# Patient Record
Sex: MALE | Race: WHITE | Employment: UNEMPLOYED | URBAN - NONMETROPOLITAN AREA
[De-identification: names, ages, dates, MRNs, and addresses within clinical notes are randomized per-mention and may not be internally consistent; named-entity substitution may affect disease eponyms.]

---

## 2017-01-03 ENCOUNTER — TELEPHONE (OUTPATIENT)
Dept: FAMILY MEDICINE CLINIC | Age: 57
End: 2017-01-03

## 2017-01-09 ENCOUNTER — OFFICE VISIT (OUTPATIENT)
Dept: FAMILY MEDICINE CLINIC | Age: 57
End: 2017-01-09

## 2017-01-09 ENCOUNTER — TELEPHONE (OUTPATIENT)
Dept: FAMILY MEDICINE CLINIC | Age: 57
End: 2017-01-09

## 2017-01-09 VITALS
WEIGHT: 223 LBS | DIASTOLIC BLOOD PRESSURE: 84 MMHG | SYSTOLIC BLOOD PRESSURE: 112 MMHG | BODY MASS INDEX: 32.93 KG/M2 | RESPIRATION RATE: 16 BRPM | HEART RATE: 80 BPM | TEMPERATURE: 97.3 F

## 2017-01-09 DIAGNOSIS — M15.9 PRIMARY OSTEOARTHRITIS INVOLVING MULTIPLE JOINTS: ICD-10-CM

## 2017-01-09 DIAGNOSIS — M51.37 DDD (DEGENERATIVE DISC DISEASE), LUMBOSACRAL: ICD-10-CM

## 2017-01-09 DIAGNOSIS — E11.42 TYPE 2 DM WITH DIABETIC NEUROPATHY AFFECTING BOTH SIDES OF BODY (HCC): Primary | ICD-10-CM

## 2017-01-09 PROCEDURE — 99213 OFFICE O/P EST LOW 20 MIN: CPT | Performed by: NURSE PRACTITIONER

## 2017-01-09 RX ORDER — OMEPRAZOLE 40 MG/1
CAPSULE, DELAYED RELEASE ORAL
Qty: 30 CAPSULE | Refills: 6 | Status: SHIPPED | OUTPATIENT
Start: 2017-01-09 | End: 2017-06-05 | Stop reason: SDUPTHER

## 2017-01-09 ASSESSMENT — PATIENT HEALTH QUESTIONNAIRE - PHQ9
SUM OF ALL RESPONSES TO PHQ9 QUESTIONS 1 & 2: 0
2. FEELING DOWN, DEPRESSED OR HOPELESS: 0
1. LITTLE INTEREST OR PLEASURE IN DOING THINGS: 0
SUM OF ALL RESPONSES TO PHQ QUESTIONS 1-9: 0

## 2017-01-09 ASSESSMENT — ENCOUNTER SYMPTOMS: RESPIRATORY NEGATIVE: 1

## 2017-01-16 RX ORDER — BLOOD SUGAR DIAGNOSTIC
STRIP MISCELLANEOUS
Qty: 100 STRIP | Refills: 10 | Status: SHIPPED | OUTPATIENT
Start: 2017-01-16 | End: 2018-12-12 | Stop reason: ALTCHOICE

## 2017-01-31 ENCOUNTER — TELEPHONE (OUTPATIENT)
Dept: FAMILY MEDICINE CLINIC | Age: 57
End: 2017-01-31

## 2017-02-06 ENCOUNTER — OFFICE VISIT (OUTPATIENT)
Dept: FAMILY MEDICINE CLINIC | Age: 57
End: 2017-02-06

## 2017-02-06 VITALS
RESPIRATION RATE: 18 BRPM | BODY MASS INDEX: 32.93 KG/M2 | OXYGEN SATURATION: 97 % | DIASTOLIC BLOOD PRESSURE: 78 MMHG | WEIGHT: 223 LBS | HEART RATE: 67 BPM | SYSTOLIC BLOOD PRESSURE: 110 MMHG

## 2017-02-06 DIAGNOSIS — E11.42 TYPE 2 DIABETES MELLITUS WITH DIABETIC POLYNEUROPATHY, UNSPECIFIED LONG TERM INSULIN USE STATUS: Primary | ICD-10-CM

## 2017-02-06 PROCEDURE — 99213 OFFICE O/P EST LOW 20 MIN: CPT | Performed by: NURSE PRACTITIONER

## 2017-02-06 RX ORDER — GLIMEPIRIDE 4 MG/1
TABLET ORAL
Qty: 30 TABLET | Refills: 3 | Status: SHIPPED | OUTPATIENT
Start: 2017-02-06 | End: 2017-03-20 | Stop reason: ALTCHOICE

## 2017-02-06 ASSESSMENT — PATIENT HEALTH QUESTIONNAIRE - PHQ9
SUM OF ALL RESPONSES TO PHQ9 QUESTIONS 1 & 2: 1
SUM OF ALL RESPONSES TO PHQ QUESTIONS 1-9: 1
2. FEELING DOWN, DEPRESSED OR HOPELESS: 0
1. LITTLE INTEREST OR PLEASURE IN DOING THINGS: 1
SUM OF ALL RESPONSES TO PHQ9 QUESTIONS 1 & 2: 1
2. FEELING DOWN, DEPRESSED OR HOPELESS: 0
1. LITTLE INTEREST OR PLEASURE IN DOING THINGS: 1
SUM OF ALL RESPONSES TO PHQ QUESTIONS 1-9: 1

## 2017-02-06 ASSESSMENT — ENCOUNTER SYMPTOMS
GASTROINTESTINAL NEGATIVE: 1
RESPIRATORY NEGATIVE: 1

## 2017-03-20 ENCOUNTER — OFFICE VISIT (OUTPATIENT)
Dept: FAMILY MEDICINE CLINIC | Age: 57
End: 2017-03-20

## 2017-03-20 VITALS
HEART RATE: 88 BPM | WEIGHT: 226 LBS | OXYGEN SATURATION: 96 % | HEIGHT: 69 IN | BODY MASS INDEX: 33.47 KG/M2 | SYSTOLIC BLOOD PRESSURE: 120 MMHG | DIASTOLIC BLOOD PRESSURE: 70 MMHG

## 2017-03-20 DIAGNOSIS — E11.42 TYPE 2 DIABETES MELLITUS WITH DIABETIC POLYNEUROPATHY, WITH LONG-TERM CURRENT USE OF INSULIN (HCC): Primary | ICD-10-CM

## 2017-03-20 DIAGNOSIS — Z79.4 TYPE 2 DIABETES MELLITUS WITH DIABETIC POLYNEUROPATHY, WITH LONG-TERM CURRENT USE OF INSULIN (HCC): Primary | ICD-10-CM

## 2017-03-20 PROCEDURE — 36415 COLL VENOUS BLD VENIPUNCTURE: CPT | Performed by: NURSE PRACTITIONER

## 2017-03-20 PROCEDURE — 99213 OFFICE O/P EST LOW 20 MIN: CPT | Performed by: NURSE PRACTITIONER

## 2017-03-20 ASSESSMENT — ENCOUNTER SYMPTOMS
EYES NEGATIVE: 1
RESPIRATORY NEGATIVE: 1
GASTROINTESTINAL NEGATIVE: 1

## 2017-03-21 LAB
ESTIMATED AVERAGE GLUCOSE: 162.8 MG/DL
HBA1C MFR BLD: 7.3 %

## 2017-04-12 ENCOUNTER — OFFICE VISIT (OUTPATIENT)
Dept: RHEUMATOLOGY | Age: 57
End: 2017-04-12

## 2017-04-12 VITALS
WEIGHT: 229 LBS | TEMPERATURE: 98.1 F | HEART RATE: 76 BPM | DIASTOLIC BLOOD PRESSURE: 72 MMHG | HEIGHT: 69 IN | BODY MASS INDEX: 33.92 KG/M2 | SYSTOLIC BLOOD PRESSURE: 104 MMHG

## 2017-04-12 DIAGNOSIS — M18.12 PRIMARY OSTEOARTHRITIS OF FIRST CARPOMETACARPAL JOINT OF LEFT HAND: ICD-10-CM

## 2017-04-12 DIAGNOSIS — M15.9 PRIMARY OSTEOARTHRITIS INVOLVING MULTIPLE JOINTS: Primary | ICD-10-CM

## 2017-04-12 DIAGNOSIS — R52 PAIN MANAGEMENT: ICD-10-CM

## 2017-04-12 DIAGNOSIS — Z79.1 NSAID LONG-TERM USE: ICD-10-CM

## 2017-04-12 PROCEDURE — 99214 OFFICE O/P EST MOD 30 MIN: CPT | Performed by: INTERNAL MEDICINE

## 2017-04-12 PROCEDURE — 20600 DRAIN/INJ JOINT/BURSA W/O US: CPT | Performed by: INTERNAL MEDICINE

## 2017-04-12 RX ORDER — OXYCODONE HYDROCHLORIDE AND ACETAMINOPHEN 5; 325 MG/1; MG/1
TABLET ORAL
Qty: 60 TABLET | Refills: 0 | Status: SHIPPED | OUTPATIENT
Start: 2017-04-12 | End: 2017-05-01 | Stop reason: SDUPTHER

## 2017-04-12 RX ORDER — TRIAMCINOLONE ACETONIDE 40 MG/ML
40 INJECTION, SUSPENSION INTRA-ARTICULAR; INTRAMUSCULAR ONCE
Status: COMPLETED | OUTPATIENT
Start: 2017-04-12 | End: 2017-04-12

## 2017-04-12 RX ADMIN — TRIAMCINOLONE ACETONIDE 40 MG: 40 INJECTION, SUSPENSION INTRA-ARTICULAR; INTRAMUSCULAR at 14:11

## 2017-04-19 ENCOUNTER — TELEPHONE (OUTPATIENT)
Dept: FAMILY MEDICINE CLINIC | Age: 57
End: 2017-04-19

## 2017-04-24 ENCOUNTER — TELEPHONE (OUTPATIENT)
Dept: FAMILY MEDICINE CLINIC | Age: 57
End: 2017-04-24

## 2017-05-01 DIAGNOSIS — M15.9 PRIMARY OSTEOARTHRITIS INVOLVING MULTIPLE JOINTS: ICD-10-CM

## 2017-05-01 RX ORDER — BISOPROLOL FUMARATE AND HYDROCHLOROTHIAZIDE 10; 6.25 MG/1; MG/1
TABLET ORAL
Qty: 30 TABLET | Refills: 2 | Status: SHIPPED | OUTPATIENT
Start: 2017-05-01 | End: 2017-06-05 | Stop reason: SDUPTHER

## 2017-05-02 ENCOUNTER — TELEPHONE (OUTPATIENT)
Dept: FAMILY MEDICINE CLINIC | Age: 57
End: 2017-05-02

## 2017-05-02 RX ORDER — DULOXETIN HYDROCHLORIDE 30 MG/1
CAPSULE, DELAYED RELEASE ORAL
Qty: 30 CAPSULE | Refills: 1 | Status: SHIPPED | OUTPATIENT
Start: 2017-05-02 | End: 2017-11-30 | Stop reason: ALTCHOICE

## 2017-05-02 RX ORDER — OXYCODONE HYDROCHLORIDE AND ACETAMINOPHEN 5; 325 MG/1; MG/1
TABLET ORAL
Qty: 60 TABLET | Refills: 0 | Status: SHIPPED | OUTPATIENT
Start: 2017-05-02 | End: 2017-11-16 | Stop reason: DRUGHIGH

## 2017-05-04 ENCOUNTER — TELEPHONE (OUTPATIENT)
Dept: FAMILY MEDICINE CLINIC | Age: 57
End: 2017-05-04

## 2017-05-04 DIAGNOSIS — E11.00 TYPE 2 DIABETES MELLITUS WITH HYPEROSMOLARITY WITHOUT COMA, WITHOUT LONG-TERM CURRENT USE OF INSULIN (HCC): Primary | ICD-10-CM

## 2017-05-17 DIAGNOSIS — Z12.11 SPECIAL SCREENING FOR MALIGNANT NEOPLASMS, COLON: Primary | ICD-10-CM

## 2017-05-24 ENCOUNTER — TELEPHONE (OUTPATIENT)
Dept: FAMILY MEDICINE CLINIC | Age: 57
End: 2017-05-24

## 2017-05-24 DIAGNOSIS — M25.511 ACUTE PAIN OF BOTH SHOULDERS: Primary | ICD-10-CM

## 2017-05-24 DIAGNOSIS — M25.512 ACUTE PAIN OF BOTH SHOULDERS: Primary | ICD-10-CM

## 2017-06-05 ENCOUNTER — OFFICE VISIT (OUTPATIENT)
Dept: FAMILY MEDICINE CLINIC | Age: 57
End: 2017-06-05

## 2017-06-05 VITALS
OXYGEN SATURATION: 97 % | RESPIRATION RATE: 20 BRPM | SYSTOLIC BLOOD PRESSURE: 122 MMHG | HEART RATE: 82 BPM | DIASTOLIC BLOOD PRESSURE: 88 MMHG | BODY MASS INDEX: 33.37 KG/M2 | WEIGHT: 226 LBS

## 2017-06-05 DIAGNOSIS — E11.40 TYPE 2 DIABETES MELLITUS WITH DIABETIC NEUROPATHY, WITH LONG-TERM CURRENT USE OF INSULIN (HCC): Primary | ICD-10-CM

## 2017-06-05 DIAGNOSIS — E78.2 MIXED HYPERLIPIDEMIA: ICD-10-CM

## 2017-06-05 DIAGNOSIS — I10 ESSENTIAL HYPERTENSION: ICD-10-CM

## 2017-06-05 DIAGNOSIS — Z79.4 TYPE 2 DIABETES MELLITUS WITH DIABETIC NEUROPATHY, WITH LONG-TERM CURRENT USE OF INSULIN (HCC): Primary | ICD-10-CM

## 2017-06-05 PROCEDURE — 99213 OFFICE O/P EST LOW 20 MIN: CPT | Performed by: NURSE PRACTITIONER

## 2017-06-05 PROCEDURE — 36415 COLL VENOUS BLD VENIPUNCTURE: CPT | Performed by: NURSE PRACTITIONER

## 2017-06-05 RX ORDER — PRAVASTATIN SODIUM 10 MG
TABLET ORAL
Qty: 30 TABLET | Refills: 6 | Status: SHIPPED | OUTPATIENT
Start: 2017-06-05 | End: 2018-02-22 | Stop reason: SDUPTHER

## 2017-06-05 RX ORDER — OMEPRAZOLE 40 MG/1
CAPSULE, DELAYED RELEASE ORAL
Qty: 30 CAPSULE | Refills: 6 | Status: SHIPPED | OUTPATIENT
Start: 2017-06-05 | End: 2018-03-23 | Stop reason: SDUPTHER

## 2017-06-05 RX ORDER — GLIMEPIRIDE 4 MG/1
4 TABLET ORAL
Qty: 30 TABLET | Refills: 3 | Status: CANCELLED | OUTPATIENT
Start: 2017-06-05

## 2017-06-05 RX ORDER — LISINOPRIL 5 MG/1
TABLET ORAL
Qty: 30 TABLET | Refills: 6 | Status: SHIPPED | OUTPATIENT
Start: 2017-06-05 | End: 2017-11-06 | Stop reason: SDUPTHER

## 2017-06-05 RX ORDER — BISOPROLOL FUMARATE AND HYDROCHLOROTHIAZIDE 10; 6.25 MG/1; MG/1
TABLET ORAL
Qty: 30 TABLET | Refills: 6 | Status: SHIPPED | OUTPATIENT
Start: 2017-06-05 | End: 2018-04-21 | Stop reason: SDUPTHER

## 2017-06-05 ASSESSMENT — ENCOUNTER SYMPTOMS
GASTROINTESTINAL NEGATIVE: 1
RESPIRATORY NEGATIVE: 1

## 2017-06-05 ASSESSMENT — PATIENT HEALTH QUESTIONNAIRE - PHQ9
2. FEELING DOWN, DEPRESSED OR HOPELESS: 0
SUM OF ALL RESPONSES TO PHQ9 QUESTIONS 1 & 2: 0
1. LITTLE INTEREST OR PLEASURE IN DOING THINGS: 0
SUM OF ALL RESPONSES TO PHQ QUESTIONS 1-9: 0

## 2017-06-06 ENCOUNTER — OFFICE VISIT (OUTPATIENT)
Dept: ORTHOPEDIC SURGERY | Age: 57
End: 2017-06-06

## 2017-06-06 VITALS — BODY MASS INDEX: 33.47 KG/M2 | WEIGHT: 225.97 LBS | HEIGHT: 69 IN

## 2017-06-06 DIAGNOSIS — M25.511 RIGHT SHOULDER PAIN, UNSPECIFIED CHRONICITY: Primary | ICD-10-CM

## 2017-06-06 DIAGNOSIS — M25.512 LEFT SHOULDER PAIN, UNSPECIFIED CHRONICITY: ICD-10-CM

## 2017-06-06 LAB
ESTIMATED AVERAGE GLUCOSE: 197.3 MG/DL
HBA1C MFR BLD: 8.5 %

## 2017-06-06 PROCEDURE — 99243 OFF/OP CNSLTJ NEW/EST LOW 30: CPT | Performed by: ORTHOPAEDIC SURGERY

## 2017-06-06 PROCEDURE — 73030 X-RAY EXAM OF SHOULDER: CPT | Performed by: ORTHOPAEDIC SURGERY

## 2017-06-06 RX ORDER — BLOOD-GLUCOSE METER
1 KIT MISCELLANEOUS DAILY PRN
Qty: 1 DEVICE | Refills: 0 | Status: SHIPPED | OUTPATIENT
Start: 2017-06-06 | End: 2017-08-28 | Stop reason: SDUPTHER

## 2017-06-13 ENCOUNTER — OFFICE VISIT (OUTPATIENT)
Dept: ORTHOPEDIC SURGERY | Age: 57
End: 2017-06-13

## 2017-06-13 VITALS — BODY MASS INDEX: 33.47 KG/M2 | WEIGHT: 225.97 LBS | HEIGHT: 69 IN

## 2017-06-13 DIAGNOSIS — M75.111 INCOMPLETE TEAR OF RIGHT ROTATOR CUFF: Primary | ICD-10-CM

## 2017-06-13 DIAGNOSIS — M75.41 IMPINGEMENT SYNDROME OF RIGHT SHOULDER: ICD-10-CM

## 2017-06-13 DIAGNOSIS — M75.42 SHOULDER IMPINGEMENT, LEFT: ICD-10-CM

## 2017-06-13 PROBLEM — M25.819 SHOULDER IMPINGEMENT: Status: ACTIVE | Noted: 2017-06-13

## 2017-06-13 PROBLEM — M75.40 SHOULDER IMPINGEMENT: Status: ACTIVE | Noted: 2017-06-13

## 2017-06-13 PROCEDURE — 20610 DRAIN/INJ JOINT/BURSA W/O US: CPT | Performed by: ORTHOPAEDIC SURGERY

## 2017-06-13 PROCEDURE — 99213 OFFICE O/P EST LOW 20 MIN: CPT | Performed by: ORTHOPAEDIC SURGERY

## 2017-06-13 RX ORDER — MELOXICAM 15 MG/1
15 TABLET ORAL DAILY
Qty: 30 TABLET | Refills: 3 | Status: SHIPPED | OUTPATIENT
Start: 2017-06-13 | End: 2017-10-09 | Stop reason: SDUPTHER

## 2017-06-19 ENCOUNTER — HOSPITAL ENCOUNTER (OUTPATIENT)
Dept: PHYSICAL THERAPY | Age: 57
Discharge: HOME | End: 2017-06-30
Admitting: ORTHOPAEDIC SURGERY

## 2017-07-06 DIAGNOSIS — M15.9 PRIMARY OSTEOARTHRITIS INVOLVING MULTIPLE JOINTS: ICD-10-CM

## 2017-07-10 ENCOUNTER — OFFICE VISIT (OUTPATIENT)
Dept: ENT CLINIC | Age: 57
End: 2017-07-10

## 2017-07-10 VITALS
BODY MASS INDEX: 33.27 KG/M2 | HEIGHT: 69 IN | WEIGHT: 224.6 LBS | DIASTOLIC BLOOD PRESSURE: 68 MMHG | TEMPERATURE: 97.9 F | SYSTOLIC BLOOD PRESSURE: 130 MMHG

## 2017-07-10 DIAGNOSIS — H90.5 HEARING LOSS, SENSORINEURAL: Primary | ICD-10-CM

## 2017-07-10 PROCEDURE — 99203 OFFICE O/P NEW LOW 30 MIN: CPT | Performed by: OTOLARYNGOLOGY

## 2017-07-10 RX ORDER — DULOXETIN HYDROCHLORIDE 30 MG/1
CAPSULE, DELAYED RELEASE ORAL
Qty: 30 CAPSULE | Refills: 0 | OUTPATIENT
Start: 2017-07-10

## 2017-07-11 ENCOUNTER — TELEPHONE (OUTPATIENT)
Dept: RHEUMATOLOGY | Age: 57
End: 2017-07-11

## 2017-07-12 ENCOUNTER — TELEPHONE (OUTPATIENT)
Dept: RHEUMATOLOGY | Age: 57
End: 2017-07-12

## 2017-07-12 DIAGNOSIS — Z79.1 NSAID LONG-TERM USE: Primary | ICD-10-CM

## 2017-07-13 DIAGNOSIS — M79.671 FOOT PAIN, RIGHT: Primary | ICD-10-CM

## 2017-07-13 DIAGNOSIS — M79.672 FOOT PAIN, LEFT: ICD-10-CM

## 2017-07-16 ENCOUNTER — HOSPITAL ENCOUNTER (OUTPATIENT)
Dept: OTHER | Age: 57
Discharge: HOME | End: 2017-07-16
Attending: INTERNAL MEDICINE | Admitting: INTERNAL MEDICINE

## 2017-07-16 LAB
A/G RATIO: 1.7 (ref 1.1–2.2)
ALBUMIN SERPL-MCNC: 4.3 G/DL (ref 3.4–5)
ALP BLD-CCNC: 49 U/L (ref 40–129)
ALT SERPL-CCNC: 21 U/L (ref 10–40)
ANION GAP SERPL CALCULATED.3IONS-SCNC: 15 MMOL/L (ref 3–16)
AST SERPL-CCNC: 16 U/L (ref 15–37)
BILIRUB SERPL-MCNC: 0.7 MG/DL (ref 0–1)
BUN BLDV-MCNC: 22 MG/DL (ref 7–20)
CALCIUM SERPL-MCNC: 9.6 MG/DL (ref 8.3–10.6)
CHLORIDE BLD-SCNC: 97 MMOL/L (ref 99–110)
CO2: 26 MMOL/L (ref 21–32)
CREAT SERPL-MCNC: 0.8 MG/DL (ref 0.9–1.3)
GFR AFRICAN AMERICAN: >60
GFR NON-AFRICAN AMERICAN: >60
GLOBULIN: 2.5 G/DL
GLUCOSE BLD-MCNC: 314 MG/DL (ref 70–99)
POTASSIUM SERPL-SCNC: 4.1 MMOL/L (ref 3.5–5.1)
SODIUM BLD-SCNC: 138 MMOL/L (ref 136–145)
TOTAL PROTEIN: 6.8 G/DL (ref 6.4–8.2)

## 2017-07-19 ENCOUNTER — OFFICE VISIT (OUTPATIENT)
Dept: ENT CLINIC | Age: 57
End: 2017-07-19

## 2017-07-19 DIAGNOSIS — H91.93 HEARING LOSS, BILATERAL: Primary | ICD-10-CM

## 2017-07-19 PROCEDURE — 92557 COMPREHENSIVE HEARING TEST: CPT | Performed by: AUDIOLOGIST

## 2017-08-03 ENCOUNTER — OFFICE VISIT (OUTPATIENT)
Dept: RHEUMATOLOGY | Age: 57
End: 2017-08-03

## 2017-08-03 VITALS
HEIGHT: 69 IN | SYSTOLIC BLOOD PRESSURE: 110 MMHG | DIASTOLIC BLOOD PRESSURE: 80 MMHG | BODY MASS INDEX: 33.33 KG/M2 | TEMPERATURE: 98 F | WEIGHT: 225 LBS

## 2017-08-03 DIAGNOSIS — G89.29 CHRONIC RIGHT SHOULDER PAIN: ICD-10-CM

## 2017-08-03 DIAGNOSIS — M25.511 CHRONIC RIGHT SHOULDER PAIN: ICD-10-CM

## 2017-08-03 DIAGNOSIS — M15.9 PRIMARY OSTEOARTHRITIS INVOLVING MULTIPLE JOINTS: Primary | ICD-10-CM

## 2017-08-03 DIAGNOSIS — Z79.1 NSAID LONG-TERM USE: ICD-10-CM

## 2017-08-03 DIAGNOSIS — R52 PAIN MANAGEMENT: ICD-10-CM

## 2017-08-03 PROCEDURE — 99214 OFFICE O/P EST MOD 30 MIN: CPT | Performed by: INTERNAL MEDICINE

## 2017-08-03 PROCEDURE — 20610 DRAIN/INJ JOINT/BURSA W/O US: CPT | Performed by: INTERNAL MEDICINE

## 2017-08-03 RX ORDER — DULOXETIN HYDROCHLORIDE 60 MG/1
CAPSULE, DELAYED RELEASE ORAL
Qty: 90 CAPSULE | Refills: 0 | Status: SHIPPED | OUTPATIENT
Start: 2017-08-03 | End: 2017-11-07 | Stop reason: SDUPTHER

## 2017-08-03 RX ORDER — OXYCODONE HYDROCHLORIDE AND ACETAMINOPHEN 5; 325 MG/1; MG/1
TABLET ORAL
Qty: 45 TABLET | Refills: 0 | Status: SHIPPED | OUTPATIENT
Start: 2017-08-03 | End: 2017-08-28 | Stop reason: SDUPTHER

## 2017-08-03 RX ORDER — GABAPENTIN 600 MG/1
TABLET ORAL
COMMUNITY
Start: 2017-07-13 | End: 2017-11-06 | Stop reason: SDUPTHER

## 2017-08-03 RX ORDER — TRIAMCINOLONE ACETONIDE 40 MG/ML
40 INJECTION, SUSPENSION INTRA-ARTICULAR; INTRAMUSCULAR ONCE
Status: COMPLETED | OUTPATIENT
Start: 2017-08-03 | End: 2017-08-03

## 2017-08-03 RX ADMIN — TRIAMCINOLONE ACETONIDE 40 MG: 40 INJECTION, SUSPENSION INTRA-ARTICULAR; INTRAMUSCULAR at 12:07

## 2017-08-21 ENCOUNTER — TELEPHONE (OUTPATIENT)
Dept: FAMILY MEDICINE CLINIC | Age: 57
End: 2017-08-21

## 2017-08-28 ENCOUNTER — OFFICE VISIT (OUTPATIENT)
Dept: FAMILY MEDICINE CLINIC | Age: 57
End: 2017-08-28

## 2017-08-28 VITALS
HEART RATE: 84 BPM | DIASTOLIC BLOOD PRESSURE: 90 MMHG | WEIGHT: 218 LBS | BODY MASS INDEX: 32.29 KG/M2 | SYSTOLIC BLOOD PRESSURE: 132 MMHG | RESPIRATION RATE: 16 BRPM | TEMPERATURE: 97.5 F

## 2017-08-28 DIAGNOSIS — E78.5 HYPERLIPIDEMIA ASSOCIATED WITH TYPE 2 DIABETES MELLITUS (HCC): ICD-10-CM

## 2017-08-28 DIAGNOSIS — I10 ESSENTIAL HYPERTENSION: ICD-10-CM

## 2017-08-28 DIAGNOSIS — E11.42 TYPE 2 DIABETES MELLITUS WITH DIABETIC POLYNEUROPATHY, WITH LONG-TERM CURRENT USE OF INSULIN (HCC): Primary | ICD-10-CM

## 2017-08-28 DIAGNOSIS — E78.2 MIXED HYPERLIPIDEMIA: ICD-10-CM

## 2017-08-28 DIAGNOSIS — E11.69 HYPERLIPIDEMIA ASSOCIATED WITH TYPE 2 DIABETES MELLITUS (HCC): ICD-10-CM

## 2017-08-28 DIAGNOSIS — Z79.4 TYPE 2 DIABETES MELLITUS WITH DIABETIC POLYNEUROPATHY, WITH LONG-TERM CURRENT USE OF INSULIN (HCC): Primary | ICD-10-CM

## 2017-08-28 PROCEDURE — 99213 OFFICE O/P EST LOW 20 MIN: CPT | Performed by: NURSE PRACTITIONER

## 2017-08-28 PROCEDURE — 36415 COLL VENOUS BLD VENIPUNCTURE: CPT | Performed by: NURSE PRACTITIONER

## 2017-08-29 LAB
CREATININE URINE: 42.9 MG/DL (ref 39–259)
ESTIMATED AVERAGE GLUCOSE: 248.9 MG/DL
HBA1C MFR BLD: 10.3 %
MICROALBUMIN UR-MCNC: <1.2 MG/DL
MICROALBUMIN/CREAT UR-RTO: NORMAL MG/G (ref 0–30)

## 2017-08-29 ASSESSMENT — ENCOUNTER SYMPTOMS
RESPIRATORY NEGATIVE: 1
EYES NEGATIVE: 1
GASTROINTESTINAL NEGATIVE: 1

## 2017-09-12 ENCOUNTER — OFFICE VISIT (OUTPATIENT)
Dept: ORTHOPEDIC SURGERY | Age: 57
End: 2017-09-12

## 2017-09-12 VITALS
BODY MASS INDEX: 32.29 KG/M2 | SYSTOLIC BLOOD PRESSURE: 115 MMHG | HEART RATE: 81 BPM | WEIGHT: 218.03 LBS | HEIGHT: 69 IN | DIASTOLIC BLOOD PRESSURE: 67 MMHG

## 2017-09-12 DIAGNOSIS — M75.41 IMPINGEMENT SYNDROME OF RIGHT SHOULDER: Primary | ICD-10-CM

## 2017-09-12 DIAGNOSIS — M75.111 INCOMPLETE TEAR OF RIGHT ROTATOR CUFF: ICD-10-CM

## 2017-09-12 DIAGNOSIS — M75.40 SHOULDER IMPINGEMENT, UNSPECIFIED LATERALITY: ICD-10-CM

## 2017-09-12 DIAGNOSIS — M18.12 PRIMARY OSTEOARTHRITIS OF FIRST CARPOMETACARPAL JOINT OF LEFT HAND: ICD-10-CM

## 2017-09-12 PROCEDURE — 20600 DRAIN/INJ JOINT/BURSA W/O US: CPT | Performed by: ORTHOPAEDIC SURGERY

## 2017-09-12 PROCEDURE — 99213 OFFICE O/P EST LOW 20 MIN: CPT | Performed by: ORTHOPAEDIC SURGERY

## 2017-09-20 ENCOUNTER — OFFICE VISIT (OUTPATIENT)
Dept: ORTHOPEDIC SURGERY | Age: 57
End: 2017-09-20

## 2017-09-20 VITALS — HEIGHT: 69 IN | WEIGHT: 218.03 LBS | BODY MASS INDEX: 32.29 KG/M2

## 2017-09-20 DIAGNOSIS — M79.642 HAND PAIN, LEFT: Primary | ICD-10-CM

## 2017-09-20 PROCEDURE — 73140 X-RAY EXAM OF FINGER(S): CPT | Performed by: ORTHOPAEDIC SURGERY

## 2017-09-20 PROCEDURE — 99243 OFF/OP CNSLTJ NEW/EST LOW 30: CPT | Performed by: ORTHOPAEDIC SURGERY

## 2017-09-20 PROCEDURE — L3918 METACARP FX ORTHOSIS PRE OTS: HCPCS | Performed by: ORTHOPAEDIC SURGERY

## 2017-09-21 DIAGNOSIS — M19.049 CMC ARTHRITIS: Primary | ICD-10-CM

## 2017-10-04 ENCOUNTER — TELEPHONE (OUTPATIENT)
Dept: OTHER | Facility: CLINIC | Age: 57
End: 2017-10-04

## 2017-10-04 NOTE — TELEPHONE ENCOUNTER
Left message for pt to return call regarding FIT test.     Need to check status of FIT test and see if patient is planning on completing it.

## 2017-10-09 DIAGNOSIS — M75.111 INCOMPLETE TEAR OF RIGHT ROTATOR CUFF: ICD-10-CM

## 2017-10-09 DIAGNOSIS — M75.42 SHOULDER IMPINGEMENT, LEFT: ICD-10-CM

## 2017-10-09 DIAGNOSIS — M75.41 IMPINGEMENT SYNDROME OF RIGHT SHOULDER: ICD-10-CM

## 2017-10-09 RX ORDER — SYRINGE AND NEEDLE,INSULIN,1ML 28GX1/2"
SYRINGE, EMPTY DISPOSABLE MISCELLANEOUS
Qty: 100 EACH | Refills: 1 | Status: SHIPPED | OUTPATIENT
Start: 2017-10-09 | End: 2017-12-06 | Stop reason: SDUPTHER

## 2017-10-09 RX ORDER — MELOXICAM 15 MG/1
15 TABLET ORAL DAILY
Qty: 30 TABLET | Refills: 3 | Status: ON HOLD | OUTPATIENT
Start: 2017-10-09 | End: 2018-11-26 | Stop reason: HOSPADM

## 2017-11-02 ENCOUNTER — OFFICE VISIT (OUTPATIENT)
Dept: RHEUMATOLOGY | Age: 57
End: 2017-11-02

## 2017-11-02 VITALS
SYSTOLIC BLOOD PRESSURE: 120 MMHG | HEIGHT: 68 IN | WEIGHT: 222 LBS | DIASTOLIC BLOOD PRESSURE: 82 MMHG | TEMPERATURE: 98.3 F | BODY MASS INDEX: 33.65 KG/M2

## 2017-11-02 DIAGNOSIS — M18.11 PRIMARY OSTEOARTHRITIS OF FIRST CARPOMETACARPAL JOINT OF RIGHT HAND: ICD-10-CM

## 2017-11-02 DIAGNOSIS — M15.9 PRIMARY OSTEOARTHRITIS INVOLVING MULTIPLE JOINTS: Primary | ICD-10-CM

## 2017-11-02 DIAGNOSIS — Z79.1 NSAID LONG-TERM USE: ICD-10-CM

## 2017-11-02 DIAGNOSIS — R52 PAIN MANAGEMENT: ICD-10-CM

## 2017-11-02 PROCEDURE — G8427 DOCREV CUR MEDS BY ELIG CLIN: HCPCS | Performed by: INTERNAL MEDICINE

## 2017-11-02 PROCEDURE — 99214 OFFICE O/P EST MOD 30 MIN: CPT | Performed by: INTERNAL MEDICINE

## 2017-11-02 PROCEDURE — G8417 CALC BMI ABV UP PARAM F/U: HCPCS | Performed by: INTERNAL MEDICINE

## 2017-11-02 PROCEDURE — 20600 DRAIN/INJ JOINT/BURSA W/O US: CPT | Performed by: INTERNAL MEDICINE

## 2017-11-02 PROCEDURE — G8482 FLU IMMUNIZE ORDER/ADMIN: HCPCS | Performed by: INTERNAL MEDICINE

## 2017-11-02 PROCEDURE — 3017F COLORECTAL CA SCREEN DOC REV: CPT | Performed by: INTERNAL MEDICINE

## 2017-11-02 PROCEDURE — 1036F TOBACCO NON-USER: CPT | Performed by: INTERNAL MEDICINE

## 2017-11-02 RX ORDER — OXYCODONE HYDROCHLORIDE AND ACETAMINOPHEN 5; 325 MG/1; MG/1
TABLET ORAL
Qty: 45 TABLET | Refills: 0 | Status: SHIPPED | OUTPATIENT
Start: 2017-11-02 | End: 2017-11-16 | Stop reason: DRUGHIGH

## 2017-11-02 RX ORDER — TRIAMCINOLONE ACETONIDE 40 MG/ML
40 INJECTION, SUSPENSION INTRA-ARTICULAR; INTRAMUSCULAR ONCE
Status: COMPLETED | OUTPATIENT
Start: 2017-11-02 | End: 2017-11-02

## 2017-11-02 RX ADMIN — TRIAMCINOLONE ACETONIDE 40 MG: 40 INJECTION, SUSPENSION INTRA-ARTICULAR; INTRAMUSCULAR at 13:30

## 2017-11-06 ENCOUNTER — OFFICE VISIT (OUTPATIENT)
Dept: FAMILY MEDICINE CLINIC | Age: 57
End: 2017-11-06

## 2017-11-06 VITALS
OXYGEN SATURATION: 97 % | HEIGHT: 68 IN | TEMPERATURE: 98 F | RESPIRATION RATE: 16 BRPM | BODY MASS INDEX: 34.4 KG/M2 | HEART RATE: 74 BPM | WEIGHT: 227 LBS | SYSTOLIC BLOOD PRESSURE: 108 MMHG | DIASTOLIC BLOOD PRESSURE: 76 MMHG

## 2017-11-06 DIAGNOSIS — E78.2 MIXED HYPERLIPIDEMIA: ICD-10-CM

## 2017-11-06 DIAGNOSIS — I10 ESSENTIAL HYPERTENSION: ICD-10-CM

## 2017-11-06 DIAGNOSIS — M48.061 SPINAL STENOSIS, LUMBAR REGION, WITHOUT NEUROGENIC CLAUDICATION: Chronic | ICD-10-CM

## 2017-11-06 DIAGNOSIS — Z79.4 TYPE 2 DIABETES MELLITUS WITH DIABETIC NEUROPATHY, WITH LONG-TERM CURRENT USE OF INSULIN (HCC): Primary | ICD-10-CM

## 2017-11-06 DIAGNOSIS — E11.40 TYPE 2 DIABETES MELLITUS WITH DIABETIC NEUROPATHY, WITH LONG-TERM CURRENT USE OF INSULIN (HCC): Primary | ICD-10-CM

## 2017-11-06 PROCEDURE — 3017F COLORECTAL CA SCREEN DOC REV: CPT | Performed by: NURSE PRACTITIONER

## 2017-11-06 PROCEDURE — 1036F TOBACCO NON-USER: CPT | Performed by: NURSE PRACTITIONER

## 2017-11-06 PROCEDURE — G8417 CALC BMI ABV UP PARAM F/U: HCPCS | Performed by: NURSE PRACTITIONER

## 2017-11-06 PROCEDURE — G8482 FLU IMMUNIZE ORDER/ADMIN: HCPCS | Performed by: NURSE PRACTITIONER

## 2017-11-06 PROCEDURE — 36415 COLL VENOUS BLD VENIPUNCTURE: CPT | Performed by: NURSE PRACTITIONER

## 2017-11-06 PROCEDURE — 3046F HEMOGLOBIN A1C LEVEL >9.0%: CPT | Performed by: NURSE PRACTITIONER

## 2017-11-06 PROCEDURE — 99213 OFFICE O/P EST LOW 20 MIN: CPT | Performed by: NURSE PRACTITIONER

## 2017-11-06 PROCEDURE — G8427 DOCREV CUR MEDS BY ELIG CLIN: HCPCS | Performed by: NURSE PRACTITIONER

## 2017-11-06 RX ORDER — GABAPENTIN 600 MG/1
600 TABLET ORAL 3 TIMES DAILY
Qty: 90 TABLET | Refills: 6 | Status: SHIPPED | OUTPATIENT
Start: 2017-11-06 | End: 2018-02-26 | Stop reason: SDUPTHER

## 2017-11-06 RX ORDER — LISINOPRIL 5 MG/1
TABLET ORAL
Qty: 30 TABLET | Refills: 6 | Status: SHIPPED | OUTPATIENT
Start: 2017-11-06 | End: 2018-04-23 | Stop reason: SDUPTHER

## 2017-11-06 ASSESSMENT — ENCOUNTER SYMPTOMS
GASTROINTESTINAL NEGATIVE: 1
EYES NEGATIVE: 1
RESPIRATORY NEGATIVE: 1

## 2017-11-06 ASSESSMENT — PATIENT HEALTH QUESTIONNAIRE - PHQ9
1. LITTLE INTEREST OR PLEASURE IN DOING THINGS: 0
SUM OF ALL RESPONSES TO PHQ QUESTIONS 1-9: 0
SUM OF ALL RESPONSES TO PHQ9 QUESTIONS 1 & 2: 0
2. FEELING DOWN, DEPRESSED OR HOPELESS: 0

## 2017-11-06 NOTE — PATIENT INSTRUCTIONS
plate format. Talk to your doctor, a dietitian, or a diabetes educator about your concerns. Carbohydrate counting  With carbohydrate counting, you plan meals based on the amount of carbohydrate in each food. Carbohydrate raises blood sugar higher and more quickly than any other nutrient. It is found in desserts, breads and cereals, and fruit. It's also found in starchy vegetables such as potatoes and corn, grains such as rice and pasta, and milk and yogurt. Spreading carbohydrate throughout the day helps keep your blood sugar levels within your target range. Your daily amount depends on several things, including your weight, how active you are, which diabetes medicines you take, and what your goals are for your blood sugar levels. A registered dietitian or diabetes educator can help you plan how much carbohydrate to include in each meal and snack. A guideline for your daily amount of carbohydrate is:  · 45 to 60 grams at each meal. That's about the same as 3 to 4 carbohydrate servings. · 15 to 20 grams at each snack. That's about the same as 1 carbohydrate serving. The Nutrition Facts label on packaged foods tells you how much carbohydrate is in a serving of the food. First, look at the serving size on the food label. Is that the amount you eat in a serving? All of the nutrition information on a food label is based on that serving size. So if you eat more or less than that, you'll need to adjust the other numbers. Total carbohydrate is the next thing you need to look for on the label. If you count carbohydrate servings, one serving of carbohydrate is 15 grams. For foods that don't come with labels, such as fresh fruits and vegetables, you'll need a guide that lists carbohydrate in these foods. Ask your doctor, dietitian, or diabetes educator about books or other nutrition guides you can use.   If you take insulin, you need to know how many grams of carbohydrate are in a meal. This lets you know how much rapid-acting insulin to take before you eat. If you use an insulin pump, you get a constant rate of insulin during the day. So the pump must be programmed at meals to give you extra insulin to cover the rise in blood sugar after meals. When you know how much carbohydrate you will eat, you can take the right amount of insulin. Or, if you always use the same amount of insulin, you need to make sure that you eat the same amount of carbohydrate at meals. If you need more help to understand carbohydrate counting and food labels, ask your doctor, dietitian, or diabetes educator. How do you get started with meal planning? Here are some tips to get started:  · Plan your meals a week at a time. Don't forget to include snacks too. · Use cookbooks or online recipes to plan several main meals. Plan some quick meals for busy nights. You also can double some recipes that freeze well. Then you can save half for other busy nights when you don't have time to cook. · Make sure you have the ingredients you need for your recipes. If you're running low on basic items, put these items on your shopping list too. · List foods that you use to make breakfasts, lunches, and snacks. List plenty of fruits and vegetables. · Post this list on the refrigerator. Add to it as you think of more things you need. · Take the list to the store to do your weekly shopping. Follow-up care is a key part of your treatment and safety. Be sure to make and go to all appointments, and call your doctor if you are having problems. It's also a good idea to know your test results and keep a list of the medicines you take. Where can you learn more? Go to https://chharshaewwhitney.Local Motion. org and sign in to your Receept account. Enter P762 in the [facility] box to learn more about \"Learning About Meal Planning for Diabetes. \"     If you do not have an account, please click on the \"Sign Up Now\" link.   Current as of: March 13, 2017  Content Use warm (not hot) water. Check the water temperature with your wrists or other part of your body, not your feet. ¨ Dry your feet well. Pat them dry. Do not rub the skin on your feet too hard. Dry well between your toes. If the skin on your feet stays moist, bacteria or a fungus can grow, which can lead to infection. ¨ Keep your skin soft. Use moisturizing skin cream to keep the skin on your feet soft and prevent calluses and cracks. But do not put the cream between your toes, and stop using any cream that causes a rash. ¨ Clean underneath your toenails carefully. Do not use a sharp object to clean underneath your toenails. Use the blunt end of a nail file or other rounded tool. ¨ Trim and file your toenails straight across to prevent ingrown toenails. Use a nail clipper, not scissors. Use an emery board to smooth the edges. · Change socks daily. Socks without seams are best, because seams often rub the feet. You can find socks for people with diabetes from specialty catalogs. · Look inside your shoes every day for things like gravel or torn linings, which could cause blisters or sores. · Buy shoes that fit well:  ¨ Look for shoes that have plenty of space around the toes. This helps prevent bunions and blisters. ¨ Try on shoes while wearing the kind of socks you will usually wear with the shoes. ¨ Avoid plastic shoes. They may rub your feet and cause blisters. Good shoes should be made of materials that are flexible and breathable, such as leather or cloth. ¨ Break in new shoes slowly by wearing them for no more than an hour a day for several days. Take extra time to check your feet for red areas, blisters, or other problems after you wear new shoes. · Do not go barefoot. Do not wear sandals, and do not wear shoes with very thin soles. Thin soles are easy to puncture. They also do not protect your feet from hot pavement or cold weather. · Have your doctor check your feet during each visit.  If you have a foot problem, see your doctor. Do not try to treat an early foot problem at home. Home remedies or treatments that you can buy without a prescription (such as corn removers) can be harmful. · Always get early treatment for foot problems. A minor irritation can lead to a major problem if not properly cared for early. When should you call for help? Call your doctor now or seek immediate medical care if:  · You have a foot sore, an ulcer or break in the skin that is not healing after 4 days, bleeding corns or calluses, or an ingrown toenail. · You have blue or black areas, which can mean bruising or blood flow problems. · You have peeling skin or tiny blisters between your toes or cracking or oozing of the skin. · You have a fever for more than 24 hours and a foot sore. · You have new numbness or tingling in your feet that does not go away after you move your feet or change positions. · You have unexplained or unusual swelling of the foot or ankle. Watch closely for changes in your health, and be sure to contact your doctor if:  · You cannot do proper foot care. Where can you learn more? Go to https://CS Networks.Weaver Express. org and sign in to your MovieSet account. Enter O535 in the Recovr box to learn more about \"Diabetes Foot Health: Care Instructions. \"     If you do not have an account, please click on the \"Sign Up Now\" link. Current as of: March 13, 2017  Content Version: 11.3  © 1028-4797 [employer]. Care instructions adapted under license by [facility] [employer] [facility] ([facility]). If you have questions about a medical condition or this instruction, always ask your healthcare professional. [place] [address] any warranty or liability for your use of this information. Patient Education        Diabetic Neuropathy: Care Instructions  Your Care Instructions  When you have diabetes, your blood sugar level may get too high.  Over time, high blood sugar levels can damage nerves. This is called diabetic neuropathy. Nerve damage can cause pain, burning, tingling, and numbness and may leave you feeling weak. The feet are often affected. When you have nerve damage in your feet, you cannot feel your feet and toes as well as normal and may not notice cuts or sores. Even a small injury can lead to a serious infection. It is very important that you follow your doctor's advice on foot care. Sometimes diabetes damages nerves that help the body function. If this happens, your blood pressure, sweating, digestion, and urination might be affected. Your doctor may give you a target blood sugar level that is higher or lower than you are used to. Try to keep your blood sugar very close to this target level to prevent more damage. Follow-up care is a key part of your treatment and safety. Be sure to make and go to all appointments, and call your doctor if you are having problems. It's also a good idea to know your test results and keep a list of the medicines you take. How can you care for yourself at home? · Take your medicines exactly as prescribed. Call your doctor if you think you are having a problem with your medicine. It is very important that you take your insulin or diabetes pills as your doctor tells you. · Try to keep blood sugar at your target level. ¨ Eat a variety of healthy foods, with carbohydrate spread out in your meals. A dietitian can help you plan meals. ¨ Try to get at least 30 minutes of exercise on most days. ¨ Check your blood sugar as many times each day as your doctor recommends. · Take and record your blood pressure at home if your doctor tells you to. Learn the importance of the two measures of blood pressure (such as 130 over 80, or 130/80). To take your blood pressure at home:  ¨ Ask your doctor to check your blood pressure monitor to be sure it is accurate and the cuff fits you. Also ask your doctor to watch you to make sure that you are using it right.   ¨ Do

## 2017-11-06 NOTE — PROGRESS NOTES
previous with monofilament testing. Lymphadenopathy:     He has no cervical adenopathy. Neurological: He is alert and oriented to person, place, and time. He has normal reflexes. No cranial nerve deficit. He exhibits normal muscle tone. Coordination normal.   Skin: Skin is warm and dry. He is not diaphoretic. Psychiatric: He has a normal mood and affect. His behavior is normal. Judgment and thought content normal.   Nursing note and vitals reviewed. Assessment:      1. Type 2 DM  2. HTN  3. Hyperlipdemia        Plan:      1.3. All are stable. I'm super pleased  With his numbers and his change in attitude towards his disease. Will get a Hgb alc today we may decrease some of his meds if his numbers are better. RTC in 3 months or sooner as needed. He agrees with plan.

## 2017-11-07 LAB
ESTIMATED AVERAGE GLUCOSE: 171.4 MG/DL
HBA1C MFR BLD: 7.6 %

## 2017-11-08 ENCOUNTER — TELEPHONE (OUTPATIENT)
Dept: ORTHOPEDIC SURGERY | Age: 57
End: 2017-11-08

## 2017-11-13 ENCOUNTER — TELEPHONE (OUTPATIENT)
Dept: RHEUMATOLOGY | Age: 57
End: 2017-11-13

## 2017-11-13 ENCOUNTER — OFFICE VISIT (OUTPATIENT)
Dept: FAMILY MEDICINE CLINIC | Age: 57
End: 2017-11-13

## 2017-11-13 VITALS
HEIGHT: 70 IN | OXYGEN SATURATION: 96 % | DIASTOLIC BLOOD PRESSURE: 78 MMHG | SYSTOLIC BLOOD PRESSURE: 118 MMHG | TEMPERATURE: 97.8 F | BODY MASS INDEX: 32.81 KG/M2 | HEART RATE: 64 BPM | RESPIRATION RATE: 18 BRPM | WEIGHT: 229.2 LBS

## 2017-11-13 DIAGNOSIS — Z01.818 PRE-OPERATIVE GENERAL PHYSICAL EXAMINATION: ICD-10-CM

## 2017-11-13 DIAGNOSIS — G51.0 BELL'S PALSY: Primary | ICD-10-CM

## 2017-11-13 DIAGNOSIS — R52 PAIN MANAGEMENT: Primary | ICD-10-CM

## 2017-11-13 PROCEDURE — G8482 FLU IMMUNIZE ORDER/ADMIN: HCPCS | Performed by: NURSE PRACTITIONER

## 2017-11-13 PROCEDURE — G8427 DOCREV CUR MEDS BY ELIG CLIN: HCPCS | Performed by: NURSE PRACTITIONER

## 2017-11-13 PROCEDURE — 99213 OFFICE O/P EST LOW 20 MIN: CPT | Performed by: NURSE PRACTITIONER

## 2017-11-13 PROCEDURE — 1036F TOBACCO NON-USER: CPT | Performed by: NURSE PRACTITIONER

## 2017-11-13 PROCEDURE — 3017F COLORECTAL CA SCREEN DOC REV: CPT | Performed by: NURSE PRACTITIONER

## 2017-11-13 PROCEDURE — G8417 CALC BMI ABV UP PARAM F/U: HCPCS | Performed by: NURSE PRACTITIONER

## 2017-11-13 RX ORDER — MINERAL OIL AND PETROLATUM 150; 830 MG/G; MG/G
OINTMENT OPHTHALMIC
COMMUNITY
Start: 2017-11-08 | End: 2018-04-23 | Stop reason: ALTCHOICE

## 2017-11-13 RX ORDER — VALACYCLOVIR HYDROCHLORIDE 1 G/1
2000 TABLET, FILM COATED ORAL 3 TIMES DAILY
Qty: 21 TABLET | Refills: 0 | Status: SHIPPED | OUTPATIENT
Start: 2017-11-13 | End: 2018-02-26 | Stop reason: ALTCHOICE

## 2017-11-13 RX ORDER — POLYVINYL ALCOHOL 14 MG/ML
SOLUTION/ DROPS OPHTHALMIC
COMMUNITY
Start: 2017-11-07 | End: 2018-08-14

## 2017-11-13 ASSESSMENT — ENCOUNTER SYMPTOMS
EYES NEGATIVE: 1
RESPIRATORY NEGATIVE: 1
ALLERGIC/IMMUNOLOGIC NEGATIVE: 1

## 2017-11-13 NOTE — PROGRESS NOTES
rales.   Abdominal: Soft. Bowel sounds are normal. He exhibits no distension and no mass. There is no tenderness. Musculoskeletal: Normal range of motion. Lymphadenopathy:     He has no cervical adenopathy. Neurological: He is alert and oriented to person, place, and time. He has normal reflexes. He exhibits normal muscle tone. Coordination normal.   A slight left sided facial droop noted. Skin: Skin is warm and dry. He is not diaphoretic. Psychiatric: He has a normal mood and affect. His behavior is normal. Judgment and thought content normal.   Nursing note and vitals reviewed. Assessment:      Pre-op physical.      Plan:      Cleared for planned procedure. Filled out paperwork. Will give him Valacyclovir 1 gram TID for 7 days for this [place] palsey. RTC as needed and keep previously scheduled appointment. He agrees with plan.

## 2017-11-13 NOTE — TELEPHONE ENCOUNTER
Patient called about his medication since we are not doing PA on pain medication. Asking for referral for   pain doctor.

## 2017-11-16 ENCOUNTER — OFFICE VISIT (OUTPATIENT)
Dept: PAIN MANAGEMENT | Age: 57
End: 2017-11-16

## 2017-11-16 VITALS
DIASTOLIC BLOOD PRESSURE: 70 MMHG | BODY MASS INDEX: 34.86 KG/M2 | WEIGHT: 230 LBS | HEIGHT: 68 IN | SYSTOLIC BLOOD PRESSURE: 109 MMHG | HEART RATE: 73 BPM

## 2017-11-16 DIAGNOSIS — G89.4 CHRONIC PAIN SYNDROME: ICD-10-CM

## 2017-11-16 DIAGNOSIS — M13.0 POLYARTHROPATHY, MULTIPLE SITES: Primary | ICD-10-CM

## 2017-11-16 PROCEDURE — 1036F TOBACCO NON-USER: CPT | Performed by: ANESTHESIOLOGY

## 2017-11-16 PROCEDURE — 99203 OFFICE O/P NEW LOW 30 MIN: CPT | Performed by: ANESTHESIOLOGY

## 2017-11-16 PROCEDURE — G8427 DOCREV CUR MEDS BY ELIG CLIN: HCPCS | Performed by: ANESTHESIOLOGY

## 2017-11-16 PROCEDURE — G8417 CALC BMI ABV UP PARAM F/U: HCPCS | Performed by: ANESTHESIOLOGY

## 2017-11-16 PROCEDURE — 3017F COLORECTAL CA SCREEN DOC REV: CPT | Performed by: ANESTHESIOLOGY

## 2017-11-16 PROCEDURE — G8482 FLU IMMUNIZE ORDER/ADMIN: HCPCS | Performed by: ANESTHESIOLOGY

## 2017-11-16 RX ORDER — OXYCODONE HYDROCHLORIDE AND ACETAMINOPHEN 5; 325 MG/1; MG/1
1 TABLET ORAL 2 TIMES DAILY PRN
Qty: 30 TABLET | Refills: 0 | Status: SHIPPED | OUTPATIENT
Start: 2017-11-16 | End: 2017-11-23

## 2017-11-16 ASSESSMENT — ENCOUNTER SYMPTOMS
VOMITING: 0
WHEEZING: 0
NAUSEA: 0
HEARTBURN: 0
BACK PAIN: 1
EYE DISCHARGE: 0
EYE PAIN: 0
CONSTIPATION: 0
ABDOMINAL PAIN: 0
EYE REDNESS: 0
ORTHOPNEA: 0
HEMOPTYSIS: 0
SHORTNESS OF BREATH: 0
COUGH: 0

## 2017-11-16 ASSESSMENT — PATIENT HEALTH QUESTIONNAIRE - PHQ9
1. LITTLE INTEREST OR PLEASURE IN DOING THINGS: 1
2. FEELING DOWN, DEPRESSED OR HOPELESS: 1
SUM OF ALL RESPONSES TO PHQ QUESTIONS 1-9: 2
SUM OF ALL RESPONSES TO PHQ9 QUESTIONS 1 & 2: 2

## 2017-11-16 NOTE — PROGRESS NOTES
[address] [facility] [address], [place], [address]  [phone] (T)  [phone] (f)      11/16/17      Elicia Skaggs  1960      Samreen Urbina, [address]  [place], [address]  [phone]      Chief Complain:   Chief Complaint   Patient presents with    Lower Back Pain     c/o lower back pain mostly left side    Hip Pain     c/o right hip pain below belt        History of Present Illness   HPI    Chronic pain \"all over\" for several years due to arthritis in multiple joints and neuropathy in legs. Pain in multiple joints due to OA including knees, shoulders, hands -  seen Rheumatology (Dr Lulu Niño), [facility] (Dr Donal Cedillo) and is scheduled for a surgery on the left hand. Seen another pain provider (Dr Stacey Shukla) and was given Percocet 5 mg with moderate help, left his practice for unknown reasons - referred to us for the same. History of diabetic neuropathy (on Neurontin) and chronic episodic back pain without acute changes. Pain is constant, achy, sharp without any radiation or focal changes. Pain worse with changes in weather, activities and helped minimally by rest and medications.  RED FLAG symptoms (Symptoms may include, but not limited to, acute trauma, fever, drug use, malignancy, weakness, perianal numbness, bladder/bowel changes) - No      Therapies Tried:    Chiropractic Manipulation: None Recently / N/A   Physical Therapy: No / N/A   Home Exercise: No / N/A   TENS Therapy: No / N/A   Psychotherapy: No / N/A   Injections: No / N/A   Surgery: No / N/A    Pain Medications Tried:    NSAIDS: Yes / currently - on meloxicam   Antidepressants: Yes / depression - on Cymbalta   Anticonvulsants: Yes / diabetic neuropathy - on Neurontin   Muscle Relaxants: Yes / currently - on tizanidine   Opioids: None Recently / N/A    Past Medical History:   Diagnosis Date    Anxiety     CTS (carpal tunnel syndrome)     CTS (carpal tunnel syndrome)     Diabetes mellitus ([facility] [address]     GERD (gastroesophageal reflux disease)     Hyperlipidemia     Hypertension     Peripheral neuropathy (HCC)     Type II or unspecified type diabetes mellitus without mention of complication, not stated as uncontrolled     Urinary frequency        Past Surgical History:   Procedure Laterality Date    APPENDECTOMY      CARPAL TUNNEL RELEASE      HERNIA REPAIR  10/12/15    Laparoscopic Hernia Repair       No Known Allergies    Current Outpatient Prescriptions   Medication Sig Dispense Refill    oxyCODONE-acetaminophen (PERCOCET) 5-325 MG per tablet Take 1 tablet by mouth 2 times daily as needed for Pain . Earliest Fill Date: 11/16/17 30 tablet 0    ARTIFICIAL TEARS 1.4 % ophthalmic solution       White Petrolatum-Mineral Oil (ARTIFICIAL TEARS) 83-15 %       valACYclovir (VALTREX) 1 g tablet Take 2 tablets by mouth 3 times daily 21 tablet 0    DULoxetine (CYMBALTA) 60 MG extended release capsule TAKE ONE CAPSULE BY MOUTH DAILY 30 capsule 3    predniSONE (DELTASONE) 10 MG tablet Take 1 tablet by mouth daily Take 4 tablets ×4 days  Take 3 tablets ×3 days  Take 2 tablets ×2 days  Take 1 tablet ×1 day 30 tablet 0    Naphazoline-Polyethyl Glycol (EYE DROPS) 0.012-0.2 % SOLN Apply 1 drop to eye every hour as needed (TO KEEP CORNEA MOIST) 1 Bottle 3    Artificial Tear Ointment (EYE LUBRICANT) OINT Apply 1 applicator to eye nightly for 15 days 1 Tube 0    acyclovir (ZOVIRAX) 800 MG tablet Take 1 tablet by mouth 5 times daily for 25 days 25 tablet 0    gabapentin (NEURONTIN) 600 MG tablet Take 1 tablet by mouth 3 times daily 90 tablet 6    metFORMIN (GLUCOPHAGE) 1000 MG tablet TAKE ONE TABLET BY MOUTH TWICE A DAY 60 tablet 6    lisinopril (PRINIVIL;ZESTRIL) 5 MG tablet TAKE ONE TABLET BY MOUTH EVERY DAY 30 tablet 6    [employer] Lancets Super Thin MISC Test blood sugars 1-2 times a day can test more if blood sugar feels low.  100 each 6    ondansetron (ZOFRAN) 4 MG tablet Take 1 tablet by mouth every 8 hours as needed for Nausea or Vomiting 20 tablet 0    insulin lispro (HUMALOG KWIKPEN) 100 UNIT/ML pen Inject 20 units into skin three times daily 5 Pen 6    Alcohol Swabs (SURE-PREP ALCOHOL PREP) 70 % PADS Use one alcohol pad for each injection of 100 each 1    meloxicam (MOBIC) 15 MG tablet Take 1 tablet by mouth daily 30 tablet 3    B Complex Vitamins (VITAMIN B COMPLEX PO) Take 1 tablet by mouth daily OTC      insulin lispro (HUMALOG) 100 UNIT/ML injection vial Inject 5 Units into the skin 3 times daily (before meals)      CRANBERRY PO Take 1 tablet by mouth 2 times daily OTC      Insulin Pen Needle (KR[employer]ER PEN NEEDLES) 31G X 6 MM MISC 1 each by Does not apply route daily 300 each 11    glucose blood VI test strips (EXACTECH TEST) strip 1 each by In Vitro route 3 times daily As needed. 100 each 11    bisoprolol-hydrochlorothiazide (ZIAC) 10-6.25 MG per tablet TAKE ONE TABLET BY MOUTH EVERY DAY 30 tablet 6    omeprazole (PRILOSEC) 40 MG delayed release capsule TAKE ONE CAPSULE BY MOUTH EVERY DAY 30 capsule 6    pravastatin (PRAVACHOL) 10 MG tablet TAKE ONE TABLET BY MOUTH EVERY DAY 30 tablet 6    DULoxetine (CYMBALTA) 30 MG extended release capsule TAKE ONE CAPSULE BY MOUTH DAILY 30 capsule 1    Insulin Degludec (TRESIBA FLEXTOUCH) 100 UNIT/ML SOPN Inject 30 Units into the skin daily Indications: 10 units daily       WAVESENSE PRESTO strip TEST DAILY AND AS NEEDED 100 strip 10    diclofenac (VOLTAREN) 75 MG EC tablet Take 1 tablet by mouth 2 times daily 60 tablet 6    tiZANidine (ZANAFLEX) 4 MG tablet Take 1 tablet by mouth 3 times daily 90 tablet 5    Saw Palmetto, Serenoa repens, (SAW PALMETTO PO) Take by mouth 2 times daily      VALERIAN ROOT PO Take by mouth nightly      diclofenac sodium 1 % GEL Apply   2 gm in upper extremity joint, 3 times a day as needed. Maximum dose 12 gram/day.  2 Tube 3     No current facility-administered medications for this warm. No rash noted. He is not diaphoretic. Psychiatric: His behavior is normal. Thought content normal. His mood appears anxious. Irritable       Vitals:    11/16/17 1403   BP: 109/70   Site: Right Arm   Position: Sitting   Cuff Size: Large Adult   Pulse: 73   Weight: 230 lb (104.3 kg)   Height: 5' 8\" (1.727 m)       Body mass index is 34.97 kg/m². Pain Score:   4 /10    Screenings:   ORT (Opioid Risk Tool) Score = 4  BPI (Brief Pain Inventory):    Pain Score = 5   Interference Score = 6  PHQ-9 (Patient Health Questionnaire) Score = 15  PDI (Pain Disability Index) Score = 35/70    Current MEDD = 15    Assessment:    1. Polyarthropathy, multiple sites  Chronic.  - External Referral To Pain Clinic  - oxyCODONE-acetaminophen (PERCOCET) 5-325 MG per tablet; Take 1 tablet by mouth 2 times daily as needed for Pain . Earliest Fill Date: 11/16/17  Dispense: 30 tablet; Refill: 0    2. Chronic pain syndrome  ORT score - medium; risk factors - anxiety/depression.  - External Referral To Pain Clinic (patient request)      Plan:     1. Presents with multiple pain issues - due to OA, diabetic neuropathy and chronic back pain - no acute changes. 2. Reviewed MRI results with patient; physical exam today showed no focal signs. 3. Counseled him on options for pain; reasonable for use of Percocet 5 mg BID on a PRN basis. 4. We were late in seeing him today - he was irritable and frustrated over this; I apologized for the delay. 5. Reports he has to leave early so he could baby sit his grand baby; does not think it is going to work out with us, since we are almost an hour from home. 6. Requesting referral to pain management closer to home at [facility] (referral provided). I will provide him with Percocet 5 mg BID PRN till he could get into this provider. RTC PRN.    - Educational material provided on safe opioid use.     Failed conservative care:    - Failed PT, NSAIDs, anti-depressants, anti-convulsants through other providers. - Based on an analysis of risks, benefits, and alternatives, prescription of opioid appears appropriate for management of chronic pain. Analgesic Plan:   Continue present regimen: Yes   Adjust dose of present analgesic: No   Switch analgesics: No - continue Percocet   Add/Adjust concomitant therapy: No - continue Cymbalta     Controlled Substances Monitoring: The entire treatment plan was discussed with patient and agreed.          Marli Landis MD  Board Certified in Anesthesiology and Pain Medicine

## 2017-11-16 NOTE — PATIENT INSTRUCTIONS
life easier, such as a higher toilet seat and padded handles on kitchen utensils. · Do not sit in low chairs, which can make it hard to get up. · Put heat or cold on your sore joints as needed. Use whichever helps you most. You also can take turns with hot and cold packs. ¨ Apply heat 2 or 3 times a day for 20 to 30 minutesusing a heating pad, hot shower, or hot packto relieve pain and stiffness. ¨ Put ice or a cold pack on your sore joint for 10 to 20 minutes at a time. Put a thin cloth between the ice and your skin. When should you call for help? Call your doctor now or seek immediate medical care if:  · You have sudden swelling, warmth, or pain in any joint. · You have joint pain and a fever or rash. · You have such bad pain that you cannot use a joint. Watch closely for changes in your health, and be sure to contact your doctor if:  · You have mild joint symptoms that continue even with more than 6 weeks of care at home. · You have stomach pain or other problems with your medicine. Where can you learn more? Go to https://Fyreplug Inc..Casagem. org and sign in to your Think-Now account. Enter Q781 in the [employer] box to learn more about \"Arthritis: Care Instructions. \"     If you do not have an account, please click on the \"Sign Up Now\" link. Current as of: November 28, 2016  Content Version: 11.3  © 3583-4585 [employer]. Care instructions adapted under license by [facility] ([facility]). If you have questions about a medical condition or this instruction, always ask your healthcare professional. [place] [address] any warranty or liability for your use of this information.

## 2017-11-27 ENCOUNTER — HOSPITAL ENCOUNTER (OUTPATIENT)
Dept: OCCUPATIONAL THERAPY | Age: 57
Discharge: HOME | End: 2017-11-30
Attending: ORTHOPAEDIC SURGERY | Admitting: ORTHOPAEDIC SURGERY

## 2017-11-29 RX ORDER — HYDROMORPHONE HCL 110MG/55ML
0.5 PATIENT CONTROLLED ANALGESIA SYRINGE INTRAVENOUS EVERY 10 MIN PRN
Status: DISCONTINUED | OUTPATIENT
Start: 2017-11-29 | End: 2017-12-01 | Stop reason: HOSPADM

## 2017-11-29 RX ORDER — HYDRALAZINE HYDROCHLORIDE 20 MG/ML
5 INJECTION INTRAMUSCULAR; INTRAVENOUS EVERY 30 MIN PRN
Status: DISCONTINUED | OUTPATIENT
Start: 2017-11-29 | End: 2017-12-01 | Stop reason: HOSPADM

## 2017-11-29 RX ORDER — DIPHENHYDRAMINE HYDROCHLORIDE 50 MG/ML
6.25 INJECTION INTRAMUSCULAR; INTRAVENOUS
Status: ACTIVE | OUTPATIENT
Start: 2017-11-29 | End: 2017-11-29

## 2017-11-29 RX ORDER — ONDANSETRON 2 MG/ML
4 INJECTION INTRAMUSCULAR; INTRAVENOUS EVERY 30 MIN PRN
Status: DISCONTINUED | OUTPATIENT
Start: 2017-11-29 | End: 2017-12-01 | Stop reason: HOSPADM

## 2017-11-29 RX ORDER — HYDROMORPHONE HCL 110MG/55ML
0.5 PATIENT CONTROLLED ANALGESIA SYRINGE INTRAVENOUS EVERY 5 MIN PRN
Status: DISCONTINUED | OUTPATIENT
Start: 2017-11-29 | End: 2017-12-01 | Stop reason: HOSPADM

## 2017-11-29 RX ORDER — OXYCODONE HYDROCHLORIDE AND ACETAMINOPHEN 5; 325 MG/1; MG/1
1 TABLET ORAL PRN
Status: ACTIVE | OUTPATIENT
Start: 2017-11-29 | End: 2017-11-29

## 2017-11-29 RX ORDER — LABETALOL HYDROCHLORIDE 5 MG/ML
5 INJECTION, SOLUTION INTRAVENOUS
Status: DISCONTINUED | OUTPATIENT
Start: 2017-11-29 | End: 2017-12-01 | Stop reason: HOSPADM

## 2017-11-29 RX ORDER — OXYCODONE HYDROCHLORIDE AND ACETAMINOPHEN 5; 325 MG/1; MG/1
2 TABLET ORAL PRN
Status: ACTIVE | OUTPATIENT
Start: 2017-11-29 | End: 2017-11-29

## 2017-11-29 RX ORDER — MEPERIDINE HYDROCHLORIDE 25 MG/ML
12.5 INJECTION INTRAMUSCULAR; INTRAVENOUS; SUBCUTANEOUS EVERY 5 MIN PRN
Status: DISCONTINUED | OUTPATIENT
Start: 2017-11-29 | End: 2017-12-01 | Stop reason: HOSPADM

## 2017-11-30 ENCOUNTER — HOSPITAL ENCOUNTER (OUTPATIENT)
Dept: SURGERY | Age: 57
Discharge: HOME | End: 2017-11-30
Attending: ORTHOPAEDIC SURGERY | Admitting: ORTHOPAEDIC SURGERY

## 2017-11-30 VITALS
TEMPERATURE: 98.2 F | DIASTOLIC BLOOD PRESSURE: 96 MMHG | HEART RATE: 78 BPM | OXYGEN SATURATION: 98 % | RESPIRATION RATE: 17 BRPM | SYSTOLIC BLOOD PRESSURE: 146 MMHG

## 2017-11-30 LAB
GLUCOSE BLD-MCNC: 114 MG/DL (ref 70–99)
GLUCOSE BLD-MCNC: 144 MG/DL (ref 70–99)
PERFORMED ON: ABNORMAL
PERFORMED ON: ABNORMAL

## 2017-11-30 RX ORDER — LIDOCAINE HYDROCHLORIDE 10 MG/ML
1 INJECTION, SOLUTION EPIDURAL; INFILTRATION; INTRACAUDAL; PERINEURAL
Status: COMPLETED | OUTPATIENT
Start: 2017-11-30 | End: 2017-11-30

## 2017-11-30 RX ORDER — OXYCODONE HYDROCHLORIDE AND ACETAMINOPHEN 5; 325 MG/1; MG/1
2 TABLET ORAL EVERY 6 HOURS PRN
Qty: 50 TABLET | Refills: 0 | Status: SHIPPED | OUTPATIENT
Start: 2017-11-30 | End: 2017-12-07

## 2017-11-30 RX ORDER — SODIUM CHLORIDE, SODIUM LACTATE, POTASSIUM CHLORIDE, CALCIUM CHLORIDE 600; 310; 30; 20 MG/100ML; MG/100ML; MG/100ML; MG/100ML
INJECTION, SOLUTION INTRAVENOUS CONTINUOUS
Status: DISCONTINUED | OUTPATIENT
Start: 2017-11-30 | End: 2017-12-01 | Stop reason: HOSPADM

## 2017-11-30 RX ADMIN — SODIUM CHLORIDE, SODIUM LACTATE, POTASSIUM CHLORIDE, CALCIUM CHLORIDE: 600; 310; 30; 20 INJECTION, SOLUTION INTRAVENOUS at 06:27

## 2017-11-30 RX ADMIN — LIDOCAINE HYDROCHLORIDE 0.1 ML: 10 INJECTION, SOLUTION EPIDURAL; INFILTRATION; INTRACAUDAL; PERINEURAL at 06:26

## 2017-11-30 ASSESSMENT — PAIN - FUNCTIONAL ASSESSMENT: PAIN_FUNCTIONAL_ASSESSMENT: 0-10

## 2017-11-30 ASSESSMENT — PAIN SCALES - GENERAL
PAINLEVEL_OUTOF10: 0
PAINLEVEL_OUTOF10: 0

## 2017-11-30 ASSESSMENT — ACTIVITIES OF DAILY LIVING (ADL): EFFECT OF PAIN ON DAILY ACTIVITIES: MOVING IT

## 2017-11-30 ASSESSMENT — PAIN DESCRIPTION - DESCRIPTORS: DESCRIPTORS: ACHING;SHARP;CONSTANT

## 2017-11-30 NOTE — ANESTHESIA POST-OP
Postoperative Anesthesia Note    Name:    Kaushik Burgos  MRN:      [id number]    Patient Vitals for the past 12 hrs:   BP Temp Temp src Pulse Resp SpO2   11/30/17 0836 (!) 146/96 - - 78 17 98 %   11/30/17 0816 (!) 157/95 98.2 °F (36.8 °C) Temporal 85 16 98 %   11/30/17 0811 (!) 157/98 - - 85 18 97 %   11/30/17 0806 (!) 135/91 - - 88 16 96 %   11/30/17 0801 (!) 147/100 98.1 °F (36.7 °C) Temporal 89 14 94 %   11/30/17 0658 (!) 131/93 - - 73 16 100 %   11/30/17 0653 (!) 135/91 - - 71 16 100 %   11/30/17 0645 (!) 148/92 - - 74 18 100 %   11/30/17 0559 132/87 97.3 °F (36.3 °C) Temporal 73 20 100 %        LABS:    CBC  Lab Results   Component Value Date/Time    WBC 6.9 10/18/2017 10:50 AM    HGB 14.9 10/18/2017 10:50 AM    HCT 43.5 10/18/2017 10:50 AM     (L) 10/18/2017 10:50 AM     RENAL  Lab Results   Component Value Date/Time     10/18/2017 10:50 AM    K 3.8 10/18/2017 10:50 AM     10/18/2017 10:50 AM    CO2 24 10/18/2017 10:50 AM    BUN 18 10/18/2017 10:50 AM    CREATININE 0.8 (L) 10/18/2017 10:50 AM    GLUCOSE 254 (H) 10/18/2017 10:50 AM     COAGS  Lab Results   Component Value Date/Time    PROTIME 14.6 (H) 10/18/2017 10:50 AM    INR 1.26 (H) 10/18/2017 10:50 AM    APTT 26.8 10/18/2017 10:50 AM       Intake & Output: In: 900 [I.V.:900]  Out: -     Nausea & Vomiting:  No    Level of Consciousness:  Awake    Pain Assessment:  Adequate analgesia    Anesthesia Complications:  No apparent anesthetic complications    SUMMARY      Vital signs stable  OK to discharge from Stage I post anesthesia care.   Care transferred from Anesthesiology department on discharge from perioperative area

## 2017-11-30 NOTE — PROGRESS NOTES
Phase I (PACU)  1. Patient is identified using name and the date of birth. 2. The patient is free from signs and symptoms of chemical, electrical, laser, radiation, positioning, or transfer/transport injury. 3. The patient receives appropriate medication(s), safely administered during the Perioperative period. 4. The patient has wound/tissue perfusion consistent with or improved from baseline levels established preoperatively. 5. The patient is at or returning to normothermia at the conclusion of the immediate postoperative period. 6. The patient's fluid, electrolyte, and acid base balances are consistent with or improved from baseline levels established preoperatively. 7. The patient's pulmonary function is consistent with or improved from baseline levels established preoperatively. 8. The patient's cardiovascular status is consistent with or improved from baseline levels established preoperatively. 9. The patient/caregiver participates in decisions affecting his or her Perioperative plan of care. 10. The patient's care is consistent with the individualized Perioperative plan of care. 11. The patient's right to privacy is maintained. 12. The patient is the recipient of competent and ethical care within legal standards of practice. 13. The patient's value system, lifestyle, ethnicity, and culture are considered, respected, and incorporated in the Perioperative plan of care. 14. The patient demonstrates and/or reports adequate pain control throughout the the Perioperative period. 15. The patient's neurological status is consistent with or improved from baseline levels established preoperatively. 16. The patient/caregiver demonstrates knowledge of the expected responses to the operative or invasive procedure. 17. Patient/Caregiver has reduced anxiety. Interventions- Familiarize with environment and equipment.   18. Potential for fall the patient will move to fall risk upon sedation - through the recovery phase. [place] to environment. Call light in reach. Instruct to call for assistance prior to getting up. Non skid footwear. Bed wheels locked. Bed in lowest position. Side rails up times two.

## 2017-11-30 NOTE — PROGRESS NOTES
process. 23. Patient pain level is established preoperatively using age appropriate pain scale. 24. The patient will move to fall risk upon sedation- during and through the recovery phase. [place] to environment. Call light in reach. Instruct to call for assistance prior to getting up. Non skid footwear. Bed wheels locked. Bed in lowest position. Siderails up times two. Family at chairside/bedside preoperatively to assist with increased observation of patient.

## 2017-11-30 NOTE — H&P
I have reviewed the History & Physical and examined the patient and find no relevant changes. I have reviewed with the patient and/or family the risks, benefits, and alternatives to the procedure(s). All questions and concerns were addressed. Consent is on the chart. Surgical site, left thumb, has been marked by Dr Skyla Schaffer and confirmed by the patient. We discussed surgery for thumb CMC joint arthritis again today. The goal of the procedure is to reduce pain, and therefore, improve functions that are limited secondary to the patient's pain. We discussed removal of the trapezium and proceeding with suspension arthroplasty. Suspension of the thumb metacarpal will be done with either FiberWire or FiberTape, to help prevent excessive proximal migration of the thumb metacarpal.  However, removing the trapezium can lead to some settling of the thumb metacarpal.  Postsurgical rehabilitation and the need for hand therapy was explained. There will be significant limitations during the healing process, and use of a brace, for the 1st 8 weeks postsurgically. Rehabilitation and full recovery typically takes up to 3-4 months, however, each patient may experience a different time frame to maximal improvement. All questions and concerns were addressed today. Patient is in agreement with the plan.         Lisa Phillips MD  Hand & Upper Extremity Surgery  [id number] partner of [facility] [address]

## 2017-11-30 NOTE — PROGRESS NOTES
Patient ambulated to PACU stretcher and placed on cardiac monitor for nerve block. Time Out done at bedside with anesthesiologist .  Assisted anesthesiologist with nerve block.

## 2017-11-30 NOTE — PROGRESS NOTES
established preoperatively. 23. The patient/caregiver demonstrates knowledge of the expected responses to the operative or invasive procedure. 20. Patient/Caregiver has reduced anxiety. Interventions- Familiarize with environment and equipment. 21. Potential for fall the patient will move to fall risk upon sedation through the recovery phase. [place] to environment. Call light in reach. Instruct to call for assistance prior to getting up. Non skid footwear on. Bed wheels locked. Bed in lowest position. Side rails up times two.

## 2017-11-30 NOTE — OP NOTE
Patient:  Jarett Oro  Date of Surgery:  11/30/2017      Pre-Op Diagnoses:  1.   left basal thumb joint arthritis  2. Left wrist dequervains tendonitis  3. Post-op Diagnoses:   1.  same  2.       same      Procedure(s) Performed:  1.  left basal thumb joint suspension arthroplasty  2.  left tendon transfer of the wrist ×1, abductor  3. left wrist release de Quervain's tendinitis, first dorsal compartment  4. left wrist fluoroscopy, multiple views with intra-operative interpretation      Anesthesia Type:   Regional plus MAC    Blood Loss:   Minimal    Pathology:   Trapezium as specimen    Complications:   None    Assistant:  [facility]      The patient was brought to the operating and room and placed in the supine position. After the induction of anesthesia a standard time-out was performed. Description of the Procedure: The upper extremity had been prepped and draped in the normal sterile fashion and antibiotic prophylaxis was in place. Formal timeout was held and then the upper extremity was exsanguinated and the tourniquet was inflated to 250 mmHg. A longitudinal incision, approximately 4 cm in length, was made over the first dorsal compartment centered over the Aia 16 joint. Incision through skin only, dissection carefully to the subcutaneous tissues well protecting the dorsal sensory radial nerve. First dorsal compartment was released under direct visualization, it was quite tight. Both tendons were freed up. The EPB had its own sheath which was carefully incised and released. Dissection was taken onto the Aia 16 joint which was carefully opened protecting the nearby radial artery. The capsule was released and elevated off the trapezium carefully. Fluoroscopic guidance did aid in our identification in the trapezium. Trapezium was removed and sent as pathologic specimen.   There was complete cartilaginous wear on the superior portion of the trapezium and the underneath of the thumb metacarpal.  Volar beak of the thumb metacarpal was removed. Loose bodies were removed. Full trapeziectomy was undertaken. Confirmed with palpation, visualization, and fluoroscopy. The wound was copiously irrigated. The thumb was held in a position of pinch and under full traction, the Arthrex CMC suspension was then utilized between the thumb metacarpal base (radially) and index metacarpal base (radially), per Arthrex guidelines. Anchors with secure fixation. A slip of the APL was transposed into the joint to aid in the suspension. The thumb had nice alignment at this time. Circumduction maneuver of the thumb revealed no further crepitance. The wound was copiously irrigated. No other abnormality was noted. A piece of Gelfoam was placed beneath the thumb metacarpal and then the capsule was closed tightly with interrupted Vicryl. The nerve was protected and again visualized, the nerve was not injured. Skin was closed with interrupted nylon. Tourniquet was deflated and all fingers including the thumb were warm and well perfused. Soft sterile dressing was placed followed by a thumb spica splint with the thumb held in appropriate alignment. Patient was awoken from sedation, tolerated the case well, and was taken to the postanesthesia recovery in good condition. There were no complications. Trapezium then was sent as specimen. I was present for the entire case. Addendum:  Intraoperative fluoroscopy of the left hand was utilized, 3 views were saved and printed, verifying full trapeziectomy and appropriate alignment of the basal thumb joint following suspension arthroplasty. Intraoperative interpretation by Dr Caleb Banks. Findings:           Intervention:  Arthrex cmc suspension, Gelfoam    Other Notes:   Follow-up in 7-10 days for wound inspection and suture removal.  Hand therapy referral for a forearm-based thumb spica brace with the IP joint free to be worn full-time except for

## 2017-11-30 NOTE — ANESTHESIA PRE-OP
Department of Anesthesiology  Preprocedure Note       Name:  Myles Delaney   Age:  61 y.o.  :  1960                                          MRN:  [id number]         Date:  2017      Surgeon:    Procedure:    Medications prior to admission:   Prior to Admission medications    Medication Sig Start Date End Date Taking? Authorizing Provider   ARTIFICIAL TEARS 1.4 % ophthalmic solution  17   Historical Provider, MD   White Petrolatum-Mineral Oil (ARTIFICIAL TEARS) 83-15 %  17   Historical Provider, MD   valACYclovir (VALTREX) 1 g tablet Take 2 tablets by mouth 3 times daily 17   Catia House CNP   DULoxetine (CYMBALTA) 60 MG extended release capsule TAKE ONE CAPSULE BY MOUTH DAILY 17   Niya Escobar MD   predniSONE (DELTASONE) 10 MG tablet Take 1 tablet by mouth daily Take 4 tablets ×4 days  Take 3 tablets ×3 days  Take 2 tablets ×2 days  Take 1 tablet ×1 day 17   Charmayne Lee, MD   acyclovir (ZOVIRAX) 800 MG tablet Take 1 tablet by mouth 5 times daily for 25 days 17  Charmayne Lee, MD   gabapentin (NEURONTIN) 600 MG tablet Take 1 tablet by mouth 3 times daily 17   Catia House CNP   metFORMIN (GLUCOPHAGE) 1000 MG tablet TAKE ONE TABLET BY MOUTH TWICE A DAY 17   Catia House CNP   lisinopril (PRINIVIL;ZESTRIL) 5 MG tablet TAKE ONE TABLET BY MOUTH EVERY DAY 17   Catia House CNP   [employer] Lancets Super Thin MISC Test blood sugars 1-2 times a day can test more if blood sugar feels low.  17   Catia House CNP   ondansetron (ZOFRAN) 4 MG tablet Take 1 tablet by mouth every 8 hours as needed for Nausea or Vomiting 10/18/17   Calixto Maria MD   insulin lispro (HUMALOG KWIKPEN) 100 UNIT/ML pen Inject 20 units into skin three times daily 10/9/17   Catia House CNP   Alcohol Swabs (SURE-PREP ALCOHOL PREP) 70 % PADS Use one alcohol pad for each injection of 10/9/17   Catia House Provider, MD       Current medications:    Current Outpatient Prescriptions   Medication Sig Dispense Refill    ARTIFICIAL TEARS 1.4 % ophthalmic solution       White Petrolatum-Mineral Oil (ARTIFICIAL TEARS) 83-15 %       valACYclovir (VALTREX) 1 g tablet Take 2 tablets by mouth 3 times daily 21 tablet 0    DULoxetine (CYMBALTA) 60 MG extended release capsule TAKE ONE CAPSULE BY MOUTH DAILY 30 capsule 3    predniSONE (DELTASONE) 10 MG tablet Take 1 tablet by mouth daily Take 4 tablets ×4 days  Take 3 tablets ×3 days  Take 2 tablets ×2 days  Take 1 tablet ×1 day 30 tablet 0    acyclovir (ZOVIRAX) 800 MG tablet Take 1 tablet by mouth 5 times daily for 25 days 25 tablet 0    gabapentin (NEURONTIN) 600 MG tablet Take 1 tablet by mouth 3 times daily 90 tablet 6    metFORMIN (GLUCOPHAGE) 1000 MG tablet TAKE ONE TABLET BY MOUTH TWICE A DAY 60 tablet 6    lisinopril (PRINIVIL;ZESTRIL) 5 MG tablet TAKE ONE TABLET BY MOUTH EVERY DAY 30 tablet 6    [employer] Lancets Super Thin MISC Test blood sugars 1-2 times a day can test more if blood sugar feels low. 100 each 6    ondansetron (ZOFRAN) 4 MG tablet Take 1 tablet by mouth every 8 hours as needed for Nausea or Vomiting 20 tablet 0    insulin lispro (HUMALOG KWIKPEN) 100 UNIT/ML pen Inject 20 units into skin three times daily 5 Pen 6    Alcohol Swabs (SURE-PREP ALCOHOL PREP) 70 % PADS Use one alcohol pad for each injection of 100 each 1    meloxicam (MOBIC) 15 MG tablet Take 1 tablet by mouth daily 30 tablet 3    B Complex Vitamins (VITAMIN B COMPLEX PO) Take 1 tablet by mouth daily OTC      insulin lispro (HUMALOG) 100 UNIT/ML injection vial Inject 5 Units into the skin 3 times daily (before meals)      CRANBERRY PO Take 1 tablet by mouth 2 times daily OTC      Insulin Pen Needle ([employer] PEN NEEDLES) 31G X 6 MM MISC 1 each by Does not apply route daily 300 each 11    diclofenac sodium 1 % GEL Apply   2 gm in upper extremity joint, 3 times a day as needed. (APRESOLINE) injection 5 mg  5 mg Intravenous Q30 Min PRN Nhi Shore MD        meperidine (DEMEROL) injection 12.5 mg  12.5 mg Intravenous Q5 Min PRN Nhi Shore MD           Allergies:  No Known Allergies    Problem List:    Patient Active Problem List   Diagnosis Code    HTN (hypertension) I10    Hyperlipidemia E78.5    Diabetes mellitus ([address] E11.9    Chronic foot pain M79.673, G89.29    CTS (carpal tunnel syndrome) G56.00    Peripheral neuropathy (HCC) G62.9    Urinary frequency R35.0    Anxiety F41.9    Elbow pain M25.529    Pain of finger of left hand M79.645    Lateral epicondylitis M77.10    Primary osteoarthritis of first carpometacarpal joint of left hand M18.12    Acute sciatica M54.30    Incarcerated umbilical hernia Q34.6    Lumbar radiculitis M54.16    Arthritis, midfoot M19.079    Cervical radiculopathy at C7 M54.12    Degeneration of lumbar or lumbosacral intervertebral disc M51.37    Displacement of lumbar intervertebral disc without myelopathy M51.26    Lumbosacral spondylosis without myelopathy M47.817    Spinal stenosis, lumbar region, without neurogenic claudication M48.061    Disc displacement, lumbar M51.26    Lumbar facet arthropathy M12.88    Lumbar stenosis M48.061    Neck pain, chronic M54.2, G89.29    Impingement syndrome of right shoulder M75.41    Incomplete tear of right rotator cuff M75.111    Shoulder impingement M75.40       Past Medical History:        Diagnosis Date    Anxiety     CTS (carpal tunnel syndrome)     CTS (carpal tunnel syndrome)     Diabetes mellitus (HCC)     GERD (gastroesophageal reflux disease)     Hyperlipidemia     Hypertension     Peripheral neuropathy (HCC)     Type II or unspecified type diabetes mellitus without mention of complication, not stated as uncontrolled     Urinary frequency        Past Surgical History:        Procedure Laterality Date    APPENDECTOMY      CARPAL TUNNEL RELEASE      HERNIA REPAIR  10/12/15    Laparoscopic Hernia Repair       Social History:    Social History   Substance Use Topics    Smoking status: Former Smoker    Smokeless tobacco: Never Used      Comment: 2000    Alcohol use No                                Counseling given: Not Answered      Vital Signs (Current): There were no vitals filed for this visit. BP Readings from Last 3 Encounters:   11/16/17 109/70   11/13/17 118/78   11/07/17 (!) 143/98       NPO Status:                                                                                 BMI:   Wt Readings from Last 3 Encounters:   11/16/17 230 lb (104.3 kg)   11/13/17 229 lb 3.2 oz (104 kg)   11/07/17 227 lb (103 kg)     There is no height or weight on file to calculate BMI. Anesthesia Evaluation  Patient summary reviewed and Nursing notes reviewed no history of anesthetic complications:   Airway: Mallampati: III     Neck ROM: full   Dental:          Pulmonary:                              Cardiovascular:    (+) hypertension:,                   Neuro/Psych:   (+) neuromuscular disease:,             GI/Hepatic/Renal:   (+) GERD:,           Endo/Other:                     Abdominal:           Vascular:                                        Anesthesia Plan      general and regional     ASA 2     (Medications & allergies reviewed  All available lab & EKG data reviewed  Axillary block for POA)  Induction: intravenous. Anesthetic plan and risks discussed with patient. Plan discussed with CRNA.                   Pelon Cordon MD   11/29/2017

## 2017-12-01 ENCOUNTER — TELEPHONE (OUTPATIENT)
Dept: PAIN MANAGEMENT | Age: 57
End: 2017-12-01

## 2017-12-01 ENCOUNTER — HOSPITAL ENCOUNTER (OUTPATIENT)
Dept: OCCUPATIONAL THERAPY | Age: 57
Discharge: HOME | End: 2017-12-31
Attending: ORTHOPAEDIC SURGERY | Admitting: ORTHOPAEDIC SURGERY

## 2017-12-06 RX ORDER — ISOPROPYL ALCOHOL 0.75 G/1
SWAB TOPICAL
Qty: 100 EACH | Refills: 2 | Status: SHIPPED | OUTPATIENT
Start: 2017-12-06 | End: 2018-03-23 | Stop reason: SDUPTHER

## 2017-12-07 ENCOUNTER — HOSPITAL ENCOUNTER (OUTPATIENT)
Dept: OCCUPATIONAL THERAPY | Age: 57
Discharge: HOME | End: 2017-09-30
Admitting: ORTHOPAEDIC SURGERY

## 2017-12-07 ENCOUNTER — HOSPITAL ENCOUNTER (OUTPATIENT)
Dept: OCCUPATIONAL THERAPY | Age: 57
Discharge: HOME | End: 2017-12-07
Admitting: ORTHOPAEDIC SURGERY

## 2017-12-07 NOTE — FLOWSHEET NOTE
[address] [place], [address]                Hand Therapy Daily Treatment Note  Date:  2017    Patient: Adriana Craig   : 1960   MRN: [id number]  Referring Physician: Referring Practitioner: Dr. Jonathan Summers Diagnosis Information:  Diagnosis: s/p L [facility] arthroplasty (M19.049)                                          Treatment Diagnosis: M25.532                                    Insurance information: OT Insurance Information: [employer]    Date of Injury:  Date of Surgery: 17      Visit # Insurance Allowable        Date of Patient follow up with Physician: 17    G-Code:  OT G-codes  Functional Assessment Tool Used: Quick DASH - 64%  Functional Limitation: Carrying, moving and handling objects  Carrying, Moving and Handling Objects Current Status (): At least 60 percent but less than 80 percent impaired, limited or restricted  Carrying, Moving and Handling Objects Goal Status ():  At least 20 percent but less than 40 percent impaired, limited or restricted      Progress Note: []  Yes  [x]  No  Next due by: Visit #10      Latex Allergy:  [x]NO      []YES    Preferred Language for Healthcare:   [x]English       []other:    Pain level:  6/10     SUBJECTIVE: Referred for therapy following recent surgery.         RESTRICTIONS/PRECAUTIONS: NO CMCJ ROM x 4 weeks    OBJECTIVE:          Date:  17       Objective Measures:        Wrist AROM NT       Wrist edema 19.8cm               Modalities:                                Therapeutic Exercise, Activities, NMR:        HEP/pt education/wound care 25'                                         Therapeutic Exercise and NMR:  [x] (67790) Provided verbal/tactile cueing for activities related to strengthening, flexibility, endurance, ROM  for improvements in scapular, scapulothoracic and UE control with self care, reaching, carrying, lifting, house/yardwork, driving/computer work.    [] (61652) Provided verbal/tactile cueing for activities related to improving balance, coordination, kinesthetic sense, posture, motor skill, proprioception  to assist with  scapular, scapulothoracic and UE control with self care, reaching, carrying, lifting, house/yardwork, driving/computer work.   [x] Comments: Issued tensogrip D, gauze    Therapeutic Activities:    [x] (08746 or 30315) Provided verbal/tactile cueing for activities related to improving balance, coordination, kinesthetic sense, posture, motor skill, proprioception and motor activation to allow for proper function of scapular, scapulothoracic and UE control with self care, carrying, lifting, driving/computer work  [] Comments:    Home Exercise Program:    [x] (62146) Reviewed/Progressed HEP activities related to strengthening, flexibility, endurance, ROM of scapular, scapulothoracic and UE control with self care, reaching, carrying, lifting, house/yardwork, driving/computer work  [] (21137) Reviewed/Progressed HEP activities related to improving balance, coordination, kinesthetic sense, posture, motor skill, proprioception of scapular, scapulothoracic and UE control with self care, reaching, carrying, lifting, house/yardwork, driving/computer work    [] Comments:    Manual Treatments:  PROM / STM / Oscillations-Mobs:  G-I, II, III, IV (PA's, Inf., Post.)  [] (18424) Provided manual therapy to mobilize soft tissue/joints of cervical/CT, scapular GHJ and UE for the purpose of modulating pain, promoting relaxation,  increasing ROM, reducing/eliminating soft tissue swelling/inflammation/restriction, improving soft tissue extensibility and allowing for proper ROM for normal function with self care, reaching, carrying, lifting, house/yardwork, driving/computer work  [] Comments:    Splinting:  [x] Fabrication of: FBTSS  [] (60300) Checkout for orthotic/prosthetic use, established patient   [] current plan (see comments)  [x] Plan of care initiated [] Hold pending MD visit [] Discharge    Electronically signed by: Deidra Garcia OT/L, T [id number]

## 2017-12-07 NOTE — PROGRESS NOTES
[address] [place], [address] Certification  Dear Referring Practitioner: Dr. Albina Delaney,     We had the pleasure of evaluating the following patient for occupational therapy services at [address] [place] [address]. A summary of our findings can be found in the initial assessment below. This includes our plan of care. If you have any questions or concerns regarding these findings, please do not hesitate to contact me at the office phone number checked above. Thank you for the referral.     Physician Signature:_______________________________Date:__________________  By signing above (or electronic signature), therapists plan is approved by physician      Patient: Nando Corrales   : 1960   MRN: [id number]  Referring Physician: Referring Practitioner: Dr. Albina Delaney      Evaluation Date: 2017      Medical Diagnosis Information:  Diagnosis: s/p L CMC arthroplasty (M19.049)                                          Treatment Diagnosis: M25.532        Insurance information: OT Insurance Information: [employer]      Precautions/ Contra-indications:   Latex Allergy:  [x]No      []Yes  Pacemaker:  [x] No       [] Yes     Preferred Language for Healthcare:   [x]English       []other:    G-Code:  Applied on 2017    OT G-codes  Functional Assessment Tool Used: Quick DASH - 64%  Functional Limitation: Carrying, moving and handling objects  Carrying, Moving and Handling Objects Current Status (): At least 60 percent but less than 80 percent impaired, limited or restricted  Carrying, Moving and Handling Objects Goal Status (): At least 20 percent but less than 40 percent impaired, limited or restricted       [x] Patient reported history, allergies, and medications reviewed - see intake form.      SUBJECTIVE:  Background/Relevant scars):   Sutures dry and intact, no drainage    Sensation: [x] No reported deficits  [] Intact to light touch    [] [place] Lesley test completed, findings as noted:  [] Other:    Palpation: N/A    Functional Mobility/Transfers/Gait:   [x] Independent - no significant gait deviations  [] Assistance needed   [] Assistive device used: Falls Risk Assessment (30 days):   [x] Falls Risk assessed and no intervention required. [] Falls Risk assessed and Patient requires intervention due to being higher risk   TUG score (>12s at risk):     [] Falls education provided, including      Review Of Systems (ROS): [x]Performed Review of systems (Integumentary, CardioPulmonary, Neurological) by intake and observation. Intake form has been scanned into medical record. Patient has been instructed to contact their primary care physician regarding ROS issues if not already being addressed at this time. ASSESSMENT:   This patient presents with signs and symptoms consistent with the medical diagnosis provided by the referring physician. Impairments (physical, cognitive and/or psychosocial):  [x] Decreased mobility   [] Weakness    [] Hypersensitivity   [x] Pain/tenderness   [x] Edema/swelling   [] Decreased coordination (fine/gross motor)   [] Impaired body mechanics  [] Sensory loss  [] Loss of balance   [] Other:      Performance Deficits (to be addressed in plan of care):   [x] Bathing    [x] Household Tasks (cooking/cleaning)   [] Dressing    [] Self Feeding   [] Grooming    [] Work/Education   [x] Functional Mobility   [] Sleeping/Rest   [] Toileting/Hygiene   [] Recreational Activities   [] Driving    [] Community/Social Participation   [] Other:     Rehab Potential:   [] Excellent [x] Good [] Fair  [] Poor     Barriers affecting rehab potential:  []Age    []Lack of Motivation   []Co-Morbidities  []Cognitive Function  []Environmental/home/work barriers  []Other:     Tolerance of evaluation/treatment:    [] extensive additional review of review of medical and/or therapy records   and physical, cognitive, or psychosocial history related to current    functional performance    [x] An assessment that identifies performance deficits (relating to physical, cognitive, or psychosocial skills) that result in activity limitations and/or participation restrictions:   [x] 1-3 performance deficits   [] 3-5 performance deficits   [] 5 or more performance deficits    [x] Clinical decision making of:   [x] low complexity, including analysis of occupational profile, data analysis from problem focused assessment, and consideration of a limited number of treatment options. No comorbidities affect occupational performance. No task modifications or assistance needed to complete evaluation. [] moderate complexity, including analysis of occupational profile, data analysis from detailed assessment and consideration of several treatment options. Comorbidities that affect occupational performance may be present. Minimal to moderate task modifications or assistance needed to complete assessment. [] high complexity, including analysis of occupational profile, analysis of data from comprehensive assessment and consideration of multiple treatment options. Multiple comorbidities present that affect occupational performance. Significant task modifications or assistance needed to complete assessment.     Evaluation Code:  [x] Low Complexity EVAL 08694 (typically 30 minutes face to face)  [] Mod Complexity EVAL 92506 (typically 45 minutes face to face)  [] High Complexity EVAL 14033 (typically 60 minutes face to face)        Electronically signed by:  Frida Alvares OT/L, [facility]

## 2017-12-11 ENCOUNTER — HOSPITAL ENCOUNTER (OUTPATIENT)
Dept: OCCUPATIONAL THERAPY | Age: 57
Discharge: HOME | End: 2017-12-11
Admitting: ORTHOPAEDIC SURGERY

## 2017-12-11 ENCOUNTER — OFFICE VISIT (OUTPATIENT)
Dept: ORTHOPEDIC SURGERY | Age: 57
End: 2017-12-11

## 2017-12-11 VITALS — BODY MASS INDEX: 36.09 KG/M2 | HEIGHT: 67 IN | WEIGHT: 229.94 LBS

## 2017-12-11 DIAGNOSIS — M79.642 HAND PAIN, LEFT: Primary | ICD-10-CM

## 2017-12-11 DIAGNOSIS — M18.12 PRIMARY OSTEOARTHRITIS OF FIRST CARPOMETACARPAL JOINT OF LEFT HAND: ICD-10-CM

## 2017-12-11 PROCEDURE — 73130 X-RAY EXAM OF HAND: CPT | Performed by: ORTHOPAEDIC SURGERY

## 2017-12-11 PROCEDURE — 99024 POSTOP FOLLOW-UP VISIT: CPT | Performed by: ORTHOPAEDIC SURGERY

## 2017-12-11 RX ORDER — SULFAMETHOXAZOLE AND TRIMETHOPRIM 800; 160 MG/1; MG/1
1 TABLET ORAL 2 TIMES DAILY
Qty: 20 TABLET | Refills: 0 | Status: SHIPPED | OUTPATIENT
Start: 2017-12-11 | End: 2017-12-21

## 2017-12-11 NOTE — FLOWSHEET NOTE
scapular, scapulothoracic and UE control with self care, reaching, carrying, lifting, house/yardwork, driving/computer work.    [] (19411) Provided verbal/tactile cueing for activities related to improving balance, coordination, kinesthetic sense, posture, motor skill, proprioception  to assist with  scapular, scapulothoracic and UE control with self care, reaching, carrying, lifting, house/yardwork, driving/computer work.   [x] Comments: Issued tensogrip D    Therapeutic Activities:    [x] (94449 or 82517) Provided verbal/tactile cueing for activities related to improving balance, coordination, kinesthetic sense, posture, motor skill, proprioception and motor activation to allow for proper function of scapular, scapulothoracic and UE control with self care, carrying, lifting, driving/computer work  [] Comments:    Home Exercise Program:    [x] (38532) Reviewed/Progressed HEP activities related to strengthening, flexibility, endurance, ROM of scapular, scapulothoracic and UE control with self care, reaching, carrying, lifting, house/yardwork, driving/computer work  [] (15569) Reviewed/Progressed HEP activities related to improving balance, coordination, kinesthetic sense, posture, motor skill, proprioception of scapular, scapulothoracic and UE control with self care, reaching, carrying, lifting, house/yardwork, driving/computer work    [] Comments:    Manual Treatments:  PROM / STM / Oscillations-Mobs:  G-I, II, III, IV (PA's, Inf., Post.)  [] (76831) Provided manual therapy to mobilize soft tissue/joints of cervical/CT, scapular GHJ and UE for the purpose of modulating pain, promoting relaxation,  increasing ROM, reducing/eliminating soft tissue swelling/inflammation/restriction, improving soft tissue extensibility and allowing for proper ROM for normal function with self care, reaching, carrying, lifting, house/yardwork, driving/computer work  [] Comments:    Splinting:  [] Fabrication of:   [] (90889) Checkout for Follow-up: [x] Yes  [] No    PLAN: See eval  [x] Continue per plan of care [] Alter current plan (see comments)  [] Plan of care initiated [] Hold pending MD visit [] Discharge    Electronically signed by: Andrez Jones OT/L, CHT [id number]

## 2017-12-13 RX ORDER — CEPHALEXIN 250 MG/1
250 CAPSULE ORAL 4 TIMES DAILY
Qty: 28 CAPSULE | Refills: 0 | Status: SHIPPED | OUTPATIENT
Start: 2017-12-13 | End: 2017-12-26

## 2017-12-19 ENCOUNTER — TELEPHONE (OUTPATIENT)
Dept: ORTHOPEDIC SURGERY | Age: 57
End: 2017-12-19

## 2017-12-19 DIAGNOSIS — M18.12 PRIMARY OSTEOARTHRITIS OF FIRST CARPOMETACARPAL JOINT OF LEFT HAND: Primary | ICD-10-CM

## 2017-12-21 ENCOUNTER — HOSPITAL ENCOUNTER (OUTPATIENT)
Dept: PHYSICAL THERAPY | Age: 57
Discharge: HOME | End: 2017-12-21
Admitting: ORTHOPAEDIC SURGERY

## 2017-12-21 NOTE — FLOWSHEET NOTE
(79741)   [] Manual (23711) x       [] US (19600)  [] TA (50459) x      [] Paraffin (96734)  [] ADL  (57685) x     [] Splint/L code:   [] Estim (unattended) (61476)  [] Other:    GOALS: Long Term Goals to be achieved in 8  weeks, including patient directed goals to address identified performance deficits:  1) Pt to be independent in graded HEP progression with a good level of effort and compliance. Progression Towards Functional goals:  [] Patient is progressing as expected towards functional goals listed. [] Progression is slowed due to complexities listed. [] Progression has been slowed due to co-morbidities.   [x] Plan just implemented, too soon to assess goals progression  [] Other:     ASSESSMENT:  See eval    Treatment/Activity Tolerance:  [x] Patient tolerated treatment well [] Patient limited by fatigue  [] Patient limited by pain  [] Patient limited by other medical complications  [] Other:     Prognosis: [x] Good [] Fair  [] Poor    Patient Requires Follow-up: [x] Yes  [] No    PLAN: See eval  [] Continue per plan of care [] Alter current plan (see comments)  [x] Plan of care initiated [] Hold pending MD visit [] Discharge    Electronically signed by: Kavita Cleary PT, DPT, [id number]

## 2017-12-21 NOTE — PLAN OF CARE
[address] [facility], [address] [place] [address] 904 [facility], [address] [facility] ([facility] [address] [facility]  Phone: [phone], [phone]                                                    Physical Therapy Certification    Dear Referring Practitioner: Payton Uriarte,    We had the pleasure of evaluating the following patient for physical therapy services at [address] [place] [address]. A summary of our findings can be found in the initial assessment below. This includes our plan of care. If you have any questions or concerns regarding these findings, please do not hesitate to contact me at the office phone number checked above. Thank you for the referral.       Physician Signature:_______________________________Date:__________________  By signing above (or electronic signature), therapists plan is approved by physician      Patient: Lali Batista   : 1960   MRN: [id number]  Referring Physician: Referring Practitioner: Payton Uriarte      Evaluation Date: 2017      Medical Diagnosis Information:  Diagnosis: s/p L CMC arthroplasty (17)   Treatment Diagnosis: (M19.049)                                          Insurance information: PT Insurance Information: [employer]    Precautions/ Contra-indications/Relevant Medical History:   Latex Allergy:  [x]NO      []YES   Preferred Language for Healthcare:   [x]English       []other:     SUBJECTIVE: Patient stated complaint: s/p L CMC arthroplasty on 17. Functional Disability Index: PT G-Codes  Functional Assessment Tool Used: Quick Dash  Score: 59%  Functional Limitation: Carrying, moving and handling objects  Mobility: Walking and Moving Around Current Status (): At least 40 percent but less than 60 percent impaired, limited or restricted  Mobility: Walking and Moving Around Goal Status (): 0 percent impaired, limited or restricted  Carrying, Moving and Handling Objects Current Status ():  At least 40 percent but less than 60 percent impaired, limited or restricted  Carrying, Moving and Handling Objects Goal Status (): 0 percent impaired, limited or restricted    Pain Scale: 2-6/10  Easing factors: rest, ice, heat  Provocative factors: grasping, holding objects, lifting     Type: [x]Constant   []Intermittent  []Radiating []Localized []other:     Numbness/Tingling: None    Functional Limitations/Impairments: [x]Lifting/reaching [x]Grooming [x]Carrying    [x]ADL's []Driving []Sports/Recreations   []Other:    Occupation/School: Disabled    Living Status/Prior Level of Function: Independent with ADLs and IADLs. OBJECTIVE:     ROM Left Right   Shoulder Flex     Shoulder Abd          Thumb flexion 60 °  WNL ? Thumb abd 68 °     Thumb opp 57 °     Wrist Flex/ext WNL ? Circumf 19.5 cm    Strength  Left Right   Shoulder Flex NT ? WNL ? Shoulder Scap     Shoulder ER     Shoulder IR     Elbow Flex     Elbow Ext     Wrist Flex     Wrist Ext     Carlos        Reflexes/Sensation (myotomes/dermatomes):    [x]Normal    []Abnormal:      Joint mobility:    [x]Normal    []Hypo   []Hyper    Palpation: pain over incisions     Functional Mobility/Transfers: WNL    Posture: WNL    Bandages/Dressings/Incisions: incision is red, patient reports he is on an antibiotic. States he has a brace that he is supposed to use for 6 weeks but hasn't been wearing it because it's uncomfortable. Gait (include devices/WB status): WNL    Orthopedic Special Tests: NT ?      Provocative Test Right Left    Shoulder Positive Negative Positive Negative   Jane-Drew [] [x] [] [x]   Empty Can [] [x] [] [x]   Cross arm test [] [x] [] [x]   Mcgowan's [] [x] [] [x]   Apprehension test [] [x] [] [x]   Speed's/Yergason's [] [x] [] [x]     Provocative Test Right Left    Cervical/Thoracic  Positive Negative Positive Negative   Sharp-Deena [] [x] [] [x]   Alar ligament test [] [x] [] [x]   Compression/distraction [] [x] [] [x] TUG score (>12s at risk):     [] Falls education provided, including       G-Codes:  PT G-Codes  Functional Assessment Tool Used: Quick Dash  Score: 59%  Functional Limitation: Carrying, moving and handling objects  Mobility: Walking and Moving Around Current Status (): At least 40 percent but less than 60 percent impaired, limited or restricted  Mobility: Walking and Moving Around Goal Status (): 0 percent impaired, limited or restricted  Carrying, Moving and Handling Objects Current Status ():  At least 40 percent but less than 60 percent impaired, limited or restricted  Carrying, Moving and Handling Objects Goal Status (): 0 percent impaired, limited or restricted    ASSESSMENT:   Functional Impairments   []Noted spinal or UE joint hypomobility   []Noted spinal or UE joint hypermobility   [x]Decreased UE functional ROM   [x]Decreased UE functional strength   []Abnormal reflexes/sensation/myotomal/dermatomal deficits   []Decreased RC/scapular/core strength and neuromuscular control   []other:      Functional Activity Limitations (from functional questionnaire and intake)   []Reduced ability to tolerate prolonged functional positions   []Reduced ability or difficulty with changes of positions or transfers between positions   []Reduced ability to maintain good posture and demonstrate good body mechanics with sitting, bending, and lifting   [] Reduced ability or tolerance with driving and/or computer work   []Reduced ability to sleep   []Reduced ability to perform lifting, reaching, carrying tasks   []Reduced ability to tolerate impact through UE   []Reduced ability to reach behind back   [x]Reduced ability to  or hold objects   [x]Reduced ability to throw or toss an object   []other:    Participation Restrictions   [x]Reduced participation in self care activities   [x]Reduced participation in home management activities   []Reduced participation in work activities   [x]Reduced participation in social activities. []Reduced participation in sport/recreation activities. Classification:   [x]Signs/symptoms consistent with post-surgical status including decreased ROM, strength and function. []Signs/symptoms consistent with joint sprain/strain   []Signs/symptoms consistent with shoulder impingement   []Signs/symptoms consistent with shoulder/elbow/wrist tendinopathy   []Signs/symptoms consistent with Rotator cuff tear   []Signs/symptoms consistent with labral tear   []Signs/symptoms consistent with postural dysfunction    []Signs/symptoms consistent with Glenohumeral IR Deficit - <45 degrees   []Signs/symptoms consistent with facet dysfunction of cervical/thoracic spine    []Signs/symptoms consistent with pathology which may benefit from Dry needling     []other: Tolerance of evaluation/treatment:    []Excellent   [x]Good    []Fair   []Poor    Physical Therapy Evaluation Complexity Justification   [x] A history of present problem with:  [] no personal factors and/or comorbidities that impact the plan of care;  [x]1-2 personal factors and/or comorbidities that impact the plan of care  []3 personal factors and/or comorbidities that impact the plan of care  [x] An examination of body systems using standardized tests and measures addressing any of the following: body structures and functions (impairments), activity limitations, and/or participation restrictions;:  [] a total of 1-2 or more elements   [x] a total of 3 or more elements   [] a total of 4 or more elements   [x] A clinical presentation with:  [] stable and/or uncomplicated characteristics   [x] evolving clinical presentation with changing characteristics  [] unstable and unpredictable characteristics;   [x] Clinical decision making of [x] low, [] moderate, [] high complexity using standardized patient assessment instrument and/or measurable assessment of functional outcome.     [x] EVAL (LOW) 46150 (typically 20 minutes face-to-face)  []

## 2017-12-23 ENCOUNTER — TELEPHONE (OUTPATIENT)
Dept: FAMILY MEDICINE CLINIC | Age: 57
End: 2017-12-23

## 2017-12-23 NOTE — TELEPHONE ENCOUNTER
Patient calling asking for Tresiba samples. He didn't know the office was closed. I sent in an Rx for 1 pen to get him until the office opens next week.

## 2017-12-27 ENCOUNTER — OFFICE VISIT (OUTPATIENT)
Dept: ORTHOPEDIC SURGERY | Age: 57
End: 2017-12-27

## 2017-12-27 DIAGNOSIS — M79.642 PAIN OF LEFT HAND: Primary | ICD-10-CM

## 2017-12-27 DIAGNOSIS — M18.12 PRIMARY OSTEOARTHRITIS OF FIRST CARPOMETACARPAL JOINT OF LEFT HAND: ICD-10-CM

## 2017-12-27 PROCEDURE — G8428 CUR MEDS NOT DOCUMENT: HCPCS | Performed by: ORTHOPAEDIC SURGERY

## 2017-12-27 PROCEDURE — G8417 CALC BMI ABV UP PARAM F/U: HCPCS | Performed by: ORTHOPAEDIC SURGERY

## 2017-12-27 PROCEDURE — G8482 FLU IMMUNIZE ORDER/ADMIN: HCPCS | Performed by: ORTHOPAEDIC SURGERY

## 2017-12-27 PROCEDURE — 99024 POSTOP FOLLOW-UP VISIT: CPT | Performed by: ORTHOPAEDIC SURGERY

## 2017-12-27 PROCEDURE — 3017F COLORECTAL CA SCREEN DOC REV: CPT | Performed by: ORTHOPAEDIC SURGERY

## 2017-12-27 PROCEDURE — 73130 X-RAY EXAM OF HAND: CPT | Performed by: ORTHOPAEDIC SURGERY

## 2017-12-27 PROCEDURE — 1036F TOBACCO NON-USER: CPT | Performed by: ORTHOPAEDIC SURGERY

## 2017-12-27 RX ORDER — CYCLOBENZAPRINE HCL 10 MG
10 TABLET ORAL 3 TIMES DAILY PRN
Qty: 30 TABLET | Refills: 0 | Status: SHIPPED | OUTPATIENT
Start: 2017-12-27 | End: 2018-01-06

## 2018-01-01 ENCOUNTER — HOSPITAL ENCOUNTER (OUTPATIENT)
Dept: OCCUPATIONAL THERAPY | Age: 58
Discharge: HOME | End: 2018-01-31
Attending: ORTHOPAEDIC SURGERY | Admitting: ORTHOPAEDIC SURGERY

## 2018-01-04 DIAGNOSIS — M18.10 DEGENERATIVE ARTHRITIS OF THUMB, UNSPECIFIED LATERALITY: Primary | ICD-10-CM

## 2018-01-05 RX ORDER — HYDROCODONE BITARTRATE AND ACETAMINOPHEN 5; 325 MG/1; MG/1
1 TABLET ORAL EVERY 8 HOURS PRN
Qty: 21 TABLET | Refills: 0 | Status: SHIPPED | OUTPATIENT
Start: 2018-01-05 | End: 2018-01-12

## 2018-01-10 ENCOUNTER — OFFICE VISIT (OUTPATIENT)
Dept: ORTHOPEDIC SURGERY | Age: 58
End: 2018-01-10

## 2018-01-10 VITALS — WEIGHT: 225.97 LBS | HEIGHT: 67 IN | BODY MASS INDEX: 35.47 KG/M2

## 2018-01-10 DIAGNOSIS — M18.12 PRIMARY OSTEOARTHRITIS OF FIRST CARPOMETACARPAL JOINT OF LEFT HAND: Primary | ICD-10-CM

## 2018-01-10 PROCEDURE — 99024 POSTOP FOLLOW-UP VISIT: CPT | Performed by: ORTHOPAEDIC SURGERY

## 2018-01-10 RX ORDER — CYCLOBENZAPRINE HCL 10 MG
10 TABLET ORAL 3 TIMES DAILY PRN
Qty: 60 TABLET | Refills: 0 | Status: SHIPPED | OUTPATIENT
Start: 2018-01-10 | End: 2018-01-20

## 2018-01-10 NOTE — PROGRESS NOTES
Chief Complaint   Patient presents with    Follow-up     Left hand basal jt arthroplasty, removal of trapezium sx 11/30/2017       HISTORY OF PRESENT ILLNESS:  Yves Adam is a 61 y.o.  patient here for repeat evaluation after undergoing left thumb basal joint suspension arthroplasty 5 weeks ago. Pain in the thumb is down to a 3. He feels that his motion and function is improving. He is not going to therapy. He has also been noncompliant with brace use postsurgically. He declines any bracing, he has not been utilizing them. He does request refill on Flexeril, this has helped with his spasms. Overall he does feel improved. ROS:  ROS neg     Past medical history is reviewed again today, no changes to report    PHYSICAL EXAMINATION:  Patient is alert and pleasant, in no acute distress. The affected extremity is examined today. Left thumb reveals a well-healed surgical site. Very minimal residual swelling. No erythema. No drainage. No sign of infection. Circumduction maneuver at the base of the thumb does not reveal instability or crepitance. Flexion and extension is almost full. Mild  strength is still persisting left hand. Thumb as well as sensate. Thumb is well perfused. X-rays:  None needed today, no new injury    IMPRESSION AND PLAN: Left thumb basal joint arthritis, 5 weeks following suspension arthroplasty  Doing well despite his noncompliance with the postoperative care plan. We will continue with our current care plan. Once again we recommended postoperative therapy and postoperative bracing and a period of postoperative restrictions. He declines office. We did refill the Flexeril as it is helping his spasms. We cannot prescribe this long-term for him. He is hopeful that this is the last prescription of it. Follow-up in 3 months unless needed sooner. No further x-ray unless deemed clinically necessary. Overall he is pleased with his progress, he feels improved.     All

## 2018-01-17 DIAGNOSIS — Z12.11 SCREENING FOR COLON CANCER: Primary | ICD-10-CM

## 2018-01-23 ENCOUNTER — OFFICE VISIT (OUTPATIENT)
Dept: ORTHOPEDIC SURGERY | Age: 58
End: 2018-01-23

## 2018-01-23 VITALS
SYSTOLIC BLOOD PRESSURE: 112 MMHG | HEART RATE: 54 BPM | DIASTOLIC BLOOD PRESSURE: 80 MMHG | BODY MASS INDEX: 32.22 KG/M2 | HEIGHT: 70 IN | WEIGHT: 225.09 LBS

## 2018-01-23 DIAGNOSIS — M25.512 LEFT SHOULDER PAIN, UNSPECIFIED CHRONICITY: Primary | ICD-10-CM

## 2018-01-23 PROCEDURE — G8482 FLU IMMUNIZE ORDER/ADMIN: HCPCS | Performed by: ORTHOPAEDIC SURGERY

## 2018-01-23 PROCEDURE — 1036F TOBACCO NON-USER: CPT | Performed by: ORTHOPAEDIC SURGERY

## 2018-01-23 PROCEDURE — G8427 DOCREV CUR MEDS BY ELIG CLIN: HCPCS | Performed by: ORTHOPAEDIC SURGERY

## 2018-01-23 PROCEDURE — 3017F COLORECTAL CA SCREEN DOC REV: CPT | Performed by: ORTHOPAEDIC SURGERY

## 2018-01-23 PROCEDURE — G8417 CALC BMI ABV UP PARAM F/U: HCPCS | Performed by: ORTHOPAEDIC SURGERY

## 2018-01-23 PROCEDURE — 99214 OFFICE O/P EST MOD 30 MIN: CPT | Performed by: ORTHOPAEDIC SURGERY

## 2018-01-23 NOTE — PROGRESS NOTES
Chief Complaint  New Patient (Left Shoulder: DOI 1 1/2 months ago fell FOOSA, increase pain, seen in ER 1/22/2018 muscle strain; increase night pain; overhead reaching is limited; pain on top and anterior )      History of Present Illness:  Alli Castillo is a 61 y.o. y/o male who presents today for evaluation of his left shoulder. In November he had a fall while he is outside onto a left arm immediately had pain. He is continuing at night pain and difficulty abducting his arm. He denies previous surgery on his shoulder, but states he has had some achy shoulder pain in the past and did have a previous cortisone injection but cannot remember when. He denies previous therapy for his shoulder. He denies numbness and tingling.       Occupation/activities: Not currently working    Medical History  Past Medical History:   Diagnosis Date    Anxiety     CTS (carpal tunnel syndrome)     CTS (carpal tunnel syndrome)     Diabetes mellitus ([address]     GERD (gastroesophageal reflux disease)     Hyperlipidemia     Hypertension     Peripheral neuropathy (HCC)     Type II or unspecified type diabetes mellitus without mention of complication, not stated as uncontrolled     Urinary frequency        Past Surgical History:   Procedure Laterality Date    APPENDECTOMY      CARPAL TUNNEL RELEASE      HAND ARTHROPLASTY Left     HERNIA REPAIR  10/12/15    Laparoscopic Hernia Repair       Social History     Social History    Marital status: Single     Spouse name: N/A    Number of children: N/A    Years of education: N/A     Occupational History    disability      Social History Main Topics    Smoking status: Former Smoker    Smokeless tobacco: Never Used      Comment: 2000    Alcohol use No    Drug use: No    Sexual activity: Yes     Partners: Female     Other Topics Concern    Not on file     Social History Narrative    No narrative on file       Family History   Problem Relation Age of Onset    Anemia Mother    [facility] Cuff Tear       Plan:   -Given his fall and exam and continued night pain and pain with activity we will get an MRI to evaluate for rotator cuff injury  -He can continue with symptomatic treatment in the interim  -We will see him back after his MRI discussed the findings and further treatment options  -If there are issues in the interim he should contact the office    Voice Recognition Dictation disclaimer: Please note that portions of this chart were generated using Dragon dictation software. Although every effort was made to ensure the accuracy of this automated transcription, some errors in transcription may have occurred.

## 2018-01-30 ENCOUNTER — NURSE ONLY (OUTPATIENT)
Dept: FAMILY MEDICINE CLINIC | Age: 58
End: 2018-01-30

## 2018-01-30 DIAGNOSIS — Z12.11 SPECIAL SCREENING FOR MALIGNANT NEOPLASMS, COLON: ICD-10-CM

## 2018-01-30 LAB
CONTROL: PRESENT
HEMOCCULT STL QL: NEGATIVE

## 2018-01-30 PROCEDURE — 82274 ASSAY TEST FOR BLOOD FECAL: CPT | Performed by: FAMILY MEDICINE

## 2018-02-02 ENCOUNTER — OFFICE VISIT (OUTPATIENT)
Dept: ORTHOPEDIC SURGERY | Age: 58
End: 2018-02-02

## 2018-02-02 VITALS — BODY MASS INDEX: 32.22 KG/M2 | HEIGHT: 70 IN | WEIGHT: 225.09 LBS

## 2018-02-02 DIAGNOSIS — M25.512 LEFT SHOULDER PAIN, UNSPECIFIED CHRONICITY: Primary | ICD-10-CM

## 2018-02-02 DIAGNOSIS — S46.812D PARTIAL TEAR OF LEFT SUBSCAPULARIS TENDON, SUBSEQUENT ENCOUNTER: ICD-10-CM

## 2018-02-02 PROCEDURE — 1036F TOBACCO NON-USER: CPT | Performed by: ORTHOPAEDIC SURGERY

## 2018-02-02 PROCEDURE — G8427 DOCREV CUR MEDS BY ELIG CLIN: HCPCS | Performed by: ORTHOPAEDIC SURGERY

## 2018-02-02 PROCEDURE — 99212 OFFICE O/P EST SF 10 MIN: CPT | Performed by: ORTHOPAEDIC SURGERY

## 2018-02-02 PROCEDURE — G8417 CALC BMI ABV UP PARAM F/U: HCPCS | Performed by: ORTHOPAEDIC SURGERY

## 2018-02-02 PROCEDURE — G8482 FLU IMMUNIZE ORDER/ADMIN: HCPCS | Performed by: ORTHOPAEDIC SURGERY

## 2018-02-02 PROCEDURE — 3017F COLORECTAL CA SCREEN DOC REV: CPT | Performed by: ORTHOPAEDIC SURGERY

## 2018-02-07 ENCOUNTER — OFFICE VISIT (OUTPATIENT)
Dept: ORTHOPEDIC SURGERY | Age: 58
End: 2018-02-07

## 2018-02-07 VITALS — BODY MASS INDEX: 35.33 KG/M2 | WEIGHT: 225.09 LBS | HEIGHT: 67 IN

## 2018-02-07 DIAGNOSIS — M18.12 PRIMARY OSTEOARTHRITIS OF FIRST CARPOMETACARPAL JOINT OF LEFT HAND: Primary | ICD-10-CM

## 2018-02-07 PROCEDURE — 99024 POSTOP FOLLOW-UP VISIT: CPT | Performed by: ORTHOPAEDIC SURGERY

## 2018-02-07 RX ORDER — HYDROCODONE BITARTRATE AND ACETAMINOPHEN 5; 325 MG/1; MG/1
1 TABLET ORAL EVERY 8 HOURS PRN
Qty: 20 TABLET | Refills: 0 | Status: SHIPPED | OUTPATIENT
Start: 2018-02-07 | End: 2018-02-14

## 2018-02-12 ENCOUNTER — HOSPITAL ENCOUNTER (OUTPATIENT)
Dept: PHYSICAL THERAPY | Age: 58
Discharge: HOME | End: 2018-02-28
Admitting: ORTHOPAEDIC SURGERY

## 2018-02-12 ENCOUNTER — TELEPHONE (OUTPATIENT)
Dept: FAMILY MEDICINE CLINIC | Age: 58
End: 2018-02-12

## 2018-02-12 NOTE — PLAN OF CARE
[x]   Speed's/Yergason's [] [x] [] [x]     Provocative Test Right Left    Cervical/Thoracic  Positive Negative Positive Negative   Sharp-Deena [] [x] [] [x]   Alar ligament test [] [x] [] [x]   Compression/distraction [] [x] [] [x]   Jump sign (MTRP's) [] [x] [] [x]   Adson's/Stephan's [] [x] [] [x]   Nila's [] [x] [] [x]                          [x] Patient history, allergies, meds reviewed. Medical chart reviewed. See intake form. Review Of Systems (ROS):  [x]Performed Review of systems (Integumentary, CardioPulmonary, Neurological) by intake and observation. Intake form has been scanned into medical record. Patient has been instructed to contact their primary care physician regarding ROS issues if not already being addressed at this time.       Co-morbidities/Complexities (which will affect course of rehabilitation):   []None           Arthritic conditions   [x]Rheumatoid arthritis (M05.9)  []Osteoarthritis (M19.91)   Cardiovascular conditions   [x]Hypertension (I10)  []Hyperlipidemia (E78.5)  []Angina pectoris (I20)  []Atherosclerosis (I70)   Musculoskeletal conditions   []Disc pathology   []Congenital spine pathologies   []Prior surgical intervention  []Osteoporosis (M81.8)  []Osteopenia (M85.8)   Endocrine conditions   []Hypothyroid (E03.9)  []Hyperthyroid Gastrointestinal conditions   []Constipation (U19.38)   Metabolic conditions   []Morbid obesity (E66.01)  [x]Diabetes type 1(E10.65) or 2 (E11.65)   []Neuropathy (G60.9)     Pulmonary conditions   []Asthma (J45)  []Coughing   []COPD (J44.9)   Psychological Disorders  []Anxiety (F41.9)  []Depression (F32.9)   []Other:   []Other:          Barriers to/and or personal factors that will affect rehab potential:              []Age  []Sex              []Motivation/Lack of Motivation                        []Co-Morbidities              []Cognitive Function, education/learning barriers              []Environmental, home barriers              []profession/work barriers  []past PT/medical experience  []other:  Justification:     Falls Risk Assessment (30 days):   [x] Falls Risk assessed and no intervention required. [] Falls Risk assessed and Patient requires intervention due to being higher risk   TUG score (>12s at risk):     [] Falls education provided, including       G-Codes:  PT G-Codes  Functional Assessment Tool Used: Quick Dash  Score: 64%  Functional Limitation: Carrying, moving and handling objects  Carrying, Moving and Handling Objects Current Status ():  At least 60 percent but less than 80 percent impaired, limited or restricted  Carrying, Moving and Handling Objects Goal Status (): 0 percent impaired, limited or restricted    ASSESSMENT:   Functional Impairments   []Noted spinal or UE joint hypomobility   []Noted spinal or UE joint hypermobility   [x]Decreased UE functional ROM   [x]Decreased UE functional strength   []Abnormal reflexes/sensation/myotomal/dermatomal deficits   [x]Decreased RC/scapular/core strength and neuromuscular control   []other:      Functional Activity Limitations (from functional questionnaire and intake)   []Reduced ability to tolerate prolonged functional positions   []Reduced ability or difficulty with changes of positions or transfers between positions   []Reduced ability to maintain good posture and demonstrate good body mechanics with sitting, bending, and lifting   [] Reduced ability or tolerance with driving and/or computer work   [x]Reduced ability to sleep   [x]Reduced ability to perform lifting, reaching, carrying tasks   [x]Reduced ability to tolerate impact through UE   [x]Reduced ability to reach behind back   [x]Reduced ability to  or hold objects   [x]Reduced ability to throw or toss an object   []other:    Participation Restrictions   []Reduced participation in self care activities   [x]Reduced participation in home management activities   []Reduced participation in work activities   [x]Reduced face-to-face)  [] EVAL (MOD) 79940 (typically 30 minutes face-to-face)  [] EVAL (HIGH) 14053 (typically 45 minutes face-to-face)  [] RE-EVAL     PLAN:  Frequency/Duration:  1-2 days per week for 12 weeks:  INTERVENTIONS:  1. Therapeutic exercise including: strength training, ROM, NMR and proprioception for the scapula, core and Upper extremity  2. Manual therapy as indicated including Dry Needling/IASTM, STM, PROM, Gr I-IV mobilizations, spinal mobilization/manipulation. 3. Modalities as needed including: thermal agents, E-stim, US, iontophoresis as indicated. 4. Patient education on joint protection, activity modification, progression of HEP. HEP instruction: (see scanned forms)    GOALS:    Therapist goals for Patient:   Short Term Goals: To be achieved in: 2 weeks  1. Independent in HEP and progression per patient tolerance, in order to prevent re-injury. 2. Patient will have a decrease in pain to facilitate improvement in movement, function, and ADLs as indicated by Functional Deficits. Long Term Goals: To be achieved in: 12 weeks  1. Disability index score of 10% or less for the Quick Dash to assist with reaching prior level of function. 2. Patient will demonstrate increased AROM to equal the opposite side bilaterally to allow for proper joint functioning as indicated by patients Functional Deficits. 3. Patient will demonstrate an increase in strength to UE MMT scores to match bilaterally to allow for proper functional mobility as indicated by patients Functional Deficits. 4. Patient will return to all transfers, work activities, and functional activities without increased symptoms or restriction. 5. Patient will have 0/10 pain with ADL's.        Electronically signed by:  Oneyda Anaya PT

## 2018-02-12 NOTE — FLOWSHEET NOTE
stress across injured/healing structures. Therapeutic Exercise and NMR EXR  [x] (87618) Provided verbal/tactile cueing for activities related to strengthening, flexibility, endurance, ROM  for improvements in scapular, scapulothoracic and UE control with self care, reaching, carrying, lifting, house/yardwork, driving/computer work. [x] (19991) Provided verbal/tactile cueing for activities related to improving balance, coordination, kinesthetic sense, posture, motor skill, proprioception  to assist with  scapular, scapulothoracic and UE control with self care, reaching, carrying, lifting, house/yardwork, driving/computer work.     Home Exercise Program:    [x] (33397) Reviewed/Progressed HEP activities related to strengthening, flexibility, endurance, ROM of scapular, scapulothoracic and UE control with self care, reaching, carrying, lifting, house/yardwork, driving/computer work  [x] (57902) Reviewed/Progressed HEP activities related to improving balance, coordination, kinesthetic sense, posture, motor skill, proprioception of scapular, scapulothoracic and UE control with self care, reaching, carrying, lifting, house/yardwork, driving/computer work      Manual Treatments:  PROM / STM / Oscillations-Mobs:  G-I, II, III, IV (PA's, Inf., Post.)  [x] (81817) Provided manual therapy to mobilize soft tissue/joints of cervical/CT, scapular GHJ and UE for the purpose of modulating pain, promoting relaxation,  increasing ROM, reducing/eliminating soft tissue swelling/inflammation/restriction, improving soft tissue extensibility and allowing for proper ROM for normal function with self care, reaching, carrying, lifting, house/yardwork, driving/computer work    Modalities:  na    Charges:  Timed Code Treatment Minutes: 30   Total Treatment Minutes: 50     [x] EVAL (LOW) 41310 (typically 20 minutes face-to-face)  [] EVAL (MOD) 81236 (typically 30 minutes face-to-face)  [] EVAL (HIGH) 02149 (typically 45 minutes face-to-face)  [] RE-EVAL     [x] JI(62015) x  2   [] Ionto  [] NMR (51732) x      [] Vaso  [] Manual (72650) x       [] Mech Traction  [] ES (unattended):   [] Other:     GOALS:  Therapist goals for Patient:   Short Term Goals: To be achieved in: 2 weeks  1. Independent in HEP and progression per patient tolerance, in order to prevent re-injury. 2. Patient will have a decrease in pain to facilitate improvement in movement, function, and ADLs as indicated by Functional Deficits.     Long Term Goals: To be achieved in: 12 weeks  1. Disability index score of 10% or less for the Quick Dash to assist with reaching prior level of function. 2. Patient will demonstrate increased AROM to equal the opposite side bilaterally to allow for proper joint functioning as indicated by patients Functional Deficits. 3. Patient will demonstrate an increase in strength to UE MMT scores to match bilaterally to allow for proper functional mobility as indicated by patients Functional Deficits. 4. Patient will return to all transfers, work activities, and functional activities without increased symptoms or restriction. 5. Patient will have 0/10 pain with ADL's. Goals that are underlined signify the goal has been accomplished. Progression Towards Functional goals:  [] Patient is progressing as expected towards functional goals listed. [] Progression is slowed due to complexities listed. [] Progression has been slowed due to co-morbidities.   [x] Plan just implemented, too soon to assess goals progression  [] Other:     ASSESSMENT:  See eval    Treatment/Activity Tolerance:   [x] Patient tolerated treatment well [] Patient limited by fatique  [] Patient limited by pain  [] Patient limited by other medical complications  [] Other:     Prognosis: [] Good [] Fair  [] Poor    Patient Requires Follow-up: [x] Yes  [] No    PLAN: See eval  [] Continue per plan of care [] Alter current plan (see comments)  [x] Plan of care

## 2018-02-22 DIAGNOSIS — E11.42 TYPE 2 DIABETES MELLITUS WITH DIABETIC POLYNEUROPATHY, WITH LONG-TERM CURRENT USE OF INSULIN (HCC): ICD-10-CM

## 2018-02-22 DIAGNOSIS — Z79.4 TYPE 2 DIABETES MELLITUS WITH DIABETIC POLYNEUROPATHY, WITH LONG-TERM CURRENT USE OF INSULIN (HCC): ICD-10-CM

## 2018-02-26 ENCOUNTER — HOSPITAL ENCOUNTER (OUTPATIENT)
Dept: PHYSICAL THERAPY | Age: 58
Discharge: HOME | End: 2018-02-27
Admitting: ORTHOPAEDIC SURGERY

## 2018-02-26 ENCOUNTER — OFFICE VISIT (OUTPATIENT)
Dept: FAMILY MEDICINE CLINIC | Age: 58
End: 2018-02-26

## 2018-02-26 VITALS
HEART RATE: 96 BPM | WEIGHT: 233 LBS | DIASTOLIC BLOOD PRESSURE: 84 MMHG | BODY MASS INDEX: 33.43 KG/M2 | OXYGEN SATURATION: 94 % | SYSTOLIC BLOOD PRESSURE: 119 MMHG

## 2018-02-26 DIAGNOSIS — M15.9 PRIMARY OSTEOARTHRITIS INVOLVING MULTIPLE JOINTS: ICD-10-CM

## 2018-02-26 DIAGNOSIS — G89.29 OTHER CHRONIC PAIN: ICD-10-CM

## 2018-02-26 DIAGNOSIS — E11.40 TYPE 2 DIABETES MELLITUS WITH DIABETIC NEUROPATHY, WITH LONG-TERM CURRENT USE OF INSULIN (HCC): Primary | ICD-10-CM

## 2018-02-26 DIAGNOSIS — Z79.4 TYPE 2 DIABETES MELLITUS WITH DIABETIC NEUROPATHY, WITH LONG-TERM CURRENT USE OF INSULIN (HCC): Primary | ICD-10-CM

## 2018-02-26 DIAGNOSIS — F33.2 SEVERE EPISODE OF RECURRENT MAJOR DEPRESSIVE DISORDER, WITHOUT PSYCHOTIC FEATURES (HCC): ICD-10-CM

## 2018-02-26 PROCEDURE — 1036F TOBACCO NON-USER: CPT | Performed by: NURSE PRACTITIONER

## 2018-02-26 PROCEDURE — G8427 DOCREV CUR MEDS BY ELIG CLIN: HCPCS | Performed by: NURSE PRACTITIONER

## 2018-02-26 PROCEDURE — 36415 COLL VENOUS BLD VENIPUNCTURE: CPT | Performed by: NURSE PRACTITIONER

## 2018-02-26 PROCEDURE — 3046F HEMOGLOBIN A1C LEVEL >9.0%: CPT | Performed by: NURSE PRACTITIONER

## 2018-02-26 PROCEDURE — 3017F COLORECTAL CA SCREEN DOC REV: CPT | Performed by: NURSE PRACTITIONER

## 2018-02-26 PROCEDURE — G8482 FLU IMMUNIZE ORDER/ADMIN: HCPCS | Performed by: NURSE PRACTITIONER

## 2018-02-26 PROCEDURE — G8417 CALC BMI ABV UP PARAM F/U: HCPCS | Performed by: NURSE PRACTITIONER

## 2018-02-26 PROCEDURE — 96160 PT-FOCUSED HLTH RISK ASSMT: CPT | Performed by: NURSE PRACTITIONER

## 2018-02-26 PROCEDURE — 99214 OFFICE O/P EST MOD 30 MIN: CPT | Performed by: NURSE PRACTITIONER

## 2018-02-26 RX ORDER — GABAPENTIN 600 MG/1
TABLET ORAL
Qty: 90 TABLET | Refills: 6 | Status: SHIPPED | OUTPATIENT
Start: 2018-02-26 | End: 2018-08-14 | Stop reason: SDUPTHER

## 2018-02-26 RX ORDER — DULOXETIN HYDROCHLORIDE 60 MG/1
CAPSULE, DELAYED RELEASE ORAL
Qty: 30 CAPSULE | Refills: 6 | Status: SHIPPED | OUTPATIENT
Start: 2018-02-26 | End: 2018-08-14 | Stop reason: SDUPTHER

## 2018-02-26 RX ORDER — PEN NEEDLE, DIABETIC 31 G X1/4"
NEEDLE, DISPOSABLE MISCELLANEOUS
Qty: 300 EACH | Refills: 11 | Status: SHIPPED | OUTPATIENT
Start: 2018-02-26 | End: 2019-03-14 | Stop reason: SDUPTHER

## 2018-02-26 RX ORDER — PRAVASTATIN SODIUM 10 MG
TABLET ORAL
Qty: 30 TABLET | Refills: 11 | Status: SHIPPED | OUTPATIENT
Start: 2018-02-26 | End: 2018-04-24 | Stop reason: SDUPTHER

## 2018-02-26 RX ORDER — LIDOCAINE 50 MG/G
1 PATCH TOPICAL DAILY
Qty: 30 PATCH | Refills: 0 | Status: SHIPPED | OUTPATIENT
Start: 2018-02-26 | End: 2018-03-19 | Stop reason: SDUPTHER

## 2018-02-26 ASSESSMENT — ENCOUNTER SYMPTOMS
RESPIRATORY NEGATIVE: 1
GASTROINTESTINAL NEGATIVE: 1

## 2018-02-26 ASSESSMENT — PATIENT HEALTH QUESTIONNAIRE - PHQ9
7. TROUBLE CONCENTRATING ON THINGS, SUCH AS READING THE NEWSPAPER OR WATCHING TELEVISION: 1
2. FEELING DOWN, DEPRESSED OR HOPELESS: 3
9. THOUGHTS THAT YOU WOULD BE BETTER OFF DEAD, OR OF HURTING YOURSELF: 2
6. FEELING BAD ABOUT YOURSELF - OR THAT YOU ARE A FAILURE OR HAVE LET YOURSELF OR YOUR FAMILY DOWN: 2
SUM OF ALL RESPONSES TO PHQ9 QUESTIONS 1 & 2: 6
3. TROUBLE FALLING OR STAYING ASLEEP: 3
8. MOVING OR SPEAKING SO SLOWLY THAT OTHER PEOPLE COULD HAVE NOTICED. OR THE OPPOSITE, BEING SO FIGETY OR RESTLESS THAT YOU HAVE BEEN MOVING AROUND A LOT MORE THAN USUAL: 0
5. POOR APPETITE OR OVEREATING: 3
4. FEELING TIRED OR HAVING LITTLE ENERGY: 2
10. IF YOU CHECKED OFF ANY PROBLEMS, HOW DIFFICULT HAVE THESE PROBLEMS MADE IT FOR YOU TO DO YOUR WORK, TAKE CARE OF THINGS AT HOME, OR GET ALONG WITH OTHER PEOPLE: 2
SUM OF ALL RESPONSES TO PHQ QUESTIONS 1-9: 19
1. LITTLE INTEREST OR PLEASURE IN DOING THINGS: 3

## 2018-02-26 NOTE — PATIENT INSTRUCTIONS
or you do not enjoy things like you once did. You may be depressed, which is common in people with chronic pain. Depression can be treated. ? · You have vomiting or cramps for more than 2 hours. ? Watch closely for changes in your health, and be sure to contact your doctor if:  ? · You cannot sleep because of pain. ? · You are very worried or anxious about your pain. ? · You have trouble taking your pain medicine. ? · You have any concerns about your pain medicine. ? · You have trouble with bowel movements, such as:  ¨ No bowel movement in 3 days. ¨ Blood in the anal area, in your stool, or on the toilet paper. ¨ Diarrhea for more than 24 hours. Where can you learn more? Go to https://[employer].Lockbox. org and sign in to your [facility] account. Enter N004 in the [employer] box to learn more about \"Chronic Pain: Care Instructions. \"     If you do not have an account, please click on the \"Sign Up Now\" link. Current as of: October 14, 2016  Content Version: 11.5  © 9421-6539 [employer]. Care instructions adapted under license by [facility] ([facility]). If you have questions about a medical condition or this instruction, always ask your healthcare professional. [place] [address] any warranty or liability for your use of this information. Patient Education        Preventing a Relapse of Depression: Care Instructions  Your Care Instructions    A relapse of depression means your symptoms have come back after you have gotten better. This illness often comes and goes during a lifetime. But there are many things you can do to keep it from coming back. Follow-up care is a key part of your treatment and safety. Be sure to make and go to all appointments, and call your doctor if you are having problems. It's also a good idea to know your test results and keep a list of the medicines you take. What do you need to know?   Know your risk of relapse  Talk to your doctor to find out if you are at risk of relapse. Many things can make a person more likely to relapse into depression. These include having a family member with depression, dealing with serious problems in a relationship or a job, having a serious medical condition, or abusing drugs or alcohol. It is important to know your risk and to recognize warning signs of relapse. Once you know these things, you will be better able to keep it from happening to you. Know the warning signs of relapse  The two most common signs of relapse are:  · Feeling sad or hopeless. · Losing interest in your daily activities. You may have other symptoms, such as:  · You lose or gain weight. · You sleep too much or not enough. · You feel restless and unable to sit still. · You feel unable to move. · You feel tired all the time. · You feel unworthy or guilty without an obvious reason. · You have problems concentrating, remembering, or making decisions. · You think often about death or suicide. · You feel angry or have panic attacks. How can you care for yourself at home? · Take your medicine as prescribed. Call your doctor if you have any problems with your medicine. Many people take their medicines for at least 6 months after they have recovered. This often helps keep symptoms from coming back. However, if your depression keeps coming back, you may have to take medicine for the rest of your life. · Continue counseling even after you have stopped taking medicine. · Eat healthy foods. Include fruits, vegetables, beans, and whole grains in your diet each day. · Get at least 30 minutes of exercise on most days of the week. Walking is a good choice. You also may want to do other activities, such as running, swimming, cycling, or playing tennis or team sports. · See your doctor right away if you have new symptoms or feel that your depression is coming back. · Keep a regular sleep schedule.  Try for 8 hours of sleep a condition or this instruction, always ask your healthcare professional. [address] any warranty or liability for your use of this information. Patient Education        Recovering From Depression: Care Instructions  Your Care Instructions    Taking good care of yourself is important as you recover from depression. In time, your symptoms will fade as your treatment takes hold. Do not give up. Instead, focus your energy on getting better. Your mood will improve. It just takes some time. Focus on things that can help you feel better, such as being with friends and family, eating well, and getting enough rest. But take things slowly. Do not do too much too soon. You will begin to feel better gradually. Follow-up care is a key part of your treatment and safety. Be sure to make and go to all appointments, and call your doctor if you are having problems. It's also a good idea to know your test results and keep a list of the medicines you take. How can you care for yourself at home? Be realistic  · If you have a large task to do, break it up into smaller steps you can handle, and just do what you can. · You may want to put off important decisions until your depression has lifted. If you have plans that will have a major impact on your life, such as marriage, divorce, or a job change, try to wait a bit. Talk it over with friends and loved ones who can help you look at the overall picture first.  · Reaching out to people for help is important. Do not isolate yourself. Let your family and friends help you. Find someone you can trust and confide in, and talk to that person. · Be patient, and be kind to yourself. Remember that depression is not your fault and is not something you can overcome with willpower alone. Treatment is necessary for depression, just like for any other illness. Feeling better takes time, and your mood will improve little by little.   Stay active  · Stay busy and get

## 2018-02-27 LAB
ESTIMATED AVERAGE GLUCOSE: 243.2 MG/DL
HBA1C MFR BLD: 10.1 %

## 2018-02-28 DIAGNOSIS — F32.89 OTHER DEPRESSION: Primary | ICD-10-CM

## 2018-03-01 ENCOUNTER — HOSPITAL ENCOUNTER (OUTPATIENT)
Dept: PHYSICAL THERAPY | Age: 58
Discharge: HOME | End: 2018-03-31
Attending: ORTHOPAEDIC SURGERY | Admitting: ORTHOPAEDIC SURGERY

## 2018-03-02 ENCOUNTER — CLINICAL DOCUMENTATION (OUTPATIENT)
Dept: SPIRITUAL SERVICES | Age: 58
End: 2018-03-02

## 2018-03-02 NOTE — PROGRESS NOTES
3/2/2018  1:20pm/1320    Outpatient SCS telephoned patient:    Ambulatory Spiritual Care Note     Visit Type: Spiritual Assessment and Initial           Age/Gender: 61 y.o. / male     Consultation Requested By: Erica Morales CNP for Emotional Distress. Advanced Care Planning:   Living Will: No Per patient's chart  Medical Power of : No Per patient's chart  [place] DNR: No Per patient's chart    Patient Demographics    Address: [address] [place] [address]   Contact Numbers: [phone] (home) [phone] (work)    E-mail Address: [email or web address]   Support System: Family Members   MyChart Status:  Active   Place of Alevism: None   Mobility/Ability to leave home-Do they Drive? Patient said that he does drive and is mobile. PCP's Name: [facility], CNP     Scheduled appointments:  Future Appointments  Date Time Provider Jacki Sue   3/5/2018 10:30 AM Radha Gray PTA [facility][1] [facility] None   3/16/2018 11:10 AM Kalyn Thomson MD [facility]   4/11/2018 10:45 AM Alpesh Augustin MD [facility]   5/9/2018 1:00 PM Lissette Isaac MD [facility]        Subjective:      Discussed with: Patient  Outpatient SCS telephoned patient. Patient discussed his current health situation and current relationships. Patient said, \"I take one day at a time. \" Patient acknowledged that \"some days are really tough. \"  Patient denies intention or means of harming himself and states, \"My family needs me and harming myself would not do anyone any good. \" Patient verbalized a desire to continue receiving telephone calls from Outpatient SCS. Objective:       Approachable [x] Sad  [x]   Angry [] Shock   []   Calm [] Tearful  []   Happy [] Venting Emotion [x]   Anxious [] Other []      Assessment:     Emotional Distress, Grief/Loss, Spiritual Struggle and Unresolved Conflict/Relationships.       Meaning/Purpose: Patient finds meaning in caring for his grand-children and for other family members. Before assisted he enjoyed working as a . Jason Wei is a hobby he would like to participate in more but is currently limited in this activity due to health and finances. Community: Patient voiced minimal emotional support that is available. Hope/Peace: Patient spoke of decreased peace in his life and his inability to sleep well. Patient voiced that he is looking forward to warmer weather where he can be outdoors and begin mowing. Personal Spirituality and Practices: Tatianna Posada is a practice that is meaningful to patient and fills him with a sense of accomplishment. No other practices were verbalized. Effects on Medical Care/ Decision Making: Patient did verbalize, \"I intend to keep going and take it one step at a time. \"        Plan:       Outpatient SCS to follow with telephone calls offering emotional and spiritual support. Outpatient SCS to facilitate expressions of grief and loss via active listening. Follow-Up: Telephone call within 3 weeks.     Length of Encounter: 45 Minutes    Complexity of Encounter: High

## 2018-03-05 ENCOUNTER — HOSPITAL ENCOUNTER (OUTPATIENT)
Dept: PHYSICAL THERAPY | Age: 58
Discharge: HOME | End: 2018-03-06
Admitting: ORTHOPAEDIC SURGERY

## 2018-03-05 NOTE — FLOWSHEET NOTE
strengthening, flexibility, endurance, ROM  for improvements in scapular, scapulothoracic and UE control with self care, reaching, carrying, lifting, house/yardwork, driving/computer work. [x] (79599) Provided verbal/tactile cueing for activities related to improving balance, coordination, kinesthetic sense, posture, motor skill, proprioception  to assist with  scapular, scapulothoracic and UE control with self care, reaching, carrying, lifting, house/yardwork, driving/computer work.     Home Exercise Program:    [x] (67905) Reviewed/Progressed HEP activities related to strengthening, flexibility, endurance, ROM of scapular, scapulothoracic and UE control with self care, reaching, carrying, lifting, house/yardwork, driving/computer work  [x] (80715) Reviewed/Progressed HEP activities related to improving balance, coordination, kinesthetic sense, posture, motor skill, proprioception of scapular, scapulothoracic and UE control with self care, reaching, carrying, lifting, house/yardwork, driving/computer work      Manual Treatments:  PROM / STM / Oscillations-Mobs:  G-I, II, III, IV (PA's, Inf., Post.)  [x] (38666) Provided manual therapy to mobilize soft tissue/joints of cervical/CT, scapular GHJ and UE for the purpose of modulating pain, promoting relaxation,  increasing ROM, reducing/eliminating soft tissue swelling/inflammation/restriction, improving soft tissue extensibility and allowing for proper ROM for normal function with self care, reaching, carrying, lifting, house/yardwork, driving/computer work    Modalities:  Ice x8'    Charges:  Timed Code Treatment Minutes: 55   Total Treatment Minutes: 63     [] EVAL (LOW) 71687 (typically 20 minutes face-to-face)  [] EVAL (MOD) 81215 (typically 30 minutes face-to-face)  [] EVAL (HIGH) 48791 (typically 45 minutes face-to-face)  [] RE-EVAL     [x] QQ(36200) x  2   [] Ionto  [x] NMR (92407) x  1   [] Vaso  [x] Manual (30230) x  1    [] Mech Traction  [] ES

## 2018-03-13 ENCOUNTER — TELEPHONE (OUTPATIENT)
Dept: FAMILY MEDICINE CLINIC | Age: 58
End: 2018-03-13

## 2018-03-13 DIAGNOSIS — M54.12 CERVICAL RADICULOPATHY AT C7: ICD-10-CM

## 2018-03-13 DIAGNOSIS — M51.26 DISPLACEMENT OF LUMBAR INTERVERTEBRAL DISC WITHOUT MYELOPATHY: Chronic | ICD-10-CM

## 2018-03-13 DIAGNOSIS — M47.817 LUMBOSACRAL SPONDYLOSIS WITHOUT MYELOPATHY: Chronic | ICD-10-CM

## 2018-03-13 DIAGNOSIS — M51.37 DEGENERATION OF LUMBAR OR LUMBOSACRAL INTERVERTEBRAL DISC: Primary | Chronic | ICD-10-CM

## 2018-03-13 DIAGNOSIS — M79.645 PAIN OF FINGER OF LEFT HAND: ICD-10-CM

## 2018-03-13 DIAGNOSIS — M54.16 LUMBAR RADICULITIS: ICD-10-CM

## 2018-03-13 DIAGNOSIS — M48.061 SPINAL STENOSIS, LUMBAR REGION, WITHOUT NEUROGENIC CLAUDICATION: Chronic | ICD-10-CM

## 2018-03-13 DIAGNOSIS — M75.40 IMPINGEMENT SYNDROME OF SHOULDER REGION, UNSPECIFIED LATERALITY: ICD-10-CM

## 2018-03-16 ENCOUNTER — OFFICE VISIT (OUTPATIENT)
Dept: ORTHOPEDIC SURGERY | Age: 58
End: 2018-03-16

## 2018-03-16 VITALS — HEIGHT: 70 IN | BODY MASS INDEX: 32.35 KG/M2 | WEIGHT: 225.97 LBS

## 2018-03-16 DIAGNOSIS — M75.111 INCOMPLETE TEAR OF RIGHT ROTATOR CUFF: Primary | ICD-10-CM

## 2018-03-16 PROCEDURE — 99212 OFFICE O/P EST SF 10 MIN: CPT | Performed by: ORTHOPAEDIC SURGERY

## 2018-03-16 PROCEDURE — G8417 CALC BMI ABV UP PARAM F/U: HCPCS | Performed by: ORTHOPAEDIC SURGERY

## 2018-03-16 PROCEDURE — 1036F TOBACCO NON-USER: CPT | Performed by: ORTHOPAEDIC SURGERY

## 2018-03-16 PROCEDURE — G8482 FLU IMMUNIZE ORDER/ADMIN: HCPCS | Performed by: ORTHOPAEDIC SURGERY

## 2018-03-16 PROCEDURE — 3017F COLORECTAL CA SCREEN DOC REV: CPT | Performed by: ORTHOPAEDIC SURGERY

## 2018-03-16 PROCEDURE — G8427 DOCREV CUR MEDS BY ELIG CLIN: HCPCS | Performed by: ORTHOPAEDIC SURGERY

## 2018-03-16 NOTE — PROGRESS NOTES
Chief Complaint  Follow-up (Left Shoulder: partial tear to the left subscapularis tendon, full-thickness superior aspect, partial thickness middle and inferior aspect, subacromial impingement, biceps tendinitis; in PT; feels as though PT  doesn't really help, so is doign exercises at home; has no pain if he keeps shoulder in front of him, but reaching behind back increases pain; still having increase pain at night which makes it hard to sleep)      History of Present Illness:  Keith Johns is a 61 y.o. y/o male who presents today for follow-up of his left shoulder. He continues to have pain especially at night and weakness with any kind of internal rotation exercises for motion. He has been doing exercises at home. He did go to physical therapy for 5 times but does not want to continue going secondary to time in financial reasons. He is now in pain management.       Medical History  Past Medical History:   Diagnosis Date    Anxiety     CTS (carpal tunnel syndrome)     CTS (carpal tunnel syndrome)     Diabetes mellitus (HCC)     GERD (gastroesophageal reflux disease)     Hyperlipidemia     Hypertension     Peripheral neuropathy (HCC)     Type II or unspecified type diabetes mellitus without mention of complication, not stated as uncontrolled     Urinary frequency        Past Surgical History:   Procedure Laterality Date    APPENDECTOMY      CARPAL TUNNEL RELEASE      HAND ARTHROPLASTY Left     HERNIA REPAIR  10/12/15    Laparoscopic Hernia Repair       Social History     Social History    Marital status: Single     Spouse name: N/A    Number of children: N/A    Years of education: N/A     Occupational History    disability      Social History Main Topics    Smoking status: Former Smoker    Smokeless tobacco: Never Used      Comment: 2000    Alcohol use No    Drug use: No    Sexual activity: Yes     Partners: Female     Other Topics Concern    Not on file     Social History Narrative    No extremity does not show any tenderness, deformity or injury. Range of motion is unremarkable. There is no gross instability. There are no rashes, ulcerations or lesions. Strength and tone are normal.      Radiology:     X-rays obtained and reviewed in office:  No new x-rays    MRI dated 1/26/18 once again reviewed: Full-thickness tear to the upper one third of the subscapularis tendon left shoulder with small amount of retraction with inferior two thirds of subscapularis tear with partial-thickness tear without retraction, tendinosis supraspinatus tendon without definitive tear, degenerative changes acromial clavicular joint, mild spurring acromion      Assessment:  68-year-old male with a partial tear to the left subscapularis tendon, full-thickness superior aspect, partial thickness middle and inferior aspect, subacromial impingement, biceps tendinitis    Office Procedures:  No orders of the defined types were placed in this encounter. Plan:   -We once again had a lengthy discussion about his shoulder. He does have a partial full-thickness injury to the superior aspect of his subscapularis and we discussed this is unlikely to improve without surgical treatment, however he is not ready for surgery yet at this time. He would like to continue with his home exercise plan    -He is in pain management he can continue with treatment there at this time    -I'll see him back in 3 months repeat evaluation and further discussion    -Surgery would be: Left shoulder arthroscopy with debridement, subscapularis rotator cuff repair arthroscopic, possible open, subpectoral biceps tenodesis, subacromial decompression and acromioplasty    -If there are issues in the interim he should contact the office    Voice Recognition Dictation disclaimer: Please note that portions of this chart were generated using Dragon dictation software.  Although every effort was made to ensure the accuracy of this automated transcription, some

## 2018-03-19 RX ORDER — METHYL SALICYLATE/MENTHOL
CREAM (GRAM) TOPICAL
Qty: 1 BOTTLE | Refills: 3 | Status: SHIPPED | OUTPATIENT
Start: 2018-03-19 | End: 2019-03-25 | Stop reason: SDUPTHER

## 2018-03-19 RX ORDER — LIDOCAINE 50 MG/G
1 PATCH TOPICAL DAILY
Qty: 30 PATCH | Refills: 0 | Status: SHIPPED | OUTPATIENT
Start: 2018-03-19 | End: 2018-04-02 | Stop reason: SDUPTHER

## 2018-03-19 NOTE — TELEPHONE ENCOUNTER
What is Thera- Gesic Extra Strength? I need directions for this tej. Will his insurance pay for this.     Thanks,  Digna Riley

## 2018-03-19 NOTE — TELEPHONE ENCOUNTER
Kareem Batista Yes it is OTC but someone told him if hecould get a Rx for it his ins may pay.  Pt stated he has been using it OTC

## 2018-03-19 NOTE — TELEPHONE ENCOUNTER
I called pt and he stated he uses the [facility] on his back and shoulders twice daily     Pt also asking for a refill of his Lidocaine patch's

## 2018-03-19 NOTE — TELEPHONE ENCOUNTER
We don't have thera-gesic on his med list, Is it something OTC. Ok to reorder the Lidocaine patches. 1 to affected areas, on for 12 hours off for 12 hours.   Disp # 60 RF x6

## 2018-03-19 NOTE — TELEPHONE ENCOUNTER
I don't think it will pay for it,  But we can try it Theragesic Cream BID to affected areas, Disp 1 tube RF x6

## 2018-03-20 ENCOUNTER — CLINICAL DOCUMENTATION (OUTPATIENT)
Dept: SPIRITUAL SERVICES | Age: 58
End: 2018-03-20

## 2018-03-21 ENCOUNTER — CLINICAL DOCUMENTATION (OUTPATIENT)
Dept: SPIRITUAL SERVICES | Age: 58
End: 2018-03-21

## 2018-03-28 RX ORDER — LIDOCAINE 5 %
ADHESIVE PATCH, MEDICATED TOPICAL
Qty: 30 PATCH | Refills: 5 | Status: SHIPPED | OUTPATIENT
Start: 2018-03-28 | End: 2018-04-24 | Stop reason: SDUPTHER

## 2018-03-28 RX ORDER — UBIQUINOL 100 MG
CAPSULE ORAL
Qty: 100 EACH | Refills: 1 | Status: SHIPPED | OUTPATIENT
Start: 2018-03-28

## 2018-03-28 RX ORDER — OMEPRAZOLE 40 MG/1
CAPSULE, DELAYED RELEASE ORAL
Qty: 30 CAPSULE | Refills: 5 | Status: SHIPPED | OUTPATIENT
Start: 2018-03-28 | End: 2018-08-14 | Stop reason: SDUPTHER

## 2018-04-01 ENCOUNTER — HOSPITAL ENCOUNTER (OUTPATIENT)
Dept: PHYSICAL THERAPY | Age: 58
Discharge: HOME | End: 2018-04-30
Attending: ORTHOPAEDIC SURGERY | Admitting: ORTHOPAEDIC SURGERY

## 2018-04-02 ENCOUNTER — TELEPHONE (OUTPATIENT)
Dept: FAMILY MEDICINE CLINIC | Age: 58
End: 2018-04-02

## 2018-04-02 DIAGNOSIS — M51.37 DEGENERATION OF LUMBAR OR LUMBOSACRAL INTERVERTEBRAL DISC: Chronic | ICD-10-CM

## 2018-04-02 DIAGNOSIS — M75.40 IMPINGEMENT SYNDROME OF SHOULDER REGION, UNSPECIFIED LATERALITY: ICD-10-CM

## 2018-04-02 DIAGNOSIS — M54.16 LUMBAR RADICULITIS: ICD-10-CM

## 2018-04-02 DIAGNOSIS — M15.9 PRIMARY OSTEOARTHRITIS INVOLVING MULTIPLE JOINTS: ICD-10-CM

## 2018-04-02 DIAGNOSIS — M79.645 PAIN OF FINGER OF LEFT HAND: ICD-10-CM

## 2018-04-02 DIAGNOSIS — M54.12 CERVICAL RADICULOPATHY AT C7: ICD-10-CM

## 2018-04-02 DIAGNOSIS — M47.817 LUMBOSACRAL SPONDYLOSIS WITHOUT MYELOPATHY: Chronic | ICD-10-CM

## 2018-04-02 DIAGNOSIS — M48.061 SPINAL STENOSIS, LUMBAR REGION, WITHOUT NEUROGENIC CLAUDICATION: Chronic | ICD-10-CM

## 2018-04-02 DIAGNOSIS — M51.26 DISPLACEMENT OF LUMBAR INTERVERTEBRAL DISC WITHOUT MYELOPATHY: Chronic | ICD-10-CM

## 2018-04-02 RX ORDER — LIDOCAINE 50 MG/G
1 PATCH TOPICAL DAILY
Qty: 30 PATCH | Refills: 2 | Status: SHIPPED | OUTPATIENT
Start: 2018-04-02 | End: 2018-04-16 | Stop reason: SDUPTHER

## 2018-04-11 ENCOUNTER — TELEPHONE (OUTPATIENT)
Dept: ORTHOPEDIC SURGERY | Age: 58
End: 2018-04-11

## 2018-04-11 ENCOUNTER — OFFICE VISIT (OUTPATIENT)
Dept: ORTHOPEDIC SURGERY | Age: 58
End: 2018-04-11

## 2018-04-11 VITALS — BODY MASS INDEX: 32.35 KG/M2 | HEIGHT: 70 IN | WEIGHT: 225.97 LBS

## 2018-04-11 DIAGNOSIS — M79.642 HAND PAIN, LEFT: Primary | ICD-10-CM

## 2018-04-11 DIAGNOSIS — M18.12 PRIMARY OSTEOARTHRITIS OF FIRST CARPOMETACARPAL JOINT OF LEFT HAND: ICD-10-CM

## 2018-04-11 PROCEDURE — 1036F TOBACCO NON-USER: CPT | Performed by: ORTHOPAEDIC SURGERY

## 2018-04-11 PROCEDURE — G8427 DOCREV CUR MEDS BY ELIG CLIN: HCPCS | Performed by: ORTHOPAEDIC SURGERY

## 2018-04-11 PROCEDURE — G8417 CALC BMI ABV UP PARAM F/U: HCPCS | Performed by: ORTHOPAEDIC SURGERY

## 2018-04-11 PROCEDURE — 99213 OFFICE O/P EST LOW 20 MIN: CPT | Performed by: ORTHOPAEDIC SURGERY

## 2018-04-11 PROCEDURE — L3917 METACARP FX ORTHOSIS PRE CST: HCPCS | Performed by: ORTHOPAEDIC SURGERY

## 2018-04-11 PROCEDURE — 3017F COLORECTAL CA SCREEN DOC REV: CPT | Performed by: ORTHOPAEDIC SURGERY

## 2018-04-16 DIAGNOSIS — M79.645 PAIN OF FINGER OF LEFT HAND: ICD-10-CM

## 2018-04-16 DIAGNOSIS — M54.12 CERVICAL RADICULOPATHY AT C7: ICD-10-CM

## 2018-04-16 DIAGNOSIS — M54.16 LUMBAR RADICULITIS: ICD-10-CM

## 2018-04-16 DIAGNOSIS — M48.061 SPINAL STENOSIS, LUMBAR REGION, WITHOUT NEUROGENIC CLAUDICATION: Chronic | ICD-10-CM

## 2018-04-16 DIAGNOSIS — M51.37 DEGENERATION OF LUMBAR OR LUMBOSACRAL INTERVERTEBRAL DISC: Chronic | ICD-10-CM

## 2018-04-16 DIAGNOSIS — M51.26 DISPLACEMENT OF LUMBAR INTERVERTEBRAL DISC WITHOUT MYELOPATHY: Chronic | ICD-10-CM

## 2018-04-16 DIAGNOSIS — M75.40 IMPINGEMENT SYNDROME OF SHOULDER REGION, UNSPECIFIED LATERALITY: ICD-10-CM

## 2018-04-16 DIAGNOSIS — M47.817 LUMBOSACRAL SPONDYLOSIS WITHOUT MYELOPATHY: Chronic | ICD-10-CM

## 2018-04-16 RX ORDER — LIDOCAINE 50 MG/G
3 PATCH TOPICAL DAILY
Qty: 30 PATCH | Refills: 2 | Status: SHIPPED | OUTPATIENT
Start: 2018-04-16 | End: 2018-04-23 | Stop reason: DRUGHIGH

## 2018-04-20 ENCOUNTER — CLINICAL DOCUMENTATION (OUTPATIENT)
Dept: SPIRITUAL SERVICES | Age: 58
End: 2018-04-20

## 2018-04-23 ENCOUNTER — OFFICE VISIT (OUTPATIENT)
Dept: FAMILY MEDICINE CLINIC | Age: 58
End: 2018-04-23

## 2018-04-23 VITALS
DIASTOLIC BLOOD PRESSURE: 82 MMHG | TEMPERATURE: 98 F | OXYGEN SATURATION: 98 % | SYSTOLIC BLOOD PRESSURE: 118 MMHG | WEIGHT: 232 LBS | HEART RATE: 76 BPM | BODY MASS INDEX: 33.29 KG/M2

## 2018-04-23 DIAGNOSIS — I10 ESSENTIAL HYPERTENSION: ICD-10-CM

## 2018-04-23 DIAGNOSIS — E11.40 TYPE 2 DIABETES MELLITUS WITH DIABETIC NEUROPATHY, WITH LONG-TERM CURRENT USE OF INSULIN (HCC): Primary | ICD-10-CM

## 2018-04-23 DIAGNOSIS — R42 DIZZINESS: ICD-10-CM

## 2018-04-23 DIAGNOSIS — F32.9 REACTIVE DEPRESSION: ICD-10-CM

## 2018-04-23 DIAGNOSIS — E78.2 MIXED HYPERLIPIDEMIA: ICD-10-CM

## 2018-04-23 DIAGNOSIS — Z79.4 TYPE 2 DIABETES MELLITUS WITH DIABETIC NEUROPATHY, WITH LONG-TERM CURRENT USE OF INSULIN (HCC): Primary | ICD-10-CM

## 2018-04-23 PROCEDURE — G8427 DOCREV CUR MEDS BY ELIG CLIN: HCPCS | Performed by: NURSE PRACTITIONER

## 2018-04-23 PROCEDURE — 3046F HEMOGLOBIN A1C LEVEL >9.0%: CPT | Performed by: NURSE PRACTITIONER

## 2018-04-23 PROCEDURE — 36415 COLL VENOUS BLD VENIPUNCTURE: CPT | Performed by: NURSE PRACTITIONER

## 2018-04-23 PROCEDURE — 2022F DILAT RTA XM EVC RTNOPTHY: CPT | Performed by: NURSE PRACTITIONER

## 2018-04-23 PROCEDURE — 99213 OFFICE O/P EST LOW 20 MIN: CPT | Performed by: NURSE PRACTITIONER

## 2018-04-23 PROCEDURE — 3017F COLORECTAL CA SCREEN DOC REV: CPT | Performed by: NURSE PRACTITIONER

## 2018-04-23 PROCEDURE — G8417 CALC BMI ABV UP PARAM F/U: HCPCS | Performed by: NURSE PRACTITIONER

## 2018-04-23 PROCEDURE — 1036F TOBACCO NON-USER: CPT | Performed by: NURSE PRACTITIONER

## 2018-04-23 RX ORDER — INSULIN LISPRO 100 [IU]/ML
INJECTION, SOLUTION INTRAVENOUS; SUBCUTANEOUS
Qty: 1 PEN | Refills: 5 | Status: SHIPPED | OUTPATIENT
Start: 2018-04-23 | End: 2018-09-27 | Stop reason: SDUPTHER

## 2018-04-23 RX ORDER — OXYCODONE AND ACETAMINOPHEN 7.5; 325 MG/1; MG/1
1 TABLET ORAL EVERY 8 HOURS PRN
COMMUNITY

## 2018-04-23 RX ORDER — BISOPROLOL FUMARATE AND HYDROCHLOROTHIAZIDE 10; 6.25 MG/1; MG/1
TABLET ORAL
Qty: 30 TABLET | Refills: 5 | Status: SHIPPED | OUTPATIENT
Start: 2018-04-23 | End: 2018-08-14 | Stop reason: SDUPTHER

## 2018-04-23 RX ORDER — LISINOPRIL 5 MG/1
TABLET ORAL
Qty: 30 TABLET | Refills: 11 | Status: SHIPPED | OUTPATIENT
Start: 2018-04-23 | End: 2018-12-17 | Stop reason: SDUPTHER

## 2018-04-23 RX ORDER — TIZANIDINE 4 MG/1
TABLET ORAL
Qty: 60 TABLET | Refills: 5 | Status: SHIPPED | OUTPATIENT
Start: 2018-04-23 | End: 2018-08-14 | Stop reason: SDUPTHER

## 2018-04-24 DIAGNOSIS — E78.2 MIXED HYPERLIPIDEMIA: Primary | ICD-10-CM

## 2018-04-24 LAB
A/G RATIO: 1.9 (ref 1.1–2.2)
ALBUMIN SERPL-MCNC: 4.2 G/DL (ref 3.4–5)
ALP BLD-CCNC: 61 U/L (ref 40–129)
ALT SERPL-CCNC: 19 U/L (ref 10–40)
ANION GAP SERPL CALCULATED.3IONS-SCNC: 18 MMOL/L (ref 3–16)
AST SERPL-CCNC: 15 U/L (ref 15–37)
BILIRUB SERPL-MCNC: 0.5 MG/DL (ref 0–1)
BUN BLDV-MCNC: 24 MG/DL (ref 7–20)
CALCIUM SERPL-MCNC: 8.8 MG/DL (ref 8.3–10.6)
CHLORIDE BLD-SCNC: 97 MMOL/L (ref 99–110)
CHOLESTEROL, TOTAL: 165 MG/DL (ref 0–199)
CO2: 22 MMOL/L (ref 21–32)
CREAT SERPL-MCNC: 1 MG/DL (ref 0.9–1.3)
ESTIMATED AVERAGE GLUCOSE: 226 MG/DL
GFR AFRICAN AMERICAN: >60
GFR NON-AFRICAN AMERICAN: >60
GLOBULIN: 2.2 G/DL
GLUCOSE BLD-MCNC: 403 MG/DL (ref 70–99)
HBA1C MFR BLD: 9.5 %
HDLC SERPL-MCNC: 30 MG/DL (ref 40–60)
LDL CHOLESTEROL CALCULATED: 80 MG/DL
POTASSIUM SERPL-SCNC: 4.4 MMOL/L (ref 3.5–5.1)
SODIUM BLD-SCNC: 137 MMOL/L (ref 136–145)
TOTAL PROTEIN: 6.4 G/DL (ref 6.4–8.2)
TRIGL SERPL-MCNC: 273 MG/DL (ref 0–150)
TSH REFLEX: 1.48 UIU/ML (ref 0.27–4.2)
VLDLC SERPL CALC-MCNC: 55 MG/DL

## 2018-04-24 RX ORDER — LIDOCAINE 50 MG/G
3 PATCH TOPICAL EVERY 24 HOURS
Qty: 90 PATCH | Refills: 6 | Status: SHIPPED | OUTPATIENT
Start: 2018-04-24 | End: 2018-07-24 | Stop reason: SDUPTHER

## 2018-04-24 RX ORDER — PRAVASTATIN SODIUM 10 MG
20 TABLET ORAL DAILY
Qty: 60 TABLET | Refills: 11 | Status: SHIPPED | OUTPATIENT
Start: 2018-04-24 | End: 2018-12-17 | Stop reason: SDUPTHER

## 2018-04-24 ASSESSMENT — ENCOUNTER SYMPTOMS
EYES NEGATIVE: 1
GASTROINTESTINAL NEGATIVE: 1
RESPIRATORY NEGATIVE: 1

## 2018-04-24 ASSESSMENT — PATIENT HEALTH QUESTIONNAIRE - PHQ9
SUM OF ALL RESPONSES TO PHQ9 QUESTIONS 1 & 2: 2
1. LITTLE INTEREST OR PLEASURE IN DOING THINGS: 1
SUM OF ALL RESPONSES TO PHQ QUESTIONS 1-9: 2
2. FEELING DOWN, DEPRESSED OR HOPELESS: 1

## 2018-04-25 ENCOUNTER — OFFICE VISIT (OUTPATIENT)
Dept: ORTHOPEDIC SURGERY | Age: 58
End: 2018-04-25

## 2018-04-25 VITALS — BODY MASS INDEX: 36.4 KG/M2 | HEIGHT: 67 IN | WEIGHT: 231.92 LBS

## 2018-04-25 DIAGNOSIS — M79.642 HAND PAIN, LEFT: Primary | ICD-10-CM

## 2018-04-25 DIAGNOSIS — M18.12 PRIMARY OSTEOARTHRITIS OF FIRST CARPOMETACARPAL JOINT OF LEFT HAND: ICD-10-CM

## 2018-04-25 PROCEDURE — 99213 OFFICE O/P EST LOW 20 MIN: CPT | Performed by: ORTHOPAEDIC SURGERY

## 2018-04-25 PROCEDURE — 3017F COLORECTAL CA SCREEN DOC REV: CPT | Performed by: ORTHOPAEDIC SURGERY

## 2018-04-25 PROCEDURE — G8417 CALC BMI ABV UP PARAM F/U: HCPCS | Performed by: ORTHOPAEDIC SURGERY

## 2018-04-25 PROCEDURE — G8427 DOCREV CUR MEDS BY ELIG CLIN: HCPCS | Performed by: ORTHOPAEDIC SURGERY

## 2018-04-25 PROCEDURE — 1036F TOBACCO NON-USER: CPT | Performed by: ORTHOPAEDIC SURGERY

## 2018-05-18 ENCOUNTER — CLINICAL DOCUMENTATION (OUTPATIENT)
Dept: SPIRITUAL SERVICES | Age: 58
End: 2018-05-18

## 2018-05-21 ENCOUNTER — TELEPHONE (OUTPATIENT)
Dept: FAMILY MEDICINE CLINIC | Age: 58
End: 2018-05-21

## 2018-05-23 ENCOUNTER — TELEPHONE (OUTPATIENT)
Dept: FAMILY MEDICINE CLINIC | Age: 58
End: 2018-05-23

## 2018-06-04 ENCOUNTER — TELEPHONE (OUTPATIENT)
Dept: FAMILY MEDICINE CLINIC | Age: 58
End: 2018-06-04

## 2018-06-06 ENCOUNTER — OFFICE VISIT (OUTPATIENT)
Dept: ORTHOPEDIC SURGERY | Age: 58
End: 2018-06-06

## 2018-06-06 VITALS — BODY MASS INDEX: 36.4 KG/M2 | WEIGHT: 231.92 LBS | HEIGHT: 67 IN

## 2018-06-06 DIAGNOSIS — M18.12 PRIMARY OSTEOARTHRITIS OF FIRST CARPOMETACARPAL JOINT OF LEFT HAND: ICD-10-CM

## 2018-06-06 DIAGNOSIS — M79.642 HAND PAIN, LEFT: Primary | ICD-10-CM

## 2018-06-06 PROCEDURE — 3017F COLORECTAL CA SCREEN DOC REV: CPT | Performed by: ORTHOPAEDIC SURGERY

## 2018-06-06 PROCEDURE — G8417 CALC BMI ABV UP PARAM F/U: HCPCS | Performed by: ORTHOPAEDIC SURGERY

## 2018-06-06 PROCEDURE — 20600 DRAIN/INJ JOINT/BURSA W/O US: CPT | Performed by: ORTHOPAEDIC SURGERY

## 2018-06-06 PROCEDURE — 1036F TOBACCO NON-USER: CPT | Performed by: ORTHOPAEDIC SURGERY

## 2018-06-06 PROCEDURE — 99213 OFFICE O/P EST LOW 20 MIN: CPT | Performed by: ORTHOPAEDIC SURGERY

## 2018-06-06 PROCEDURE — G8428 CUR MEDS NOT DOCUMENT: HCPCS | Performed by: ORTHOPAEDIC SURGERY

## 2018-06-12 ENCOUNTER — OFFICE VISIT (OUTPATIENT)
Dept: ORTHOPEDIC SURGERY | Age: 58
End: 2018-06-12

## 2018-06-12 VITALS — BODY MASS INDEX: 36.4 KG/M2 | WEIGHT: 231.92 LBS | HEIGHT: 67 IN

## 2018-06-12 DIAGNOSIS — S46.812D PARTIAL TEAR OF LEFT SUBSCAPULARIS TENDON, SUBSEQUENT ENCOUNTER: Primary | ICD-10-CM

## 2018-06-12 DIAGNOSIS — M25.511 CHRONIC RIGHT SHOULDER PAIN: ICD-10-CM

## 2018-06-12 DIAGNOSIS — G89.29 CHRONIC RIGHT SHOULDER PAIN: ICD-10-CM

## 2018-06-12 PROCEDURE — G8428 CUR MEDS NOT DOCUMENT: HCPCS | Performed by: ORTHOPAEDIC SURGERY

## 2018-06-12 PROCEDURE — 1036F TOBACCO NON-USER: CPT | Performed by: ORTHOPAEDIC SURGERY

## 2018-06-12 PROCEDURE — G8417 CALC BMI ABV UP PARAM F/U: HCPCS | Performed by: ORTHOPAEDIC SURGERY

## 2018-06-12 PROCEDURE — 20610 DRAIN/INJ JOINT/BURSA W/O US: CPT | Performed by: ORTHOPAEDIC SURGERY

## 2018-06-12 PROCEDURE — 99213 OFFICE O/P EST LOW 20 MIN: CPT | Performed by: ORTHOPAEDIC SURGERY

## 2018-06-12 PROCEDURE — 3017F COLORECTAL CA SCREEN DOC REV: CPT | Performed by: ORTHOPAEDIC SURGERY

## 2018-06-13 ENCOUNTER — TELEPHONE (OUTPATIENT)
Dept: ORTHOPEDIC SURGERY | Age: 58
End: 2018-06-13

## 2018-06-18 ENCOUNTER — TELEPHONE (OUTPATIENT)
Dept: FAMILY MEDICINE CLINIC | Age: 58
End: 2018-06-18

## 2018-06-18 RX ORDER — BLOOD-GLUCOSE METER
KIT MISCELLANEOUS
Qty: 50 STRIP | Refills: 10 | Status: SHIPPED | OUTPATIENT
Start: 2018-06-18 | End: 2018-12-12 | Stop reason: SDUPTHER

## 2018-06-19 RX ORDER — BACLOFEN 10 MG/1
10 TABLET ORAL
COMMUNITY
Start: 2018-05-21 | End: 2018-08-14

## 2018-06-19 RX ORDER — LIDOCAINE 40 MG/G
4 CREAM TOPICAL EVERY 12 HOURS
COMMUNITY
Start: 2018-05-22 | End: 2018-08-14

## 2018-06-20 RX ORDER — LANCETS 33 GAUGE
EACH MISCELLANEOUS
Qty: 100 EACH | Refills: 5 | Status: SHIPPED | OUTPATIENT
Start: 2018-06-20 | End: 2018-12-21 | Stop reason: SDUPTHER

## 2018-06-26 ENCOUNTER — OFFICE VISIT (OUTPATIENT)
Dept: ORTHOPEDIC SURGERY | Age: 58
End: 2018-06-26

## 2018-06-26 VITALS — WEIGHT: 231 LBS | BODY MASS INDEX: 36.26 KG/M2 | HEIGHT: 67 IN

## 2018-06-26 DIAGNOSIS — M19.019 ACROMIOCLAVICULAR JOINT ARTHRITIS: ICD-10-CM

## 2018-06-26 DIAGNOSIS — M75.81 TENDINITIS OF RIGHT ROTATOR CUFF: Primary | ICD-10-CM

## 2018-06-26 PROCEDURE — G8427 DOCREV CUR MEDS BY ELIG CLIN: HCPCS | Performed by: ORTHOPAEDIC SURGERY

## 2018-06-26 PROCEDURE — 3017F COLORECTAL CA SCREEN DOC REV: CPT | Performed by: ORTHOPAEDIC SURGERY

## 2018-06-26 PROCEDURE — G8417 CALC BMI ABV UP PARAM F/U: HCPCS | Performed by: ORTHOPAEDIC SURGERY

## 2018-06-26 PROCEDURE — 20610 DRAIN/INJ JOINT/BURSA W/O US: CPT | Performed by: ORTHOPAEDIC SURGERY

## 2018-06-26 PROCEDURE — 99213 OFFICE O/P EST LOW 20 MIN: CPT | Performed by: ORTHOPAEDIC SURGERY

## 2018-06-26 PROCEDURE — 1036F TOBACCO NON-USER: CPT | Performed by: ORTHOPAEDIC SURGERY

## 2018-07-24 RX ORDER — LIDOCAINE 50 MG/G
3 PATCH TOPICAL EVERY 24 HOURS
Qty: 90 PATCH | Refills: 6 | Status: SHIPPED | OUTPATIENT
Start: 2018-07-24 | End: 2018-12-17 | Stop reason: SDUPTHER

## 2018-07-26 ENCOUNTER — CARE COORDINATION (OUTPATIENT)
Dept: CARE COORDINATION | Age: 58
End: 2018-07-26

## 2018-07-26 ENCOUNTER — TELEPHONE (OUTPATIENT)
Dept: FAMILY MEDICINE CLINIC | Age: 58
End: 2018-07-26

## 2018-08-01 ENCOUNTER — TELEPHONE (OUTPATIENT)
Dept: FAMILY MEDICINE CLINIC | Age: 58
End: 2018-08-01

## 2018-08-02 ENCOUNTER — TELEPHONE (OUTPATIENT)
Dept: ORTHOPEDIC SURGERY | Age: 58
End: 2018-08-02

## 2018-08-02 NOTE — TELEPHONE ENCOUNTER
SUBMITTED A PA VIA CMM FOR Lidocaine 5% patches  Key: [id number]  PA Case ID: [id number]  Rx #: [id number]   STATUS: APPROVED   GOOD UNTIL 05/22/2019

## 2018-08-06 ENCOUNTER — TELEPHONE (OUTPATIENT)
Dept: FAMILY MEDICINE CLINIC | Age: 58
End: 2018-08-06

## 2018-08-08 ENCOUNTER — CARE COORDINATION (OUTPATIENT)
Dept: CARE COORDINATION | Age: 58
End: 2018-08-08

## 2018-08-08 NOTE — CARE COORDINATION
Spoke with patient  Introduced role of [facility] and Explained benefits of Care Coordination  Reviewed definition of A1C and patient's results  Patient reports he currently has all of his medications and denies any difficulty affording medications  Patient admits he occasionally forgets to take his medication      Patient currently undecided if wants to enroll in care coordination  He has an appointment with his PCP next week and wants to wait until he gets the results of his A1C  Will provide the following hand outs for patient to review:  Target Blood sugars  Complications of Diabetes   Understanding Type 2 Diabetes

## 2018-08-10 NOTE — TELEPHONE ENCOUNTER
Pritesh Campos,  Thanks for talking with Caleb Freire. He has multiple issues and is pretty non-complaint. He eats pretty much what he wants and is known to miss his meds.         Robby Taylor

## 2018-08-14 ENCOUNTER — OFFICE VISIT (OUTPATIENT)
Dept: FAMILY MEDICINE CLINIC | Age: 58
End: 2018-08-14

## 2018-08-14 VITALS
WEIGHT: 226.4 LBS | BODY MASS INDEX: 35.53 KG/M2 | HEART RATE: 76 BPM | OXYGEN SATURATION: 93 % | SYSTOLIC BLOOD PRESSURE: 104 MMHG | DIASTOLIC BLOOD PRESSURE: 68 MMHG

## 2018-08-14 DIAGNOSIS — Z79.4 TYPE 2 DIABETES MELLITUS WITH DIABETIC NEUROPATHY, WITH LONG-TERM CURRENT USE OF INSULIN (HCC): Primary | ICD-10-CM

## 2018-08-14 DIAGNOSIS — F34.1 DYSTHYMIA: ICD-10-CM

## 2018-08-14 DIAGNOSIS — I10 ESSENTIAL HYPERTENSION: ICD-10-CM

## 2018-08-14 DIAGNOSIS — E11.40 TYPE 2 DIABETES MELLITUS WITH DIABETIC NEUROPATHY, WITH LONG-TERM CURRENT USE OF INSULIN (HCC): Primary | ICD-10-CM

## 2018-08-14 DIAGNOSIS — G47.01 INSOMNIA DUE TO MEDICAL CONDITION: ICD-10-CM

## 2018-08-14 DIAGNOSIS — E78.2 MIXED HYPERLIPIDEMIA: ICD-10-CM

## 2018-08-14 DIAGNOSIS — M15.9 PRIMARY OSTEOARTHRITIS INVOLVING MULTIPLE JOINTS: ICD-10-CM

## 2018-08-14 LAB
CREATININE URINE: 166 MG/DL (ref 39–259)
MICROALBUMIN UR-MCNC: <1.2 MG/DL
MICROALBUMIN/CREAT UR-RTO: NORMAL MG/G (ref 0–30)

## 2018-08-14 PROCEDURE — G8417 CALC BMI ABV UP PARAM F/U: HCPCS | Performed by: NURSE PRACTITIONER

## 2018-08-14 PROCEDURE — G8427 DOCREV CUR MEDS BY ELIG CLIN: HCPCS | Performed by: NURSE PRACTITIONER

## 2018-08-14 PROCEDURE — 3045F PR MOST RECENT HEMOGLOBIN A1C LEVEL 7.0-9.0%: CPT | Performed by: NURSE PRACTITIONER

## 2018-08-14 PROCEDURE — 99214 OFFICE O/P EST MOD 30 MIN: CPT | Performed by: NURSE PRACTITIONER

## 2018-08-14 PROCEDURE — 36415 COLL VENOUS BLD VENIPUNCTURE: CPT | Performed by: NURSE PRACTITIONER

## 2018-08-14 PROCEDURE — 1036F TOBACCO NON-USER: CPT | Performed by: NURSE PRACTITIONER

## 2018-08-14 PROCEDURE — 2022F DILAT RTA XM EVC RTNOPTHY: CPT | Performed by: NURSE PRACTITIONER

## 2018-08-14 PROCEDURE — 3017F COLORECTAL CA SCREEN DOC REV: CPT | Performed by: NURSE PRACTITIONER

## 2018-08-14 PROCEDURE — 96160 PT-FOCUSED HLTH RISK ASSMT: CPT | Performed by: NURSE PRACTITIONER

## 2018-08-14 RX ORDER — BISOPROLOL FUMARATE AND HYDROCHLOROTHIAZIDE 10; 6.25 MG/1; MG/1
TABLET ORAL
Qty: 30 TABLET | Refills: 5 | Status: SHIPPED | OUTPATIENT
Start: 2018-08-14 | End: 2018-10-22

## 2018-08-14 RX ORDER — GABAPENTIN 600 MG/1
TABLET ORAL
Qty: 90 TABLET | Refills: 6 | Status: SHIPPED | OUTPATIENT
Start: 2018-08-14 | End: 2018-09-24 | Stop reason: SDUPTHER

## 2018-08-14 RX ORDER — DULOXETIN HYDROCHLORIDE 60 MG/1
CAPSULE, DELAYED RELEASE ORAL
Qty: 30 CAPSULE | Refills: 6 | Status: SHIPPED | OUTPATIENT
Start: 2018-08-14 | End: 2018-10-22

## 2018-08-14 RX ORDER — OMEPRAZOLE 40 MG/1
CAPSULE, DELAYED RELEASE ORAL
Qty: 30 CAPSULE | Refills: 5 | Status: SHIPPED | OUTPATIENT
Start: 2018-08-14 | End: 2018-12-17 | Stop reason: SDUPTHER

## 2018-08-14 RX ORDER — TIZANIDINE 4 MG/1
TABLET ORAL
Qty: 60 TABLET | Refills: 5 | Status: SHIPPED | OUTPATIENT
Start: 2018-08-14 | End: 2018-12-17 | Stop reason: SDUPTHER

## 2018-08-14 RX ORDER — DICLOFENAC SODIUM 75 MG/1
75 TABLET, DELAYED RELEASE ORAL 2 TIMES DAILY
Qty: 60 TABLET | Refills: 6 | Status: SHIPPED | OUTPATIENT
Start: 2018-08-14 | End: 2018-12-17 | Stop reason: SDUPTHER

## 2018-08-14 ASSESSMENT — PATIENT HEALTH QUESTIONNAIRE - PHQ9
SUM OF ALL RESPONSES TO PHQ QUESTIONS 1-9: 9
SUM OF ALL RESPONSES TO PHQ QUESTIONS 1-9: 9
3. TROUBLE FALLING OR STAYING ASLEEP: 2
SUM OF ALL RESPONSES TO PHQ9 QUESTIONS 1 & 2: 3
2. FEELING DOWN, DEPRESSED OR HOPELESS: 1
4. FEELING TIRED OR HAVING LITTLE ENERGY: 1
6. FEELING BAD ABOUT YOURSELF - OR THAT YOU ARE A FAILURE OR HAVE LET YOURSELF OR YOUR FAMILY DOWN: 1
8. MOVING OR SPEAKING SO SLOWLY THAT OTHER PEOPLE COULD HAVE NOTICED. OR THE OPPOSITE, BEING SO FIGETY OR RESTLESS THAT YOU HAVE BEEN MOVING AROUND A LOT MORE THAN USUAL: 0
1. LITTLE INTEREST OR PLEASURE IN DOING THINGS: 2
10. IF YOU CHECKED OFF ANY PROBLEMS, HOW DIFFICULT HAVE THESE PROBLEMS MADE IT FOR YOU TO DO YOUR WORK, TAKE CARE OF THINGS AT HOME, OR GET ALONG WITH OTHER PEOPLE: 2
9. THOUGHTS THAT YOU WOULD BE BETTER OFF DEAD, OR OF HURTING YOURSELF: 0
7. TROUBLE CONCENTRATING ON THINGS, SUCH AS READING THE NEWSPAPER OR WATCHING TELEVISION: 0
5. POOR APPETITE OR OVEREATING: 2

## 2018-08-14 ASSESSMENT — ENCOUNTER SYMPTOMS
EYES NEGATIVE: 1
RESPIRATORY NEGATIVE: 1

## 2018-08-14 NOTE — PROGRESS NOTES
Subjective:      Patient ID: Deidra Frank is a 61 y.o. male. HPI  Here for diabetic and other chronic conditions check up. Blood sugar consistently run over 200, taking meds as directed. Eating high carbohydrate meals and tea sweetened with sugar. \" it doesn't make any difference what I eat my sugars are  Still too high. \"  Doesn't know what food he can or cannot eat. In too much pain to exercise. Denies vision changes, SOB, chest pain, feet are still numb and tingling. Still having pain in multiple joints still seeing pain management. Also complaining of being unable to sleep. Problems falling and staying asleep. Denies napping during the day. Drinks a pitcher of tea daily. Having some issues with his daughter not letting him see his grandchildren. Reconnected with a son from a previous relationship, but this person keep asking him for money. Review of Systems   Constitutional: Negative. Eyes: Negative. Respiratory: Negative. Cardiovascular: Negative. Endocrine: Negative. Genitourinary: Negative. Musculoskeletal: Negative. Neurological:        See HPI   Hematological: Negative. Psychiatric/Behavioral:        See HPI       Objective:   Physical Exam   Constitutional: He is oriented to person, place, and time. He appears well-developed and well-nourished. No distress. HENT:   Head: Normocephalic and atraumatic. Right Ear: External ear normal.   Left Ear: External ear normal.   Nose: Nose normal.   Mouth/Throat: Oropharynx is clear and moist. No oropharyngeal exudate. Eyes: Pupils are equal, round, and reactive to light. Conjunctivae and EOM are normal. Right eye exhibits no discharge. Left eye exhibits no discharge. No scleral icterus. Fundoscopic exam:       The right eye shows no arteriolar narrowing, no AV nicking, no exudate, no hemorrhage and no papilledema.         The left eye shows no arteriolar narrowing, no AV nicking, no exudate, no hemorrhage and no papilledema. Neck: Normal range of motion. Neck supple. Carotid bruit is not present. No thyromegaly present. Cardiovascular: Normal rate, regular rhythm, normal heart sounds and intact distal pulses. No murmur heard. Pulmonary/Chest: Effort normal and breath sounds normal. No respiratory distress. Abdominal: Soft. Bowel sounds are normal.   Musculoskeletal: Normal range of motion. He exhibits no edema, tenderness or deformity. Bilateral toes numb  And about to mid boot on both feet numb, unable to tell sharp from dull with monafilament testing. Feet warm and dry. Distal pulses palpable. Cap refill > 3 sec. Lymphadenopathy:     He has no cervical adenopathy. Neurological: He is alert and oriented to person, place, and time. He has normal reflexes. No cranial nerve deficit. He exhibits normal muscle tone. Coordination normal.   Skin: Skin is warm and dry. He is not diaphoretic. Psychiatric: He has a normal mood and affect. His behavior is normal. Judgment and thought content normal.   Speech non pressured, maintains direct eye contact dress and affect appropriate for season. No anxious mannerisms. Denies wanting to hurt self or others. Nursing note and vitals reviewed. Assessment:      1. Type 2 DM  2. HTN  3. Hyperlipidemia  4. Diabetic neuropathy  5. Depression  6. Insomnia      Plan:      1. Will continue with current meds. Will get a hemoglobin A1-c today and just meds as indicated. We went or diet, and the need for exercise again, as we do at every visit. Offered to make him an appointment to see a dietitian or a diabetic educator, again, and he refused again. 2. And 3. Both are stable, will continue on his current meds. 4.  He sees pain mangement and his on several meds for these issues. He does use the Lidocaine patches at night. He will continue to use them. Will increase his gabapentin to  900 mgm in the AM and 1200 mgm at HS.   5. He is already on cymbalta, at the highest dose, so I will not increase it. Suggested counseling, he refuses to go. \"it won't help. \" See PHQ-9 score  6. Hopefully the increase in the gabapentin may help him sleep . He refuses to stop drinking tea during the day. I really don't want to add any sleep meds to his regimen since he is on so many meds anyway. RTC in 3 months or sooner as needed, he agrees with plan.         Janora Runner, APRN - CNP

## 2018-08-14 NOTE — PATIENT INSTRUCTIONS
Patient Education        Learning About Certified Diabetes Educators  What is a certified diabetes educator? Certified diabetes educators are health professionals who have special training to help you manage your diabetes. They may be:  · Registered nurses. · Registered dietitians. · Pharmacists. · Social workers. · Doctors. Your diabetes educator will give you tips and help with daily diabetes care. He or she also may teach classes. These classes give you a chance to learn from and connect with others who have diabetes. Why see a diabetes educator? Your doctor wants you to get the personal support and help that a diabetes educator can give. And most insurance plans will cover part or all of the cost.  Learning is a key part of living with diabetes. A diabetes educator teaches about the most important parts of your care. You will learn about:  · Eating healthy meals. · Being active. · Taking medicine. · Checking your blood sugar. · Dealing with your feelings about having diabetes. He or she can help you find ways to live better with diabetes. And your diabetes educator can show you small changes that can make a big difference in your daily routine and your health. If you need to make a big change, he or she will be able to answer your questions and guide you though each step. When should you see one? It can be helpful to see a diabetes educator at certain points in your care. He or she can:  · Get you started when you're first diagnosed with diabetes. · Check in once a year for a review of your health and daily routine. · Show you how to handle a new health problem along with your diabetes. · Help you work with a new health care team.  Follow-up care is a key part of your treatment and safety. Be sure to make and go to all appointments, and call your doctor if you are having problems. It's also a good idea to know your test results and keep a list of the medicines you take.   Where can you learn and rice have about 15 grams of carbs in a serving. A serving is 1 slice of bread (1 ounce), ½ cup of cooked cereal, or 1/3 cup of cooked pasta or rice. ¨ Fruits have 15 grams of carbs in a serving. A serving is 1 small fresh fruit, such as an apple or orange; ½ of a banana; ½ cup of cooked or canned fruit; ½ cup of fruit juice; 1 cup of melon or raspberries; or 2 tablespoons of dried fruit. ¨ Milk and no-sugar-added yogurt have 15 grams of carbs in a serving. A serving is 1 cup of milk or 2/3 cup of no-sugar-added yogurt. ¨ Starchy vegetables have 15 grams of carbs in a serving. A serving is ½ cup of mashed potatoes or sweet potato; 1 cup winter squash; ½ of a small baked potato; ½ cup of cooked beans; or ½ cup cooked corn or green peas. · Learn how much carbs to eat each day and at each meal. A dietitian or CDE can teach you how to keep track of the amount of carbs you eat. This is called carbohydrate counting. · If you are not sure how to count carbohydrate grams, use the Plate Method to plan meals. It is a good, quick way to make sure that you have a balanced meal. It also helps you spread carbs throughout the day. ¨ Divide your plate by types of foods. Put non-starchy vegetables on half the plate, meat or other protein food on one-quarter of the plate, and a grain or starchy vegetable in the final quarter of the plate. To this you can add a small piece of fruit and 1 cup of milk or yogurt, depending on how many carbs you are supposed to eat at a meal.  · Try to eat about the same amount of carbs at each meal. Do not \"save up\" your daily allowance of carbs to eat at one meal.  · Proteins have very little or no carbs per serving. Examples of proteins are beef, chicken, turkey, fish, eggs, tofu, cheese, cottage cheese, and peanut butter. A serving size of meat is 3 ounces, which is about the size of a deck of cards.  Examples of meat substitute serving sizes (equal to 1 ounce of meat) are 1/4 cup of cottage problem, see your doctor. Do not try to treat an early foot problem at home. Home remedies or treatments that you can buy without a prescription (such as corn removers) can be harmful. · Always get early treatment for foot problems. A minor irritation can lead to a major problem if not properly cared for early. When should you call for help? Call your doctor now or seek immediate medical care if:    · You have a foot sore, an ulcer or break in the skin that is not healing after 4 days, bleeding corns or calluses, or an ingrown toenail.     · You have blue or black areas, which can mean bruising or blood flow problems.     · You have peeling skin or tiny blisters between your toes or cracking or oozing of the skin.     · You have a fever for more than 24 hours and a foot sore.     · You have new numbness or tingling in your feet that does not go away after you move your feet or change positions.     · You have unexplained or unusual swelling of the foot or ankle.    Watch closely for changes in your health, and be sure to contact your doctor if:    · You cannot do proper foot care. Where can you learn more? Go to https://Sun[employer] Foundation[employer]. org and sign in to your Tracky account. Enter X117 in the BrickTrends box to learn more about \"Diabetes Foot Health: Care Instructions. \"     If you do not have an account, please click on the \"Sign Up Now\" link. Current as of: December 7, 2017  Content Version: 11.7  © 1293-3823 [employer]. Care instructions adapted under license by [employer] ([facility]). If you have questions about a medical condition or this instruction, always ask your healthcare professional. [place] [address] any warranty or liability for your use of this information. Patient Education            Current as of: December 7, 2017  Content Version: 11.7  © 6242-5282 [employer]. Care instructions adapted under license by [employer] ([facility]).  If you have questions about a medical condition or this instruction, always ask your healthcare professional. [address] any warranty or liability for your use of this information. Patient Education        Diabetic Neuropathy: Care Instructions  Your Care Instructions    When you have diabetes, your blood sugar level may get too high. Over time, high blood sugar levels can damage nerves. This is called diabetic neuropathy. Nerve damage can cause pain, burning, tingling, and numbness and may leave you feeling weak. The feet are often affected. When you have nerve damage in your feet, you cannot feel your feet and toes as well as normal and may not notice cuts or sores. Even a small injury can lead to a serious infection. It is very important that you follow your doctor's advice on foot care. Sometimes diabetes damages nerves that help the body function. If this happens, your blood pressure, sweating, digestion, and urination might be affected. Your doctor may give you a target blood sugar level that is higher or lower than you are used to. Try to keep your blood sugar very close to this target level to prevent more damage. Follow-up care is a key part of your treatment and safety. Be sure to make and go to all appointments, and call your doctor if you are having problems. It's also a good idea to know your test results and keep a list of the medicines you take. How can you care for yourself at home? · Take your medicines exactly as prescribed. Call your doctor if you think you are having a problem with your medicine. It is very important that you take your insulin or diabetes pills as your doctor tells you. · Try to keep blood sugar at your target level. ¨ Eat a variety of healthy foods, with carbohydrate spread out in your meals. A dietitian can help you plan meals. ¨ Try to get at least 30 minutes of exercise on most days.   ¨ Check your blood sugar as many times each day as your check your feet during each visit. Your doctor may notice a foot problem you have missed. · Get early treatment for any foot problem, even a minor one. When should you call for help? Call your doctor now or seek immediate medical care if:    · You have symptoms of infection, such as:  ¨ Increased pain, swelling, warmth, or redness. ¨ Red streaks leading from the area. ¨ Pus draining from the area. ¨ A fever.     · You have new or worse numbness, pain, or tingling in any part of your body.    Watch closely for changes in your health, and be sure to contact your doctor if:    · You have a new problem with your feet, such as:  ¨ A new sore or ulcer. ¨ A break in the skin that is not healing after several days. ¨ Bleeding corns or calluses. ¨ An ingrown toenail.     · You do not get better as expected. Where can you learn more? Go to https://[employer]. org and sign in to your [employer]x Media account. Enter P328 in the GageIn box to learn more about \"Diabetic Neuropathy: Care Instructions. \"     If you do not have an account, please click on the \"Sign Up Now\" link. Current as of: December 7, 2017  Content Version: 11.7  © 0513-0087 [employer]. Care instructions adapted under license by [facility] ([facility]). If you have questions about a medical condition or this instruction, always ask your healthcare professional. [place] [address] any warranty or liability for your use of this information.

## 2018-08-15 LAB
ESTIMATED AVERAGE GLUCOSE: 200.1 MG/DL
HBA1C MFR BLD: 8.6 %

## 2018-08-16 ENCOUNTER — CARE COORDINATION (OUTPATIENT)
Dept: CARE COORDINATION | Age: 58
End: 2018-08-16

## 2018-08-16 NOTE — CARE COORDINATION
Attempted to contact  patient; left message to return call  Contact information provided    Mary RICHN, RN  Nurse Care Coordinator   [phone]

## 2018-09-07 ENCOUNTER — CARE COORDINATION (OUTPATIENT)
Dept: CARE COORDINATION | Age: 58
End: 2018-09-07

## 2018-09-07 NOTE — CARE COORDINATION
Multiple attempts to contact patient re: Care Coordination unsuccessful and patient has not returned calls  Contact information has been provided  Will temporarily exclude from care coordination

## 2018-09-14 ENCOUNTER — HOSPITAL ENCOUNTER (EMERGENCY)
Age: 58
Discharge: HOME | End: 2018-09-14
Attending: EMERGENCY MEDICINE
Payer: COMMERCIAL

## 2018-09-14 VITALS
HEART RATE: 79 BPM | TEMPERATURE: 99.1 F | RESPIRATION RATE: 14 BRPM | HEIGHT: 70 IN | DIASTOLIC BLOOD PRESSURE: 95 MMHG | WEIGHT: 225 LBS | SYSTOLIC BLOOD PRESSURE: 144 MMHG | BODY MASS INDEX: 32.21 KG/M2 | OXYGEN SATURATION: 95 %

## 2018-09-14 DIAGNOSIS — T63.441A BEE STING, ACCIDENTAL OR UNINTENTIONAL, INITIAL ENCOUNTER: Primary | ICD-10-CM

## 2018-09-14 PROCEDURE — 6360000002 HC RX W HCPCS

## 2018-09-14 PROCEDURE — 96372 THER/PROPH/DIAG INJ SC/IM: CPT

## 2018-09-14 PROCEDURE — 99282 EMERGENCY DEPT VISIT SF MDM: CPT

## 2018-09-14 RX ORDER — DIPHENHYDRAMINE HYDROCHLORIDE 50 MG/ML
INJECTION INTRAMUSCULAR; INTRAVENOUS
Status: COMPLETED
Start: 2018-09-14 | End: 2018-09-14

## 2018-09-14 RX ORDER — DIPHENHYDRAMINE HYDROCHLORIDE 50 MG/ML
25 INJECTION INTRAMUSCULAR; INTRAVENOUS ONCE
Status: COMPLETED | OUTPATIENT
Start: 2018-09-14 | End: 2018-09-14

## 2018-09-14 RX ORDER — DIPHENHYDRAMINE HYDROCHLORIDE 50 MG/ML
25 INJECTION INTRAMUSCULAR; INTRAVENOUS ONCE
Status: DISCONTINUED | OUTPATIENT
Start: 2018-09-14 | End: 2018-09-14

## 2018-09-14 RX ADMIN — DIPHENHYDRAMINE HYDROCHLORIDE 25 MG: 50 INJECTION INTRAMUSCULAR; INTRAVENOUS at 22:32

## 2018-09-14 RX ADMIN — DIPHENHYDRAMINE HYDROCHLORIDE 25 MG: 50 INJECTION, SOLUTION INTRAMUSCULAR; INTRAVENOUS at 22:32

## 2018-09-14 ASSESSMENT — PAIN DESCRIPTION - LOCATION: LOCATION: ARM;LEG

## 2018-09-14 ASSESSMENT — PAIN DESCRIPTION - PAIN TYPE: TYPE: ACUTE PAIN

## 2018-09-14 ASSESSMENT — PAIN SCALES - GENERAL: PAINLEVEL_OUTOF10: 9

## 2018-09-21 ENCOUNTER — TELEPHONE (OUTPATIENT)
Dept: FAMILY MEDICINE CLINIC | Age: 58
End: 2018-09-21

## 2018-09-21 NOTE — TELEPHONE ENCOUNTER
Patient calling stating at his last apt with you on 8/14/18 he states you changed his sig for his Gabapentin from 1 1/2 in the am and 2 HS he states he is doing 2 AM and 2 HS is this correct? Didn't see a note about this. Please advise and send a new Rx to his pharmacy. Flory mead.

## 2018-09-24 ENCOUNTER — OFFICE VISIT (OUTPATIENT)
Dept: ORTHOPEDIC SURGERY | Age: 58
End: 2018-09-24
Payer: COMMERCIAL

## 2018-09-24 VITALS — HEIGHT: 70 IN | BODY MASS INDEX: 32.28 KG/M2

## 2018-09-24 DIAGNOSIS — M25.512 LEFT SHOULDER PAIN, UNSPECIFIED CHRONICITY: Primary | ICD-10-CM

## 2018-09-24 DIAGNOSIS — M75.41 IMPINGEMENT SYNDROME OF RIGHT SHOULDER: ICD-10-CM

## 2018-09-24 DIAGNOSIS — M75.42 SHOULDER IMPINGEMENT, LEFT: ICD-10-CM

## 2018-09-24 DIAGNOSIS — S46.812D PARTIAL TEAR OF LEFT SUBSCAPULARIS TENDON, SUBSEQUENT ENCOUNTER: ICD-10-CM

## 2018-09-24 DIAGNOSIS — M75.81 TENDINITIS OF RIGHT ROTATOR CUFF: ICD-10-CM

## 2018-09-24 PROCEDURE — 3017F COLORECTAL CA SCREEN DOC REV: CPT | Performed by: ORTHOPAEDIC SURGERY

## 2018-09-24 PROCEDURE — G8417 CALC BMI ABV UP PARAM F/U: HCPCS | Performed by: ORTHOPAEDIC SURGERY

## 2018-09-24 PROCEDURE — G8427 DOCREV CUR MEDS BY ELIG CLIN: HCPCS | Performed by: ORTHOPAEDIC SURGERY

## 2018-09-24 PROCEDURE — 99213 OFFICE O/P EST LOW 20 MIN: CPT | Performed by: ORTHOPAEDIC SURGERY

## 2018-09-24 PROCEDURE — 20610 DRAIN/INJ JOINT/BURSA W/O US: CPT | Performed by: ORTHOPAEDIC SURGERY

## 2018-09-24 PROCEDURE — 1036F TOBACCO NON-USER: CPT | Performed by: ORTHOPAEDIC SURGERY

## 2018-09-24 RX ORDER — GABAPENTIN 600 MG/1
TABLET ORAL
Qty: 120 TABLET | Refills: 5 | Status: SHIPPED | OUTPATIENT
Start: 2018-09-24 | End: 2018-12-17 | Stop reason: SDUPTHER

## 2018-09-24 RX ORDER — INSULIN DEGLUDEC INJECTION 100 U/ML
INJECTION, SOLUTION SUBCUTANEOUS
Qty: 1 PEN | Refills: 5 | Status: SHIPPED | OUTPATIENT
Start: 2018-09-24 | End: 2018-12-17 | Stop reason: SDUPTHER

## 2018-09-24 NOTE — PROGRESS NOTES
Injection administration details:    Date :9/24/18TODAY  Site & Comments:left shoulder administered by Dr Lrorie Washington Early Today       1% Lidocaine (10mg/ mL)  # of cc: 5  NDC: 99347-476-48  Lot number: [id number]  EXP: 10/21    Kenalog 40mg  # of cc: 2  NDC#  7514-0651-71  LOT NUMBER: [id number]  EXP: 03/2019

## 2018-09-24 NOTE — PROGRESS NOTES
Chief Complaint  Follow-up (Left Shoulder: subscapularis partial tear, subacromial impingement, biceps tendinitis,  last seen on 6/12/18  for injection; the injection help a lot and the pain started back just 2 weeks ago and would like another injection  today if possible )      History of Present Illness:  Augusto Milner is a 60 y.o. y/o male who presents today for follow up of his right and left shoulders. He states his right shoulder is doing okay today after having an injection in June, however his left (bothering him again in 2 weeks ago. He is hoping for another subacromial injection which did help him at his last visit. He denies any new acute injuries. He states he is achy shoulder pain with abduction. He has been given home exercises in the past which he says he does occasionally.       Medical History  Past Medical History:   Diagnosis Date    Anxiety     CTS (carpal tunnel syndrome)     CTS (carpal tunnel syndrome)     Diabetes mellitus (HCC)     GERD (gastroesophageal reflux disease)     Hyperlipidemia     Hypertension     Peripheral neuropathy     Type II or unspecified type diabetes mellitus without mention of complication, not stated as uncontrolled     Urinary frequency        Past Surgical History:   Procedure Laterality Date    APPENDECTOMY      CARPAL TUNNEL RELEASE      HAND ARTHROPLASTY Left     HERNIA REPAIR  10/12/15    Laparoscopic Hernia Repair       Social History     Social History    Marital status: Single     Spouse name: N/A    Number of children: N/A    Years of education: N/A     Occupational History    disability      Social History Main Topics    Smoking status: Former Smoker    Smokeless tobacco: Never Used      Comment: 2000    Alcohol use No    Drug use: No    Sexual activity: Yes     Partners: Female     Other Topics Concern    Not on file     Social History Narrative    No narrative on file       Family History   Problem Relation Age of Onset    Anemia and left shoulder subscapularis partial tear, subacromial impingement, biceps tendinitis    Office Procedures:  Orders Placed This Encounter   Procedures    MN ARTHROCENTESIS ASPIR&/INJ MAJOR JT/BURSA W/O US    MN TRIAMCINOLONE ACETONIDE INJ       Plan:   -At this time he will continue with nonsurgical treatment with home exercises, over-the-counter medication, activity modification  -I will perform a subacromial injection in his left shoulder today for subacromial impingement rotator cuff tendinitis  -He can continue with pain management treatment as indicated  -I'll see him back in 3 months or sooner if he requires treatment for his right shoulder and if there are issues the interim he'll contact the office    Left shoulder subacromial injection  Due to the fact the patient is having persistent Left rotator cuff pain, it was decided to proceed with a subacromial injection. Discussion of risks, benefits, and alternatives was performed and verbal consent was obtained. After a time out was performed identifying the site and procedure, the patient was placed in the sitting position and the posterolateral corner of the acromion was identified and marked. The area was cleansed with a betadine swab and alcohol swab. A 22 gauge needle was introduced in to the subacromial space and a solution containing 5 mls of 1% Lidocaine and 2 ml of Kenalog was injected into the patient's shoulder under sterile conditions. He tolerated the procedure well and was instructed to contact the office immediately if he developed any signs of infection including redness, warmth, or swelling. Ty Rosas MD  [address] partner of [facility])        Voice Recognition Dictation disclaimer: Please note that portions of this chart were generated using Dragon dictation software.  Although every effort was made to ensure the accuracy of this automated transcription, some errors in transcription may

## 2018-10-01 RX ORDER — INSULIN LISPRO 100 [IU]/ML
INJECTION, SOLUTION INTRAVENOUS; SUBCUTANEOUS
Qty: 1 PEN | Refills: 4 | Status: SHIPPED | OUTPATIENT
Start: 2018-10-01 | End: 2018-12-17 | Stop reason: SDUPTHER

## 2018-10-22 DIAGNOSIS — M15.9 PRIMARY OSTEOARTHRITIS INVOLVING MULTIPLE JOINTS: ICD-10-CM

## 2018-10-22 RX ORDER — BISOPROLOL FUMARATE AND HYDROCHLOROTHIAZIDE 10; 6.25 MG/1; MG/1
TABLET ORAL
Qty: 30 TABLET | Refills: 4 | Status: SHIPPED | OUTPATIENT
Start: 2018-10-22 | End: 2018-12-17 | Stop reason: SDUPTHER

## 2018-10-22 RX ORDER — DULOXETIN HYDROCHLORIDE 60 MG/1
CAPSULE, DELAYED RELEASE ORAL
Qty: 24 CAPSULE | Refills: 5 | Status: SHIPPED | OUTPATIENT
Start: 2018-10-22 | End: 2018-12-17 | Stop reason: SDUPTHER

## 2018-10-24 ENCOUNTER — OFFICE VISIT (OUTPATIENT)
Dept: ORTHOPEDIC SURGERY | Age: 58
End: 2018-10-24
Payer: COMMERCIAL

## 2018-10-24 VITALS
BODY MASS INDEX: 30.78 KG/M2 | HEIGHT: 70 IN | HEART RATE: 79 BPM | SYSTOLIC BLOOD PRESSURE: 132 MMHG | DIASTOLIC BLOOD PRESSURE: 84 MMHG | WEIGHT: 215 LBS

## 2018-10-24 DIAGNOSIS — M79.642 HAND PAIN, LEFT: Primary | ICD-10-CM

## 2018-10-24 PROCEDURE — G8427 DOCREV CUR MEDS BY ELIG CLIN: HCPCS | Performed by: ORTHOPAEDIC SURGERY

## 2018-10-24 PROCEDURE — 20600 DRAIN/INJ JOINT/BURSA W/O US: CPT | Performed by: ORTHOPAEDIC SURGERY

## 2018-10-24 PROCEDURE — G8417 CALC BMI ABV UP PARAM F/U: HCPCS | Performed by: ORTHOPAEDIC SURGERY

## 2018-10-24 PROCEDURE — 3017F COLORECTAL CA SCREEN DOC REV: CPT | Performed by: ORTHOPAEDIC SURGERY

## 2018-10-24 PROCEDURE — 99213 OFFICE O/P EST LOW 20 MIN: CPT | Performed by: ORTHOPAEDIC SURGERY

## 2018-10-24 PROCEDURE — 1036F TOBACCO NON-USER: CPT | Performed by: ORTHOPAEDIC SURGERY

## 2018-10-24 PROCEDURE — G8484 FLU IMMUNIZE NO ADMIN: HCPCS | Performed by: ORTHOPAEDIC SURGERY

## 2018-10-30 ENCOUNTER — OFFICE VISIT (OUTPATIENT)
Dept: ORTHOPEDIC SURGERY | Age: 58
End: 2018-10-30
Payer: COMMERCIAL

## 2018-10-30 VITALS — WEIGHT: 214.95 LBS | BODY MASS INDEX: 30.77 KG/M2 | HEIGHT: 70 IN

## 2018-10-30 DIAGNOSIS — M75.42 SHOULDER IMPINGEMENT, LEFT: ICD-10-CM

## 2018-10-30 DIAGNOSIS — M75.112 INCOMPLETE TEAR OF LEFT ROTATOR CUFF: ICD-10-CM

## 2018-10-30 DIAGNOSIS — M25.512 LEFT SHOULDER PAIN, UNSPECIFIED CHRONICITY: Primary | ICD-10-CM

## 2018-10-30 DIAGNOSIS — M75.22 BICEPS TENDINITIS OF LEFT UPPER EXTREMITY: ICD-10-CM

## 2018-10-30 PROCEDURE — G8417 CALC BMI ABV UP PARAM F/U: HCPCS | Performed by: ORTHOPAEDIC SURGERY

## 2018-10-30 PROCEDURE — L3670 SO ACRO/CLAV CAN WEB PRE OTS: HCPCS | Performed by: ORTHOPAEDIC SURGERY

## 2018-10-30 PROCEDURE — 3017F COLORECTAL CA SCREEN DOC REV: CPT | Performed by: ORTHOPAEDIC SURGERY

## 2018-10-30 PROCEDURE — G8484 FLU IMMUNIZE NO ADMIN: HCPCS | Performed by: ORTHOPAEDIC SURGERY

## 2018-10-30 PROCEDURE — 1036F TOBACCO NON-USER: CPT | Performed by: ORTHOPAEDIC SURGERY

## 2018-10-30 PROCEDURE — 99213 OFFICE O/P EST LOW 20 MIN: CPT | Performed by: ORTHOPAEDIC SURGERY

## 2018-10-30 PROCEDURE — G8427 DOCREV CUR MEDS BY ELIG CLIN: HCPCS | Performed by: ORTHOPAEDIC SURGERY

## 2018-10-30 NOTE — PROGRESS NOTES
to perform his activities of daily living well and interfering with his sleep.  -He has tried physical therapy, injections, and pain management and continues having symptoms  -At this time given the fact that he is tried nonsurgical treatment for now over 10 months he does wish to move forward with surgical treatment    Surgery: Left shoulder arthroscopy with debridement, subscapularis rotator cuff repair arthroscopic versus possible open, biceps tenodesis, subacromial decompression    -We did discuss the fact that he did have a recent cortisone injection could increase his risk of nonhealing. I would recommend waiting till at least a 6 week period from the injection to move forward with surgery to decrease risk of infection and we discussed that the longer we wait the less risk of cortisone interfering with healing  -Today he is not having much pain at his left acromioclavicular joint and so we will not address this surgically if we don't have to    After discussing the pros and cons of treatment options and risks and benefits of surgical intervention, Greyson Ruiz  has elected to proceed with surgery at this time. He understands that there are no guarantee of results with surgery and there are always risks of infection, stiff joint, injury to nerve or blood vessel, blood clot, anesthesia complications, etc. The procedure and recovery were discussed and all questions answered. Greyson Ruiz understands that this is an elective procedure and should they have any further questions they may give me a call. Surgery will be set up in the near future. Risks and benefits of the procedure were fully explained in detail, including but not limited to infection, neurovascular injury, continued pain, arthritis, stiffness, need for further surgery, re-injury, DVT, PE, general risks of anesthesia and loss of limb or life.  The patient understands all the risks and does wish to proceed with written consent.     -We will provide him for a

## 2018-11-06 ENCOUNTER — TELEPHONE (OUTPATIENT)
Dept: ORTHOPEDIC SURGERY | Age: 58
End: 2018-11-06

## 2018-11-08 ENCOUNTER — OFFICE VISIT (OUTPATIENT)
Dept: FAMILY MEDICINE CLINIC | Age: 58
End: 2018-11-08
Payer: COMMERCIAL

## 2018-11-08 VITALS
WEIGHT: 230 LBS | DIASTOLIC BLOOD PRESSURE: 70 MMHG | OXYGEN SATURATION: 96 % | HEIGHT: 70 IN | SYSTOLIC BLOOD PRESSURE: 100 MMHG | HEART RATE: 108 BPM | BODY MASS INDEX: 32.93 KG/M2

## 2018-11-08 DIAGNOSIS — M75.102 TEAR OF LEFT ROTATOR CUFF, UNSPECIFIED TEAR EXTENT: ICD-10-CM

## 2018-11-08 DIAGNOSIS — Z13.31 POSITIVE DEPRESSION SCREENING: ICD-10-CM

## 2018-11-08 DIAGNOSIS — I10 ESSENTIAL HYPERTENSION: ICD-10-CM

## 2018-11-08 DIAGNOSIS — E78.2 MIXED HYPERLIPIDEMIA: ICD-10-CM

## 2018-11-08 DIAGNOSIS — Z23 NEED FOR INFLUENZA VACCINATION: Primary | ICD-10-CM

## 2018-11-08 DIAGNOSIS — E11.40 TYPE 2 DIABETES MELLITUS WITH DIABETIC NEUROPATHY, WITH LONG-TERM CURRENT USE OF INSULIN (HCC): ICD-10-CM

## 2018-11-08 DIAGNOSIS — Z79.4 TYPE 2 DIABETES MELLITUS WITH DIABETIC NEUROPATHY, WITH LONG-TERM CURRENT USE OF INSULIN (HCC): ICD-10-CM

## 2018-11-08 DIAGNOSIS — Z01.818 PREOP TESTING: ICD-10-CM

## 2018-11-08 LAB
HCT VFR BLD CALC: 52.3 % (ref 40.5–52.5)
HEMOGLOBIN: 17.4 G/DL (ref 13.5–17.5)
MCH RBC QN AUTO: 28 PG (ref 26–34)
MCHC RBC AUTO-ENTMCNC: 33.4 G/DL (ref 31–36)
MCV RBC AUTO: 84.1 FL (ref 80–100)
PDW BLD-RTO: 14.7 % (ref 12.4–15.4)
PLATELET # BLD: 161 K/UL (ref 135–450)
PMV BLD AUTO: 11.3 FL (ref 5–10.5)
RBC # BLD: 6.22 M/UL (ref 4.2–5.9)
WBC # BLD: 8 K/UL (ref 4–11)

## 2018-11-08 PROCEDURE — G8417 CALC BMI ABV UP PARAM F/U: HCPCS | Performed by: FAMILY MEDICINE

## 2018-11-08 PROCEDURE — 36415 COLL VENOUS BLD VENIPUNCTURE: CPT | Performed by: FAMILY MEDICINE

## 2018-11-08 PROCEDURE — G8427 DOCREV CUR MEDS BY ELIG CLIN: HCPCS | Performed by: FAMILY MEDICINE

## 2018-11-08 PROCEDURE — 93000 ELECTROCARDIOGRAM COMPLETE: CPT | Performed by: FAMILY MEDICINE

## 2018-11-08 PROCEDURE — 3045F PR MOST RECENT HEMOGLOBIN A1C LEVEL 7.0-9.0%: CPT | Performed by: FAMILY MEDICINE

## 2018-11-08 PROCEDURE — 90688 IIV4 VACCINE SPLT 0.5 ML IM: CPT | Performed by: FAMILY MEDICINE

## 2018-11-08 PROCEDURE — 3017F COLORECTAL CA SCREEN DOC REV: CPT | Performed by: FAMILY MEDICINE

## 2018-11-08 PROCEDURE — G8482 FLU IMMUNIZE ORDER/ADMIN: HCPCS | Performed by: FAMILY MEDICINE

## 2018-11-08 PROCEDURE — 99214 OFFICE O/P EST MOD 30 MIN: CPT | Performed by: FAMILY MEDICINE

## 2018-11-08 PROCEDURE — 1036F TOBACCO NON-USER: CPT | Performed by: FAMILY MEDICINE

## 2018-11-08 PROCEDURE — 90471 IMMUNIZATION ADMIN: CPT | Performed by: FAMILY MEDICINE

## 2018-11-08 PROCEDURE — 2022F DILAT RTA XM EVC RTNOPTHY: CPT | Performed by: FAMILY MEDICINE

## 2018-11-08 PROCEDURE — G8431 POS CLIN DEPRES SCRN F/U DOC: HCPCS | Performed by: FAMILY MEDICINE

## 2018-11-09 LAB
ALBUMIN SERPL-MCNC: 4.5 G/DL (ref 3.4–5)
ANION GAP SERPL CALCULATED.3IONS-SCNC: 14 MMOL/L (ref 3–16)
BUN BLDV-MCNC: 25 MG/DL (ref 7–20)
CALCIUM SERPL-MCNC: 9.4 MG/DL (ref 8.3–10.6)
CHLORIDE BLD-SCNC: 99 MMOL/L (ref 99–110)
CO2: 26 MMOL/L (ref 21–32)
CREAT SERPL-MCNC: 1.2 MG/DL (ref 0.9–1.3)
GFR AFRICAN AMERICAN: >60
GFR NON-AFRICAN AMERICAN: >60
GLUCOSE BLD-MCNC: 302 MG/DL (ref 70–99)
PHOSPHORUS: 3.4 MG/DL (ref 2.5–4.9)
POTASSIUM SERPL-SCNC: 4.5 MMOL/L (ref 3.5–5.1)
SODIUM BLD-SCNC: 139 MMOL/L (ref 136–145)

## 2018-11-23 ENCOUNTER — ANESTHESIA EVENT (OUTPATIENT)
Dept: OPERATING ROOM | Age: 58
End: 2018-11-23
Payer: COMMERCIAL

## 2018-11-26 ENCOUNTER — ANESTHESIA (OUTPATIENT)
Dept: OPERATING ROOM | Age: 58
End: 2018-11-26
Payer: COMMERCIAL

## 2018-11-26 ENCOUNTER — HOSPITAL ENCOUNTER (OUTPATIENT)
Age: 58
Setting detail: OUTPATIENT SURGERY
Discharge: HOME | End: 2018-11-26
Attending: ORTHOPAEDIC SURGERY | Admitting: ORTHOPAEDIC SURGERY
Payer: COMMERCIAL

## 2018-11-26 VITALS
DIASTOLIC BLOOD PRESSURE: 90 MMHG | RESPIRATION RATE: 4 BRPM | OXYGEN SATURATION: 98 % | SYSTOLIC BLOOD PRESSURE: 120 MMHG

## 2018-11-26 VITALS
SYSTOLIC BLOOD PRESSURE: 129 MMHG | HEART RATE: 80 BPM | BODY MASS INDEX: 32.93 KG/M2 | WEIGHT: 230 LBS | OXYGEN SATURATION: 95 % | RESPIRATION RATE: 16 BRPM | HEIGHT: 70 IN | DIASTOLIC BLOOD PRESSURE: 91 MMHG | TEMPERATURE: 98 F

## 2018-11-26 LAB
GLUCOSE BLD-MCNC: 186 MG/DL (ref 70–99)
GLUCOSE BLD-MCNC: 229 MG/DL (ref 70–99)
PERFORMED ON: ABNORMAL
PERFORMED ON: ABNORMAL

## 2018-11-26 PROCEDURE — C1713 ANCHOR/SCREW BN/BN,TIS/BN: HCPCS | Performed by: ORTHOPAEDIC SURGERY

## 2018-11-26 PROCEDURE — 2580000003 HC RX 258: Performed by: ORTHOPAEDIC SURGERY

## 2018-11-26 PROCEDURE — 2500000003 HC RX 250 WO HCPCS: Performed by: NURSE ANESTHETIST, CERTIFIED REGISTERED

## 2018-11-26 PROCEDURE — 3700000001 HC ADD 15 MINUTES (ANESTHESIA): Performed by: ORTHOPAEDIC SURGERY

## 2018-11-26 PROCEDURE — 6360000002 HC RX W HCPCS: Performed by: ORTHOPAEDIC SURGERY

## 2018-11-26 PROCEDURE — 3600000014 HC SURGERY LEVEL 4 ADDTL 15MIN: Performed by: ORTHOPAEDIC SURGERY

## 2018-11-26 PROCEDURE — 3700000000 HC ANESTHESIA ATTENDED CARE: Performed by: ORTHOPAEDIC SURGERY

## 2018-11-26 PROCEDURE — 7100000010 HC PHASE II RECOVERY - FIRST 15 MIN: Performed by: ORTHOPAEDIC SURGERY

## 2018-11-26 PROCEDURE — 6360000002 HC RX W HCPCS: Performed by: ANESTHESIOLOGY

## 2018-11-26 PROCEDURE — 7100000001 HC PACU RECOVERY - ADDTL 15 MIN: Performed by: ORTHOPAEDIC SURGERY

## 2018-11-26 PROCEDURE — 6370000000 HC RX 637 (ALT 250 FOR IP): Performed by: ANESTHESIOLOGY

## 2018-11-26 PROCEDURE — 2500000003 HC RX 250 WO HCPCS: Performed by: ANESTHESIOLOGY

## 2018-11-26 PROCEDURE — 64415 NJX AA&/STRD BRCH PLXS IMG: CPT | Performed by: ANESTHESIOLOGY

## 2018-11-26 PROCEDURE — 3600000004 HC SURGERY LEVEL 4 BASE: Performed by: ORTHOPAEDIC SURGERY

## 2018-11-26 PROCEDURE — 7100000011 HC PHASE II RECOVERY - ADDTL 15 MIN: Performed by: ORTHOPAEDIC SURGERY

## 2018-11-26 PROCEDURE — 6360000002 HC RX W HCPCS: Performed by: NURSE ANESTHETIST, CERTIFIED REGISTERED

## 2018-11-26 PROCEDURE — 2580000003 HC RX 258: Performed by: ANESTHESIOLOGY

## 2018-11-26 PROCEDURE — 7100000000 HC PACU RECOVERY - FIRST 15 MIN: Performed by: ORTHOPAEDIC SURGERY

## 2018-11-26 PROCEDURE — 2709999900 HC NON-CHARGEABLE SUPPLY: Performed by: ORTHOPAEDIC SURGERY

## 2018-11-26 DEVICE — ANCHOR SUTURE BIOCOMP 4.75X19.1 MM SWIVELOCK C: Type: IMPLANTABLE DEVICE | Status: FUNCTIONAL

## 2018-11-26 DEVICE — SYSTEM IMPL DST BICEPS REP DEL W/ BICEPS BTTN 7X10MM PEEK: Type: IMPLANTABLE DEVICE | Status: FUNCTIONAL

## 2018-11-26 RX ORDER — OXYCODONE HYDROCHLORIDE 5 MG/1
10 TABLET ORAL ONCE
Status: COMPLETED | OUTPATIENT
Start: 2018-11-26 | End: 2018-11-26

## 2018-11-26 RX ORDER — FENTANYL CITRATE 50 UG/ML
25 INJECTION, SOLUTION INTRAMUSCULAR; INTRAVENOUS EVERY 5 MIN PRN
Status: DISCONTINUED | OUTPATIENT
Start: 2018-11-26 | End: 2018-11-26 | Stop reason: HOSPADM

## 2018-11-26 RX ORDER — OXYCODONE HYDROCHLORIDE AND ACETAMINOPHEN 5; 325 MG/1; MG/1
2 TABLET ORAL PRN
Status: DISCONTINUED | OUTPATIENT
Start: 2018-11-26 | End: 2018-11-26 | Stop reason: HOSPADM

## 2018-11-26 RX ORDER — HYDRALAZINE HYDROCHLORIDE 20 MG/ML
5 INJECTION INTRAMUSCULAR; INTRAVENOUS EVERY 10 MIN PRN
Status: DISCONTINUED | OUTPATIENT
Start: 2018-11-26 | End: 2018-11-26 | Stop reason: HOSPADM

## 2018-11-26 RX ORDER — OXYCODONE HYDROCHLORIDE 5 MG/1
TABLET ORAL
Status: DISCONTINUED
Start: 2018-11-26 | End: 2018-11-26 | Stop reason: HOSPADM

## 2018-11-26 RX ORDER — SODIUM CHLORIDE 0.9 % (FLUSH) 0.9 %
10 SYRINGE (ML) INJECTION PRN
Status: DISCONTINUED | OUTPATIENT
Start: 2018-11-26 | End: 2018-11-26 | Stop reason: HOSPADM

## 2018-11-26 RX ORDER — BUPIVACAINE HYDROCHLORIDE 2.5 MG/ML
INJECTION, SOLUTION EPIDURAL; INFILTRATION; INTRACAUDAL PRN
Status: DISCONTINUED | OUTPATIENT
Start: 2018-11-26 | End: 2018-11-26 | Stop reason: SDUPTHER

## 2018-11-26 RX ORDER — LIDOCAINE HYDROCHLORIDE 10 MG/ML
0.3 INJECTION, SOLUTION EPIDURAL; INFILTRATION; INTRACAUDAL; PERINEURAL
Status: COMPLETED | OUTPATIENT
Start: 2018-11-26 | End: 2018-11-26

## 2018-11-26 RX ORDER — ONDANSETRON 4 MG/1
4 TABLET, FILM COATED ORAL EVERY 8 HOURS PRN
Qty: 20 TABLET | Refills: 0 | Status: SHIPPED | OUTPATIENT
Start: 2018-11-26 | End: 2019-03-25

## 2018-11-26 RX ORDER — PROPOFOL 10 MG/ML
INJECTION, EMULSION INTRAVENOUS PRN
Status: DISCONTINUED | OUTPATIENT
Start: 2018-11-26 | End: 2018-11-26 | Stop reason: SDUPTHER

## 2018-11-26 RX ORDER — LIDOCAINE HYDROCHLORIDE 20 MG/ML
INJECTION, SOLUTION EPIDURAL; INFILTRATION; INTRACAUDAL; PERINEURAL PRN
Status: DISCONTINUED | OUTPATIENT
Start: 2018-11-26 | End: 2018-11-26 | Stop reason: SDUPTHER

## 2018-11-26 RX ORDER — ACETAMINOPHEN 10 MG/ML
1000 INJECTION, SOLUTION INTRAVENOUS ONCE
Status: COMPLETED | OUTPATIENT
Start: 2018-11-26 | End: 2018-11-26

## 2018-11-26 RX ORDER — ONDANSETRON 2 MG/ML
4 INJECTION INTRAMUSCULAR; INTRAVENOUS
Status: DISCONTINUED | OUTPATIENT
Start: 2018-11-26 | End: 2018-11-26 | Stop reason: HOSPADM

## 2018-11-26 RX ORDER — MIDAZOLAM HYDROCHLORIDE 1 MG/ML
INJECTION INTRAMUSCULAR; INTRAVENOUS PRN
Status: DISCONTINUED | OUTPATIENT
Start: 2018-11-26 | End: 2018-11-26 | Stop reason: SDUPTHER

## 2018-11-26 RX ORDER — DOCUSATE SODIUM 100 MG/1
100 CAPSULE, LIQUID FILLED ORAL 2 TIMES DAILY PRN
Qty: 20 CAPSULE | Refills: 0 | Status: SHIPPED | OUTPATIENT
Start: 2018-11-26 | End: 2018-12-26

## 2018-11-26 RX ORDER — MEPERIDINE HYDROCHLORIDE 25 MG/ML
12.5 INJECTION INTRAMUSCULAR; INTRAVENOUS; SUBCUTANEOUS EVERY 5 MIN PRN
Status: DISCONTINUED | OUTPATIENT
Start: 2018-11-26 | End: 2018-11-26 | Stop reason: HOSPADM

## 2018-11-26 RX ORDER — SODIUM CHLORIDE, SODIUM LACTATE, POTASSIUM CHLORIDE, CALCIUM CHLORIDE 600; 310; 30; 20 MG/100ML; MG/100ML; MG/100ML; MG/100ML
INJECTION, SOLUTION INTRAVENOUS CONTINUOUS
Status: DISCONTINUED | OUTPATIENT
Start: 2018-11-26 | End: 2018-11-26 | Stop reason: HOSPADM

## 2018-11-26 RX ORDER — OXYCODONE HYDROCHLORIDE AND ACETAMINOPHEN 5; 325 MG/1; MG/1
1 TABLET ORAL PRN
Status: DISCONTINUED | OUTPATIENT
Start: 2018-11-26 | End: 2018-11-26 | Stop reason: HOSPADM

## 2018-11-26 RX ORDER — SODIUM CHLORIDE 0.9 % (FLUSH) 0.9 %
10 SYRINGE (ML) INJECTION EVERY 12 HOURS SCHEDULED
Status: DISCONTINUED | OUTPATIENT
Start: 2018-11-26 | End: 2018-11-26 | Stop reason: HOSPADM

## 2018-11-26 RX ORDER — FENTANYL CITRATE 50 UG/ML
INJECTION, SOLUTION INTRAMUSCULAR; INTRAVENOUS PRN
Status: DISCONTINUED | OUTPATIENT
Start: 2018-11-26 | End: 2018-11-26 | Stop reason: SDUPTHER

## 2018-11-26 RX ORDER — CEFAZOLIN SODIUM 2 G/50ML
2 SOLUTION INTRAVENOUS ONCE
Status: COMPLETED | OUTPATIENT
Start: 2018-11-26 | End: 2018-11-26

## 2018-11-26 RX ORDER — ROCURONIUM BROMIDE 10 MG/ML
INJECTION, SOLUTION INTRAVENOUS PRN
Status: DISCONTINUED | OUTPATIENT
Start: 2018-11-26 | End: 2018-11-26 | Stop reason: SDUPTHER

## 2018-11-26 RX ADMIN — CEFAZOLIN SODIUM 2 G: 2 SOLUTION INTRAVENOUS at 12:29

## 2018-11-26 RX ADMIN — ROCURONIUM BROMIDE 50 MG: 10 INJECTION, SOLUTION INTRAVENOUS at 12:36

## 2018-11-26 RX ADMIN — PROPOFOL 150 MG: 10 INJECTION, EMULSION INTRAVENOUS at 12:36

## 2018-11-26 RX ADMIN — LIDOCAINE HYDROCHLORIDE 0.1 ML: 10 INJECTION, SOLUTION EPIDURAL; INFILTRATION; INTRACAUDAL; PERINEURAL at 11:12

## 2018-11-26 RX ADMIN — BUPIVACAINE HYDROCHLORIDE 30 ML: 2.5 INJECTION, SOLUTION EPIDURAL; INFILTRATION; INTRACAUDAL; PERINEURAL at 11:15

## 2018-11-26 RX ADMIN — FENTANYL CITRATE 100 MCG: 50 INJECTION INTRAMUSCULAR; INTRAVENOUS at 11:13

## 2018-11-26 RX ADMIN — MIDAZOLAM 2 MG: 1 INJECTION INTRAMUSCULAR; INTRAVENOUS at 11:13

## 2018-11-26 RX ADMIN — MIDAZOLAM 2 MG: 1 INJECTION INTRAMUSCULAR; INTRAVENOUS at 12:34

## 2018-11-26 RX ADMIN — FENTANYL CITRATE 100 MCG: 50 INJECTION INTRAMUSCULAR; INTRAVENOUS at 12:36

## 2018-11-26 RX ADMIN — ACETAMINOPHEN 1000 MG: 10 INJECTION, SOLUTION INTRAVENOUS at 11:12

## 2018-11-26 RX ADMIN — OXYCODONE HYDROCHLORIDE 10 MG: 5 TABLET ORAL at 15:55

## 2018-11-26 RX ADMIN — SODIUM CHLORIDE, POTASSIUM CHLORIDE, SODIUM LACTATE AND CALCIUM CHLORIDE: 600; 310; 30; 20 INJECTION, SOLUTION INTRAVENOUS at 11:11

## 2018-11-26 RX ADMIN — LIDOCAINE HYDROCHLORIDE 100 MG: 20 INJECTION, SOLUTION EPIDURAL; INFILTRATION; INTRACAUDAL; PERINEURAL at 12:36

## 2018-11-26 RX ADMIN — PROPOFOL 50 MG: 10 INJECTION, EMULSION INTRAVENOUS at 14:33

## 2018-11-26 RX ADMIN — SUGAMMADEX 200 MG: 100 INJECTION, SOLUTION INTRAVENOUS at 15:10

## 2018-11-26 ASSESSMENT — PULMONARY FUNCTION TESTS
PIF_VALUE: 20
PIF_VALUE: 21
PIF_VALUE: 20
PIF_VALUE: 20
PIF_VALUE: 1
PIF_VALUE: 20
PIF_VALUE: 20
PIF_VALUE: 0
PIF_VALUE: 9
PIF_VALUE: 20
PIF_VALUE: 8
PIF_VALUE: 1
PIF_VALUE: 20
PIF_VALUE: 20
PIF_VALUE: 3
PIF_VALUE: 20
PIF_VALUE: 1
PIF_VALUE: 21
PIF_VALUE: 20
PIF_VALUE: 23
PIF_VALUE: 20
PIF_VALUE: 6
PIF_VALUE: 20
PIF_VALUE: 21
PIF_VALUE: 20
PIF_VALUE: 22
PIF_VALUE: 20
PIF_VALUE: 1
PIF_VALUE: 23
PIF_VALUE: 20
PIF_VALUE: 37
PIF_VALUE: 20
PIF_VALUE: 22
PIF_VALUE: 20
PIF_VALUE: 20
PIF_VALUE: 0
PIF_VALUE: 20
PIF_VALUE: 0
PIF_VALUE: 20
PIF_VALUE: 20
PIF_VALUE: 23
PIF_VALUE: 20
PIF_VALUE: 20
PIF_VALUE: 22
PIF_VALUE: 23
PIF_VALUE: 1
PIF_VALUE: 20
PIF_VALUE: 22
PIF_VALUE: 20
PIF_VALUE: 6
PIF_VALUE: 20
PIF_VALUE: 3
PIF_VALUE: 20
PIF_VALUE: 21
PIF_VALUE: 20
PIF_VALUE: 20
PIF_VALUE: 23
PIF_VALUE: 20
PIF_VALUE: 20
PIF_VALUE: 3
PIF_VALUE: 21
PIF_VALUE: 20
PIF_VALUE: 20
PIF_VALUE: 21
PIF_VALUE: 1
PIF_VALUE: 20
PIF_VALUE: 22
PIF_VALUE: 20
PIF_VALUE: 21
PIF_VALUE: 1
PIF_VALUE: 20
PIF_VALUE: 20
PIF_VALUE: 2
PIF_VALUE: 20
PIF_VALUE: 2
PIF_VALUE: 9
PIF_VALUE: 23
PIF_VALUE: 20
PIF_VALUE: 28
PIF_VALUE: 23
PIF_VALUE: 22
PIF_VALUE: 20
PIF_VALUE: 21
PIF_VALUE: 1
PIF_VALUE: 20
PIF_VALUE: 1
PIF_VALUE: 20
PIF_VALUE: 28
PIF_VALUE: 20

## 2018-11-26 ASSESSMENT — PAIN - FUNCTIONAL ASSESSMENT: PAIN_FUNCTIONAL_ASSESSMENT: 0-10

## 2018-11-26 ASSESSMENT — PAIN DESCRIPTION - PAIN TYPE: TYPE: SURGICAL PAIN

## 2018-11-26 ASSESSMENT — PAIN SCALES - GENERAL
PAINLEVEL_OUTOF10: 6
PAINLEVEL_OUTOF10: 6

## 2018-11-26 ASSESSMENT — PAIN DESCRIPTION - DESCRIPTORS: DESCRIPTORS: ACHING

## 2018-11-26 ASSESSMENT — PAIN DESCRIPTION - LOCATION: LOCATION: SHOULDER

## 2018-11-26 ASSESSMENT — LIFESTYLE VARIABLES: SMOKING_STATUS: 0

## 2018-11-26 ASSESSMENT — PAIN DESCRIPTION - ORIENTATION: ORIENTATION: LEFT

## 2018-11-26 NOTE — ANESTHESIA PRE PROCEDURE
Department of Anesthesiology  Preprocedure Note       Name:  Lisandro Izquierdo   Age:  60 y.o.  :  1960                                          MRN:  [id number]         Date:  2018      Surgeon:  Trista Don MD    Procedure: LEFT SHOULDER ARTHROSCOPY WITH DEBRIDEMENT, ROTATOR CUFF REPAIR, POSSIBLE OPEN SUBSCAPULARIS REPAIR, BICEPS TENODESIS, SUBACROMIAL DECOMPRESSION    HPI:  Royce Pizarro is a 60 y.o. y/o male who has chronic pain in his right and left shoulders. The left shoulder is bothes him more than his right. He was previously seen on 18 and a left shoulder subacromial injection. He states he had some mild improvement for a little bit, but continues having pain which has been worsening and is interfering with his activities of daily living. He has continued taking Percocet 7.5 mg  3-times a day for pain. No new falls or injuries. He states he mostly notices the pain when reaching behind him especially when trying to shut doors for a jacket. He has done physical therapy in the past and transition to a home exercise program which is no longer helping. Medications prior to admission:   APPLE CIDER VINEGAR PO Take by mouth   DULoxetine (CYMBALTA) 60 MG ER TAKE ONE CAPSULE BY MOUTH DAILY   bisoprolol-hydrochlorothiazide (ZIAC) 10-6.25 MG TAKE ONE TABLET BY MOUTH DAILY   HUMALOG KWIKPEN 100 UNIT/ML pen INJECT 20 UNITS SUBCUTANEOUSLY THREE TIMES A DAY   TRESIBA FLEXTOUCH 100 UNIT/ML SOPN INJECT 50 UNITS SUBCUTANEOUSLY DAILY   gabapentin (NEURONTIN) 600 MG tablet Take 2 tablets  In the AM and 2 tablets at HS.    tiZANidine (ZANAFLEX) 4 MG tablet 1- 2 tablets at bedtime as needed   omeprazole (PRILOSEC) 40 MG delayed release capsule TAKE ONE CAPSULE BY MOUTH DAILY   diclofenac (VOLTAREN) 75 MG EC tablet Take 1 tablet by mouth 2 times daily   lidocaine (LIDODERM) 5 % Place 3 patches onto the skin every 24 hours 12 hours on, 12 hours off.   metFORMIN (GLUCOPHAGE) 1000 MG tablet TAKE ONE TABLET BY displacement, lumbar M51.26    Lumbar facet arthropathy M47.816    Lumbar stenosis M48.061    Neck pain, chronic M54.2, G89.29    Impingement syndrome of right shoulder M75.41    Incomplete tear of right rotator cuff M75.111    Shoulder impingement M75.40    Incomplete tear of left rotator cuff M75.112     Past Medical History:     Anxiety    CTS (carpal tunnel syndrome)    CTS (carpal tunnel syndrome)    Diabetes mellitus (HCC)    GERD (gastroesophageal reflux disease)    Hyperlipidemia    Hypertension    Peripheral neuropathy    Type II or unspecified type diabetes mellitus without mention of complication, not stated as uncontrolled    Urinary frequency     Past Surgical History:     APPENDECTOMY      CARPAL TUNNEL RELEASE      HAND ARTHROPLASTY Left     HERNIA REPAIR  10/12/15    Laparoscopic Hernia Repair     Social History:     Smoking status: Former Smoker    Smokeless tobacco: Never Used      Comment: 2000    Alcohol use No                                Counseling given: Not Answered    Vital Signs (Current):       BP: 154/99 Pulse: 70   Resp: 18 SpO2: 97   Temp: 97.3 °F (36.3 °C)   Height: 5' 10\" (1.778 m)  (11/26/18) Weight: 230 lb (104.3 kg)  (11/26/18)   BMI: 33.1               BP Readings from Last 3 Encounters:   11/08/18 100/70   10/24/18 132/84   09/14/18 (!) 144/95     NPO Status: >8 hrs    CBC:    WBC 8.0 11/08/2018    HGB 17.4 11/08/2018    HCT 52.3 11/08/2018     11/08/2018     CMP:     11/08/2018    K 4.5 11/08/2018    CL 99 11/08/2018    CO2 26 11/08/2018    BUN 25 11/08/2018    CREATININE 1.2 11/08/2018    GLUCOSE 302 11/08/2018    PROT 6.4 04/23/2018    PROT 6.5 08/10/2011    CALCIUM 9.4 11/08/2018    BILITOT 0.5 04/23/2018    ALKPHOS 61 04/23/2018    AST 15 04/23/2018    ALT 19 04/23/2018     Anesthesia Evaluation  Patient summary reviewed and Nursing notes reviewed  Airway: Mallampati: I  TM distance: >3 FB   Neck ROM: full  Mouth opening: > = 3 FB

## 2018-11-26 NOTE — BRIEF OP NOTE
Brief Postoperative Note  ______________________________________________________________    Patient: Kehinde Smith  YOB: 1960  MRN: [id number]  Date of Procedure: 11/26/2018    Pre-Op Diagnosis: LEFT SHOULDER SUBSCAPULARIS ROTATOR CUFF TEAR, BICEPS TENDONITIS, SUBACROMIAL IMPINGEMENT    Post-Op Diagnosis: Same       Procedure(s):  1. Left shoulder arthroscopy, rotator cuff repair subscapularis and supraspinatus (21783)  2. Left shoulder subpectoral biceps tenodesis (76271)  3. Left shoulder athroscopy, debridement labrum (90394)  4.  Left shoulder arthroscopy, subacromial decompression (72383)     Anesthesia: General    Surgeon(s):  Lanie Navarrete MD    Assistant: Gustavo Barthel, SA    Estimated Blood Loss (mL): less than 50     Complications: None    Specimens:   * No specimens in log *    Implants:  * No implants in log *      Antibiotics: 2 g Ancef IV prior to incision    Findings: Left shoulder full thickness superior border subscapularis tendon tear, full thickness supraspinatus tendon tear, superior labral tearing and fraying, acromial spurring    Lanie Navarrete MD  Date: 11/26/2018  Time: 3:07 PM

## 2018-11-26 NOTE — ANESTHESIA PROCEDURE NOTES
Peripheral Block    Patient location during procedure: pre-op  Start time: 11/26/2018 11:13 AM  End time: 11/26/2018 11:17 AM  Staffing  Anesthesiologist: Floyd Marinelli  Preanesthetic Checklist  Completed: patient identified, site marked, surgical consent, pre-op evaluation, timeout performed, IV checked, risks and benefits discussed, monitors and equipment checked, anesthesia consent given, oxygen available and patient being monitored  Peripheral Block  Patient position: sitting  Prep: ChloraPrep  Patient monitoring: cardiac monitor, continuous pulse ox, frequent blood pressure checks and IV access  Block type: Brachial plexus  Laterality: left  Injection technique: single-shot  Procedures: ultrasound guided  Infiltration strength: 2 %  Dose: 2 mL  Interscalene  Provider prep: sterile gloves  Needle  Needle gauge: 21 G  Needle length: 10 cm  Needle localization: anatomical landmarks and ultrasound guidance  Test dose: positive  Assessment  Injection assessment: negative aspiration for heme, no paresthesia on injection and local visualized surrounding nerve on ultrasound  Slow fractionated injection: yes  Hemodynamics: stable  Additional Notes  Patient ID'd and marked. Sedated. Chlorhex prep to anterior neck/shoulder area. Lido 2% 2 ml sc prior to placement of the block needle. Needle advanced under U/S guidance. Bupivacaine 0.25% 30 ml injected slowly in 5 ml aliquots. Patient tolerated well. ADDENDUM:  Post-op patient with c/o pain in the axilla area- described as burning. Marcaine 0.25% 10 ml injected in the axillary/deltopectoral groove area  In an attempt to block the thoracobrachial nerve branch of the long thoracic nerve.   tjk  Reason for block: post-op pain management and at surgeon's request

## 2018-11-26 NOTE — OP NOTE
MD  [address] [facility] partner of [facility] ([facility])

## 2018-11-27 ENCOUNTER — HOSPITAL ENCOUNTER (EMERGENCY)
Age: 58
Discharge: HOME | End: 2018-11-27
Attending: EMERGENCY MEDICINE
Payer: COMMERCIAL

## 2018-11-27 VITALS
SYSTOLIC BLOOD PRESSURE: 100 MMHG | HEIGHT: 70 IN | TEMPERATURE: 98.3 F | OXYGEN SATURATION: 95 % | DIASTOLIC BLOOD PRESSURE: 62 MMHG | WEIGHT: 226 LBS | RESPIRATION RATE: 14 BRPM | HEART RATE: 106 BPM | BODY MASS INDEX: 32.35 KG/M2

## 2018-11-27 DIAGNOSIS — Z48.02 ATTENTION TO DRESSINGS AND SUTURES: Primary | ICD-10-CM

## 2018-11-27 DIAGNOSIS — Z48.00 ATTENTION TO DRESSINGS AND SUTURES: Primary | ICD-10-CM

## 2018-11-27 PROCEDURE — 99283 EMERGENCY DEPT VISIT LOW MDM: CPT

## 2018-12-11 ENCOUNTER — OFFICE VISIT (OUTPATIENT)
Dept: ORTHOPEDIC SURGERY | Age: 58
End: 2018-12-11

## 2018-12-11 VITALS — WEIGHT: 225.97 LBS | BODY MASS INDEX: 32.35 KG/M2 | HEIGHT: 70 IN

## 2018-12-11 DIAGNOSIS — Z98.890 S/P SHOULDER SURGERY: Primary | ICD-10-CM

## 2018-12-11 DIAGNOSIS — M75.112 INCOMPLETE TEAR OF LEFT ROTATOR CUFF: ICD-10-CM

## 2018-12-11 PROCEDURE — 99024 POSTOP FOLLOW-UP VISIT: CPT | Performed by: ORTHOPAEDIC SURGERY

## 2018-12-11 NOTE — PROGRESS NOTES
Examination of the right upper extremity does not show any tenderness, deformity or injury. Range of motion is unremarkable. There is no gross instability. There are no rashes, ulcerations or lesions. Strength and tone are normal.      Radiology:     X-rays obtained and reviewed in office:  None      Assessment:  79-year-old male 2 weeks status post left shoulder arthroscopy with rotator cuff repair of the supraspinatus and subscapularis, biceps tenodesis, debridement, subacromial decompression    Office Procedures:  Orders Placed This Encounter   Procedures    [facility] - [facility] Physical [facility]     Referral Priority:   Routine     Referral Type:   Eval and Treat     Referral Reason:   Specialty Services Required     Requested Specialty:   Physical Therapy     Number of Visits Requested:   1       Plan:   -At this time we will begin physical therapy per protocol for his left shoulder  -I did reiterate to him today restrictions as well as directions and wearing his sling other than for therapy and hygiene  -Ice, Percocet as per his pain management specialist  -We will see him back in 4 weeks for repeat evaluation and likely wean him out of his sling at that time if there are issues interim he'll contact the office    Ty Rosas MD  [address] [employer] partner of [facility] ([facility])        Voice Recognition Dictation disclaimer: Please note that portions of this chart were generated using Dragon dictation software. Although every effort was made to ensure the accuracy of this automated transcription, some errors in transcription may have occurred.

## 2018-12-12 ENCOUNTER — HOSPITAL ENCOUNTER (OUTPATIENT)
Dept: PHYSICAL THERAPY | Age: 58
Setting detail: THERAPIES SERIES
Discharge: HOME | End: 2018-12-12
Payer: COMMERCIAL

## 2018-12-12 PROCEDURE — 97110 THERAPEUTIC EXERCISES: CPT

## 2018-12-12 PROCEDURE — 97140 MANUAL THERAPY 1/> REGIONS: CPT

## 2018-12-12 PROCEDURE — G8985 CARRY GOAL STATUS: HCPCS

## 2018-12-12 PROCEDURE — G8984 CARRY CURRENT STATUS: HCPCS

## 2018-12-12 PROCEDURE — 97161 PT EVAL LOW COMPLEX 20 MIN: CPT

## 2018-12-12 RX ORDER — BLOOD-GLUCOSE METER
KIT MISCELLANEOUS
Qty: 50 STRIP | Refills: 9 | Status: SHIPPED | OUTPATIENT
Start: 2018-12-12 | End: 2019-05-28 | Stop reason: SDUPTHER

## 2018-12-12 NOTE — PLAN OF CARE
guarding with PROM and mobilization. ? Passive flexion to 130°   ? Passive external rotation to 90° @ 90° of abduction       Latex Allergy:  [x]NO      []YES   Preferred Language for Healthcare:   [x]English       []other:     SUBJECTIVE: Patient stated complaint: Patient is 61 y/o male s/p Left RTC Tear repair of subscapularis, supraspinatus, Bicep Tenodesis, Labral debridement, and SAD on 11/26/18. Functional Disability Index: PT G-Codes  Functional Assessment Tool Used: Quick Dash 11  Score: 41%  Functional Limitation: Carrying, moving and handling objects  Carrying, Moving and Handling Objects Current Status (): At least 40 percent but less than 60 percent impaired, limited or restricted  Carrying, Moving and Handling Objects Goal Status (): At least 1 percent but less than 20 percent impaired, limited or restricted    Pain Scale: 2/10  Easing factors: rest  Provocative factors: movement     Type: [x]Constant   []Intermittent  []Radiating []Localized []other:     Numbness/Tingling:   Functional Limitations/Impairments: [x]Lifting/reaching [x]Grooming [x]Carrying    [x]ADL's [x]Driving [x]Sports/Recreations   []Other:    Occupation/School: disabled/retired     Living Status/Prior Level of Function: Independent with ADLs and IADLs    OBJECTIVE:     CERV ROM     Cervical Flexion     Cervical Extension     Cervical SB     Cervical rotation          ROM Left Right   Shoulder Flex PROM 100 °  WFL ? Shoulder Abd PROM 45 °     Shoulder ER PROM 23 ° at 45 ° Abd    Shoulder IR     Elbow Flex     Elbow Ext     Wrist Flex     Wrist Ext     Strength  Left Right   Shoulder Flex N.T. 4+/5 ? Shoulder Scap N.T. Shoulder ER N.T.     Shoulder IR N.T.    Elbow Flex N.T.    Elbow Ext N.T.    Wrist Flex N.T.    Wrist Ext N.T.    Carlos        Reflexes/Sensation (myotomes/dermatomes):    [x]Normal    []Abnormal:      Joint mobility:   []Normal    [x]Hypo   []Hyper    Palpation: non-remarkable    Functional activities. Classification:   [x]Signs/symptoms consistent with post-surgical status including decreased ROM,  strength and function. []Signs/symptoms consistent with joint sprain/strain   []Signs/symptoms consistent with shoulder impingement   []Signs/symptoms consistent with shoulder/elbow/wrist tendinopathy   []Signs/symptoms consistent with Rotator cuff tear   []Signs/symptoms consistent with labral tear   []Signs/symptoms consistent with postural dysfunction    []Signs/symptoms consistent with Glenohumeral IR Deficit - <45 degrees   []Signs/symptoms consistent with facet dysfunction of cervical/thoracic spine    []Signs/symptoms consistent with pathology which may benefit from Dry needling     []other: Tolerance of evaluation/treatment:    []Excellent   [x]Good    []Fair   []Poor    Physical Therapy Evaluation Complexity Justification   [x] A history of present problem with:  [] no personal factors and/or comorbidities that impact the plan of care;  []1-2 personal factors and/or comorbidities that impact the plan of care  [x]3 personal factors and/or comorbidities that impact the plan of care  [x] An examination of body systems using standardized tests and measures addressing any of the following: body structures and functions (impairments), activity limitations, and/or participation restrictions;:  [] a total of 1-2 or more elements   [x] a total of 3 or more elements   [] a total of 4 or more elements   [x] A clinical presentation with:  [x] stable and/or uncomplicated characteristics   [] evolving clinical presentation with changing characteristics  [] unstable and unpredictable characteristics;   [x] Clinical decision making of [x] low, [] moderate, [] high complexity using standardized patient assessment instrument and/or measurable assessment of functional outcome.       [x] EVAL (LOW) 55928 (typically 20 minutes face-to-face)  [] EVAL (MOD) 27398 (typically 30 minutes face-to-face)  [] EVAL (HIGH)

## 2018-12-12 NOTE — FLOWSHEET NOTE
therapy to mobilize soft tissue/joints of cervical/CT, scapular GHJ and UE for the purpose of modulating pain, promoting relaxation,  increasing ROM, reducing/eliminating soft tissue swelling/inflammation/restriction, improving soft tissue extensibility and allowing for proper ROM for normal function with self care, reaching, carrying, lifting, house/yardwork, driving/computer work    Modalities:  CP x 8'    Charges:  Timed Code Treatment Minutes: 30'   Total Treatment Minutes:     54'         [x] EVAL (LOW) 42997 (typically 20 minutes face-to-face)  [] EVAL (MOD) 20328 (typically 30 minutes face-to-face)  [] EVAL (HIGH) 87810 (typically 45 minutes face-to-face)  [] RE-EVAL     [x] RZ(40826) x  1   [] Ionto  [] NMR (19680) x      [] ES (unattended)  [x] Manual (91378) x  1    [] Mech Traction  [] TA: x (86310)         [] Other:      GOALS:  Therapist goals for Patient:   Short Term Goals: To be achieved in: 2 weeks  1. Independent in HEP and progression per patient tolerance, in order to prevent re-injury. 2. Patient will have a decrease in pain to facilitate improvement in movement, function, and ADLs as indicated by Functional Deficits. Long Term Goals: To be achieved in: 10 weeks  1. Disability index score of 10% or less for the Quick Dash to assist with reaching prior level of function. 2. Patient will demonstrate increased AROM of Left shoulder to 160 ° flex/abd, 660-70 ° ER/IR  to allow for proper joint functioning as indicated by patients Functional Deficits. 3. Patient will demonstrate an increase in strength to Left  UE =- Right UE to allow for proper functional mobility as indicated by patients Functional Deficits. 4. Patient will return to all transfers, work activities, and functional activities without increased symptoms or restriction.     5. Patient will have 0/10 pain with ADL's.  6. Patient stated goal: to decrease pain and return to PLOF     Goals that are underlined signify the goal has

## 2018-12-17 ENCOUNTER — OFFICE VISIT (OUTPATIENT)
Dept: FAMILY MEDICINE CLINIC | Age: 58
End: 2018-12-17
Payer: COMMERCIAL

## 2018-12-17 ENCOUNTER — HOSPITAL ENCOUNTER (OUTPATIENT)
Dept: PHYSICAL THERAPY | Age: 58
Setting detail: THERAPIES SERIES
Discharge: HOME | End: 2018-12-17
Payer: COMMERCIAL

## 2018-12-17 VITALS
HEART RATE: 99 BPM | DIASTOLIC BLOOD PRESSURE: 79 MMHG | OXYGEN SATURATION: 97 % | SYSTOLIC BLOOD PRESSURE: 116 MMHG | BODY MASS INDEX: 33 KG/M2 | WEIGHT: 230 LBS

## 2018-12-17 DIAGNOSIS — M15.9 PRIMARY OSTEOARTHRITIS INVOLVING MULTIPLE JOINTS: ICD-10-CM

## 2018-12-17 DIAGNOSIS — F33.41 RECURRENT MAJOR DEPRESSIVE DISORDER, IN PARTIAL REMISSION (HCC): ICD-10-CM

## 2018-12-17 DIAGNOSIS — E78.2 MIXED HYPERLIPIDEMIA: ICD-10-CM

## 2018-12-17 DIAGNOSIS — Z79.4 TYPE 2 DIABETES MELLITUS WITH DIABETIC NEUROPATHY, WITH LONG-TERM CURRENT USE OF INSULIN (HCC): Primary | ICD-10-CM

## 2018-12-17 DIAGNOSIS — E11.40 TYPE 2 DIABETES MELLITUS WITH DIABETIC NEUROPATHY, WITH LONG-TERM CURRENT USE OF INSULIN (HCC): Primary | ICD-10-CM

## 2018-12-17 DIAGNOSIS — I10 ESSENTIAL HYPERTENSION: ICD-10-CM

## 2018-12-17 PROCEDURE — 3017F COLORECTAL CA SCREEN DOC REV: CPT | Performed by: NURSE PRACTITIONER

## 2018-12-17 PROCEDURE — 36415 COLL VENOUS BLD VENIPUNCTURE: CPT | Performed by: NURSE PRACTITIONER

## 2018-12-17 PROCEDURE — 97110 THERAPEUTIC EXERCISES: CPT

## 2018-12-17 PROCEDURE — 3045F PR MOST RECENT HEMOGLOBIN A1C LEVEL 7.0-9.0%: CPT | Performed by: NURSE PRACTITIONER

## 2018-12-17 PROCEDURE — 90471 IMMUNIZATION ADMIN: CPT | Performed by: NURSE PRACTITIONER

## 2018-12-17 PROCEDURE — 2022F DILAT RTA XM EVC RTNOPTHY: CPT | Performed by: NURSE PRACTITIONER

## 2018-12-17 PROCEDURE — G8427 DOCREV CUR MEDS BY ELIG CLIN: HCPCS | Performed by: NURSE PRACTITIONER

## 2018-12-17 PROCEDURE — 1036F TOBACCO NON-USER: CPT | Performed by: NURSE PRACTITIONER

## 2018-12-17 PROCEDURE — G8417 CALC BMI ABV UP PARAM F/U: HCPCS | Performed by: NURSE PRACTITIONER

## 2018-12-17 PROCEDURE — 99214 OFFICE O/P EST MOD 30 MIN: CPT | Performed by: NURSE PRACTITIONER

## 2018-12-17 PROCEDURE — 97140 MANUAL THERAPY 1/> REGIONS: CPT

## 2018-12-17 PROCEDURE — 90732 PPSV23 VACC 2 YRS+ SUBQ/IM: CPT | Performed by: NURSE PRACTITIONER

## 2018-12-17 PROCEDURE — G8482 FLU IMMUNIZE ORDER/ADMIN: HCPCS | Performed by: NURSE PRACTITIONER

## 2018-12-17 PROCEDURE — 97112 NEUROMUSCULAR REEDUCATION: CPT

## 2018-12-17 RX ORDER — DULOXETIN HYDROCHLORIDE 60 MG/1
CAPSULE, DELAYED RELEASE ORAL
Qty: 30 CAPSULE | Refills: 6 | Status: SHIPPED | OUTPATIENT
Start: 2018-12-17 | End: 2019-03-25 | Stop reason: SDUPTHER

## 2018-12-17 RX ORDER — LIDOCAINE 50 MG/G
3 PATCH TOPICAL EVERY 24 HOURS
Qty: 90 PATCH | Refills: 6 | Status: SHIPPED | OUTPATIENT
Start: 2018-12-17 | End: 2019-02-11 | Stop reason: SDUPTHER

## 2018-12-17 RX ORDER — BISOPROLOL FUMARATE AND HYDROCHLOROTHIAZIDE 10; 6.25 MG/1; MG/1
TABLET ORAL
Qty: 30 TABLET | Refills: 6 | Status: SHIPPED | OUTPATIENT
Start: 2018-12-17 | End: 2019-03-25 | Stop reason: SDUPTHER

## 2018-12-17 RX ORDER — DICLOFENAC SODIUM 75 MG/1
75 TABLET, DELAYED RELEASE ORAL 2 TIMES DAILY
Qty: 60 TABLET | Refills: 6 | Status: SHIPPED | OUTPATIENT
Start: 2018-12-17 | End: 2019-03-25 | Stop reason: SDUPTHER

## 2018-12-17 RX ORDER — TIZANIDINE 4 MG/1
TABLET ORAL
Qty: 60 TABLET | Refills: 5 | Status: SHIPPED | OUTPATIENT
Start: 2018-12-17 | End: 2019-04-26 | Stop reason: SDUPTHER

## 2018-12-17 RX ORDER — GABAPENTIN 600 MG/1
TABLET ORAL
Qty: 120 TABLET | Refills: 5 | Status: SHIPPED | OUTPATIENT
Start: 2018-12-17 | End: 2019-03-25 | Stop reason: SDUPTHER

## 2018-12-17 RX ORDER — LISINOPRIL 5 MG/1
TABLET ORAL
Qty: 30 TABLET | Refills: 11 | Status: SHIPPED | OUTPATIENT
Start: 2018-12-17 | End: 2019-03-25 | Stop reason: SDUPTHER

## 2018-12-17 RX ORDER — PRAVASTATIN SODIUM 10 MG
20 TABLET ORAL DAILY
Qty: 60 TABLET | Refills: 11 | Status: SHIPPED | OUTPATIENT
Start: 2018-12-17 | End: 2019-03-25 | Stop reason: SDUPTHER

## 2018-12-17 RX ORDER — EPINEPHRINE 0.3 MG/.3ML
0.3 INJECTION SUBCUTANEOUS
Qty: 2 EACH | Refills: 0 | Status: SHIPPED | OUTPATIENT
Start: 2018-12-17 | End: 2021-11-03 | Stop reason: ALTCHOICE

## 2018-12-17 RX ORDER — OMEPRAZOLE 40 MG/1
CAPSULE, DELAYED RELEASE ORAL
Qty: 30 CAPSULE | Refills: 6 | Status: SHIPPED | OUTPATIENT
Start: 2018-12-17 | End: 2019-03-25 | Stop reason: SDUPTHER

## 2018-12-17 ASSESSMENT — PATIENT HEALTH QUESTIONNAIRE - PHQ9
SUM OF ALL RESPONSES TO PHQ QUESTIONS 1-9: 0
SUM OF ALL RESPONSES TO PHQ QUESTIONS 1-9: 0
1. LITTLE INTEREST OR PLEASURE IN DOING THINGS: 0
SUM OF ALL RESPONSES TO PHQ9 QUESTIONS 1 & 2: 0
SUM OF ALL RESPONSES TO PHQ QUESTIONS 1-9: 0
1. LITTLE INTEREST OR PLEASURE IN DOING THINGS: 0
SUM OF ALL RESPONSES TO PHQ QUESTIONS 1-9: 0
SUM OF ALL RESPONSES TO PHQ9 QUESTIONS 1 & 2: 0
2. FEELING DOWN, DEPRESSED OR HOPELESS: 0
2. FEELING DOWN, DEPRESSED OR HOPELESS: 0

## 2018-12-17 ASSESSMENT — ENCOUNTER SYMPTOMS
EYES NEGATIVE: 1
RESPIRATORY NEGATIVE: 1

## 2018-12-18 LAB
ESTIMATED AVERAGE GLUCOSE: 237.4 MG/DL
HBA1C MFR BLD: 9.9 %

## 2018-12-18 NOTE — PATIENT INSTRUCTIONS
life easier, such as a higher toilet seat and padded handles on kitchen utensils. · Do not sit in low chairs, which can make it hard to get up. · Put heat or cold on your sore joints as needed. Use whichever helps you most. You also can take turns with hot and cold packs. ? Apply heat 2 or 3 times a day for 20 to 30 minutes--using a heating pad, hot shower, or hot pack--to relieve pain and stiffness. ? Put ice or a cold pack on your sore joint for 10 to 20 minutes at a time. Put a thin cloth between the ice and your skin. When should you call for help? Call your doctor now or seek immediate medical care if:    · You have sudden swelling, warmth, or pain in any joint.     · You have joint pain and a fever or rash.     · You have such bad pain that you cannot use a joint.    Watch closely for changes in your health, and be sure to contact your doctor if:    · You have mild joint symptoms that continue even with more than 6 weeks of care at home.     · You have stomach pain or other problems with your medicine. Where can you learn more? Go to https://Just Fab.Ghostery. org and sign in to your [employer] account. Enter W449 in the First Look Media box to learn more about \"Arthritis: Care Instructions. \"     If you do not have an account, please click on the \"Sign Up Now\" link. Current as of: June 11, 2018  Content Version: 11.8  © 9628-5670 [employer]itDATA. Care instructions adapted under license by [facility] [place] ([facility]). If you have questions about a medical condition or this instruction, always ask your healthcare professional. [place] [address] any warranty or liability for your use of this information. Patient Education        Preventing a Relapse of Depression: Care Instructions  Your Care Instructions    A relapse of depression means your symptoms have come back after you have gotten better. This illness often comes and goes during a lifetime.  But there are many time every night, and get up at the same time every morning. ? Keep your bedroom dark and quiet. ? Do not exercise after 5:00 p.m.  ? Avoid drinks with caffeine after 5:00 p.m. · Avoid sleeping pills unless they are prescribed by the doctor treating your depression. Sleeping pills may make you groggy during the day, and they may interact with other medicine you are taking. · If you have any other illnesses, such as diabetes, heart disease, or high blood pressure, make sure to continue with your treatment. Tell your doctor about all of the medicines you take, including those with or without a prescription. · Keep the numbers for these national suicide hotlines: 4-318-607-TALK (9-452.348.2111) and 9-786-DYBQXSO (4-491.135.4515). If you or someone you know talks about suicide or feeling hopeless, get help right away. When should you call for help? Call 911 anytime you think you may need emergency care. For example, call if:    · You feel like hurting yourself or someone else.     · Someone you know has depression and is about to attempt or is attempting suicide.   [facility] your doctor now or seek immediate medical care if:    · You hear voices.     · Someone you know has depression and:  ? Starts to give away his or her possessions. ? Uses illegal drugs or drinks alcohol heavily. ? Talks or writes about death, including writing suicide notes or talking about guns, knives, or pills. ? Starts to spend a lot of time alone. ? Acts very aggressively or suddenly appears calm.    Watch closely for changes in your health, and be sure to contact your doctor if:    · You do not get better as expected. Where can you learn more? Go to https://Helmi Technologies.Between. org and sign in to your [employer] account. Enter U039 in the ResponseTap (formerly AdInsight) box to learn more about \"Recovering From Depression: Care Instructions. \"     If you do not have an account, please click on the \"Sign Up Now\" link.   Current as of: will help lower your blood sugar. What else should you know? · Limit saturated fat, such as the fat from meat and dairy products. This is a healthy choice because people who have diabetes are at higher risk of heart disease. So choose lean cuts of meat and nonfat or low-fat dairy products. Use olive or canola oil instead of butter or shortening when cooking. · Don't skip meals. Your blood sugar may drop too low if you skip meals and take insulin or certain medicines for diabetes. · Check with your doctor before you drink alcohol. Alcohol can cause your blood sugar to drop too low. Alcohol can also cause a bad reaction if you take certain diabetes medicines. Follow-up care is a key part of your treatment and safety. Be sure to make and go to all appointments, and call your doctor if you are having problems. It's also a good idea to know your test results and keep a list of the medicines you take. Where can you learn more? Go to https://Snapbridge Software.Chromatin. org and sign in to your [employer] account. Enter C887 in the [employer]ane box to learn more about \"Learning About Diabetes Food Guidelines. \"     If you do not have an account, please click on the \"Sign Up Now\" link. Current as of: December 7, 2017  Content Version: 11.8  © 1201-8912 Healthwise, Incorporated. Care instructions adapted under license by [facility] ([facility]). If you have questions about a medical condition or this instruction, always ask your healthcare professional. [place] [address] any warranty or liability for your use of this information. Patient Education        Diabetes Foot Health: Care Instructions  Your Care Instructions    When you have diabetes, your feet need extra care and attention. Diabetes can damage the nerve endings and blood vessels in your feet, making you less likely to notice when your feet are injured.  Diabetes also limits your body's ability to fight infection and get blood to areas that need it. If you get a minor foot injury, it could become an ulcer or a serious infection. With good foot care, you can prevent most of these problems. Caring for your feet can be quick and easy. Most of the care can be done when you are bathing or getting ready for bed. Follow-up care is a key part of your treatment and safety. Be sure to make and go to all appointments, and call your doctor if you are having problems. It's also a good idea to know your test results and keep a list of the medicines you take. How can you care for yourself at home? · Keep your blood sugar close to normal by watching what and how much you eat, monitoring blood sugar, taking medicines if prescribed, and getting regular exercise. · Do not smoke. Smoking affects blood flow and can make foot problems worse. If you need help quitting, talk to your doctor about stop-smoking programs and medicines. These can increase your chances of quitting for good. · Eat a diet that is low in fats. High fat intake can cause fat to build up in your blood vessels and decrease blood flow. · Inspect your feet daily for blisters, cuts, cracks, or sores. If you cannot see well, use a mirror or have someone help you. · Take care of your feet:  ? Wash your feet every day. Use warm (not hot) water. Check the water temperature with your wrists or other part of your body, not your feet. ? Dry your feet well. Pat them dry. Do not rub the skin on your feet too hard. Dry well between your toes. If the skin on your feet stays moist, bacteria or a fungus can grow, which can lead to infection. ? Keep your skin soft. Use moisturizing skin cream to keep the skin on your feet soft and prevent calluses and cracks. But do not put the cream between your toes, and stop using any cream that causes a rash. ? Clean underneath your toenails carefully. Do not use a sharp object to clean underneath your toenails.  Use the blunt end of a nail file or other rounded

## 2018-12-21 RX ORDER — LANCETS 33 GAUGE
EACH MISCELLANEOUS
Qty: 100 EACH | Refills: 4 | Status: SHIPPED | OUTPATIENT
Start: 2018-12-21 | End: 2019-05-13 | Stop reason: SDUPTHER

## 2018-12-24 ENCOUNTER — HOSPITAL ENCOUNTER (OUTPATIENT)
Dept: PHYSICAL THERAPY | Age: 58
Setting detail: THERAPIES SERIES
Discharge: HOME | End: 2018-12-24
Payer: COMMERCIAL

## 2018-12-24 PROCEDURE — 97110 THERAPEUTIC EXERCISES: CPT

## 2018-12-24 PROCEDURE — 97112 NEUROMUSCULAR REEDUCATION: CPT

## 2018-12-24 PROCEDURE — 97140 MANUAL THERAPY 1/> REGIONS: CPT

## 2018-12-31 ENCOUNTER — HOSPITAL ENCOUNTER (OUTPATIENT)
Dept: PHYSICAL THERAPY | Age: 58
Setting detail: THERAPIES SERIES
Discharge: HOME | End: 2018-12-31
Payer: COMMERCIAL

## 2018-12-31 PROCEDURE — 97140 MANUAL THERAPY 1/> REGIONS: CPT

## 2018-12-31 PROCEDURE — 97110 THERAPEUTIC EXERCISES: CPT

## 2018-12-31 PROCEDURE — 97112 NEUROMUSCULAR REEDUCATION: CPT

## 2019-01-07 ENCOUNTER — HOSPITAL ENCOUNTER (OUTPATIENT)
Dept: PHYSICAL THERAPY | Age: 59
Setting detail: THERAPIES SERIES
Discharge: HOME | End: 2019-01-07
Payer: COMMERCIAL

## 2019-01-07 PROCEDURE — 97112 NEUROMUSCULAR REEDUCATION: CPT

## 2019-01-07 PROCEDURE — G8984 CARRY CURRENT STATUS: HCPCS

## 2019-01-07 PROCEDURE — 97140 MANUAL THERAPY 1/> REGIONS: CPT

## 2019-01-07 PROCEDURE — 97110 THERAPEUTIC EXERCISES: CPT

## 2019-01-07 PROCEDURE — G8985 CARRY GOAL STATUS: HCPCS

## 2019-01-08 ENCOUNTER — OFFICE VISIT (OUTPATIENT)
Dept: ORTHOPEDIC SURGERY | Age: 59
End: 2019-01-08

## 2019-01-08 VITALS — BODY MASS INDEX: 32.92 KG/M2 | WEIGHT: 229.94 LBS | HEIGHT: 70 IN

## 2019-01-08 DIAGNOSIS — Z98.890 S/P SHOULDER SURGERY: Primary | ICD-10-CM

## 2019-01-08 DIAGNOSIS — M75.112 INCOMPLETE TEAR OF LEFT ROTATOR CUFF: ICD-10-CM

## 2019-01-08 PROCEDURE — 99024 POSTOP FOLLOW-UP VISIT: CPT | Performed by: ORTHOPAEDIC SURGERY

## 2019-01-10 ENCOUNTER — HOSPITAL ENCOUNTER (OUTPATIENT)
Dept: PHYSICAL THERAPY | Age: 59
Setting detail: THERAPIES SERIES
Discharge: HOME | End: 2019-01-10
Payer: COMMERCIAL

## 2019-01-10 PROCEDURE — 97110 THERAPEUTIC EXERCISES: CPT | Performed by: PHYSICAL THERAPY ASSISTANT

## 2019-01-10 PROCEDURE — 97112 NEUROMUSCULAR REEDUCATION: CPT | Performed by: PHYSICAL THERAPY ASSISTANT

## 2019-01-14 ENCOUNTER — HOSPITAL ENCOUNTER (OUTPATIENT)
Dept: PHYSICAL THERAPY | Age: 59
Setting detail: THERAPIES SERIES
End: 2019-01-14
Payer: COMMERCIAL

## 2019-01-15 ENCOUNTER — HOSPITAL ENCOUNTER (OUTPATIENT)
Dept: PHYSICAL THERAPY | Age: 59
Setting detail: THERAPIES SERIES
Discharge: HOME | End: 2019-01-15
Payer: COMMERCIAL

## 2019-01-15 PROCEDURE — 97110 THERAPEUTIC EXERCISES: CPT

## 2019-01-15 PROCEDURE — 97112 NEUROMUSCULAR REEDUCATION: CPT

## 2019-01-15 PROCEDURE — 97140 MANUAL THERAPY 1/> REGIONS: CPT

## 2019-01-17 ENCOUNTER — HOSPITAL ENCOUNTER (OUTPATIENT)
Dept: PHYSICAL THERAPY | Age: 59
Setting detail: THERAPIES SERIES
Discharge: HOME | End: 2019-01-17
Payer: COMMERCIAL

## 2019-01-17 PROCEDURE — 97112 NEUROMUSCULAR REEDUCATION: CPT

## 2019-01-17 PROCEDURE — 97140 MANUAL THERAPY 1/> REGIONS: CPT

## 2019-01-17 PROCEDURE — 97110 THERAPEUTIC EXERCISES: CPT

## 2019-01-17 RX ORDER — TIZANIDINE 4 MG/1
TABLET ORAL
Qty: 60 TABLET | Refills: 4 | OUTPATIENT
Start: 2019-01-17

## 2019-01-21 ENCOUNTER — HOSPITAL ENCOUNTER (OUTPATIENT)
Dept: PHYSICAL THERAPY | Age: 59
Setting detail: THERAPIES SERIES
End: 2019-01-21
Payer: COMMERCIAL

## 2019-01-24 ENCOUNTER — HOSPITAL ENCOUNTER (OUTPATIENT)
Dept: PHYSICAL THERAPY | Age: 59
Setting detail: THERAPIES SERIES
Discharge: HOME | End: 2019-01-24
Payer: COMMERCIAL

## 2019-01-24 PROCEDURE — 97112 NEUROMUSCULAR REEDUCATION: CPT

## 2019-01-24 PROCEDURE — 97110 THERAPEUTIC EXERCISES: CPT

## 2019-01-24 PROCEDURE — 97140 MANUAL THERAPY 1/> REGIONS: CPT

## 2019-01-24 PROCEDURE — G8984 CARRY CURRENT STATUS: HCPCS

## 2019-01-24 PROCEDURE — G8985 CARRY GOAL STATUS: HCPCS

## 2019-01-28 ENCOUNTER — HOSPITAL ENCOUNTER (OUTPATIENT)
Dept: PHYSICAL THERAPY | Age: 59
Setting detail: THERAPIES SERIES
Discharge: HOME | End: 2019-01-28
Payer: COMMERCIAL

## 2019-01-28 ENCOUNTER — TELEPHONE (OUTPATIENT)
Dept: FAMILY MEDICINE CLINIC | Age: 59
End: 2019-01-28

## 2019-01-28 PROCEDURE — 97110 THERAPEUTIC EXERCISES: CPT

## 2019-01-28 PROCEDURE — 97112 NEUROMUSCULAR REEDUCATION: CPT

## 2019-01-28 PROCEDURE — 97140 MANUAL THERAPY 1/> REGIONS: CPT

## 2019-01-31 ENCOUNTER — HOSPITAL ENCOUNTER (OUTPATIENT)
Dept: PHYSICAL THERAPY | Age: 59
Setting detail: THERAPIES SERIES
Discharge: HOME | End: 2019-01-31
Payer: COMMERCIAL

## 2019-01-31 PROCEDURE — 97140 MANUAL THERAPY 1/> REGIONS: CPT

## 2019-01-31 PROCEDURE — 97112 NEUROMUSCULAR REEDUCATION: CPT

## 2019-01-31 PROCEDURE — 97110 THERAPEUTIC EXERCISES: CPT

## 2019-02-04 ENCOUNTER — HOSPITAL ENCOUNTER (OUTPATIENT)
Dept: PHYSICAL THERAPY | Age: 59
Setting detail: THERAPIES SERIES
Discharge: HOME | End: 2019-02-04
Payer: COMMERCIAL

## 2019-02-04 PROCEDURE — 97110 THERAPEUTIC EXERCISES: CPT

## 2019-02-04 PROCEDURE — 97112 NEUROMUSCULAR REEDUCATION: CPT

## 2019-02-04 PROCEDURE — 97140 MANUAL THERAPY 1/> REGIONS: CPT

## 2019-02-07 ENCOUNTER — HOSPITAL ENCOUNTER (OUTPATIENT)
Dept: PHYSICAL THERAPY | Age: 59
Setting detail: THERAPIES SERIES
Discharge: HOME | End: 2019-02-07
Payer: COMMERCIAL

## 2019-02-07 PROCEDURE — 97110 THERAPEUTIC EXERCISES: CPT | Performed by: PHYSICAL THERAPY ASSISTANT

## 2019-02-07 PROCEDURE — 97112 NEUROMUSCULAR REEDUCATION: CPT | Performed by: PHYSICAL THERAPY ASSISTANT

## 2019-02-07 PROCEDURE — 97140 MANUAL THERAPY 1/> REGIONS: CPT | Performed by: PHYSICAL THERAPY ASSISTANT

## 2019-02-11 ENCOUNTER — HOSPITAL ENCOUNTER (OUTPATIENT)
Dept: PHYSICAL THERAPY | Age: 59
Setting detail: THERAPIES SERIES
Discharge: HOME | End: 2019-02-11
Payer: COMMERCIAL

## 2019-02-11 PROCEDURE — 97112 NEUROMUSCULAR REEDUCATION: CPT | Performed by: PHYSICAL THERAPY ASSISTANT

## 2019-02-11 PROCEDURE — 97110 THERAPEUTIC EXERCISES: CPT | Performed by: PHYSICAL THERAPY ASSISTANT

## 2019-02-12 RX ORDER — PRAVASTATIN SODIUM 10 MG
TABLET ORAL
Qty: 30 TABLET | Refills: 10 | OUTPATIENT
Start: 2019-02-12

## 2019-02-13 RX ORDER — LIDOCAINE 50 MG/G
3 PATCH TOPICAL EVERY 24 HOURS
Qty: 90 PATCH | Refills: 6 | Status: SHIPPED | OUTPATIENT
Start: 2019-02-13 | End: 2019-03-25 | Stop reason: SDUPTHER

## 2019-02-14 ENCOUNTER — HOSPITAL ENCOUNTER (OUTPATIENT)
Dept: PHYSICAL THERAPY | Age: 59
Setting detail: THERAPIES SERIES
End: 2019-02-14
Payer: COMMERCIAL

## 2019-02-15 RX ORDER — INSULIN LISPRO 100 [IU]/ML
INJECTION, SOLUTION INTRAVENOUS; SUBCUTANEOUS
Qty: 3 PEN | Refills: 3 | Status: SHIPPED | OUTPATIENT
Start: 2019-02-15 | End: 2019-03-25 | Stop reason: SDUPTHER

## 2019-02-18 ENCOUNTER — HOSPITAL ENCOUNTER (OUTPATIENT)
Dept: PHYSICAL THERAPY | Age: 59
Setting detail: THERAPIES SERIES
Discharge: HOME | End: 2019-02-18
Payer: COMMERCIAL

## 2019-02-18 PROCEDURE — G8984 CARRY CURRENT STATUS: HCPCS | Performed by: PHYSICAL THERAPY ASSISTANT

## 2019-02-18 PROCEDURE — 97112 NEUROMUSCULAR REEDUCATION: CPT | Performed by: PHYSICAL THERAPY ASSISTANT

## 2019-02-18 PROCEDURE — 97110 THERAPEUTIC EXERCISES: CPT | Performed by: PHYSICAL THERAPY ASSISTANT

## 2019-02-18 PROCEDURE — G8986 CARRY D/C STATUS: HCPCS | Performed by: PHYSICAL THERAPY ASSISTANT

## 2019-02-18 PROCEDURE — G8985 CARRY GOAL STATUS: HCPCS | Performed by: PHYSICAL THERAPY ASSISTANT

## 2019-02-19 ENCOUNTER — OFFICE VISIT (OUTPATIENT)
Dept: ORTHOPEDIC SURGERY | Age: 59
End: 2019-02-19
Payer: COMMERCIAL

## 2019-02-19 ENCOUNTER — TELEPHONE (OUTPATIENT)
Dept: ORTHOPEDIC SURGERY | Age: 59
End: 2019-02-19

## 2019-02-19 VITALS — WEIGHT: 229.94 LBS | BODY MASS INDEX: 32.92 KG/M2 | HEIGHT: 70 IN

## 2019-02-19 DIAGNOSIS — M75.112 INCOMPLETE TEAR OF LEFT ROTATOR CUFF: ICD-10-CM

## 2019-02-19 DIAGNOSIS — M79.644 PAIN OF RIGHT THUMB: Primary | ICD-10-CM

## 2019-02-19 DIAGNOSIS — Z98.890 S/P SHOULDER SURGERY: ICD-10-CM

## 2019-02-19 PROCEDURE — L3924 HFO WITHOUT JOINTS PRE OTS: HCPCS | Performed by: ORTHOPAEDIC SURGERY

## 2019-02-19 PROCEDURE — 99024 POSTOP FOLLOW-UP VISIT: CPT | Performed by: ORTHOPAEDIC SURGERY

## 2019-02-21 ENCOUNTER — HOSPITAL ENCOUNTER (OUTPATIENT)
Dept: PHYSICAL THERAPY | Age: 59
Setting detail: THERAPIES SERIES
End: 2019-02-21
Payer: COMMERCIAL

## 2019-02-25 ENCOUNTER — OFFICE VISIT (OUTPATIENT)
Dept: ORTHOPEDIC SURGERY | Age: 59
End: 2019-02-25
Payer: COMMERCIAL

## 2019-02-25 ENCOUNTER — APPOINTMENT (OUTPATIENT)
Dept: PHYSICAL THERAPY | Age: 59
End: 2019-02-25
Payer: COMMERCIAL

## 2019-02-25 VITALS — WEIGHT: 229.94 LBS | BODY MASS INDEX: 32.92 KG/M2 | HEIGHT: 70 IN

## 2019-02-25 DIAGNOSIS — M18.12 PRIMARY OSTEOARTHRITIS OF FIRST CARPOMETACARPAL JOINT OF LEFT HAND: Primary | ICD-10-CM

## 2019-02-25 PROCEDURE — G8428 CUR MEDS NOT DOCUMENT: HCPCS | Performed by: ORTHOPAEDIC SURGERY

## 2019-02-25 PROCEDURE — 1036F TOBACCO NON-USER: CPT | Performed by: ORTHOPAEDIC SURGERY

## 2019-02-25 PROCEDURE — G8417 CALC BMI ABV UP PARAM F/U: HCPCS | Performed by: ORTHOPAEDIC SURGERY

## 2019-02-25 PROCEDURE — 99213 OFFICE O/P EST LOW 20 MIN: CPT | Performed by: ORTHOPAEDIC SURGERY

## 2019-02-25 PROCEDURE — 3017F COLORECTAL CA SCREEN DOC REV: CPT | Performed by: ORTHOPAEDIC SURGERY

## 2019-02-25 PROCEDURE — G8482 FLU IMMUNIZE ORDER/ADMIN: HCPCS | Performed by: ORTHOPAEDIC SURGERY

## 2019-02-28 ENCOUNTER — APPOINTMENT (OUTPATIENT)
Dept: PHYSICAL THERAPY | Age: 59
End: 2019-02-28
Payer: COMMERCIAL

## 2019-03-14 DIAGNOSIS — Z79.4 TYPE 2 DIABETES MELLITUS WITH DIABETIC POLYNEUROPATHY, WITH LONG-TERM CURRENT USE OF INSULIN (HCC): ICD-10-CM

## 2019-03-14 DIAGNOSIS — E11.42 TYPE 2 DIABETES MELLITUS WITH DIABETIC POLYNEUROPATHY, WITH LONG-TERM CURRENT USE OF INSULIN (HCC): ICD-10-CM

## 2019-03-14 RX ORDER — PEN NEEDLE, DIABETIC 31 G X1/4"
NEEDLE, DISPOSABLE MISCELLANEOUS
Qty: 300 EACH | Refills: 11 | Status: SHIPPED | OUTPATIENT
Start: 2019-03-14

## 2019-03-25 ENCOUNTER — OFFICE VISIT (OUTPATIENT)
Dept: FAMILY MEDICINE CLINIC | Age: 59
End: 2019-03-25
Payer: MEDICARE

## 2019-03-25 VITALS
OXYGEN SATURATION: 96 % | HEART RATE: 80 BPM | BODY MASS INDEX: 32.86 KG/M2 | DIASTOLIC BLOOD PRESSURE: 68 MMHG | SYSTOLIC BLOOD PRESSURE: 96 MMHG | WEIGHT: 229 LBS

## 2019-03-25 DIAGNOSIS — M79.645 PAIN OF FINGER OF LEFT HAND: ICD-10-CM

## 2019-03-25 DIAGNOSIS — M15.9 PRIMARY OSTEOARTHRITIS INVOLVING MULTIPLE JOINTS: ICD-10-CM

## 2019-03-25 DIAGNOSIS — M47.817 LUMBOSACRAL SPONDYLOSIS WITHOUT MYELOPATHY: Chronic | ICD-10-CM

## 2019-03-25 DIAGNOSIS — M54.12 CERVICAL RADICULOPATHY AT C7: ICD-10-CM

## 2019-03-25 DIAGNOSIS — M51.26 DISPLACEMENT OF LUMBAR INTERVERTEBRAL DISC WITHOUT MYELOPATHY: Chronic | ICD-10-CM

## 2019-03-25 DIAGNOSIS — E78.2 MIXED HYPERLIPIDEMIA: ICD-10-CM

## 2019-03-25 DIAGNOSIS — M54.16 LUMBAR RADICULITIS: ICD-10-CM

## 2019-03-25 DIAGNOSIS — Z79.4 TYPE 2 DIABETES MELLITUS WITH DIABETIC POLYNEUROPATHY, WITH LONG-TERM CURRENT USE OF INSULIN (HCC): ICD-10-CM

## 2019-03-25 DIAGNOSIS — E11.42 TYPE 2 DIABETES MELLITUS WITH DIABETIC POLYNEUROPATHY, WITH LONG-TERM CURRENT USE OF INSULIN (HCC): ICD-10-CM

## 2019-03-25 DIAGNOSIS — M75.40 IMPINGEMENT SYNDROME OF SHOULDER REGION, UNSPECIFIED LATERALITY: ICD-10-CM

## 2019-03-25 DIAGNOSIS — M48.061 SPINAL STENOSIS, LUMBAR REGION, WITHOUT NEUROGENIC CLAUDICATION: Chronic | ICD-10-CM

## 2019-03-25 DIAGNOSIS — M51.37 DEGENERATION OF LUMBAR OR LUMBOSACRAL INTERVERTEBRAL DISC: Chronic | ICD-10-CM

## 2019-03-25 DIAGNOSIS — Z12.11 SCREENING FOR COLON CANCER: Primary | ICD-10-CM

## 2019-03-25 PROCEDURE — 3046F HEMOGLOBIN A1C LEVEL >9.0%: CPT | Performed by: NURSE PRACTITIONER

## 2019-03-25 PROCEDURE — G8427 DOCREV CUR MEDS BY ELIG CLIN: HCPCS | Performed by: NURSE PRACTITIONER

## 2019-03-25 PROCEDURE — G8417 CALC BMI ABV UP PARAM F/U: HCPCS | Performed by: NURSE PRACTITIONER

## 2019-03-25 PROCEDURE — 3017F COLORECTAL CA SCREEN DOC REV: CPT | Performed by: NURSE PRACTITIONER

## 2019-03-25 PROCEDURE — 2022F DILAT RTA XM EVC RTNOPTHY: CPT | Performed by: NURSE PRACTITIONER

## 2019-03-25 PROCEDURE — G8482 FLU IMMUNIZE ORDER/ADMIN: HCPCS | Performed by: NURSE PRACTITIONER

## 2019-03-25 PROCEDURE — 99214 OFFICE O/P EST MOD 30 MIN: CPT | Performed by: NURSE PRACTITIONER

## 2019-03-25 PROCEDURE — 1036F TOBACCO NON-USER: CPT | Performed by: NURSE PRACTITIONER

## 2019-03-25 RX ORDER — LISINOPRIL 5 MG/1
TABLET ORAL
Qty: 30 TABLET | Refills: 11 | Status: SHIPPED | OUTPATIENT
Start: 2019-03-25 | End: 2019-04-26 | Stop reason: SDUPTHER

## 2019-03-25 RX ORDER — DULOXETIN HYDROCHLORIDE 60 MG/1
CAPSULE, DELAYED RELEASE ORAL
Qty: 30 CAPSULE | Refills: 11 | Status: SHIPPED | OUTPATIENT
Start: 2019-03-25 | End: 2019-04-26 | Stop reason: SDUPTHER

## 2019-03-25 RX ORDER — GABAPENTIN 600 MG/1
TABLET ORAL
Qty: 120 TABLET | Refills: 6 | Status: SHIPPED | OUTPATIENT
Start: 2019-03-25 | End: 2019-04-26 | Stop reason: SDUPTHER

## 2019-03-25 RX ORDER — DICLOFENAC SODIUM 75 MG/1
75 TABLET, DELAYED RELEASE ORAL 2 TIMES DAILY
Qty: 60 TABLET | Refills: 11 | Status: SHIPPED | OUTPATIENT
Start: 2019-03-25 | End: 2019-04-26 | Stop reason: SDUPTHER

## 2019-03-25 RX ORDER — BISOPROLOL FUMARATE AND HYDROCHLOROTHIAZIDE 10; 6.25 MG/1; MG/1
TABLET ORAL
Qty: 30 TABLET | Refills: 11 | Status: SHIPPED | OUTPATIENT
Start: 2019-03-25 | End: 2019-04-26 | Stop reason: SDUPTHER

## 2019-03-25 RX ORDER — METHYL SALICYLATE/MENTHOL
CREAM (GRAM) TOPICAL
Qty: 1 BOTTLE | Refills: 6 | Status: SHIPPED | OUTPATIENT
Start: 2019-03-25 | End: 2019-09-20 | Stop reason: CLARIF

## 2019-03-25 RX ORDER — OMEPRAZOLE 40 MG/1
CAPSULE, DELAYED RELEASE ORAL
Qty: 30 CAPSULE | Refills: 6 | Status: SHIPPED | OUTPATIENT
Start: 2019-03-25 | End: 2019-04-26 | Stop reason: SDUPTHER

## 2019-03-25 RX ORDER — PRAVASTATIN SODIUM 10 MG
20 TABLET ORAL DAILY
Qty: 60 TABLET | Refills: 11 | Status: SHIPPED | OUTPATIENT
Start: 2019-03-25 | End: 2019-04-08 | Stop reason: DRUGHIGH

## 2019-03-25 RX ORDER — LIDOCAINE 50 MG/G
3 PATCH TOPICAL EVERY 24 HOURS
Qty: 90 PATCH | Refills: 6 | Status: SHIPPED | OUTPATIENT
Start: 2019-03-25 | End: 2019-04-18

## 2019-03-25 ASSESSMENT — PATIENT HEALTH QUESTIONNAIRE - PHQ9
1. LITTLE INTEREST OR PLEASURE IN DOING THINGS: 2
SUM OF ALL RESPONSES TO PHQ QUESTIONS 1-9: 0
2. FEELING DOWN, DEPRESSED OR HOPELESS: 0
SUM OF ALL RESPONSES TO PHQ QUESTIONS 1-9: 0
1. LITTLE INTEREST OR PLEASURE IN DOING THINGS: 0
2. FEELING DOWN, DEPRESSED OR HOPELESS: 0
SUM OF ALL RESPONSES TO PHQ QUESTIONS 1-9: 2
SUM OF ALL RESPONSES TO PHQ QUESTIONS 1-9: 2
SUM OF ALL RESPONSES TO PHQ9 QUESTIONS 1 & 2: 2
SUM OF ALL RESPONSES TO PHQ9 QUESTIONS 1 & 2: 0

## 2019-03-25 ASSESSMENT — ENCOUNTER SYMPTOMS
EYES NEGATIVE: 1
GASTROINTESTINAL NEGATIVE: 1

## 2019-03-29 ENCOUNTER — TELEPHONE (OUTPATIENT)
Dept: FAMILY MEDICINE CLINIC | Age: 59
End: 2019-03-29

## 2019-03-29 ENCOUNTER — NURSE ONLY (OUTPATIENT)
Dept: FAMILY MEDICINE CLINIC | Age: 59
End: 2019-03-29
Payer: MEDICARE

## 2019-03-29 DIAGNOSIS — Z12.11 SCREENING FOR COLON CANCER: ICD-10-CM

## 2019-03-29 DIAGNOSIS — Z79.4 TYPE 2 DIABETES MELLITUS WITH DIABETIC POLYNEUROPATHY, WITH LONG-TERM CURRENT USE OF INSULIN (HCC): ICD-10-CM

## 2019-03-29 DIAGNOSIS — E78.2 MIXED HYPERLIPIDEMIA: ICD-10-CM

## 2019-03-29 DIAGNOSIS — E11.42 TYPE 2 DIABETES MELLITUS WITH DIABETIC POLYNEUROPATHY, WITH LONG-TERM CURRENT USE OF INSULIN (HCC): ICD-10-CM

## 2019-03-29 LAB
A/G RATIO: 1.6 (ref 1.1–2.2)
ALBUMIN SERPL-MCNC: 4.2 G/DL (ref 3.4–5)
ALP BLD-CCNC: 60 U/L (ref 40–129)
ALT SERPL-CCNC: 21 U/L (ref 10–40)
ANION GAP SERPL CALCULATED.3IONS-SCNC: 9 MMOL/L (ref 3–16)
AST SERPL-CCNC: 14 U/L (ref 15–37)
BASOPHILS ABSOLUTE: 0 K/UL (ref 0–0.2)
BASOPHILS RELATIVE PERCENT: 0.5 %
BILIRUB SERPL-MCNC: 0.6 MG/DL (ref 0–1)
BUN BLDV-MCNC: 21 MG/DL (ref 7–20)
CALCIUM SERPL-MCNC: 9.1 MG/DL (ref 8.3–10.6)
CHLORIDE BLD-SCNC: 99 MMOL/L (ref 99–110)
CHOLESTEROL, TOTAL: 164 MG/DL (ref 0–199)
CO2: 28 MMOL/L (ref 21–32)
CONTROL: PRESENT
CREAT SERPL-MCNC: 0.9 MG/DL (ref 0.9–1.3)
EOSINOPHILS ABSOLUTE: 0.3 K/UL (ref 0–0.6)
EOSINOPHILS RELATIVE PERCENT: 4.7 %
GFR AFRICAN AMERICAN: >60
GFR NON-AFRICAN AMERICAN: >60
GLOBULIN: 2.6 G/DL
GLUCOSE BLD-MCNC: 257 MG/DL (ref 70–99)
HCT VFR BLD CALC: 50.2 % (ref 40.5–52.5)
HDLC SERPL-MCNC: 28 MG/DL (ref 40–60)
HEMOCCULT STL QL: POSITIVE
HEMOGLOBIN: 17 G/DL (ref 13.5–17.5)
LDL CHOLESTEROL CALCULATED: 76 MG/DL
LYMPHOCYTES ABSOLUTE: 1.6 K/UL (ref 1–5.1)
LYMPHOCYTES RELATIVE PERCENT: 27.5 %
MCH RBC QN AUTO: 28.9 PG (ref 26–34)
MCHC RBC AUTO-ENTMCNC: 33.9 G/DL (ref 31–36)
MCV RBC AUTO: 85.3 FL (ref 80–100)
MONOCYTES ABSOLUTE: 0.5 K/UL (ref 0–1.3)
MONOCYTES RELATIVE PERCENT: 8.9 %
NEUTROPHILS ABSOLUTE: 3.3 K/UL (ref 1.7–7.7)
NEUTROPHILS RELATIVE PERCENT: 58.4 %
PDW BLD-RTO: 14.2 % (ref 12.4–15.4)
PLATELET # BLD: 131 K/UL (ref 135–450)
PMV BLD AUTO: 11.1 FL (ref 5–10.5)
POTASSIUM SERPL-SCNC: 4.3 MMOL/L (ref 3.5–5.1)
RBC # BLD: 5.88 M/UL (ref 4.2–5.9)
SODIUM BLD-SCNC: 136 MMOL/L (ref 136–145)
TOTAL PROTEIN: 6.8 G/DL (ref 6.4–8.2)
TRIGL SERPL-MCNC: 298 MG/DL (ref 0–150)
TSH REFLEX: 2.41 UIU/ML (ref 0.27–4.2)
VLDLC SERPL CALC-MCNC: 60 MG/DL
WBC # BLD: 5.7 K/UL (ref 4–11)

## 2019-03-29 PROCEDURE — 82274 ASSAY TEST FOR BLOOD FECAL: CPT | Performed by: NURSE PRACTITIONER

## 2019-03-29 PROCEDURE — 36415 COLL VENOUS BLD VENIPUNCTURE: CPT | Performed by: NURSE PRACTITIONER

## 2019-04-01 DIAGNOSIS — Z79.4 TYPE 2 DIABETES MELLITUS WITH DIABETIC POLYNEUROPATHY, WITH LONG-TERM CURRENT USE OF INSULIN (HCC): ICD-10-CM

## 2019-04-01 DIAGNOSIS — K92.1 BLOOD IN STOOL: Primary | ICD-10-CM

## 2019-04-01 DIAGNOSIS — Z12.11 ENCOUNTER FOR SCREENING COLONOSCOPY: ICD-10-CM

## 2019-04-01 DIAGNOSIS — E11.42 TYPE 2 DIABETES MELLITUS WITH DIABETIC POLYNEUROPATHY, WITH LONG-TERM CURRENT USE OF INSULIN (HCC): ICD-10-CM

## 2019-04-01 LAB
ESTIMATED AVERAGE GLUCOSE: 220.2 MG/DL
HBA1C MFR BLD: 9.3 %

## 2019-04-02 ENCOUNTER — TELEPHONE (OUTPATIENT)
Dept: FAMILY MEDICINE CLINIC | Age: 59
End: 2019-04-02

## 2019-04-02 NOTE — TELEPHONE ENCOUNTER
Received a fax from [employer] stating that the pravastatin has quantity limits need to be changed.   I think they are wanting you to change it to 20mg one tablet daily instead of 10mg two tablets daily  See attached scan

## 2019-04-05 ENCOUNTER — OFFICE VISIT (OUTPATIENT)
Dept: GASTROENTEROLOGY | Age: 59
End: 2019-04-05
Payer: MEDICARE

## 2019-04-05 VITALS
SYSTOLIC BLOOD PRESSURE: 128 MMHG | HEIGHT: 70 IN | DIASTOLIC BLOOD PRESSURE: 74 MMHG | BODY MASS INDEX: 32.64 KG/M2 | WEIGHT: 228 LBS

## 2019-04-05 DIAGNOSIS — R19.5 OCCULT BLOOD IN STOOLS: Primary | ICD-10-CM

## 2019-04-05 PROCEDURE — 1036F TOBACCO NON-USER: CPT | Performed by: INTERNAL MEDICINE

## 2019-04-05 PROCEDURE — 99213 OFFICE O/P EST LOW 20 MIN: CPT | Performed by: INTERNAL MEDICINE

## 2019-04-05 PROCEDURE — G8417 CALC BMI ABV UP PARAM F/U: HCPCS | Performed by: INTERNAL MEDICINE

## 2019-04-05 PROCEDURE — 3017F COLORECTAL CA SCREEN DOC REV: CPT | Performed by: INTERNAL MEDICINE

## 2019-04-05 PROCEDURE — G8427 DOCREV CUR MEDS BY ELIG CLIN: HCPCS | Performed by: INTERNAL MEDICINE

## 2019-04-05 RX ORDER — POLYETHYLENE GLYCOL 3350 17 G/17G
238 POWDER ORAL ONCE
Qty: 238 G | Refills: 0 | Status: SHIPPED | OUTPATIENT
Start: 2019-04-05 | End: 2019-04-05

## 2019-04-05 NOTE — PROGRESS NOTES
[address] ,  [address] [facility]  Phone: [phone]    CHIEF COMPLAINT     Chief Complaint   Patient presents with   [address]     NP- positive FIT         HPI     Marino Borrego is a 60 y.o. male who presents for occult blood in stools positive by FIT 3/28/19. Hb was 17 on 3/29/19 with MCV of 85. Denies rectal bleeding or melena or changes in bowel habits. He does not feel he was straining at stools when the FIT was done. He had a negative occult blood in stool test last year. Denies abdominal pain. Patient has several chronic pain issues including back, neck, shoulder pain and is on oxycodone. He denies any family history of colon cancer but states his father who is 80 has had polyps removed on his colonoscopies. Patient has never had a colonoscopy before.         PAST MEDICAL HISTORY     Past Medical History:   Diagnosis Date    Anxiety     CTS (carpal tunnel syndrome)     CTS (carpal tunnel syndrome)     Diabetes mellitus (HCC)     GERD (gastroesophageal reflux disease)     Hyperlipidemia     Hypertension     Peripheral neuropathy     Type II or unspecified type diabetes mellitus without mention of complication, not stated as uncontrolled     Urinary frequency      FAMILY HISTORY     Family History   Problem Relation Age of Onset    Anemia Mother     Hypertension Mother     Heart Disease Father     Diabetes Father      SOCIAL HISTORY     Social History     Socioeconomic History    Marital status: Single     Spouse name: Not on file    Number of children: Not on file    Years of education: Not on file    Highest education level: Not on file   Occupational History    Occupation: disability   Social Needs    Financial resource strain: Not on file    Food insecurity:     Worry: Not on file     Inability: Not on file    Transportation needs:     Medical: Not on file     Non-medical: Not on file   Tobacco Use    Smoking allergic reaction 2 each 0      ALLERGIES     Allergies   Allergen Reactions    Sulfa Antibiotics Hives     IMMUNIZATIONS     Immunization History   Administered Date(s) Administered    Influenza Virus Vaccine 08/21/2017    Influenza, Batsheva Agreste, 3 Years and older, IM (Fluzone 3 yrs and older or Afluria 5 yrs and older) 10/03/2016, 11/08/2018    Pneumococcal Polysaccharide ([id number]) 12/17/2018    Tdap (Boostrix, Adacel) 08/21/2017    Zoster Live (Zostavax) 08/24/2017     REVIEW OF SYSTEMS     Constitutional: denies fever and unexpected weight change. HENT: Negative for ear pain, hearing loss and nosebleeds. Eyes: Negative for pain and visual disturbance. Respiratory: Negative for cough, shortness of breath and wheezing. Cardiovascular: Negative for chest pain, palpitations and leg swelling. Gastrointestinal: see HPI for details. Endocrine: Negative for polydipsia, polyphagia and polyuria. Genitourinary: Negative for difficulty urinating, dysuria, hematuria and urgency. Musculoskeletal: Positive for arthralgias and back pain. Skin: Negative for pallor and rash. Allergic/Immunologic: Negative for environmental allergies and immunocompromised state. Neurological: Negative for seizures, syncope. Hematological: Negative for adenopathy. Does not bruise/bleed easily. Psychiatric/Behavioral: Negative for agitation, confusion, hallucinations. PHYSICAL EXAM   /74 (Site: Right Upper Arm)   Ht 5' 10\" (1.778 m)   Wt 228 lb (103.4 kg)   BMI 32.71 kg/m²   Wt Readings from Last 3 Encounters:   04/05/19 228 lb (103.4 kg)   03/25/19 229 lb (103.9 kg)   02/25/19 229 lb 15 oz (104.3 kg)     Constitutional: AAO3, No acute distress  HEENT: no pallor or icterus. Neck: supple, no adenopathy  Cardiovascular: Normal heart rate, Normal rhythm, No murmurs,. RS: Normal breath sounds, No wheezing,   Abdomen: soft, non tender, obese, BS+. No hepatosplenomegaly. Extremities:  No edema.   Neuro: AAO3, non focal.      FINAL IMPRESSION     Occult blood positive in stools by FIT - no overt GI bleeding. Denies bowel habit changes. He needs a colonoscopy to rule out large colon polyps or colon cancer. Procedure discussed with patient in detail including risks and benefits. Anesthesia risks, risk of perforation, bleeding discussed with the patient. Patient agrees to proceeding with procedure. Patient is on oxycodone and needs MAC for procedure.

## 2019-04-05 NOTE — LETTER
COLONOSCOPY PREP INSTRUCTIONS  MlRALAX SPLIT DOSE   [facility] PHYSICIAN ENDOSCOPY    Your colonoscopy is scheduled on: _4/30/19_   __Megan/Mateusz_  Arrival Time: __10:00 am_   DO NOT EAT OR DRINK (INCLUDING WATER) AFTER: _6:00 am-after drinking the 2nd dose of prep_  's Name_[facility] [phone]  [facility]- [phone]    Keep these papers together; REVIEW ALL OF THEM AT LEAST 7 DAYS BEFORE THE PROCEDURE. Please complete all paperwork; including a current list of your medications, to avoid delays in the admission process. The following instructions must be followed in order to ensure your procedure has optimal outcomes. - KEEP YOUR APPOINTMENT. If for any reason, you are unable to keep your appointment, please notify us within 72 hours before your procedure. - You MUST have a responsible adult to drive you, who MUST remain at our facility the ENTIRE time. If not the procedure will be cancelled. You may go by taxi ONLY if you have a responsible adult with you. You may experience light headedness, dizziness etc., therefore you should have a responsible adult remain with you until the morning after your procedure. - Bring your insurance card and 's license. Call your insurance carrier to verify your benefits, and confirm that our facility is in your network, prior to the procedure date to ensure coverage. The facility name is listed as [facility] or [place] [address]  and the tax ID# is [id number].  - Due to the safety and privacy of our patients, only one visitor is allowed in the recovery area after the procedure. The center will not be responsible for lost valuables so please leave them at home. - Try to avoid seeds (strawberries, lilibeth, and rye) for one week prior to your procedure.   - If you have questions after beginning the prep, call between 8:30 am & 4:30pm you will speak to a nurse or medical assistant, after 4:30pm you will reach the physician on call. - Hydration (body fluid) is the most important aspect of an effective and safe prep. If you are not drinking enough fluid you may experience cramping, nausea and dizziness. - Common side effects of the prep are nausea, bloating and shaking chills. If any of these occur, take a break from the prep (30 minutes to 1 hour) and then restart. If unable to complete after that notify the Physician as your procedure may need to be cancelled. Nausea medication or an alternative prep is sometimes called in.  - Do not leave home after starting the prep. Frequent, liquid stools may begin as soon as 15 minutes after the first bottle, although it could take up to 4 hours or longer for your first bowel movement. - Even if your stools are clear and watery in appearance, TAKE ALL OF THE PREP.  - Using baby wipes and nonprescription ointments, such as Desitin, will help with the irritation caused by frequent loose stools. - Due to unforeseen schedule changes, you may be asked to move your appointment time to an earlier/later time slot. To insure that your prep gives you the best results for your Colonoscopy, Please follow all directions closely. 3-5 days prior to your procedure:  1. Begin a low-fiber diet (no corn, dried beans, tomatoes, nuts, seeds, lettuce). 2. No bran or bulking agents. 3. Stop taking your multivitamin or iron supplements.   4. If you currently take Aggrenox, Dipyridamole, Persantine, Fondaparinux sodium (Arixtra), Dalteparin sodium Nikita Abts), Warfarin (Coumadin), Clopidogrel (Plavix), Prasugrel (Effient), Enoxaparin, Lovenox, or Heparin, Cilostazol (Pletal), Rivoroxaban (Xarelto), Desirudin (Iprivask), Cyclopentyltrazolopyrimide (Ticagrelor or Brilinta), Cangrelor ([place]), Dabigatran (Pradaxa), Apixaban (Eliquis), Edoxaban (Savaysa)you should have received instructions regarding if and when to discontinue the medication. If you have not, or do not clearly understand the instructions, please call the office for clarification (number listed above). 5. Drink plenty of fluid. 1 day prior to your procedure:  1. Do not eat any SOLID FOOD, beginning with breakfast drink clear liquids only, which includes: Chicken or Beef Broth, Coffee/tea (without milk or creamer) Gatorade/PowerAde (no red or purple), JeIl-O (no red or purple), All Soda (even dark cola), Sorbet/Popsicles (no red or purple), Water If you take Diabetic medications (insulin/oral medication)-reduce the amount by one half on the morning of prep. You may resume the meds once you begin eating again. You must drink 8oz of liquids every hour to avoid dehydration. 2. If you take Diabetic medications (insulin/oral medication) - reduce the amount by one half on morning of prep. You may resume the meds once you begin eating again. 3. Bowel Cleansing Prep  ** 3:00pm Take 4 dulcolax (bisacodyl) tablets with a full glass of water  ** 5:00pm Mix one bottle of Miralax (polyethlene glycol) (238/255gm) in one bottle of Gatorade (64oz). Shake until dissolved. Drink 8 oz every 15 minutes until you have finished half of the solution. MORNING OF PROCEDURE  1. __6:00 am__ (5 hours prior to the procedure) Drink the remaining portion of the solution 8 oz. every 15 minutes until completely finished, followed by 8 oz of any of the approved liquids. 2. Take your Blood pressure, Heart and Seizure medication the morning of the procedure with sips of water. 3. Bring inhalers with you. 4. Do not take your Diabetic medication the morning of procedure. 5. You must have a  stay with you during the entire procedure. Dear Patient,      Rohith Childs will receive a call from the [facility] pre-registration department prior to your GI procedure.  This will help streamline your check-in process on the day of service. During this call a skilled associate will review your demographic and insurance benefits information. The associate will answer any question pertaining to your insurance contract, such as deductible/co-insurance/ co-pay or any other financial concerns. They may offer an amount that you could pay on the day of surgery but this is not a requirement. Thank you for allowing [facility] to be part of your [address] [place] [address], [address] [facility]      Once you turn into the hospital, turn right (towards E.R.) go past the E.R., youll see a driveway on your left. Make that turn, the sign will say [facility] or Surgery entrance. [address] [facility]  Go through 2 sets of double doors, sign in at window, you will get registered there and they will send you up the hallway to the Endoscopy area.

## 2019-04-08 ENCOUNTER — OFFICE VISIT (OUTPATIENT)
Dept: ORTHOPEDIC SURGERY | Age: 59
End: 2019-04-08
Payer: MEDICARE

## 2019-04-08 VITALS — HEIGHT: 70 IN | RESPIRATION RATE: 17 BRPM | BODY MASS INDEX: 32.63 KG/M2 | WEIGHT: 227.96 LBS

## 2019-04-08 DIAGNOSIS — M79.644 PAIN OF RIGHT THUMB: Primary | ICD-10-CM

## 2019-04-08 DIAGNOSIS — M18.11 ARTHRITIS OF CARPOMETACARPAL (CMC) JOINT OF RIGHT THUMB: ICD-10-CM

## 2019-04-08 PROCEDURE — L3924 HFO WITHOUT JOINTS PRE OTS: HCPCS | Performed by: ORTHOPAEDIC SURGERY

## 2019-04-08 PROCEDURE — 99214 OFFICE O/P EST MOD 30 MIN: CPT | Performed by: ORTHOPAEDIC SURGERY

## 2019-04-08 PROCEDURE — 20600 DRAIN/INJ JOINT/BURSA W/O US: CPT | Performed by: ORTHOPAEDIC SURGERY

## 2019-04-08 PROCEDURE — 3017F COLORECTAL CA SCREEN DOC REV: CPT | Performed by: ORTHOPAEDIC SURGERY

## 2019-04-08 PROCEDURE — 1036F TOBACCO NON-USER: CPT | Performed by: ORTHOPAEDIC SURGERY

## 2019-04-08 PROCEDURE — G8417 CALC BMI ABV UP PARAM F/U: HCPCS | Performed by: ORTHOPAEDIC SURGERY

## 2019-04-08 PROCEDURE — G8427 DOCREV CUR MEDS BY ELIG CLIN: HCPCS | Performed by: ORTHOPAEDIC SURGERY

## 2019-04-08 PROCEDURE — 73140 X-RAY EXAM OF FINGER(S): CPT | Performed by: ORTHOPAEDIC SURGERY

## 2019-04-08 RX ORDER — PRAVASTATIN SODIUM 20 MG
20 TABLET ORAL DAILY
Qty: 90 TABLET | Refills: 3 | Status: SHIPPED | OUTPATIENT
Start: 2019-04-08 | End: 2019-04-26 | Stop reason: SDUPTHER

## 2019-04-08 NOTE — TELEPHONE ENCOUNTER
I spoke to the patient and he gets his meds at [facility] so I sent the new rx for pravastatin 20 mg daily.

## 2019-04-08 NOTE — PROGRESS NOTES
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 He is seen with my colleagues in the past and had a left thumb [facility] arthroplasty ultimately was somewhat frustrated by the outcomes. He is now been having on his dominant right side him pain at the similar [facility] region. He does not member injury nor does he describe loss of feeling or triggering. He does report that he has had a brace in the past and he did have an injection gave him at least temporary relief. Review of Systems  Pertinent items are noted in HPI  Denies fever, chills, confusion, bowel/bladder active change. Review of systems reviewed from Patient History Form dated on 4/8/2019 and available in the patient's chart under the Media tab. Examination:  On exam there is tenderness when I palpate over the thumb CMC region on the right side and a positive CMC grind. There is no triggering and no instability at the MP or the IP levels. Provocative testing for carpal tunnel syndrome is negative and there is good overall motor function. There are no signs of compartment syndrome. On the opposite left thumb he has surgical changes from [facility] arthroplasty and prefers to wear a neoprene wrap. Radiology:     X-rays obtained and reviewed in office:  Views 3 views  Location right thumb  Impression x-rays demonstrate grade 3 thumb CMC arthritis but without lateral subluxation    Orders Placed This Encounter   Procedures    XR FINGER RIGHT (MIN 2 VIEWS)     Standing Status:   Future     Number of Occurrences:   1     Standing Expiration Date:   4/8/2020    MD BETAMETHASONE ACET&SOD PHOSP    MD ARTHROCENTESIS ASPIR&/INJ SMALL JT/BURSA W/O US       Impression:  Encounter Diagnoses   Name Primary?     Pain of right thumb Yes    Arthritis of carpometacarpal (CMC) joint of right thumb          Treatment Plan:  I talked with the patient about his right thumb CMC arthritis and options moving forward. I do appreciate that he has been somewhat frustrated by the ongoing symptoms on his left side, but ultimately we talked about good standard of care for thumb CMC arthritis and the different conservative options. He would like to try and avoid surgery on the right side if at all possible. I have offered him a formalized physical therapy and a cortisone injection today. Under sterile conditions, I injected the right thumb CMC joint with 1% lidocaine and 1 mL of Celestone. Appropriate injection risks and instructions were discussed. Down the road if he fails to have lasting relief with conservative care once again he would be a candidate for thumb [facility] arthroplasty if he were ever to decide to move forward. Please note that this transcription was created using voice recognition software. Any errors are unintentional and may be due to voice recognition transcription.

## 2019-04-18 RX ORDER — LIDOCAINE 50 MG/G
3 PATCH TOPICAL EVERY 24 HOURS
Qty: 30 PATCH | Refills: 11 | Status: SHIPPED | OUTPATIENT
Start: 2019-04-18 | End: 2019-04-26 | Stop reason: SDUPTHER

## 2019-04-25 DIAGNOSIS — M15.9 PRIMARY OSTEOARTHRITIS INVOLVING MULTIPLE JOINTS: ICD-10-CM

## 2019-04-25 NOTE — TELEPHONE ENCOUNTER
Received APPROVAL for Lidocaine 5% patch from 04/01/2019 through 04/23/2020. Approval letter attached. There is another approval for Lidocaine in 3/29/19 Telephone Encounter.

## 2019-04-26 RX ORDER — GABAPENTIN 600 MG/1
TABLET ORAL
Qty: 120 TABLET | Refills: 6 | Status: SHIPPED | OUTPATIENT
Start: 2019-04-26 | End: 2020-04-28

## 2019-04-26 RX ORDER — BISOPROLOL FUMARATE AND HYDROCHLOROTHIAZIDE 10; 6.25 MG/1; MG/1
TABLET ORAL
Qty: 30 TABLET | Refills: 11 | Status: SHIPPED | OUTPATIENT
Start: 2019-04-26 | End: 2020-05-04

## 2019-04-26 RX ORDER — PRAVASTATIN SODIUM 20 MG
20 TABLET ORAL DAILY
Qty: 90 TABLET | Refills: 3 | Status: SHIPPED | OUTPATIENT
Start: 2019-04-26 | End: 2020-06-08

## 2019-04-26 RX ORDER — TIZANIDINE 4 MG/1
TABLET ORAL
Qty: 60 TABLET | Refills: 5 | Status: SHIPPED
Start: 2019-04-26 | End: 2020-03-11

## 2019-04-26 RX ORDER — OMEPRAZOLE 40 MG/1
CAPSULE, DELAYED RELEASE ORAL
Qty: 30 CAPSULE | Refills: 6 | Status: SHIPPED | OUTPATIENT
Start: 2019-04-26 | End: 2020-03-06

## 2019-04-26 RX ORDER — LISINOPRIL 5 MG/1
TABLET ORAL
Qty: 30 TABLET | Refills: 11 | Status: SHIPPED | OUTPATIENT
Start: 2019-04-26 | End: 2020-03-06

## 2019-04-26 RX ORDER — DULOXETIN HYDROCHLORIDE 60 MG/1
CAPSULE, DELAYED RELEASE ORAL
Qty: 30 CAPSULE | Refills: 11 | Status: SHIPPED | OUTPATIENT
Start: 2019-04-26 | End: 2020-08-17 | Stop reason: SDUPTHER

## 2019-04-26 RX ORDER — LIDOCAINE 50 MG/G
3 PATCH TOPICAL EVERY 24 HOURS
Qty: 30 PATCH | Refills: 11 | Status: SHIPPED | OUTPATIENT
Start: 2019-04-26 | End: 2019-05-10 | Stop reason: SDUPTHER

## 2019-04-26 RX ORDER — DICLOFENAC SODIUM 75 MG/1
75 TABLET, DELAYED RELEASE ORAL 2 TIMES DAILY
Qty: 60 TABLET | Refills: 11 | Status: SHIPPED | OUTPATIENT
Start: 2019-04-26 | End: 2020-03-06

## 2019-04-29 ENCOUNTER — TELEPHONE (OUTPATIENT)
Dept: GASTROENTEROLOGY | Age: 59
End: 2019-04-29

## 2019-04-29 ENCOUNTER — ANESTHESIA EVENT (OUTPATIENT)
Dept: ENDOSCOPY | Age: 59
End: 2019-04-29
Payer: MEDICARE

## 2019-04-29 NOTE — TELEPHONE ENCOUNTER
Pt called - stated he took 4 dulcolax at 3pm and started drinking the prep right after that.  I informed pt it was ok but to not drink anymore until 5 pm then to follow rest of prep instructions until finished - pt understood

## 2019-04-29 NOTE — ANESTHESIA PRE PROCEDURE
Department of Anesthesiology  Preprocedure Note       Name:  Wilber Mccoy   Age:  60 y.o.  :  1960                                          MRN:  [id number]         Date:  2019      Surgeon:  Ashutosh Barahona MD    Procedure: COLON W/ANES. (11:00) (N/A )    HPI:  This is a 59 y/o male with HTN, IDDM, GERD and DJD who presents for a screening colonoscopy. Medications prior to admission:   metFORMIN (GLUCOPHAGE) 1000 MG tablet TAKE ONE TABLET BY MOUTH TWICE A DAY   bisoprolol-hydrochlorothiazide 10-6.25 MG  TAKE ONE TABLET BY MOUTH DAILY   lisinopril (PRINIVIL;ZESTRIL) 5 MG tablet TAKE ONE TABLET BY MOUTH EVERY DAY   diclofenac (VOLTAREN) 75 MG EC tablet Take 1 tablet by mouth 2 times daily   tiZANidine (ZANAFLEX) 4 MG tablet 1- 2 tablets at bedtime as needed   gabapentin (NEURONTIN) 600 MG tablet Take 2 tablets  In the AM and 2 tablets at HS   DULoxetine (CYMBALTA) 60 MG XR TAKE ONE CAPSULE BY MOUTH DAILY   pravastatin (PRAVACHOL) 20 MG tablet Take 1 tablet by mouth daily   omeprazole (PRILOSEC) 40 MG  TAKE ONE CAPSULE BY MOUTH DAILY   lidocaine (LIDODERM) 5 % Place 3 patches onto the skin every 24 hours 12 hours on, 12 hours off.   bisacodyl (BISACODYL) 5 MG EC tablet Take 4 tablets of Bisacodyl for colonoscopy prep as directed.    Menthol-Methyl Salicylate (THERA-GESIC) 0.5-15 % CREA Use servando on back and arms for muscle pain   insulin aspart (NOVOLOG FLEXPEN)  injection pen Inject 35 Units into the skin 3 times daily (before meals)   Insulin Degludec (TRESIBA FLEXTOUCH)  SOPN INJECT 50 UNITS SUBCUTANEOUSLY DAILY   EPINEPHrine (EPIPEN 2-SHO)  injection Inject 0.3 mLs into the muscle once as needed (Hives) Use as directed for allergic reaction   APPLE CIDER VINEGAR PO Take by mouth   oxyCODONE-acetaminophen (PERCOCET) 7.5-325 MG  Take 1 tablet by mouth every 4 hours as needed for Pain    Alcohol Swabs (ALCOHOL PREP) 70 % PADS USE ONCE DAILY FOR EACH INJECTION   B Complex Vitamins (VITAMIN B COMPLEX PO) Take 1 tablet by mouth daily OTC   CRANBERRY PO Take 1 tablet by mouth 2 times daily OTC   Saw Palmetto, Serenoa repens, (SAW PALMETTO PO) Take by mouth 2 times daily   VALERIAN ROOT PO Take by mouth nightly     Allergies:      Sulfa Antibiotics Hives     Problem List:     HTN (hypertension)    Hyperlipidemia    Type 2 diabetes mellitus with diabetic neuropathy, with long-term current use of insulin (HCC)    Chronic foot pain    CTS (carpal tunnel syndrome)    Peripheral neuropathy    Urinary frequency    Anxiety    Elbow pain    Pain of finger of left hand    Lateral epicondylitis    Primary osteoarthritis of first carpometacarpal joint of left hand    Acute sciatica    Incarcerated umbilical hernia    Lumbar radiculitis    Arthritis, midfoot    Cervical radiculopathy at C7    Degeneration of lumbar or lumbosacral intervertebral disc    Displacement of lumbar intervertebral disc without myelopathy    Lumbosacral spondylosis without myelopathy    Spinal stenosis, lumbar region, without neurogenic claudication    Disc displacement, lumbar    Lumbar facet arthropathy    Lumbar stenosis    Neck pain, chronic    Impingement syndrome of right shoulder    Incomplete tear of right rotator cuff    Shoulder impingement    Incomplete tear of left rotator cuff    Occult blood in stools    Arthritis of carpometacarpal (CMC) joint of right thumb     Past Medical History:     Anxiety     CTS (carpal tunnel syndrome)     CTS (carpal tunnel syndrome)     Diabetes mellitus (HCC)     GERD (gastroesophageal reflux disease)     Hyperlipidemia     Hypertension     Peripheral neuropathy     Type II or unspecified type diabetes mellitus without mention of complication, not stated as uncontrolled     Urinary frequency      Past Surgical History:     APPENDECTOMY      CARPAL TUNNEL RELEASE      HAND ARTHROPLASTY Left     HERNIA REPAIR  10/12/15    Laparoscopic Hernia Repair    GA insulin, : arthritis: OA., .    (-) hypothyroidism               Abdominal:           Vascular:     - DVT and PE. Anesthesia Plan      TIVA     ASA 3       Induction: intravenous. Anesthetic plan and risks discussed with patient. Plan discussed with CRNA.             Arya Posadas MD

## 2019-04-30 ENCOUNTER — HOSPITAL ENCOUNTER (OUTPATIENT)
Age: 59
Setting detail: OUTPATIENT SURGERY
Discharge: HOME | End: 2019-04-30
Attending: INTERNAL MEDICINE | Admitting: INTERNAL MEDICINE
Payer: MEDICARE

## 2019-04-30 ENCOUNTER — ANESTHESIA (OUTPATIENT)
Dept: ENDOSCOPY | Age: 59
End: 2019-04-30
Payer: MEDICARE

## 2019-04-30 VITALS
RESPIRATION RATE: 16 BRPM | HEART RATE: 75 BPM | OXYGEN SATURATION: 96 % | BODY MASS INDEX: 31.78 KG/M2 | DIASTOLIC BLOOD PRESSURE: 91 MMHG | SYSTOLIC BLOOD PRESSURE: 133 MMHG | HEIGHT: 70 IN | TEMPERATURE: 96.8 F | WEIGHT: 222 LBS

## 2019-04-30 VITALS
OXYGEN SATURATION: 94 % | RESPIRATION RATE: 19 BRPM | DIASTOLIC BLOOD PRESSURE: 70 MMHG | SYSTOLIC BLOOD PRESSURE: 106 MMHG

## 2019-04-30 PROBLEM — K63.5 POLYP OF DESCENDING COLON: Status: ACTIVE | Noted: 2019-04-30

## 2019-04-30 PROBLEM — K63.5 POLYP OF TRANSVERSE COLON: Status: ACTIVE | Noted: 2019-04-30

## 2019-04-30 LAB
GLUCOSE BLD-MCNC: 165 MG/DL (ref 70–99)
PERFORMED ON: ABNORMAL

## 2019-04-30 PROCEDURE — 3700000000 HC ANESTHESIA ATTENDED CARE: Performed by: INTERNAL MEDICINE

## 2019-04-30 PROCEDURE — 2709999900 HC NON-CHARGEABLE SUPPLY: Performed by: INTERNAL MEDICINE

## 2019-04-30 PROCEDURE — 2500000003 HC RX 250 WO HCPCS: Performed by: NURSE ANESTHETIST, CERTIFIED REGISTERED

## 2019-04-30 PROCEDURE — 2580000003 HC RX 258: Performed by: INTERNAL MEDICINE

## 2019-04-30 PROCEDURE — 2720000010 HC SURG SUPPLY STERILE: Performed by: INTERNAL MEDICINE

## 2019-04-30 PROCEDURE — 88305 TISSUE EXAM BY PATHOLOGIST: CPT

## 2019-04-30 PROCEDURE — 3609010600 HC COLONOSCOPY POLYPECTOMY SNARE/COLD BIOPSY: Performed by: INTERNAL MEDICINE

## 2019-04-30 PROCEDURE — 3609009900 HC COLONOSCOPY W/CONTROL BLEEDING ANY METHOD: Performed by: INTERNAL MEDICINE

## 2019-04-30 PROCEDURE — 6360000002 HC RX W HCPCS: Performed by: NURSE ANESTHETIST, CERTIFIED REGISTERED

## 2019-04-30 PROCEDURE — 7100000010 HC PHASE II RECOVERY - FIRST 15 MIN: Performed by: INTERNAL MEDICINE

## 2019-04-30 PROCEDURE — 3700000001 HC ADD 15 MINUTES (ANESTHESIA): Performed by: INTERNAL MEDICINE

## 2019-04-30 PROCEDURE — 7100000011 HC PHASE II RECOVERY - ADDTL 15 MIN: Performed by: INTERNAL MEDICINE

## 2019-04-30 PROCEDURE — 45385 COLONOSCOPY W/LESION REMOVAL: CPT | Performed by: INTERNAL MEDICINE

## 2019-04-30 DEVICE — CLIP LIG L235CM RESOL 360 BX/20: Type: IMPLANTABLE DEVICE | Status: FUNCTIONAL

## 2019-04-30 RX ORDER — PROPOFOL 10 MG/ML
INJECTION, EMULSION INTRAVENOUS PRN
Status: DISCONTINUED | OUTPATIENT
Start: 2019-04-30 | End: 2019-04-30 | Stop reason: SDUPTHER

## 2019-04-30 RX ORDER — LIDOCAINE HYDROCHLORIDE 20 MG/ML
INJECTION, SOLUTION INFILTRATION; PERINEURAL PRN
Status: DISCONTINUED | OUTPATIENT
Start: 2019-04-30 | End: 2019-04-30 | Stop reason: SDUPTHER

## 2019-04-30 RX ORDER — SODIUM CHLORIDE, SODIUM LACTATE, POTASSIUM CHLORIDE, CALCIUM CHLORIDE 600; 310; 30; 20 MG/100ML; MG/100ML; MG/100ML; MG/100ML
INJECTION, SOLUTION INTRAVENOUS CONTINUOUS
Status: DISCONTINUED | OUTPATIENT
Start: 2019-04-30 | End: 2019-04-30 | Stop reason: HOSPADM

## 2019-04-30 RX ADMIN — PROPOFOL 100 MG: 10 INJECTION, EMULSION INTRAVENOUS at 10:50

## 2019-04-30 RX ADMIN — PROPOFOL 200 MG: 10 INJECTION, EMULSION INTRAVENOUS at 10:40

## 2019-04-30 RX ADMIN — PROPOFOL 200 MG: 10 INJECTION, EMULSION INTRAVENOUS at 10:59

## 2019-04-30 RX ADMIN — LIDOCAINE HYDROCHLORIDE 100 MG: 20 INJECTION, SOLUTION INFILTRATION; PERINEURAL at 10:40

## 2019-04-30 RX ADMIN — SODIUM CHLORIDE, POTASSIUM CHLORIDE, SODIUM LACTATE AND CALCIUM CHLORIDE: 600; 310; 30; 20 INJECTION, SOLUTION INTRAVENOUS at 10:40

## 2019-04-30 ASSESSMENT — LIFESTYLE VARIABLES: SMOKING_STATUS: 0

## 2019-04-30 ASSESSMENT — PAIN - FUNCTIONAL ASSESSMENT: PAIN_FUNCTIONAL_ASSESSMENT: 0-10

## 2019-04-30 NOTE — OP NOTE
[address] ,  [address] [facility]  Phone: [phone]   RUR:[phone]    Colonoscopy Procedure Note    Patient: Wally Diallo  : 1960    Procedure: Colonoscopy with --diagnostic    Date:  2019     Endoscopist:  Torri Webb MD    Referring Physician:  ANA Barney CNP    Preoperative Diagnosis:  OCCULT BLOOD IN STOOL    Postoperative Diagnosis:  See impression    Anesthesia: Anesthesia: MAC  Sedation: see anesthesia notes for details. Start Time: 10:40  Stop Time: 11:09  Withdrawal time: 21 minutes  ASA Class: 3  Mallampati: II (soft palate, uvula, fauces visible)    Indications: This is a 60y.o. year old male who presents today with positive occult blood in stool by FIT for colonoscopy. Procedure Details  Informed consent was obtained for the procedure, including sedation. Risks of perforation, hemorrhage, adverse drug reaction and aspiration were discussed. The patient was placed in the left lateral decubitus position. Based on the pre-procedure assessment, including review of the patient's medical history, medications, allergies, and review of systems, he had been deemed to be an appropriate candidate for conscious sedation; he was therefore sedated with the medications listed below. The patient was monitored continuously with ECG tracing, pulse oximetry, blood pressure monitoring, and direct observations. rectal examination was performed. The colonoscope was inserted into the rectum and advanced under direct vision to the cecum, which was identified by the ileocecal valve and appendiceal orifice. The quality of the colonic preparation was good. A careful inspection was made as the colonoscope was withdrawn, including a retroflexed view of the rectum; findings and interventions are described below. Appropriate photodocumentation was obtained. Patient tolerated the procedure well.     Findings: -Mild diverticulosis in the sigmoid, descending and transverse colon. One 1.5 cm sessile polyp in the transverse colon removed with hot snare, one clip placed prophylactically at the site. One 1.5 cm sessile descending colon polyp removed with hot snare. One 1 cm sessile sigmoid polyp removed with hot snare. There are few small 5-7 mm sessile sigmoid and rectal hyperplastic appearing polyps that were not removed. - Anesthesia issues: no    Specimens: Was Obtained: bottle A, sessile polyp, transverse colon, hot snare  Bottle B, sessile polyp, descending colon, hot snare  Bottle C, sessile polyp, sigmoid colon, hot snare    Complications:   None    Estimated blood loss: minimal    Disposition:   PACU - hemodynamically stable. Impression:   - Mild diverticulosis in the sigmoid, descending and transverse colon. One 1.5 cm sessile polyp in the transverse colon removed with hot snare, one clip placed prophylactically at the site. One 1.5 cm sessile descending colon polyp removed with hot snare. One 1 cm sessile sigmoid polyp removed with hot snare. There are few small 5-7 mm sessile sigmoid and rectal hyperplastic appearing polyps that were not removed. Recommendations:  -Await pathology.    - Avoid NSAIDs for 1 week  - High fiber diet for diverticulosis        Jaswinder Gaston 4/30/19 10:36 AM

## 2019-04-30 NOTE — ANESTHESIA POSTPROCEDURE EVALUATION
Department of Anesthesiology  Postprocedure Note    Patient: Osiirs Eugene  MRN: [id number]  YOB: 1960  Date of evaluation: 4/30/2019    Procedure Summary     Date:  04/30/19 Room / Location:  [facility] / [facility]    Anesthesia Start:  1035 Anesthesia Stop:  1113    Procedures:       COLONOSCOPY POLYPECTOMY SNARE/COLD BIOPSY (N/A )      COLONOSCOPY CONTROL HEMORRHAGE (N/A ) Diagnosis:  (OCCULT BLOOD IN STOOL)    Surgeon:  Jeet Posada MD Responsible Provider:  Radha Thompson MD    Anesthesia Type:  TIVA ASA Status:  3        Anesthesia Type: TIVA    Beata Phase I: Beata Score: 10    Beata Phase II: Beata Score: 10    Anesthesia Post Evaluation   Anesthetic Problems: no   Last Vitals:     BP: 138/96 Pulse: 80   Resp: 18 SpO2: 98   Temp: 97 °F (36.1 °C)     Cardiovascular System Stable: yes  Respiratory Function: Airway Patent yes  ETT no  Ventilator no  Level of consciousness: awake, alert and oriented  Post-op pain: adequate analgesia  Hydration Adequate: yes  Nausea/Vomiting:no  Other Issues:     Jennifer Crump MD

## 2019-04-30 NOTE — H&P
tobacco: Never Used    Tobacco comment: 2000   Substance and Sexual Activity    Alcohol use:  No       Alcohol/week: 0.0 oz    Drug use: No    Sexual activity: Yes       Partners: Female   Lifestyle    Physical activity:       Days per week: Not on file       Minutes per session: Not on file    Stress: Not on file   Relationships    Social connections:       Talks on phone: Not on file       Gets together: Not on file       Attends Church service: Not on file       Active member of club or organization: Not on file       Attends meetings of clubs or organizations: Not on file       Relationship status: Not on file    Intimate partner violence:       Fear of current or ex partner: Not on file       Emotionally abused: Not on file       Physically abused: Not on file       Forced sexual activity: Not on file   Other Topics Concern    Not on file   Social History Narrative    Not on file         SURGICAL HISTORY      Past Surgical History         Past Surgical History:   Procedure Laterality Date    APPENDECTOMY        CARPAL TUNNEL RELEASE        HAND ARTHROPLASTY Left     [address]   10/12/15     Laparoscopic Hernia Repair    MT SHLDR ARTHROSCOP,SURG,W/ROTAT CUFF REPR Left 11/26/2018     LEFT SHOULDER ARTHROSCOPY WITH DEBRIDEMENT, ROTATOR CUFF REPAIR, OPEN SUBSCAPULARIS REPAIR, BICEPS TENODESIS, SUBACROMIAL DECOMPRESSION   performed by Aileen Licona MD at [place] [address]   (This list may include medications prescribed during this encounter as epic can not insert only the list prior to this encounter.)  Current Outpatient Rx   Current Outpatient Rx   Medication Sig Dispense Refill    pravastatin (PRAVACHOL) 10 MG tablet Take 2 tablets by mouth daily 60 tablet 11    omeprazole (PRILOSEC) 40 MG delayed release capsule TAKE ONE CAPSULE BY MOUTH DAILY 30 capsule 6    metFORMIN (GLUCOPHAGE) 1000 MG tablet TAKE ONE TABLET BY MOUTH TWICE A DAY 60 tablet 11    lisinopril (PRINIVIL;ZESTRIL) 5 MG tablet TAKE ONE TABLET BY MOUTH EVERY DAY 30 tablet 11    Menthol-Methyl Salicylate (THERA-GESIC) 0.5-15 % CREA Use servando on back and arms for muscle pain 1 Bottle 6    lidocaine (LIDODERM) 5 % Place 3 patches onto the skin every 24 hours 12 hours on, 12 hours off. 90 patch 6    insulin aspart (NOVOLOG FLEXPEN) 100 UNIT/ML injection pen Inject 35 Units into the skin 3 times daily (before meals) 5 pen 3    Insulin Degludec (TRESIBA FLEXTOUCH) 100 UNIT/ML SOPN INJECT 50 UNITS SUBCUTANEOUSLY DAILY 5 pen 11    gabapentin (NEURONTIN) 600 MG tablet Take 2 tablets  In the AM and 2 tablets at  tablet 6    DULoxetine (CYMBALTA) 60 MG extended release capsule TAKE ONE CAPSULE BY MOUTH DAILY 30 capsule 11    diclofenac (VOLTAREN) 75 MG EC tablet Take 1 tablet by mouth 2 times daily 60 tablet 11    bisoprolol-hydrochlorothiazide (ZIAC) 10-6.25 MG per tablet TAKE ONE TABLET BY MOUTH DAILY 30 tablet 11    Insulin Pen Needle (PEN NEEDLES) 31G X 6 MM MISC TEST BLOOD SUGARS ONCE DAILY FOR E11.42 300 each 11    [employer] LANCETS MICRO THIN 33G MISC TEST BLOOD SUGARS 1 TO 2 TIMES A DAY. CAN TEST MORE IF BLOOD SUGAR FEELS  each 4    tiZANidine (ZANAFLEX) 4 MG tablet 1- 2 tablets at bedtime as needed 60 tablet 5    FREESTYLE LITE strip USE TO TEST 3 TIMES DAILY AS NEEDED 50 strip 9    APPLE CIDER VINEGAR PO Take by mouth        oxyCODONE-acetaminophen (PERCOCET) 7.5-325 MG per tablet Take 1 tablet by mouth every 4 hours as needed for Pain (three times daily). .        Alcohol Swabs (ALCOHOL PREP) 70 % PADS USE ONCE DAILY FOR EACH INJECTION 100 each 1    B Complex Vitamins (VITAMIN B COMPLEX PO) Take 1 tablet by mouth daily OTC        CRANBERRY PO Take 1 tablet by mouth 2 times daily OTC        Saw Palmetto, Serenoa repens, (SAW PALMETTO PO) Take by mouth 2 times daily        VALERIAN ROOT PO Take by mouth nightly        EPINEPHrine (EPIPEN 2-SHO) 0.3 MG/0.3ML SOAJ injection Inject 0.3 mLs into the muscle once as needed (Hives) Use as directed for allergic reaction 2 each 0         ALLERGIES      Allergies   Allergen Reactions    Sulfa Antibiotics Hives      IMMUNIZATIONS           Immunization History   Administered Date(s) Administered    Influenza Virus Vaccine 08/21/2017    Influenza, [place], 3 Years and older, IM (Fluzone 3 yrs and older or Afluria 5 yrs and older) 10/03/2016, 11/08/2018    Pneumococcal Polysaccharide (Dmebhmmho20) 12/17/2018    Tdap (Boostrix, Adacel) 08/21/2017    Zoster Live (Zostavax) 08/24/2017      REVIEW OF SYSTEMS      Constitutional: denies fever and unexpected weight change. HENT: Negative for ear pain, hearing loss and nosebleeds. Eyes: Negative for pain and visual disturbance. Respiratory: Negative for cough, shortness of breath and wheezing. Cardiovascular: Negative for chest pain, palpitations and leg swelling. Gastrointestinal: see HPI for details. Endocrine: Negative for polydipsia, polyphagia and polyuria. Genitourinary: Negative for difficulty urinating, dysuria, hematuria and urgency. Musculoskeletal: Positive for arthralgias and back pain. Skin: Negative for pallor and rash. Allergic/Immunologic: Negative for environmental allergies and immunocompromised state. Neurological: Negative for seizures, syncope. Hematological: Negative for adenopathy. Does not bruise/bleed easily. Psychiatric/Behavioral: Negative for agitation, confusion, hallucinations.     PHYSICAL EXAM   /74 (Site: Right Upper Arm)   Ht 5' 10\" (1.778 m)   Wt 228 lb (103.4 kg)   BMI 32.71 kg/m²       Wt Readings from Last 3 Encounters:   04/05/19 228 lb (103.4 kg)   03/25/19 229 lb (103.9 kg)   02/25/19 229 lb 15 oz (104.3 kg)      Constitutional: AAO3, No acute distress  HEENT: no pallor or icterus. Neck: supple, no adenopathy  Cardiovascular: Normal heart rate, Normal rhythm, No murmurs,.    RS: Normal breath sounds, No wheezing,

## 2019-05-06 DIAGNOSIS — M75.41 IMPINGEMENT SYNDROME OF RIGHT SHOULDER: ICD-10-CM

## 2019-05-06 DIAGNOSIS — M75.111 INCOMPLETE TEAR OF RIGHT ROTATOR CUFF: ICD-10-CM

## 2019-05-06 DIAGNOSIS — M75.42 SHOULDER IMPINGEMENT, LEFT: ICD-10-CM

## 2019-05-06 RX ORDER — MELOXICAM 15 MG/1
15 TABLET ORAL DAILY
Qty: 30 TABLET | Refills: 3 | OUTPATIENT
Start: 2019-05-06

## 2019-05-10 RX ORDER — LIDOCAINE 50 MG/G
3 PATCH TOPICAL EVERY 24 HOURS
Qty: 90 PATCH | Refills: 11 | Status: SHIPPED | OUTPATIENT
Start: 2019-05-10 | End: 2020-05-24

## 2019-05-13 RX ORDER — LANCETS 33 GAUGE
EACH MISCELLANEOUS
Qty: 100 EACH | Refills: 3 | Status: SHIPPED | OUTPATIENT
Start: 2019-05-13

## 2019-05-28 NOTE — TELEPHONE ENCOUNTER
Last visit- 3/25/19    Told to follow up- 3 months    Pending appointment- None    Additional Comments -

## 2019-06-10 NOTE — TELEPHONE ENCOUNTER
Trudy Mcconnell has never prescribed this for the patient before. Called patient and informed him, he does not even need the medication.

## 2019-07-11 DIAGNOSIS — Z79.4 TYPE 2 DIABETES MELLITUS WITH DIABETIC POLYNEUROPATHY, WITH LONG-TERM CURRENT USE OF INSULIN (HCC): Primary | ICD-10-CM

## 2019-07-11 DIAGNOSIS — E11.42 TYPE 2 DIABETES MELLITUS WITH DIABETIC POLYNEUROPATHY, WITH LONG-TERM CURRENT USE OF INSULIN (HCC): Primary | ICD-10-CM

## 2019-07-16 ENCOUNTER — TELEPHONE (OUTPATIENT)
Dept: PRIMARY CARE CLINIC | Age: 59
End: 2019-07-16

## 2019-07-19 ENCOUNTER — TELEPHONE (OUTPATIENT)
Dept: FAMILY MEDICINE CLINIC | Age: 59
End: 2019-07-19

## 2019-07-19 RX ORDER — BLOOD-GLUCOSE METER
1 EACH MISCELLANEOUS ONCE
Qty: 1 KIT | Refills: 0 | Status: SHIPPED | OUTPATIENT
Start: 2019-07-19 | End: 2019-12-30 | Stop reason: SDUPTHER

## 2019-07-19 NOTE — TELEPHONE ENCOUNTER
Been out of test strips for 3 weeks. Garry Monteiro prescribed freestyle blood glucose test strips on 7-11-19 but his insurance will no longer cover them. Pharmacy told patient that his insurance will cover accu check. Will need meter, test strips and lancets.  Send to mike [facility]

## 2019-08-05 NOTE — TELEPHONE ENCOUNTER
Patient has refills at the pharmacy. Called and spoke to patient and told him to call pharmacy before asking us for refills.

## 2019-09-05 NOTE — TELEPHONE ENCOUNTER
Patient says his insurance changed and they will not cover the tresiba.  Needs a prior authorization

## 2019-09-09 ENCOUNTER — TELEPHONE (OUTPATIENT)
Dept: FAMILY MEDICINE CLINIC | Age: 59
End: 2019-09-09

## 2019-09-09 DIAGNOSIS — Z79.4 TYPE 2 DIABETES MELLITUS WITH DIABETIC NEUROPATHY, WITH LONG-TERM CURRENT USE OF INSULIN (HCC): Primary | ICD-10-CM

## 2019-09-09 DIAGNOSIS — E11.40 TYPE 2 DIABETES MELLITUS WITH DIABETIC NEUROPATHY, WITH LONG-TERM CURRENT USE OF INSULIN (HCC): Primary | ICD-10-CM

## 2019-09-11 ENCOUNTER — TELEPHONE (OUTPATIENT)
Dept: FAMILY MEDICINE CLINIC | Age: 59
End: 2019-09-11

## 2019-09-20 ENCOUNTER — OFFICE VISIT (OUTPATIENT)
Dept: FAMILY MEDICINE CLINIC | Age: 59
End: 2019-09-20
Payer: MEDICARE

## 2019-09-20 VITALS
DIASTOLIC BLOOD PRESSURE: 70 MMHG | BODY MASS INDEX: 32 KG/M2 | OXYGEN SATURATION: 94 % | TEMPERATURE: 98.1 F | HEART RATE: 73 BPM | SYSTOLIC BLOOD PRESSURE: 102 MMHG | WEIGHT: 223 LBS

## 2019-09-20 DIAGNOSIS — D36.9 ADENOMATOUS POLYPS: ICD-10-CM

## 2019-09-20 DIAGNOSIS — E78.2 MIXED HYPERLIPIDEMIA: ICD-10-CM

## 2019-09-20 DIAGNOSIS — Z79.4 TYPE 2 DIABETES MELLITUS WITH DIABETIC NEUROPATHY, WITH LONG-TERM CURRENT USE OF INSULIN (HCC): ICD-10-CM

## 2019-09-20 DIAGNOSIS — I10 ESSENTIAL HYPERTENSION: ICD-10-CM

## 2019-09-20 DIAGNOSIS — Z11.59 NEED FOR HEPATITIS C SCREENING TEST: ICD-10-CM

## 2019-09-20 DIAGNOSIS — Z23 NEED FOR INFLUENZA VACCINATION: Primary | ICD-10-CM

## 2019-09-20 DIAGNOSIS — Z00.00 ROUTINE GENERAL MEDICAL EXAMINATION AT A HEALTH CARE FACILITY: ICD-10-CM

## 2019-09-20 DIAGNOSIS — Z11.4 ENCOUNTER FOR SCREENING FOR HIV: ICD-10-CM

## 2019-09-20 DIAGNOSIS — E11.40 TYPE 2 DIABETES MELLITUS WITH DIABETIC NEUROPATHY, WITH LONG-TERM CURRENT USE OF INSULIN (HCC): ICD-10-CM

## 2019-09-20 LAB
A/G RATIO: 2.1 (ref 1.1–2.2)
ALBUMIN SERPL-MCNC: 4.2 G/DL (ref 3.4–5)
ALP BLD-CCNC: 60 U/L (ref 40–129)
ALT SERPL-CCNC: 20 U/L (ref 10–40)
ANION GAP SERPL CALCULATED.3IONS-SCNC: 15 MMOL/L (ref 3–16)
AST SERPL-CCNC: 22 U/L (ref 15–37)
BILIRUB SERPL-MCNC: 0.6 MG/DL (ref 0–1)
BUN BLDV-MCNC: 21 MG/DL (ref 7–20)
CALCIUM SERPL-MCNC: 9.2 MG/DL (ref 8.3–10.6)
CHLORIDE BLD-SCNC: 100 MMOL/L (ref 99–110)
CHOLESTEROL, TOTAL: 134 MG/DL (ref 0–199)
CO2: 24 MMOL/L (ref 21–32)
CREAT SERPL-MCNC: 1.1 MG/DL (ref 0.8–1.3)
GFR AFRICAN AMERICAN: >60
GFR NON-AFRICAN AMERICAN: >60
GLOBULIN: 2 G/DL
GLUCOSE BLD-MCNC: 339 MG/DL (ref 70–99)
HDLC SERPL-MCNC: 27 MG/DL (ref 40–60)
HEPATITIS C ANTIBODY INTERPRETATION: NORMAL
LDL CHOLESTEROL CALCULATED: 50 MG/DL
POTASSIUM SERPL-SCNC: 4.8 MMOL/L (ref 3.5–5.1)
SODIUM BLD-SCNC: 139 MMOL/L (ref 136–145)
TOTAL PROTEIN: 6.2 G/DL (ref 6.4–8.2)
TRIGL SERPL-MCNC: 284 MG/DL (ref 0–150)
VLDLC SERPL CALC-MCNC: 57 MG/DL

## 2019-09-20 PROCEDURE — 90688 IIV4 VACCINE SPLT 0.5 ML IM: CPT | Performed by: FAMILY MEDICINE

## 2019-09-20 PROCEDURE — G0008 ADMIN INFLUENZA VIRUS VAC: HCPCS | Performed by: FAMILY MEDICINE

## 2019-09-20 PROCEDURE — G0438 PPPS, INITIAL VISIT: HCPCS | Performed by: FAMILY MEDICINE

## 2019-09-20 PROCEDURE — 36415 COLL VENOUS BLD VENIPUNCTURE: CPT | Performed by: FAMILY MEDICINE

## 2019-09-20 ASSESSMENT — PATIENT HEALTH QUESTIONNAIRE - PHQ9
SUM OF ALL RESPONSES TO PHQ QUESTIONS 1-9: 0
SUM OF ALL RESPONSES TO PHQ QUESTIONS 1-9: 0

## 2019-09-20 ASSESSMENT — LIFESTYLE VARIABLES: HOW OFTEN DO YOU HAVE A DRINK CONTAINING ALCOHOL: 0

## 2019-09-20 NOTE — PROGRESS NOTES
Vaccine Information Sheet, \"Influenza - Inactivated\"  given to [employer], or parent/legal guardian of  [employer] and verbalized understanding. Patient responses:    Have you ever had a reaction to a flu vaccine? No  Are you able to eat eggs without adverse effects? Yes  Do you have any current illness? No  Have you ever had Guillian [place] Syndrome? No    Flu vaccine given per order. Please see immunization tab.

## 2019-09-20 NOTE — PROGRESS NOTES
Medicare Annual Wellness Visit  Name: Miguel Saha Date: 2019   MRN: [id number] Sex: Male   Age: 60 y.o. Ethnicity: Non-/Non    : 1960 Race: Ginger Skaggs is here for Medicare AWV and Flu Vaccine    Screenings for behavioral, psychosocial and functional/safety risks, and cognitive dysfunction are all negative except as indicated below. These results, as well as other patient data from the [address] form, are documented in Flowsheets linked to this Encounter. Allergies   Allergen Reactions    Sulfa Antibiotics Hives     Prior to Visit Medications    Medication Sig Taking? Authorizing Provider   Insulin Degludec (TRESIBA FLEXTOUCH) 100 UNIT/ML SOPN INJECT 50 UNITS SUBCUTANEOUSLY DAILY Yes ANA Arshad Asa, CNP   SOFT TOUCH LANCETS MISC 1 Device by Does not apply route 3 times daily Yes ANA Mendoza CNP   metFORMIN (GLUCOPHAGE) 1000 MG tablet TAKE 1 TABLET BY MOUTH TWO TIMES A DAY Yes ANA Mendoza CNP   blood glucose test strips (FREESTYLE LITE) strip USE TO TEST 3 TIMES DAILY AS NEEDED E11.9 Yes ANA Mendoza CNP   insulin aspart (NOVOLOG FLEXPEN) 100 UNIT/ML injection pen Inject 35 Units into the skin 3 times daily (before meals) Yes ANA Mendoza CNP   [employer] LANCETS MICRO THIN 33G MISC USE TO TEST BLOOD SUGAR 1-2 TIMES A DAY MAY TEST MORE IF BLOOD SUGAR SEEMS LOW Yes ANA Mendoza CNP   lidocaine (LIDODERM) 5 % Place 3 patches onto the skin every 24 hours 12 hours on, 12 hours off.  Yes ANA Mendoza CNP   bisoprolol-hydrochlorothiazide (ZIAC) 10-6.25 MG per tablet TAKE ONE TABLET BY MOUTH DAILY Yes ANA Mendoza CNP   lisinopril (PRINIVIL;ZESTRIL) 5 MG tablet TAKE ONE TABLET BY MOUTH EVERY DAY Yes ANA Mendoza CNP   diclofenac (VOLTAREN) 75 MG EC tablet Take 1 tablet by mouth 2 times daily Yes ANA Mendoza stricture dilated mult yrs ago    Hyperlipidemia     Hypertension     Peripheral neuropathy     Type II or unspecified type diabetes mellitus without mention of complication, not stated as uncontrolled     Urinary frequency      Past Surgical History:   Procedure Laterality Date    APPENDECTOMY      CARPAL TUNNEL RELEASE      COLONOSCOPY N/A 4/30/2019    COLONOSCOPY POLYPECTOMY SNARE/COLD BIOPSY performed by Chiquita Robison MD at [address] COLONOSCOPY N/A 4/30/2019    COLONOSCOPY CONTROL HEMORRHAGE performed by Chiquita Robison MD at [address] Left     HERNIA REPAIR  10/12/15    Laparoscopic Hernia Repair    TX SHLDR ARTHROSCOP,SURG,W/ROTAT CUFF REPR Left 11/26/2018    LEFT SHOULDER ARTHROSCOPY WITH DEBRIDEMENT, ROTATOR CUFF REPAIR, OPEN SUBSCAPULARIS REPAIR, BICEPS TENODESIS, SUBACROMIAL DECOMPRESSION   performed by Hayley Morales MD at [place] [address]     Family History   Problem Relation Age of Onset    Anemia Mother     Hypertension Mother     Heart Disease Father     Diabetes Father     No Known Problems Sister     No Known Problems Brother     No Known Problems Daughter     Other Son         ? etiol;    No Known Problems Son        CareTeam (Including outside providers/suppliers regularly involved in providing care):   Patient Care Team:  ANA Hernandez CNP as PCP - General (Nurse Practitioner)  ANA Hernandez CNP as PCP - [facility] Empaneled Provider  Tony Phillips MD as Surgeon (Orthopedic Surgery)  James Smith MD as Consulting Physician (Anesthesiology)  Hayley Morales MD as Consulting Physician (Orthopedic Surgery)    Wt Readings from Last 3 Encounters:   09/20/19 223 lb (101.2 kg)   04/30/19 222 lb (100.7 kg)   04/08/19 227 lb 15.3 oz (103.4 kg)     Vitals:    09/20/19 1106   BP: 102/70   Pulse: 73   Temp: 98.1 °F (36.7 °C)   TempSrc: Oral   SpO2: 94%   Weight: 223 lb (101.2 kg)

## 2019-09-21 LAB
ESTIMATED AVERAGE GLUCOSE: 266.1 MG/DL
HBA1C MFR BLD: 10.9 %
HIV AG/AB: NORMAL
HIV ANTIGEN: NORMAL
HIV-1 ANTIBODY: NORMAL
HIV-2 AB: NORMAL

## 2019-11-18 ENCOUNTER — OFFICE VISIT (OUTPATIENT)
Dept: ORTHOPEDIC SURGERY | Age: 59
End: 2019-11-18
Payer: MEDICARE

## 2019-11-18 VITALS — HEIGHT: 70 IN | WEIGHT: 223.11 LBS | RESPIRATION RATE: 13 BRPM | BODY MASS INDEX: 31.94 KG/M2

## 2019-11-18 DIAGNOSIS — M18.11 ARTHRITIS OF CARPOMETACARPAL (CMC) JOINT OF RIGHT THUMB: Primary | ICD-10-CM

## 2019-11-18 PROCEDURE — 99213 OFFICE O/P EST LOW 20 MIN: CPT | Performed by: ORTHOPAEDIC SURGERY

## 2019-11-18 PROCEDURE — 20600 DRAIN/INJ JOINT/BURSA W/O US: CPT | Performed by: ORTHOPAEDIC SURGERY

## 2019-11-18 RX ORDER — BETAMETHASONE SODIUM PHOSPHATE AND BETAMETHASONE ACETATE 3; 3 MG/ML; MG/ML
12 INJECTION, SUSPENSION INTRA-ARTICULAR; INTRALESIONAL; INTRAMUSCULAR; SOFT TISSUE ONCE
Status: COMPLETED | OUTPATIENT
Start: 2019-11-18 | End: 2019-11-18

## 2019-11-18 RX ADMIN — BETAMETHASONE SODIUM PHOSPHATE AND BETAMETHASONE ACETATE 12 MG: 3; 3 INJECTION, SUSPENSION INTRA-ARTICULAR; INTRALESIONAL; INTRAMUSCULAR; SOFT TISSUE at 14:52

## 2019-12-30 ENCOUNTER — TELEPHONE (OUTPATIENT)
Dept: FAMILY MEDICINE CLINIC | Age: 59
End: 2019-12-30

## 2019-12-30 RX ORDER — BLOOD-GLUCOSE METER
1 EACH MISCELLANEOUS ONCE
Qty: 1 KIT | Refills: 0 | Status: SHIPPED | OUTPATIENT
Start: 2019-12-30 | End: 2022-06-02

## 2020-01-20 ENCOUNTER — TELEPHONE (OUTPATIENT)
Dept: FAMILY MEDICINE CLINIC | Age: 60
End: 2020-01-20

## 2020-01-27 ENCOUNTER — OFFICE VISIT (OUTPATIENT)
Dept: FAMILY MEDICINE CLINIC | Age: 60
End: 2020-01-27
Payer: MEDICARE

## 2020-01-27 VITALS
RESPIRATION RATE: 16 BRPM | SYSTOLIC BLOOD PRESSURE: 118 MMHG | OXYGEN SATURATION: 95 % | BODY MASS INDEX: 30.95 KG/M2 | DIASTOLIC BLOOD PRESSURE: 70 MMHG | WEIGHT: 216.2 LBS | HEART RATE: 82 BPM | HEIGHT: 70 IN

## 2020-01-27 PROCEDURE — 99214 OFFICE O/P EST MOD 30 MIN: CPT | Performed by: NURSE PRACTITIONER

## 2020-01-27 RX ORDER — NALOXONE HYDROCHLORIDE 4 MG/.1ML
SPRAY NASAL
COMMUNITY
Start: 2019-11-19 | End: 2020-10-14 | Stop reason: ALTCHOICE

## 2020-01-27 RX ORDER — NORTRIPTYLINE HYDROCHLORIDE 25 MG/1
CAPSULE ORAL
COMMUNITY
Start: 2019-12-17 | End: 2020-03-11

## 2020-01-27 ASSESSMENT — PATIENT HEALTH QUESTIONNAIRE - PHQ9: DEPRESSION UNABLE TO ASSESS: PT REFUSES

## 2020-01-30 ASSESSMENT — ENCOUNTER SYMPTOMS
GASTROINTESTINAL NEGATIVE: 1
EYES NEGATIVE: 1
RESPIRATORY NEGATIVE: 1

## 2020-01-31 NOTE — PROGRESS NOTES
papilledema. Left eye: No hemorrhage, exudate, AV nicking, arteriolar narrowing or papilledema. Neck:      Musculoskeletal: Normal range of motion. Thyroid: No thyroid mass, thyromegaly or thyroid tenderness. Vascular: No carotid bruit. Cardiovascular:      Rate and Rhythm: Normal rate and regular rhythm. Pulses: Normal pulses. Heart sounds: Normal heart sounds. No murmur. No friction rub. Pulmonary:      Effort: No respiratory distress. Breath sounds: Normal breath sounds. Abdominal:      General: Abdomen is flat. Bowel sounds are normal. There is no distension. Palpations: Abdomen is soft. Tenderness: There is no abdominal tenderness. Musculoskeletal: Normal range of motion. General: No swelling, tenderness or deformity. Comments: Refused foot exam   Lymphadenopathy:      Cervical: No cervical adenopathy. Skin:     General: Skin is warm and dry. Capillary Refill: Capillary refill takes less than 2 seconds. Coloration: Skin is not jaundiced or pale. Findings: No rash. Neurological:      General: No focal deficit present. Mental Status: He is alert and oriented to person, place, and time. Sensory: No sensory deficit. Motor: No weakness. Gait: Gait normal.   Psychiatric:         Mood and Affect: Mood normal.         Behavior: Behavior normal.         Thought Content: Thought content normal.         Judgment: Judgment normal.         Assessment:      1. Type 2 DM  2. HTN  3. Hyperlipidemia  4. Diabetic Neuropathy  5. Depression      Plan:      1. He was drinking a can of sweet tea during our visit. Is frustrated with his disorder, but continues to eat whatever we wants. Discussed options as we have in the past, and he takes his meds. He tells me he feels like nothing is helping so why try. We talked at length about the medical issues with poorly controlled diabetes, he voiced undestanding.   He refused labs

## 2020-02-03 ENCOUNTER — NURSE ONLY (OUTPATIENT)
Dept: FAMILY MEDICINE CLINIC | Age: 60
End: 2020-02-03
Payer: MEDICARE

## 2020-02-03 PROCEDURE — 36415 COLL VENOUS BLD VENIPUNCTURE: CPT | Performed by: NURSE PRACTITIONER

## 2020-02-03 NOTE — PROGRESS NOTES
Blood drawn per order. Needle size: 23 g  Site: L Antecubital.  First attempt successful Yes    Second attempt na    Pressure applied until bleeding stopped. Cotton ball and band aid  applied. Patient informed to call office or return if bleeding reoccurs and unable to stop.     Tubes drawn: 1 purple     0 red

## 2020-02-04 LAB
ESTIMATED AVERAGE GLUCOSE: 274.7 MG/DL
HBA1C MFR BLD: 11.2 %

## 2020-02-10 NOTE — TELEPHONE ENCOUNTER
LANTUS is $42 - patient informed. PER ELLEN REFER HIM TO ENDOCRINOLOGY. REFERRAL PLACED PATIENT INFORMED.

## 2020-02-10 NOTE — TELEPHONE ENCOUNTER
Appears lantus, levemir, I have lantus pended. If ok please sign.  [facility]

## 2020-03-06 RX ORDER — PRAVASTATIN SODIUM 10 MG
TABLET ORAL
Qty: 60 TABLET | Refills: 5 | Status: SHIPPED | OUTPATIENT
Start: 2020-03-06 | End: 2020-06-08

## 2020-03-06 RX ORDER — OMEPRAZOLE 40 MG/1
CAPSULE, DELAYED RELEASE ORAL
Qty: 30 CAPSULE | Refills: 5 | Status: SHIPPED | OUTPATIENT
Start: 2020-03-06 | End: 2020-08-18

## 2020-03-06 RX ORDER — LISINOPRIL 5 MG/1
TABLET ORAL
Qty: 30 TABLET | Refills: 10 | Status: SHIPPED
Start: 2020-03-06 | End: 2020-07-16 | Stop reason: DRUGHIGH

## 2020-03-06 RX ORDER — DICLOFENAC SODIUM 75 MG/1
TABLET, DELAYED RELEASE ORAL
Qty: 60 TABLET | Refills: 5 | Status: SHIPPED | OUTPATIENT
Start: 2020-03-06 | End: 2020-09-09 | Stop reason: ALTCHOICE

## 2020-03-09 ENCOUNTER — CARE COORDINATION (OUTPATIENT)
Dept: FAMILY MEDICINE CLINIC | Age: 60
End: 2020-03-09

## 2020-03-09 ENCOUNTER — OFFICE VISIT (OUTPATIENT)
Dept: FAMILY MEDICINE CLINIC | Age: 60
End: 2020-03-09
Payer: MEDICARE

## 2020-03-09 VITALS
SYSTOLIC BLOOD PRESSURE: 116 MMHG | DIASTOLIC BLOOD PRESSURE: 66 MMHG | RESPIRATION RATE: 16 BRPM | WEIGHT: 215.6 LBS | BODY MASS INDEX: 30.86 KG/M2 | HEIGHT: 70 IN | HEART RATE: 95 BPM | OXYGEN SATURATION: 98 %

## 2020-03-09 PROCEDURE — 99214 OFFICE O/P EST MOD 30 MIN: CPT | Performed by: NURSE PRACTITIONER

## 2020-03-09 RX ORDER — BUSPIRONE HYDROCHLORIDE 7.5 MG/1
7.5 TABLET ORAL 2 TIMES DAILY
Qty: 60 TABLET | Refills: 6 | Status: SHIPPED | OUTPATIENT
Start: 2020-03-09 | End: 2020-04-08

## 2020-03-09 NOTE — CARE COORDINATION
Attempted to meet with patient following OV with PCP  Patient left before he could meet with this ACM  Will send [address] letter  Will f/u later this week

## 2020-03-11 ENCOUNTER — CARE COORDINATION (OUTPATIENT)
Dept: FAMILY MEDICINE CLINIC | Age: 60
End: 2020-03-11

## 2020-03-11 ASSESSMENT — ENCOUNTER SYMPTOMS
GASTROINTESTINAL NEGATIVE: 1
RESPIRATORY NEGATIVE: 1

## 2020-03-11 ASSESSMENT — PATIENT HEALTH QUESTIONNAIRE - PHQ9
SUM OF ALL RESPONSES TO PHQ QUESTIONS 1-9: 2
2. FEELING DOWN, DEPRESSED OR HOPELESS: 1
1. LITTLE INTEREST OR PLEASURE IN DOING THINGS: 1
SUM OF ALL RESPONSES TO PHQ9 QUESTIONS 1 & 2: 2
SUM OF ALL RESPONSES TO PHQ QUESTIONS 1-9: 2

## 2020-03-11 NOTE — CARE COORDINATION
Ambulatory Care Coordination Note  3/11/2020  CM Risk Score: 5  [place] 10 Year Mortality Risk Score: 47%     ACC: Yessica Callahan, RN    Summary Note:     PLAN:  Encouraged to monitor blood sugars more frequently especially for upcoming appointment with endocrinologist  Patient has set a goal to reduce the amount of ice tea and mountain dew to 2/day (patient had been drinking six of either 12 oz tea with sugar or [address] Dew /day)    FYI:  Patient grieving the recent loss of his mom; he declines grief counseling but may be interested in the future  He is verbalizing he is tying to make healthier choices and work on weight loss   Patient verbalizing it costs too much to eat healthier and he gives example of cost of bread  Actively listened; do not feel patient ready for advise   Discussed food pantries; patient verbalizes he has used them in the past and the food is either  or not fit for diabetic    Stress, cost of medications and inconvenience of taking insulin has been a barrier  Patient reports his medications were much cheaper last year and he was paying around $3-4.00 for insulin  Patient unsure if he had medicaid or Extra-help  He further reports his cousin is very knowledgeable and she is coming over tomorrow to help him figure out what he needs to do to lower the cost    He has changed his insurance to [employer] this year and the cost of his insulin is $42.00 for 5 pens; he reports he usually goes through 5 pens/month  Patient reports he currently has all of his medications but he plans on discontinuing his insulin if he is not able to get it any cheaper.   Informed patient there is a generic insulin (Relion) that costs $25.00/vial but will need NP or physician to adjust medication        Ambulatory Care Coordination Assessment    Care Coordination Protocol  Program Enrollment:  Rising Risk  Referral from Primary Care Provider:  No  Week 1 - Initial Assessment     Do you have all of your stability (including domestic violence, insecure housing, neighbor harassment)?:  Consistently safe, supportive, stable, no identified problems   How do daily activities impact on the patient's well-being? (include current or anticipated unemployment, work, caregiving, access to transportation or other): Contributes to low mood or stress at times   How would you rate their social network (family, work, friends)?:  Adequate participation with social networks   How would you rate their financial resources (including ability to afford all required medical care)?:  [address] insecure, some resource challenges   How wells does the patient now understand their health and well-being (symptoms, signs or risk factors) and what they need to do to manage their health?:  Little understanding which impacts on their ability to undertake better management   Suggested Interventions and Community Resources  Behavioral Health:  Declined    Diabetes Education: In Process    Grief/ Bereavement Counselor:  Declined   Zone Management Tools:  Not Started                  Prior to Admission medications    Medication Sig Start Date End Date Taking?  Authorizing Provider   omeprazole (PRILOSEC) 40 MG delayed release capsule TAKE ONE CAPSULE BY MOUTH DAILY 3/6/20  Yes ANA Tay CNP   lisinopril (PRINIVIL;ZESTRIL) 5 MG tablet TAKE ONE TABLET BY MOUTH DAILY 3/6/20  Yes ANA Tay CNP   insulin glargine (LANTUS SOLOSTAR) 100 UNIT/ML injection pen Inject 50 Units into the skin nightly 2/10/20  Yes ANA Tay CNP   metFORMIN (GLUCOPHAGE) 1000 MG tablet TAKE 1 TABLET BY MOUTH TWO TIMES A DAY 7/15/19  Yes ANA Tay CNP   insulin aspart (NOVOLOG FLEXPEN) 100 UNIT/ML injection pen Inject 35 Units into the skin 3 times daily (before meals) 6/3/19  Yes ANA Tay CNP   bisoprolol-hydrochlorothiazide (ZIAC) 10-6.25 MG per tablet TAKE ONE TABLET BY MOUTH DAILY 4/26/19  Yes Beryle Stain, APRN - CNP   gabapentin (NEURONTIN) 600 MG tablet Take 2 tablets  In the AM and 2 tablets at [facility] 4/26/19 4/25/20 Yes Beryle Stain, APRN - CNP   DULoxetine (CYMBALTA) 60 MG extended release capsule TAKE ONE CAPSULE BY MOUTH DAILY 4/26/19  Yes Beryle Stain, APRN - CNP   pravastatin (PRAVACHOL) 20 MG tablet Take 1 tablet by mouth daily 4/26/19  Yes Beryle Stain, APRN - CNP   APPLE CIDER VINEGAR PO Take by mouth   Yes Historical Provider, MD   B Complex Vitamins (VITAMIN B COMPLEX PO) Take 1 tablet by mouth daily OTC   Yes Historical Provider, MD   Saw Dewitt, Serenoa repens, (SAW PALMETTO PO) Take by mouth 2 times daily   Yes Historical Provider, MD   VALERIALEXI ROOT PO Take by mouth nightly   Yes Historical Provider, MD   busPIRone (BUSPAR) 7.5 MG tablet Take 1 tablet by mouth 2 times daily 3/9/20 4/8/20  Beryle Stain, APRN - CNP   diclofenac (VOLTAREN) 75 MG EC tablet TAKE ONE TABLET BY MOUTH TWICE A DAY 3/6/20   Beryle Stain, APRN - CNP   pravastatin (PRAVACHOL) 10 MG tablet TAKE TWO TABLETS BY MOUTH DAILY 3/6/20   Beryle Stain, APRN - CNP   NARCAN 4 MG/0.1ML LIQD nasal spray  11/19/19   Historical Provider, MD   Blood Glucose Monitoring Suppl (ACCU-CHEK KATHIA PLUS) w/Device KIT 1 Device by Does not apply route once for 1 dose 12/30/19 1/27/20  Beryle Stain, APRN - CNP   Insulin Degludec (TRESIBA FLEXTOUCH) 100 UNIT/ML SOPN INJECT 50 UNITS SUBCUTANEOUSLY DAILY 9/9/19   Beryle Stain APRN - CNP   SOFT TOUCH LANCETS MISC 1 Device by Does not apply route 3 times daily 7/19/19   Beryle Stain APRN - CNP   blood glucose test strips (FREESTYLE LITE) strip USE TO TEST 3 TIMES DAILY AS NEEDED E11.9  Patient taking differently: USE TO TEST 3 TIMES DAILY AS NEEDED E11.9  Using accucheck 7/11/19   Beryle StainANA - CNP   [employer] LANCETS MICRO THIN 33G MISC USE TO TEST BLOOD SUGAR 1-2 TIMES A DAY MAY TEST MORE IF

## 2020-03-20 ENCOUNTER — CARE COORDINATION (OUTPATIENT)
Dept: CARE COORDINATION | Age: 60
End: 2020-03-20

## 2020-03-23 RX ORDER — BLOOD-GLUCOSE METER
KIT MISCELLANEOUS
Qty: 50 EACH | Refills: 9 | Status: SHIPPED | OUTPATIENT
Start: 2020-03-23 | End: 2020-05-01 | Stop reason: SDUPTHER

## 2020-03-25 RX ORDER — INSULIN DEGLUDEC INJECTION 100 U/ML
INJECTION, SOLUTION SUBCUTANEOUS
Qty: 5 PEN | Refills: 11 | Status: SHIPPED | OUTPATIENT
Start: 2020-03-25 | End: 2020-09-09

## 2020-04-27 ENCOUNTER — TELEPHONE (OUTPATIENT)
Dept: FAMILY MEDICINE CLINIC | Age: 60
End: 2020-04-27

## 2020-04-27 NOTE — TELEPHONE ENCOUNTER
On 3-23-20 geovanna cait prescribed blood glucose test strips with a quantity of 50. asking if she would change the quantity from 50 to 100 so he would not have to go to the pharmacy every 2 weeks. When and where should he have his a1c done. Believes he is to have it done in may.

## 2020-04-29 RX ORDER — GABAPENTIN 600 MG/1
TABLET ORAL
Qty: 120 TABLET | Refills: 5 | Status: SHIPPED | OUTPATIENT
Start: 2020-04-29 | End: 2021-01-06 | Stop reason: ALTCHOICE

## 2020-04-29 NOTE — TELEPHONE ENCOUNTER
Per Vickie Barnes via telephone since she was having computer issues.   She has okayed this and having me send it back with refills

## 2020-05-01 RX ORDER — BLOOD-GLUCOSE METER
KIT MISCELLANEOUS
Qty: 100 EACH | Refills: 11 | Status: SHIPPED | OUTPATIENT
Start: 2020-05-01 | End: 2020-06-04 | Stop reason: SDUPTHER

## 2020-05-01 NOTE — TELEPHONE ENCOUNTER
Sent in the new RX for the test strips and he can get his blood work done in June or July and at that time he should be able to get it at [facility] or he can go the [facility] the order is in [facility]    I left this information for patient on his vm

## 2020-05-04 RX ORDER — BISOPROLOL FUMARATE AND HYDROCHLOROTHIAZIDE 10; 6.25 MG/1; MG/1
TABLET ORAL
Qty: 30 TABLET | Refills: 5 | Status: SHIPPED | OUTPATIENT
Start: 2020-05-04 | End: 2021-02-08

## 2020-05-04 RX ORDER — INSULIN ASPART 100 [IU]/ML
INJECTION, SOLUTION INTRAVENOUS; SUBCUTANEOUS
Qty: 5 PEN | Refills: 5 | Status: SHIPPED
Start: 2020-05-04 | End: 2020-05-05 | Stop reason: CLARIF

## 2020-05-05 ENCOUNTER — TELEPHONE (OUTPATIENT)
Dept: FAMILY MEDICINE CLINIC | Age: 60
End: 2020-05-05

## 2020-05-05 RX ORDER — INSULIN GLARGINE 100 [IU]/ML
50 INJECTION, SOLUTION SUBCUTANEOUS NIGHTLY
Qty: 5 PEN | Refills: 10 | OUTPATIENT
Start: 2020-05-05 | End: 2020-10-14 | Stop reason: SDUPTHER

## 2020-05-05 RX ORDER — INSULIN LISPRO 100 [IU]/ML
35 INJECTION, SOLUTION INTRAVENOUS; SUBCUTANEOUS
Qty: 5 PEN | Refills: 6 | Status: SHIPPED | OUTPATIENT
Start: 2020-05-05 | End: 2021-01-06 | Stop reason: ALTCHOICE

## 2020-05-24 RX ORDER — LIDOCAINE 50 MG/G
PATCH TOPICAL
Qty: 90 PATCH | Refills: 10 | Status: SHIPPED | OUTPATIENT
Start: 2020-05-24

## 2020-06-01 ENCOUNTER — TELEPHONE (OUTPATIENT)
Dept: ADMINISTRATIVE | Age: 60
End: 2020-06-01

## 2020-06-01 NOTE — TELEPHONE ENCOUNTER
Submitted PA for Lidocaine 5% patches, Key: [id number]. Medication has been APPROVED through 12/31/2020. Approval letter attached. Please notify patient. Thank you.

## 2020-06-04 RX ORDER — BLOOD-GLUCOSE METER
KIT MISCELLANEOUS
Qty: 100 EACH | Refills: 11 | Status: SHIPPED | OUTPATIENT
Start: 2020-06-04 | End: 2020-07-06 | Stop reason: SDUPTHER

## 2020-06-08 ENCOUNTER — TELEPHONE (OUTPATIENT)
Dept: ADMINISTRATIVE | Age: 60
End: 2020-06-08

## 2020-06-08 RX ORDER — PRAVASTATIN SODIUM 20 MG
20 TABLET ORAL DAILY
Qty: 30 TABLET | Refills: 5 | Status: SHIPPED | OUTPATIENT
Start: 2020-06-08 | End: 2020-10-26 | Stop reason: DRUGHIGH

## 2020-07-06 ENCOUNTER — TELEPHONE (OUTPATIENT)
Dept: FAMILY MEDICINE CLINIC | Age: 60
End: 2020-07-06

## 2020-07-06 RX ORDER — BLOOD-GLUCOSE METER
KIT MISCELLANEOUS
Qty: 300 EACH | Refills: 11 | Status: SHIPPED | OUTPATIENT
Start: 2020-07-06

## 2020-07-07 ENCOUNTER — TELEPHONE (OUTPATIENT)
Dept: FAMILY MEDICINE CLINIC | Age: 60
End: 2020-07-07

## 2020-07-07 RX ORDER — LANCETS
1 EACH MISCELLANEOUS 3 TIMES DAILY
Qty: 100 EACH | Refills: 11 | Status: SHIPPED | OUTPATIENT
Start: 2020-07-07

## 2020-07-07 RX ORDER — BLOOD SUGAR DIAGNOSTIC
1 STRIP MISCELLANEOUS 3 TIMES DAILY
Qty: 100 EACH | Refills: 11 | Status: SHIPPED | OUTPATIENT
Start: 2020-07-07

## 2020-07-07 RX ORDER — BLOOD-GLUCOSE METER
1 EACH MISCELLANEOUS ONCE
Qty: 1 KIT | Refills: 0 | Status: SHIPPED | OUTPATIENT
Start: 2020-07-07 | End: 2022-06-02

## 2020-07-16 RX ORDER — LISINOPRIL 10 MG/1
5 TABLET ORAL DAILY
Qty: 45 TABLET | Refills: 1 | Status: SHIPPED | OUTPATIENT
Start: 2020-07-16 | End: 2021-11-03 | Stop reason: ALTCHOICE

## 2020-08-13 RX ORDER — PRAVASTATIN SODIUM 10 MG
TABLET ORAL
Qty: 60 TABLET | Refills: 4 | OUTPATIENT
Start: 2020-08-13

## 2020-08-17 RX ORDER — PRAVASTATIN SODIUM 10 MG
TABLET ORAL
Qty: 60 TABLET | Refills: 4 | OUTPATIENT
Start: 2020-08-17

## 2020-08-17 RX ORDER — LISINOPRIL 5 MG/1
TABLET ORAL
Qty: 90 TABLET | Refills: 9 | OUTPATIENT
Start: 2020-08-17

## 2020-08-17 RX ORDER — DULOXETIN HYDROCHLORIDE 60 MG/1
CAPSULE, DELAYED RELEASE ORAL
Qty: 30 CAPSULE | Refills: 11 | Status: SHIPPED | OUTPATIENT
Start: 2020-08-17 | End: 2021-11-03 | Stop reason: ALTCHOICE

## 2020-08-18 RX ORDER — OMEPRAZOLE 40 MG/1
CAPSULE, DELAYED RELEASE ORAL
Qty: 30 CAPSULE | Refills: 4 | Status: SHIPPED | OUTPATIENT
Start: 2020-08-18 | End: 2020-12-14

## 2020-09-09 ENCOUNTER — OFFICE VISIT (OUTPATIENT)
Dept: ENDOCRINOLOGY | Age: 60
End: 2020-09-09
Payer: COMMERCIAL

## 2020-09-09 VITALS
HEART RATE: 96 BPM | TEMPERATURE: 98.3 F | OXYGEN SATURATION: 99 % | DIASTOLIC BLOOD PRESSURE: 74 MMHG | WEIGHT: 212 LBS | SYSTOLIC BLOOD PRESSURE: 128 MMHG | BODY MASS INDEX: 30.35 KG/M2 | RESPIRATION RATE: 14 BRPM | HEIGHT: 70 IN

## 2020-09-09 PROBLEM — E55.9 VITAMIN D DEFICIENCY: Status: ACTIVE | Noted: 2020-09-09

## 2020-09-09 PROBLEM — E66.9 OBESITY WITH SERIOUS COMORBIDITY: Status: ACTIVE | Noted: 2020-09-09

## 2020-09-09 LAB — HBA1C MFR BLD: 10.5 %

## 2020-09-09 PROCEDURE — 83036 HEMOGLOBIN GLYCOSYLATED A1C: CPT | Performed by: NURSE PRACTITIONER

## 2020-09-09 PROCEDURE — 99204 OFFICE O/P NEW MOD 45 MIN: CPT | Performed by: NURSE PRACTITIONER

## 2020-09-09 RX ORDER — TAMSULOSIN HYDROCHLORIDE 0.4 MG/1
0.4 CAPSULE ORAL DAILY
COMMUNITY
Start: 2020-09-06

## 2020-09-09 RX ORDER — BUSPIRONE HYDROCHLORIDE 7.5 MG/1
TABLET ORAL
COMMUNITY
Start: 2020-08-09 | End: 2021-01-06 | Stop reason: ALTCHOICE

## 2020-09-09 RX ORDER — LIRAGLUTIDE 6 MG/ML
1.2 INJECTION SUBCUTANEOUS DAILY
Qty: 2 PEN | Refills: 3 | Status: SHIPPED
Start: 2020-09-09 | End: 2020-10-14 | Stop reason: SINTOL

## 2020-09-09 RX ORDER — DULAGLUTIDE 0.75 MG/.5ML
INJECTION, SOLUTION SUBCUTANEOUS
COMMUNITY
Start: 2020-06-29 | End: 2020-09-09 | Stop reason: SINTOL

## 2020-09-09 ASSESSMENT — ENCOUNTER SYMPTOMS
CONSTIPATION: 0
COLOR CHANGE: 0
SHORTNESS OF BREATH: 0
DIARRHEA: 0
EYE PAIN: 0
NAUSEA: 0

## 2020-09-09 NOTE — PATIENT INSTRUCTIONS
Lab Results   Component Value Date    LABA1C 10.5 09/09/2020     Basaglar  Continue 50 units at night    humalog  To cover your meal : 15 units    To cover blood sugar   151- 200 Add  2 units  201- 250 Add 4 units  251- 300 Add 6 units   301- 350 Add 8 units  351- 400 Add 10 units  If > 400 : take fixed dose  +10 units, recheck blood sugar in 4 hours and dose according to the sliding scale only

## 2020-09-09 NOTE — PROGRESS NOTES
Endocrinology  Jessica Hilliard, [place], [address]  [place], [address]  Phone [phone]  Fax [phone]    Renay Gould is a 63 y.o. male who is a new patient following up for management of Diabetes Mellitus Type 2. PCP ANA Armas CNP    Last A1C:   Lab Results   Component Value Date    LABA1C 10.5 2020    LABA1C 11.2 2020    LABA1C 10.9 2019     Last BP Readings:  BP Readings from Last 3 Encounters:   20 128/74   20 116/66   20 118/70     Last LDL:   Lab Results   Component Value Date    LDLCALC 50 2019     Aspirin Use: no    Tobacco/Alcohol History:    Smoking status: Former Smoker     Packs/day: 3.00     Years: 31.00     Pack years: 93.00     Last attempt to quit: 2001     Years since quittin.6    Smokeless tobacco: Never Used    Tobacco comment:     Alcohol use: No     Alcohol/week: 0.0 standard drinks     Diabetes:   Renay Gould  was diagnosed with diabetes type 2 in : 2016   On insulin: since 2016   Patient reports that disease course has been variable and is currently poorly controlled   Current microvascular complications include peripheral neuropathy, fluctuating vision   Current Macrovascular complications there is no history of  CVD, CAD, PVD   Patient reports compliance for about 50 %  to medication, but usually not to diet and exercise instructions.  There have been no recent hospital or ED admissions for hypoglycemia, hyperglycemia or DKA.   Bib Hernandez Other ED visit include for none    PMH includes  Past Medical History:   Diagnosis Date    Adenomatous polyps     scoped ;acc to pt ,repeat in one yr    Anxiety     CTS (carpal tunnel syndrome)     CTS (carpal tunnel syndrome)     Diabetes mellitus ([address]     GERD (gastroesophageal reflux disease)     has had stricture dilated mult yrs ago    Hyperlipidemia     Hypertension     Obesity with serious comorbidity 2020    Peripheral neuropathy     Type II or unspecified type diabetes mellitus without mention of complication, not stated as uncontrolled     Urinary frequency      Blood glucose trends:  Patient brought log glucometer for download: no  Readings per day  3 per patient report  BG Range 200s to 400s  Hypoglycemia awareness and symptoms:none recently  Has not done CGM    Current Medication regimen:   Biguanides: Metformin 1000 mg BID  SCHMITZ  GLP1  SGLT2  Insulin  · Basaglar: 50 units  · Humalo units AC TID (usually takes once a day)      GFR > 60    Current Dietary regimen:   BF: eggs and toast/hash browns/cereal/ often skips  Lunch: ham sw with cheese  Dinner: may skip/ eat out  Snacks: not much any more  Beverages: mountain dew x 2/sweet tea with lemon/ 160x 4 bottles per day  Average carbs per day: > 150-200    Microvascular Complications:  · Neuropathy: severe peripheral neuropathy extending to knees, on Gabapentin 600mg BID  · Retinopathy: no concerns with vision, field of vision  · Nephropathy: no microalbuminuria no recent MACR    Diabetic Health Maintenance   · Last Eye Exam:   · Last Foot exam:   · Has patient seen a dietitian? Yes  · Current Exercise: No structured exercise  · On ACEI or ARB:  · On statin:     Hyperlipidemia: Current complaints include occasional myalgias but otherwise tolerates well. Lab Results   Component Value Date    CHOL 134 2019    CHOL 164 2019    CHOL 165 2018     Lab Results   Component Value Date    TRIG 284 2019    TRIG 298 2019    TRIG 273 2018     Lab Results   Component Value Date    HDL 27 2019    HDL 28 2019    HDL 30 2018     Lab Results   Component Value Date    LDLCALC 50 2019    LDLCALC 76 2019    [address] 2018     No results found for: LDLDIRECT  No results found for: CHOLHDLRATIO    Vitamin D deficiency: Currently is on no supplementation. Current complaints include fatigue on daily basis.    Last vitamin Dlevel is:  No results found for: VD23T, VITD3, VD25, VITD25    Hypertension  Controlled , denies symptoms of dizziness, light headedness. Occasional dependent edema. Tries to follow a salt restricted diet. Lab Results   Component Value Date     09/20/2019    K 4.8 09/20/2019     09/20/2019    CO2 24 09/20/2019    BUN 21 (H) 09/20/2019    CREATININE 1.1 09/20/2019    GLUCOSE 339 (H) 09/20/2019    CALCIUM 9.2 09/20/2019    PROT 6.2 (L) 09/20/2019    LABALBU 4.2 09/20/2019    BILITOT 0.6 09/20/2019    ALKPHOS 60 09/20/2019    AST 22 09/20/2019    ALT 20 09/20/2019    LABGLOM >60 09/20/2019    GFRAA >60 09/20/2019    AGRATIO 2.1 09/20/2019    GLOB 2.0 09/20/2019     The 10-year ASCVD risk score (Izabela Olivier et al., 2013) is: 21.4%    Values used to calculate the score:      Age: 63 years      Sex: Male      Is Non- : No      Diabetic: Yes      Tobacco smoker: No      Systolic Blood Pressure: 718 mmHg      Is BP treated: Yes      HDL Cholesterol: 27 mg/dL      Total Cholesterol: 134 mg/dL    Family History   Problem Relation Age of Onset    Anemia Mother     Hypertension Mother     Heart Disease Father     Diabetes Father     No Known Problems Sister     No Known Problems Brother     No Known Problems Daughter     Other Son         ? etiol;    No Known Problems Son      Review of Systems   Constitutional: Negative for activity change, appetite change, diaphoresis, fever and unexpected weight change. HENT: Negative for dental problem. Eyes: Negative for pain and visual disturbance. Respiratory: Negative for shortness of breath. Cardiovascular: Negative for chest pain, palpitations and leg swelling. Gastrointestinal: Negative for constipation, diarrhea and nausea. Endocrine: Negative for cold intolerance, heat intolerance, polydipsia, polyphagia and polyuria. Genitourinary: Negative for frequency and urgency.    Musculoskeletal: Negative for arthralgias, joint swelling and myalgias. Skin: Negative for color change and pallor. Neurological: Negative for weakness, numbness and headaches. Psychiatric/Behavioral: Negative for dysphoric mood and sleep disturbance. The patient is not nervous/anxious. Vitals:    09/09/20 1304   BP: 128/74   Pulse: 96   Resp: 14   Temp: 98.3 °F (36.8 °C)   TempSrc: Temporal   SpO2: 99%   Weight: 212 lb (96.2 kg)   Height: 5' 10\" (1.778 m)     Physical Exam  Vitals signs reviewed. Constitutional:       Appearance: He is well-developed. Eyes:      Pupils: Pupils are equal, round, and reactive to light. Neck:      Musculoskeletal: Normal range of motion. Thyroid: No thyromegaly. Cardiovascular:      Rate and Rhythm: Normal rate and regular rhythm. Heart sounds: Normal heart sounds. Pulmonary:      Effort: Pulmonary effort is normal.      Breath sounds: Normal breath sounds. Abdominal:      General: There is no distension. Musculoskeletal: Normal range of motion. Skin:     General: Skin is warm and dry. Comments: No skin changes or evidence of trauma. Neurological:      Mental Status: He is alert and oriented to person, place, and time. Sensory: No sensory deficit. Psychiatric:         Behavior: Behavior normal.         Thought Content: Thought content normal.       Skeletal foot exam:   Toenails are normal.   Pulses are +1 DP bilaterally. Sensory: 10 g monofilament is 0/10 on the right and 0/10 on the left,   128 Hz vibration sense is absent bilaterally.     Assessment  Renay Gould is a 63 y.o. male with uncontrolled Diabetes Mellitus type 2 complicated by peripheral neuropathy and associated with HTN /HLD/ Depression/    Plan  Diabetes Mellitus Type 2  Lab Results   Component Value Date    LABA1C 10.5 09/09/2020     Goal A1c  < 6.5%  Lab Results   Component Value Date    LABA1C 10.5 09/09/2020     Basaglar  Continue 50 units at night    humalog  To cover your meal : 15 units    To cover blood sugar   151- 200 Add  2 units  201- 250 Add 4 units  251- 300 Add 6 units   301- 350 Add 8 units  351- 400 Add 10 units  If > 400 : take fixed dose  +10 units, recheck blood sugar in 4 hours and dose according to the sliding scale only  Avoid hypoglycemia    Diet :   30-40 grams per meal , 3 meals per day, avoid carbs in snack choices  Include moderate proteins and good fats   Ensure adequate hydration and  electrolyte replacement    Exercise :  Recommended exercise is 5-7 days a week for 30-60 mins at least, per day OR a total of 2.5 hours per week , which ever is more feasible. Ambulate as possible     Diabetic Health Maintenance  Follow up with annual eye exams  Follow up with annual podiatry exams if needed  Reviewed sick day management of BG     Other areas of Diabetic Education reviewed:   Carbs: good carbs and bad carbs, importance of carb counting, incorporation of protein with each meal to reduce Glycemic index, importance of portions, Carb/insulin ratio   Fats: Good fats and bad fats, meal planning and supplements.  Discussed how food affects blood sugar readings.  Different diabetic medications   Managing high and low sugar readings   Rotation of sites for subcutaneous medication injection    Mixed Hyperlipidemia  Lab Results   Component Value Date    LDLCALC 50 09/20/2019    LDLCALC 76 03/29/2019    [address] 04/23/2018     No results found for: LDLDIRECT  Lab Results   Component Value Date    TRIG 284 09/20/2019    TRIG 298 03/29/2019    TRIG 273 04/23/2018     LDL goal  < 100;   TG elevated at 284  Continue current regimen  Managed by PCP    Essential Hypertension  Blood pressure controlled.  Continue current regimen       Vitamin D deficiency  No results found for: VITD25  Check levels    Problem List Items Addressed This Visit     HTN (hypertension)    Relevant Orders    Comprehensive Metabolic Panel    Hyperlipidemia    Relevant Orders    Lipid Panel    Obesity with serious comorbidity    Relevant Medications    Liraglutide (VICTOZA) 18 MG/3ML SOPN SC injection    Type 2 diabetes mellitus with diabetic neuropathy, with long-term current use of insulin (HCC) - Primary    Relevant Medications    Liraglutide (VICTOZA) 18 MG/3ML SOPN SC injection    Other Relevant Orders    POCT glycosylated hemoglobin (Hb A1C) (Completed)    Comprehensive Metabolic Panel    Lipid Panel    Microalbumin / Creatinine Urine Ratio    TSH without Reflex    T4, Free    Vitamin D deficiency    Relevant Orders    Vitamin D 25 Hydroxy          Greater than 40 minutes spent directly counseling patient about topics listed above (such as lifestyle modifications, preventative screenings and/or disease related processes. Return in about 1 month (around 10/9/2020).

## 2020-10-01 ENCOUNTER — HOSPITAL ENCOUNTER (EMERGENCY)
Age: 60
Discharge: OTHER INSTITUTION | End: 2020-10-01
Attending: EMERGENCY MEDICINE
Payer: MEDICARE

## 2020-10-01 ENCOUNTER — APPOINTMENT (OUTPATIENT)
Dept: CT IMAGING | Age: 60
End: 2020-10-01
Payer: MEDICARE

## 2020-10-01 ENCOUNTER — APPOINTMENT (OUTPATIENT)
Dept: GENERAL RADIOLOGY | Age: 60
End: 2020-10-01
Payer: MEDICARE

## 2020-10-01 VITALS
HEART RATE: 122 BPM | BODY MASS INDEX: 30.35 KG/M2 | TEMPERATURE: 98 F | RESPIRATION RATE: 15 BRPM | SYSTOLIC BLOOD PRESSURE: 110 MMHG | OXYGEN SATURATION: 100 % | WEIGHT: 212 LBS | HEIGHT: 70 IN | DIASTOLIC BLOOD PRESSURE: 75 MMHG

## 2020-10-01 LAB
A/G RATIO: 2 (ref 1.1–2.2)
ALBUMIN SERPL-MCNC: 4.2 G/DL (ref 3.4–5)
ALP BLD-CCNC: 54 U/L (ref 40–129)
ALT SERPL-CCNC: 18 U/L (ref 10–40)
ANION GAP SERPL CALCULATED.3IONS-SCNC: 12 MMOL/L (ref 3–16)
AST SERPL-CCNC: 17 U/L (ref 15–37)
BASOPHILS ABSOLUTE: 0.1 K/UL (ref 0–0.2)
BASOPHILS RELATIVE PERCENT: 1.2 %
BILIRUB SERPL-MCNC: 0.6 MG/DL (ref 0–1)
BUN BLDV-MCNC: 27 MG/DL (ref 7–20)
CALCIUM SERPL-MCNC: 9 MG/DL (ref 8.3–10.6)
CHLORIDE BLD-SCNC: 106 MMOL/L (ref 99–110)
CO2: 23 MMOL/L (ref 21–32)
CREAT SERPL-MCNC: 1.2 MG/DL (ref 0.8–1.3)
EKG ATRIAL RATE: 123 BPM
EKG DIAGNOSIS: NORMAL
EKG P AXIS: 14 DEGREES
EKG P-R INTERVAL: 160 MS
EKG Q-T INTERVAL: 316 MS
EKG QRS DURATION: 76 MS
EKG QTC CALCULATION (BAZETT): 452 MS
EKG R AXIS: 21 DEGREES
EKG T AXIS: 43 DEGREES
EKG VENTRICULAR RATE: 123 BPM
EOSINOPHILS ABSOLUTE: 0.4 K/UL (ref 0–0.6)
EOSINOPHILS RELATIVE PERCENT: 5.1 %
GFR AFRICAN AMERICAN: >60
GFR NON-AFRICAN AMERICAN: >60
GLOBULIN: 2.1 G/DL
GLUCOSE BLD-MCNC: 215 MG/DL (ref 70–99)
HCT VFR BLD CALC: 43.9 % (ref 40.5–52.5)
HEMOGLOBIN: 15 G/DL (ref 13.5–17.5)
INR BLD: 1.02 (ref 0.86–1.14)
LYMPHOCYTES ABSOLUTE: 1.5 K/UL (ref 1–5.1)
LYMPHOCYTES RELATIVE PERCENT: 20.1 %
MCH RBC QN AUTO: 29.3 PG (ref 26–34)
MCHC RBC AUTO-ENTMCNC: 34.1 G/DL (ref 31–36)
MCV RBC AUTO: 86 FL (ref 80–100)
MONOCYTES ABSOLUTE: 0.6 K/UL (ref 0–1.3)
MONOCYTES RELATIVE PERCENT: 7.8 %
NEUTROPHILS ABSOLUTE: 4.8 K/UL (ref 1.7–7.7)
NEUTROPHILS RELATIVE PERCENT: 65.8 %
PDW BLD-RTO: 14 % (ref 12.4–15.4)
PLATELET # BLD: 145 K/UL (ref 135–450)
PMV BLD AUTO: 10.2 FL (ref 5–10.5)
POTASSIUM REFLEX MAGNESIUM: 4.3 MMOL/L (ref 3.5–5.1)
PROTHROMBIN TIME: 11.8 SEC (ref 10–13.2)
RBC # BLD: 5.11 M/UL (ref 4.2–5.9)
SODIUM BLD-SCNC: 141 MMOL/L (ref 136–145)
TOTAL PROTEIN: 6.3 G/DL (ref 6.4–8.2)
TROPONIN: <0.01 NG/ML
WBC # BLD: 7.2 K/UL (ref 4–11)

## 2020-10-01 PROCEDURE — 73560 X-RAY EXAM OF KNEE 1 OR 2: CPT

## 2020-10-01 PROCEDURE — 12002 RPR S/N/AX/GEN/TRNK2.6-7.5CM: CPT

## 2020-10-01 PROCEDURE — 84484 ASSAY OF TROPONIN QUANT: CPT

## 2020-10-01 PROCEDURE — 99291 CRITICAL CARE FIRST HOUR: CPT

## 2020-10-01 PROCEDURE — 6370000000 HC RX 637 (ALT 250 FOR IP): Performed by: EMERGENCY MEDICINE

## 2020-10-01 PROCEDURE — 6360000002 HC RX W HCPCS

## 2020-10-01 PROCEDURE — 85610 PROTHROMBIN TIME: CPT

## 2020-10-01 PROCEDURE — 85025 COMPLETE CBC W/AUTO DIFF WBC: CPT

## 2020-10-01 PROCEDURE — 93005 ELECTROCARDIOGRAM TRACING: CPT | Performed by: EMERGENCY MEDICINE

## 2020-10-01 PROCEDURE — 96374 THER/PROPH/DIAG INJ IV PUSH: CPT

## 2020-10-01 PROCEDURE — 80053 COMPREHEN METABOLIC PANEL: CPT

## 2020-10-01 PROCEDURE — 93010 ELECTROCARDIOGRAM REPORT: CPT | Performed by: INTERNAL MEDICINE

## 2020-10-01 PROCEDURE — 36415 COLL VENOUS BLD VENIPUNCTURE: CPT

## 2020-10-01 PROCEDURE — 2580000003 HC RX 258: Performed by: EMERGENCY MEDICINE

## 2020-10-01 PROCEDURE — 96361 HYDRATE IV INFUSION ADD-ON: CPT

## 2020-10-01 PROCEDURE — 71045 X-RAY EXAM CHEST 1 VIEW: CPT

## 2020-10-01 RX ORDER — OXYCODONE HYDROCHLORIDE AND ACETAMINOPHEN 5; 325 MG/1; MG/1
1 TABLET ORAL ONCE
Status: COMPLETED | OUTPATIENT
Start: 2020-10-01 | End: 2020-10-01

## 2020-10-01 RX ORDER — 0.9 % SODIUM CHLORIDE 0.9 %
1000 INTRAVENOUS SOLUTION INTRAVENOUS ONCE
Status: COMPLETED | OUTPATIENT
Start: 2020-10-01 | End: 2020-10-01

## 2020-10-01 RX ORDER — FENTANYL CITRATE 50 UG/ML
25 INJECTION, SOLUTION INTRAMUSCULAR; INTRAVENOUS ONCE
Status: COMPLETED | OUTPATIENT
Start: 2020-10-01 | End: 2020-10-01

## 2020-10-01 RX ORDER — FENTANYL CITRATE 50 UG/ML
INJECTION, SOLUTION INTRAMUSCULAR; INTRAVENOUS
Status: COMPLETED
Start: 2020-10-01 | End: 2020-10-01

## 2020-10-01 RX ADMIN — OXYCODONE AND ACETAMINOPHEN 1 TABLET: 5; 325 TABLET ORAL at 15:30

## 2020-10-01 RX ADMIN — SODIUM CHLORIDE 1000 ML: 9 INJECTION, SOLUTION INTRAVENOUS at 15:57

## 2020-10-01 RX ADMIN — FENTANYL CITRATE 25 MCG: 50 INJECTION INTRAMUSCULAR; INTRAVENOUS at 16:24

## 2020-10-01 RX ADMIN — FENTANYL CITRATE 25 MCG: 50 INJECTION, SOLUTION INTRAMUSCULAR; INTRAVENOUS at 16:24

## 2020-10-01 ASSESSMENT — PAIN DESCRIPTION - ONSET: ONSET: ON-GOING

## 2020-10-01 ASSESSMENT — PAIN SCALES - GENERAL
PAINLEVEL_OUTOF10: 7
PAINLEVEL_OUTOF10: 8
PAINLEVEL_OUTOF10: 8
PAINLEVEL_OUTOF10: 6

## 2020-10-01 ASSESSMENT — PAIN DESCRIPTION - PAIN TYPE
TYPE: ACUTE PAIN
TYPE: ACUTE PAIN

## 2020-10-01 ASSESSMENT — PAIN DESCRIPTION - LOCATION
LOCATION: CHEST
LOCATION: CHEST

## 2020-10-01 ASSESSMENT — PAIN DESCRIPTION - DESCRIPTORS
DESCRIPTORS: SHARP
DESCRIPTORS: SHARP

## 2020-10-01 ASSESSMENT — PAIN DESCRIPTION - ORIENTATION
ORIENTATION: RIGHT;MID
ORIENTATION: RIGHT

## 2020-10-01 ASSESSMENT — PAIN DESCRIPTION - FREQUENCY: FREQUENCY: CONTINUOUS

## 2020-10-01 NOTE — ED TRIAGE NOTES
Patient arrives to the ED via POV after reportedly falling approximately four feet off of a ladder landing with his mid to right chest on a metal gas cylinder. Denies LOC. Reports pain that is worse when he attempts to take a full breath. Area of erythema noted to the lower chest/upper abdomen. Laceration to the left knee.

## 2020-10-01 NOTE — ED NOTES
1604 Attempted to call [facility] beds. After 8 mins of phone just ringing, called [phone] directly. Called [facility] at 1609, closest [place] was [facility] with 24 min ETA. They called around and no other service was available. Accepted flight from [facility]. [facility] dispatched called back and declined flight due to weather. [facility] was out of the area and unable to provide crew. [facility] dispatched called at [phone] to request [facility] for urgent transport to [facility]. [facility] arrived at 18.      Cristhian Galo, EMT-P  10/01/20 6815

## 2020-10-01 NOTE — ED NOTES
Report called to Berenice Fields RN at [facility] ED. The patient is transferred from this department at this time via stretcher per [facility].      Zonia Reaves RN  10/01/20 [id number]

## 2020-10-02 NOTE — ED PROVIDER NOTES
[facility]      CHIEF COMPLAINT  Fall       HISTORY OF PRESENT ILLNESS  Aamir Goldstein is a 63 y.o. male with a past medical history of diabetes who presents to the ED complaining of injury to his chest after a fall. The patient states that he was on a ladder, 4 feet up, when the ladder started to sway to the left and he subsequently fell. He hit his right chest directly on a metal glass cylinder. He denies hitting his head or having loss of consciousness. He denies any neck or back pain. He denies any abdominal pain or vomiting. He is not anticoagulated. He also does have a laceration to his left knee. He denies any actual pain with knee range of motion. He is up-to-date on tetanus. No other complaints, modifying factors or associated symptoms. I have reviewed the following from the nursing documentation.     Past Medical History:   Diagnosis Date    Adenomatous polyps     scoped 2019;acc to pt ,repeat in one yr    Anxiety     CTS (carpal tunnel syndrome)     CTS (carpal tunnel syndrome)     Diabetes mellitus ([facility] [address]     GERD (gastroesophageal reflux disease)     has had stricture dilated mult yrs ago    Hyperlipidemia     Hypertension     Obesity with serious comorbidity 9/9/2020    Peripheral neuropathy     Type II or unspecified type diabetes mellitus without mention of complication, not stated as uncontrolled     Urinary frequency      Past Surgical History:   Procedure Laterality Date    APPENDECTOMY      CARPAL TUNNEL RELEASE      COLONOSCOPY N/A 4/30/2019    COLONOSCOPY POLYPECTOMY SNARE/COLD BIOPSY performed by Lloyd Gomez MD at [facility] N/A 4/30/2019    COLONOSCOPY CONTROL HEMORRHAGE performed by Lloyd Gomez MD at [address] Left     HERNIA REPAIR  10/12/15    Laparoscopic Hernia Repair    CO SHLDR ARTHROSCOP,SURG,W/ROTAT CUFF REPR Left 11/26/2018    LEFT SHOULDER ARTHROSCOPY WITH DEBRIDEMENT, ROTATOR CUFF REPAIR, OPEN SUBSCAPULARIS REPAIR, BICEPS TENODESIS, SUBACROMIAL DECOMPRESSION   performed by Annamarie Dowling MD at [place] [id number]     Family History   Problem Relation Age of Onset    Anemia Mother     Hypertension Mother     Heart Disease Father     Diabetes Father     No Known Problems Sister     No Known Problems Brother     No Known Problems Daughter     Other Son         ? etiol;    No Known Problems Son      Social History     Socioeconomic History    Marital status: Single     Spouse name: Not on file    Number of children: Not on file    Years of education: Not on file    Highest education level: Not on file   Occupational History    Occupation: disability   Social Needs    Financial resource strain: Not on file    Food insecurity     Worry: Not on file     Inability: Not on file   [employer] needs     Medical: Not on file     Non-medical: Not on file   Tobacco Use    Smoking status: Former Smoker     Packs/day: 3.00     Years: 31.00     Pack years: 93.00     Last attempt to quit: 2001     Years since quittin.6    Smokeless tobacco: Never Used    Tobacco comment:    Substance and Sexual Activity    Alcohol use: No     Alcohol/week: 0.0 standard drinks    Drug use: No    Sexual activity: Yes     Partners: Female   Lifestyle    Physical activity     Days per week: Not on file     Minutes per session: Not on file    Stress: Not on file   Relationships    Social connections     Talks on phone: Not on file     Gets together: Not on file     Attends Hinduism service: Not on file     Active member of club or organization: Not on file     Attends meetings of clubs or organizations: Not on file     Relationship status: Not on file    Intimate partner violence     Fear of current or ex partner: Not on file     Emotionally abused: Not on file     Physically abused: Not on file     Forced sexual activity: Not on file   Other Topics Concern  Not on file   Social History Narrative    9/2019 lives by self ;on disability due to per neurop;since 2016; No current facility-administered medications for this encounter. Current Outpatient Medications   Medication Sig Dispense Refill    tamsulosin (FLOMAX) 0.4 MG capsule       sertraline (ZOLOFT) 50 MG tablet       busPIRone (BUSPAR) 7.5 MG tablet       Liraglutide (VICTOZA) 18 MG/3ML SOPN SC injection Inject 1.2 mg into the skin daily 2 pen 3    omeprazole (PRILOSEC) 40 MG delayed release capsule TAKE ONE CAPSULE BY MOUTH DAILY 30 capsule 4    DULoxetine (CYMBALTA) 60 MG extended release capsule TAKE ONE CAPSULE BY MOUTH DAILY 30 capsule 11    lisinopril (PRINIVIL;ZESTRIL) 10 MG tablet Take 0.5 tablets by mouth daily 45 tablet 1    Blood Glucose Monitoring Suppl (ONETOUCH VERIO) w/Device KIT 1 Device by Does not apply route once for 1 dose 1 kit 0    blood glucose test strips (ONETOUCH VERIO) strip 1 each by In Vitro route 3 times daily As needed.  100 each 11    ONE TOUCH ULTRASOFT LANCETS MISC 1 each by Does not apply route 3 times daily 100 each 11    blood glucose test strips (FREESTYLE LITE) strip USE TO TEST 3 TIMES DAILY AS NEEDED E11.9 GIVE WHAT IS COVERED. 300 each 11    pravastatin (PRAVACHOL) 20 MG tablet Take 1 tablet by mouth daily 30 tablet 5    lidocaine (LIDODERM) 5 % PLACE 3 PATCHES ONTO THE SKIN EVERY 24 HOURS, 12 HOURS ON AND 12 HOURS OFF 90 patch 10    insulin glargine (BASAGLAR KWIKPEN) 100 UNIT/ML injection pen Inject 50 Units into the skin nightly 5 pen 10    insulin lispro, 1 Unit Dial, (HUMALOG KWIKPEN) 100 UNIT/ML SOPN Inject 35 Units into the skin 3 times daily (before meals) 5 pen 6    bisoprolol-hydrochlorothiazide (ZIAC) 10-6.25 MG per tablet TAKE ONE TABLET BY MOUTH DAILY 30 tablet 5    metFORMIN (GLUCOPHAGE) 1000 MG tablet TAKE ONE TABLET BY MOUTH TWICE A DAY 60 tablet 10    gabapentin (NEURONTIN) 600 MG tablet TAKE TWO TABLETS BY MOUTH EVERY MORNING AND TAKE TWO TABLETS BY MOUTH EVERY NIGHT AT BEDTIME 120 tablet 5    NARCAN 4 MG/0.1ML LIQD nasal spray       Blood Glucose Monitoring Suppl (ACCU-CHEK KATHIA PLUS) w/Device KIT 1 Device by Does not apply route once for 1 dose 1 kit 0    SOFT TOUCH LANCETS MISC 1 Device by Does not apply route 3 times daily 100 each 5    KROGER LANCETS MICRO THIN 33G MISC USE TO TEST BLOOD SUGAR 1-2 TIMES A DAY MAY TEST MORE IF BLOOD SUGAR SEEMS  each 3    Insulin Pen Needle (PEN NEEDLES) 31G X 6 MM MISC TEST BLOOD SUGARS ONCE DAILY FOR E11.42 300 each 11    EPINEPHrine (EPIPEN 2-SHO) 0.3 MG/0.3ML SOAJ injection Inject 0.3 mLs into the muscle once as needed (Hives) Use as directed for allergic reaction 2 each 0    APPLE CIDER VINEGAR PO Take by mouth      oxyCODONE-acetaminophen (PERCOCET) 7.5-325 MG per tablet Take 1 tablet by mouth every 8 hours as needed for Pain (three times daily).  Alcohol Swabs (ALCOHOL PREP) 70 % PADS USE ONCE DAILY FOR EACH INJECTION 100 each 1    Saw Palmetto, Serenoa repens, (SAW PALMETTO PO) Take by mouth 2 times daily      VALERIAN ROOT PO Take by mouth nightly       Allergies   Allergen Reactions    Sulfa Antibiotics Hives       REVIEW OF SYSTEMS  10 systems reviewed, pertinent positives per HPI otherwise noted to be negative. PHYSICAL EXAM  /75   Pulse 122   Temp 98 °F (36.7 °C) (Oral)   Resp 15   Ht 5' 10\" (1.778 m)   Wt 212 lb (96.2 kg)   SpO2 100%   BMI 30.42 kg/m²    Physical exam:  General appearance: awake and cooperative. He is distressed. ill appearing. Skin: Warm and dry. No rashes or lesions. HENT: Normocephalic. Atraumatic. No hemotympanum. Negative rodarte's sign. No raccoon eyes. No nasal septal hematoma. No oropharyngeal bleeding. Mucus membranes are moist. Midface stable. Neck: supple. No C spine tenderness midline. Eyes: MARIANO. EOM intact. Heart: RRR. No murmurs.    Lungs: Abrasion and contusion to anterior right chest, there is associated tenderness, also tenderness palpation directly over the sternum. Patient is tachypneic with 4-5 word conversational dyspnea. CTAB. No wheezes, rales, or rhonchi. Good air exchange  Abdomen: No tenderness. Soft. Non distended. No peritoneal signs. Musculoskeletal: 3 to 4 cm vertical laceration overlying anterior left knee, patellar and quadricep tendons are intact, there is no underlying bony tenderness, ACL and PCL are intact and there is no joint laxity. No T/L spine tenderness midline. No step offs. Extremities atraumatic. No extremity edema. Compartments soft. No deformity. No tenderness in the extremities. All extremities neurovascularly intact. Radial, Dp, and PT pulses +2/4 bilaterally  Neurological: Alert and oriented. No focal deficits. Sensation intact x 4. No aphasia or dysarthria. Psychiatric: Normal mood and affect. LABS  I have reviewed all labs for this visit.    Results for orders placed or performed during the hospital encounter of 10/01/20   CBC Auto Differential   Result Value Ref Range    WBC 7.2 4.0 - 11.0 K/uL    RBC 5.11 4.20 - 5.90 M/uL    Hemoglobin 15.0 13.5 - 17.5 g/dL    Hematocrit 43.9 40.5 - 52.5 %    MCV 86.0 80.0 - 100.0 fL    MCH 29.3 26.0 - 34.0 pg    MCHC 34.1 31.0 - 36.0 g/dL    RDW 14.0 12.4 - 15.4 %    Platelets 738 865 - 341 K/uL    MPV 10.2 5.0 - 10.5 fL    Neutrophils % 65.8 %    Lymphocytes % 20.1 %    Monocytes % 7.8 %    Eosinophils % 5.1 %    Basophils % 1.2 %    Neutrophils Absolute 4.8 1.7 - 7.7 K/uL    Lymphocytes Absolute 1.5 1.0 - 5.1 K/uL    Monocytes Absolute 0.6 0.0 - 1.3 K/uL    Eosinophils Absolute 0.4 0.0 - 0.6 K/uL    Basophils Absolute 0.1 0.0 - 0.2 K/uL   Comprehensive Metabolic Panel w/ Reflex to MG   Result Value Ref Range    Sodium 141 136 - 145 mmol/L    Potassium reflex Magnesium 4.3 3.5 - 5.1 mmol/L    Chloride 106 99 - 110 mmol/L    CO2 23 21 - 32 mmol/L    Anion Gap 12 3 - 16    Glucose 215 (H) 70 - 99 mg/dL    BUN 27 (H) 7 - 20 mg/dL    CREATININE 1.2 0.8 - 1.3 mg/dL    GFR Non-African American >60 >60    GFR African American >60 >60    Calcium 9.0 8.3 - 10.6 mg/dL    Total Protein 6.3 (L) 6.4 - 8.2 g/dL    Alb 4.2 3.4 - 5.0 g/dL    Albumin/Globulin Ratio 2.0 1.1 - 2.2    Total Bilirubin 0.6 0.0 - 1.0 mg/dL    Alkaline Phosphatase 54 40 - 129 U/L    ALT 18 10 - 40 U/L    AST 17 15 - 37 U/L    Globulin 2.1 g/dL   Troponin   Result Value Ref Range    Troponin <0.01 <0.01 ng/mL   Protime-INR   Result Value Ref Range    Protime 11.8 10.0 - 13.2 sec    INR 1.02 0.86 - 1.14   EKG 12 Lead   Result Value Ref Range    Ventricular Rate 123 BPM    Atrial Rate 123 BPM    P-R Interval 160 ms    QRS Duration 76 ms    Q-T Interval 316 ms    QTc Calculation (Bazett) 452 ms    P Axis 14 degrees    R Axis 21 degrees    T Axis 43 degrees    Diagnosis       Sinus tachycardiaNonspecific ST abnormalityWhen compared with ECG of 07-NOV-2017 16:10,Vent. rate has increased BY  47 BPMConfirmed by EMILIANO DOMINGUEZ MD ([id number]) on 10/1/2020 5:30:01 PM       ECG  The Ekg interpreted by me shows  sinus tachycardia, sqse=550   Axis is   Normal  QTc is  within an acceptable range  Intervals and Durations are unremarkable. ST Segments: nonspecific changes    RADIOLOGY  Xr Knee Left (1-2 Views)    Result Date: 10/1/2020  EXAMINATION: 2 XRAY VIEWS OF THE LEFT KNEE 10/1/2020 4:20 pm COMPARISON: 03/15/2018 HISTORY: ORDERING SYSTEM PROVIDED HISTORY: laceration TECHNOLOGIST PROVIDED HISTORY: Reason for exam:->laceration Reason for Exam: LEFT KNEE PAIN WITH LAC, FALL FROM LADDER Acuity: Acute Type of Exam: Initial FINDINGS: Frontal and lateral views of the left knee were performed. There is no acute fracture or dislocation. A joint effusion is not identified. There is mild joint space loss and osteophyte formation within all 3 compartments, similar to prior study of 03/15/2018.   There is a focal mild soft tissue laceration of the anterior soft tissues just anterior to the Trauma  Billable study: yes    Views:  Hepatorenal: Adequate  Perisplenic: Adequate  Suprapubic: Adequate  Pericardial: Adequate  R thorax: Adequate  L thorax: Adequate    Images obtained with findings:   Hepatorenal free fluid: Absent   Perisplenic free fluid: Absent   Suprapubic free fluid: Absent   Pericardial effusion: Present  Right thoracic free fluid: Absent   Right lung sliding: Present  Left thoracic free fluid: Absent   Left lung sliding: Present    Impression:   Peritoneal free fluid: Absent   Pericardial effusion: Present  Right thoracic fluid: Absent   Left thoracic fluid: Absent   Right lung pneumothorax: Absent   Left lung pneumothorax: Absent      Images obtained by Marino, interpreted by Marino. Images were saved to ultrasound machine. The total Critical Care time is 43 minutes which excludes separately billable procedures. During the patient's ED course, the patient was given:  Medications   oxyCODONE-acetaminophen (PERCOCET) 5-325 MG per tablet 1 tablet (1 tablet Oral Given 10/1/20 1530)   0.9 % sodium chloride bolus (0 mLs Intravenous Patient Transferred to Other Facility 10/1/20 1658)   fentaNYL (SUBLIMAZE) injection 25 mcg (25 mcg Intravenous Given 10/1/20 1624)        CLINICAL IMPRESSION  1. Acute traumatic injury of chest wall    2. Dehydration    3. Hyperglycemia    4. Knee laceration, left, initial encounter        Blood pressure 110/75, pulse 122, temperature 98 °F (36.7 °C), temperature source Oral, resp. rate 15, height 5' 10\" (1.778 m), weight 212 lb (96.2 kg), SpO2 100 %. Patient was given scripts for the following medications. I counseled patient how to take these medications. Discharge Medication List as of 10/1/2020  4:59 PM          Follow-up with:  No follow-up provider specified. DISCLAIMER: This chart was created using Dragon dictation software.   Efforts were made by me to ensure accuracy, however some errors may be present due to limitations of this technology and occasionally words are not transcribed correctly.      Supa LuzJack Hughston Memorial Hospitallewis  10/02/20 1809

## 2020-10-14 ENCOUNTER — OFFICE VISIT (OUTPATIENT)
Dept: ENDOCRINOLOGY | Age: 60
End: 2020-10-14
Payer: MEDICARE

## 2020-10-14 VITALS
WEIGHT: 210 LBS | OXYGEN SATURATION: 99 % | BODY MASS INDEX: 30.06 KG/M2 | DIASTOLIC BLOOD PRESSURE: 72 MMHG | SYSTOLIC BLOOD PRESSURE: 118 MMHG | RESPIRATION RATE: 14 BRPM | HEIGHT: 70 IN | HEART RATE: 58 BPM | TEMPERATURE: 98.5 F

## 2020-10-14 PROCEDURE — 99214 OFFICE O/P EST MOD 30 MIN: CPT | Performed by: NURSE PRACTITIONER

## 2020-10-14 RX ORDER — GABAPENTIN 800 MG/1
TABLET ORAL
COMMUNITY
Start: 2020-09-15 | End: 2020-10-14 | Stop reason: CLARIF

## 2020-10-14 RX ORDER — SEMAGLUTIDE 1.34 MG/ML
0.25 INJECTION, SOLUTION SUBCUTANEOUS WEEKLY
Qty: 1 PEN | Refills: 2 | Status: SHIPPED | OUTPATIENT
Start: 2020-10-14 | End: 2020-11-19 | Stop reason: SDUPTHER

## 2020-10-14 RX ORDER — INSULIN GLARGINE 100 [IU]/ML
40 INJECTION, SOLUTION SUBCUTANEOUS 2 TIMES DAILY
Qty: 5 PEN | Refills: 10 | Status: SHIPPED | OUTPATIENT
Start: 2020-10-14 | End: 2021-02-19 | Stop reason: SDUPTHER

## 2020-10-14 ASSESSMENT — ENCOUNTER SYMPTOMS
CONSTIPATION: 0
NAUSEA: 0
SHORTNESS OF BREATH: 0
DIARRHEA: 0
EYE PAIN: 0
COLOR CHANGE: 0

## 2020-10-14 NOTE — PROGRESS NOTES
Endocrinology  Tali Barillas CNP, [address]  [place], [address]  Phone [phone]  Fax [phone]    Jensen Lagunas is a 63 y.o. male who is a new patient following up for management of Diabetes Mellitus Type 2. PCP ANA De La Rosa CNP    Last A1C:   Lab Results   Component Value Date    LABA1C 10.5 2020    LABA1C 11.2 2020    LABA1C 10.9 2019     Last BP Readings:  BP Readings from Last 3 Encounters:   10/14/20 118/72   10/01/20 110/75   20 128/74     Last LDL:   Lab Results   Component Value Date    LDLCALC 65 10/15/2020     Aspirin Use: no    Tobacco/Alcohol History:    Smoking status: Former Smoker     Packs/day: 3.00     Years: 31.00     Pack years: 93.00     Last attempt to quit: 2001     Years since quittin.6    Smokeless tobacco: Never Used    Tobacco comment:     Alcohol use: No     Alcohol/week: 0.0 standard drinks     Diabetes:   Jensen Lagunas  was diagnosed with diabetes type 2 in : 2016   On insulin: since 2016   Patient reports that disease course has been variable and is currently poorly controlled   Current microvascular complications include peripheral neuropathy, fluctuating vision   Current Macrovascular complications there is no history of  CVD, CAD, PVD   Patient reports compliance for about 50 %  to medication, but usually not to diet and exercise instructions.  There have been no recent hospital or ED admissions for hypoglycemia, hyperglycemia or DKA.   [facility] Other ED visit include for none    PMH includes  Past Medical History:   Diagnosis Date    Adenomatous polyps     scoped ;acc to pt ,repeat in one yr    Anxiety     CTS (carpal tunnel syndrome)     CTS (carpal tunnel syndrome)     Diabetes mellitus ([address]     GERD (gastroesophageal reflux disease)     has had stricture dilated mult yrs ago    Hyperlipidemia     Hypertension     Obesity with serious comorbidity 2020    Peripheral neuropathy     Type II or unspecified type diabetes mellitus without mention of complication, not stated as uncontrolled     Urinary frequency      Blood glucose trends:  Patient brought log glucometer for download: no  Readings per day  3 per patient report  BG Range 200s to 400s  Hypoglycemia awareness and symptoms:none recently  Has not done CGM    Current Medication regimen:   Biguanides: Metformin 1000 mg BID  Insulin  · Lantus : 50 units  · Humalo units AC TID (usually takes once a day)--> 15 fixed dose with 2: 50> 150  GFR > 60    Current Dietary regimen:   BF: eggs and toast/hash browns/cereal/ often skips  Lunch: ham sw with cheese  Dinner: may skip/ eat out  Snacks: not much any more  Beverages: mountain dew x 2/sweet tea with lemon/ 160x 4 bottles per day  Average carbs per day: > 150-200    Microvascular Complications:  · Neuropathy: severe peripheral neuropathy extending to knees, on Gabapentin 600mg BID  · Retinopathy: no concerns with vision, field of vision  · Nephropathy: no microalbuminuria no recent MACR    Diabetic Health Maintenance   · Last Eye Exam:   · Last Foot exam:   · Has patient seen a dietitian? Yes  · Current Exercise: No structured exercise  · On ACEI or ARB:  · On statin:     Hyperlipidemia: Current complaints include occasional myalgias but otherwise tolerates well. Lab Results   Component Value Date    CHOL 145 10/15/2020    CHOL 134 2019    CHOL 164 2019     Lab Results   Component Value Date    TRIG 256 10/15/2020    TRIG 284 2019    TRIG 298 2019     Lab Results   Component Value Date    HDL 29 10/15/2020    HDL 27 2019    HDL 28 2019     Lab Results   Component Value Date    LDLCALC 65 10/15/2020    [address] 50 2019    LDLCALC 76 2019     No results found for: LDLDIRECT  No results found for: CHOLHDLRATIO    Vitamin D deficiency: Currently is on no supplementation.  Current complaints include fatigue on daily basis. Last vitamin Dlevel is:  Lab Results   Component Value Date    VITD25 58.6 10/15/2020       Hypertension  Controlled , denies symptoms of dizziness, light headedness. Occasional dependent edema. Tries to follow a salt restricted diet. Lab Results   Component Value Date     10/01/2020    K 4.3 10/01/2020     10/01/2020    CO2 23 10/01/2020    BUN 27 (H) 10/01/2020    CREATININE 1.2 10/01/2020    GLUCOSE 215 (H) 10/01/2020    CALCIUM 9.0 10/01/2020    PROT 6.3 (L) 10/01/2020    LABALBU 4.2 10/01/2020    BILITOT 0.6 10/01/2020    ALKPHOS 54 10/01/2020    AST 17 10/01/2020    ALT 18 10/01/2020    LABGLOM >60 10/01/2020    GFRAA >60 10/01/2020    AGRATIO 2.0 10/01/2020    GLOB 2.1 10/01/2020     The 10-year ASCVD risk score (Nadia Hwang et al., 2013) is: 19.2%    Values used to calculate the score:      Age: 63 years      Sex: Male      Is Non- : No      Diabetic: Yes      Tobacco smoker: No      Systolic Blood Pressure: 858 mmHg      Is BP treated: Yes      HDL Cholesterol: 29 mg/dL      Total Cholesterol: 145 mg/dL    Family History   Problem Relation Age of Onset    Anemia Mother     Hypertension Mother     Heart Disease Father     Diabetes Father     No Known Problems Sister     No Known Problems Brother     No Known Problems Daughter     Other Son         ? etiol;    No Known Problems Son      Review of Systems   Constitutional: Negative for activity change, appetite change, diaphoresis, fever and unexpected weight change. HENT: Negative for dental problem. Eyes: Negative for pain and visual disturbance. Respiratory: Negative for shortness of breath. Cardiovascular: Negative for chest pain, palpitations and leg swelling. Gastrointestinal: Negative for constipation, diarrhea and nausea. Endocrine: Negative for cold intolerance, heat intolerance, polydipsia, polyphagia and polyuria. Genitourinary: Negative for frequency and urgency. Musculoskeletal: Negative for arthralgias, joint swelling and myalgias. Skin: Negative for color change and pallor. Neurological: Negative for weakness, numbness and headaches. Psychiatric/Behavioral: Negative for dysphoric mood and sleep disturbance. The patient is not nervous/anxious. Vitals:    10/14/20 1354   BP: 118/72   Pulse: 58   Resp: 14   Temp: 98.5 °F (36.9 °C)   TempSrc: Temporal   SpO2: 99%   Weight: 210 lb (95.3 kg)   Height: 5' 10\" (1.778 m)     Physical Exam  Vitals signs reviewed. Constitutional:       Appearance: He is well-developed. Eyes:      Pupils: Pupils are equal, round, and reactive to light. Neck:      Musculoskeletal: Normal range of motion. Thyroid: No thyromegaly. Cardiovascular:      Rate and Rhythm: Normal rate and regular rhythm. Heart sounds: Normal heart sounds. Pulmonary:      Effort: Pulmonary effort is normal.      Breath sounds: Normal breath sounds. Abdominal:      General: There is no distension. Musculoskeletal: Normal range of motion. Skin:     General: Skin is warm and dry. Comments: No skin changes or evidence of trauma. Neurological:      Mental Status: He is alert and oriented to person, place, and time. Sensory: No sensory deficit. Psychiatric:         Behavior: Behavior normal.         Thought Content: Thought content normal.       Skeletal foot exam:   Toenails are normal.   Pulses are +1 DP bilaterally. Sensory: 10 g monofilament is 0/10 on the right and 0/10 on the left,   128 Hz vibration sense is absent bilaterally.     Assessment  Maria Luisa Leija is a 63 y.o. male with uncontrolled Diabetes Mellitus type 2 complicated by peripheral neuropathy and associated with HTN /HLD/ Depression/    Plan  Diabetes Mellitus Type 2  Lab Results   Component Value Date    LABA1C 10.5 09/09/2020     Goal A1c  < 6.5%  Lab Results   Component Value Date    LABA1C 10.5 09/09/2020     Basaglar  Continue 50 units at night    humalog  To cover your meal : 15 units    To cover blood sugar   151- 200 Add  2 units  201- 250 Add 4 units  251- 300 Add 6 units   301- 350 Add 8 units  351- 400 Add 10 units  If > 400 : take fixed dose  +10 units, recheck blood sugar in 4 hours and dose according to the sliding scale only  Avoid hypoglycemia    Diet :   30-40 grams per meal , 3 meals per day, avoid carbs in snack choices  Include moderate proteins and good fats   Ensure adequate hydration and  electrolyte replacement    Exercise :  Recommended exercise is 5-7 days a week for 30-60 mins at least, per day OR a total of 2.5 hours per week , which ever is more feasible. Ambulate as possible     Diabetic Health Maintenance  Follow up with annual eye exams  Follow up with annual podiatry exams if needed  Reviewed sick day management of BG     Other areas of Diabetic Education reviewed:   Carbs: good carbs and bad carbs, importance of carb counting, incorporation of protein with each meal to reduce Glycemic index, importance of portions, Carb/insulin ratio   Fats: Good fats and bad fats, meal planning and supplements.  Discussed how food affects blood sugar readings.  Different diabetic medications   Managing high and low sugar readings   Rotation of sites for subcutaneous medication injection    Mixed Hyperlipidemia  Lab Results   Component Value Date    LDLCALC 65 10/15/2020    LDLCALC 50 09/20/2019    LDLCALC 76 03/29/2019     No results found for: LDLDIRECT  Lab Results   Component Value Date    TRIG 256 10/15/2020    TRIG 284 09/20/2019    TRIG 298 03/29/2019     LDL goal  < 100;   TG elevated at 284  Continue current regimen  Managed by PCP    Essential Hypertension  Blood pressure controlled.  Continue current regimen       Vitamin D deficiency  Lab Results   Component Value Date    VITD25 58.6 10/15/2020     Check levels    Problem List Items Addressed This Visit     Type 2 diabetes mellitus with diabetic neuropathy, with long-term current use of insulin (HCC) - Primary    Relevant Medications    Semaglutide,0.25 or 0.5MG/DOS, (OZEMPIC, 0.25 OR 0.5 MG/DOSE,) 2 MG/1.5ML SOPN    insulin glargine (BASAGLAR KWIKPEN) 100 UNIT/ML injection pen    Other Relevant Orders    T4, Free (Completed)    TSH without Reflex (Completed)    Lipid Panel (Completed)    Vitamin D deficiency    Relevant Orders    Vitamin D 25 Hydroxy (Completed)          Greater than 40 minutes spent directly counseling patient about topics listed above (such as lifestyle modifications, preventative screenings and/or disease related processes. No follow-ups on file.

## 2020-10-14 NOTE — PATIENT INSTRUCTIONS
Lantus: 30 units twice a day  Goal range for morning blood sugars:   Increase night time dose as needed 1 unit every 2 days until morning blood sugars are in range    Humalog : no change    Trial Ozempic: call if you decide to stop for side effects

## 2020-10-15 ENCOUNTER — HOSPITAL ENCOUNTER (OUTPATIENT)
Age: 60
Discharge: HOME | End: 2020-10-15
Payer: MEDICARE

## 2020-10-15 LAB
CHOLESTEROL, TOTAL: 145 MG/DL (ref 0–199)
HDLC SERPL-MCNC: 29 MG/DL (ref 40–60)
LDL CHOLESTEROL CALCULATED: 65 MG/DL
T4 FREE: 1.2 NG/DL (ref 0.9–1.8)
TRIGL SERPL-MCNC: 256 MG/DL (ref 0–150)
TSH SERPL DL<=0.05 MIU/L-ACNC: 1.84 UIU/ML (ref 0.27–4.2)
VITAMIN D 25-HYDROXY: 58.6 NG/ML
VLDLC SERPL CALC-MCNC: 51 MG/DL

## 2020-10-15 PROCEDURE — 82306 VITAMIN D 25 HYDROXY: CPT

## 2020-10-15 PROCEDURE — 80061 LIPID PANEL: CPT

## 2020-10-15 PROCEDURE — 84439 ASSAY OF FREE THYROXINE: CPT

## 2020-10-15 PROCEDURE — 36415 COLL VENOUS BLD VENIPUNCTURE: CPT

## 2020-10-15 PROCEDURE — 84443 ASSAY THYROID STIM HORMONE: CPT

## 2020-10-20 ENCOUNTER — TELEPHONE (OUTPATIENT)
Dept: FAMILY MEDICINE CLINIC | Age: 60
End: 2020-10-20

## 2020-10-20 RX ORDER — PRAVASTATIN SODIUM 20 MG
TABLET ORAL
Qty: 90 TABLET | Refills: 4 | OUTPATIENT
Start: 2020-10-20

## 2020-10-20 NOTE — TELEPHONE ENCOUNTER
Patient says he is being prescribed pravastatin 20 mg because his insurance would not cover the 10 mg but would cover the 20 mg and he cuts it in half to take 10 mg. Says he is wasting a lot of the prescription when he cuts it.  His insurance has changed, asking if we can change back to the 10 mg and send to [facility]

## 2020-10-26 RX ORDER — PRAVASTATIN SODIUM 10 MG
10 TABLET ORAL DAILY
Qty: 30 TABLET | Refills: 5 | Status: SHIPPED | OUTPATIENT
Start: 2020-10-26 | End: 2021-04-26 | Stop reason: SDUPTHER

## 2020-10-26 NOTE — TELEPHONE ENCOUNTER
Please go ahead and refill his pravchol, with 2 refills, he will have to see me within the next 3 months.     Thanks,  Giovanny Cano

## 2020-10-26 NOTE — TELEPHONE ENCOUNTER
He has not been seen in over a year. Please contact whoever he is seeing now about this.     Thanks,  Neda Prescott

## 2020-10-26 NOTE — TELEPHONE ENCOUNTER
Future appt scheduled 0 scheduled-  Return in about 3 months (around 6/9/2020)             Last appt 03/09/2020      Last Written 06/08/2020    pravastatin (PRAVACHOL) 20 MG tablet  #30  5 RF     Mobile: [phone] (Preferred) -   Called pt back who stated he just needs the 20 mg changed back to 10 mg. He has been cutting his pills (due to insurance only covering the 20 mg) and they crumble. I attempted to schedule pt an appt. Pt stated [facility] and if he can avoid any other bills at this time, stating he just cannot afford to come in. Pt states that if we cannot push his appointment out for him, he will just stop taking his medication.  I told pt we will advise his pcp

## 2020-11-10 ENCOUNTER — TELEPHONE (OUTPATIENT)
Dept: PHARMACY | Facility: CLINIC | Age: 60
End: 2020-11-10

## 2020-11-10 NOTE — TELEPHONE ENCOUNTER
CLINICAL PHARMACY: ADHERENCE REVIEW  Identified care gap per [employer]; fills at [facility]: ACE/ARB adherence (1st fill)    Last Office Visit: 3/9/20    Patient also appears to be taking: statin, DM     ASSESSMENT  ACE/ARB ADHERENCE  PDC: @95% YTD (potential fail date: 11/15/20)    Per [address] [place] [address]   Lisinopril 10mg, 1/2 tab daily, last filled on 7/16/20 for a 90 day supply; 1 refill remaining - will get ready for patient today since appears due. BP Readings from Last 3 Encounters:   10/14/20 118/72   10/01/20 110/75   09/09/20 128/74     Estimated Creatinine Clearance: 75 mL/min (based on SCr of 1.2 mg/dL). DIABETES ADHERENCE  PDC: not reported d/t insulin    Lab Results   Component Value Date    LABA1C 10.5 09/09/2020    LABA1C 11.2 02/03/2020    LABA1C 10.9 09/20/2019       STATIN ADHERENCE  [facility]: @92% YTD (with confirmed Oct fill, needs 11 more days to be \"adherent\" this year)    Per [address] [facility]   Pravastatin 10mg daily last filled on 10/26/20 for a 30 day supply and was picked up; 5 refills remaining which can be combined for 90ds if patient requests. (prev fill was 7/1/20 #90/90ds of pravastatin 20mg)    Per chart review: 10/20/20 encounter - had been using pravastatin 20mg tab but cutting in 1/2 because prev insurance would not cover pravastatin 10mg?   Rx now updated to pravastatin 10mg tab daily    Lab Results   Component Value Date    CHOL 145 10/15/2020    TRIG 256 (H) 10/15/2020    HDL 29 (L) 10/15/2020    LDLCALC 65 10/15/2020     ALT   Date Value Ref Range Status   10/01/2020 18 10 - 40 U/L Final     AST   Date Value Ref Range Status   10/01/2020 17 15 - 37 U/L Final     The 10-year ASCVD risk score (Marilu Patel et al., 2013) is: 19.2%    Values used to calculate the score:      Age: 63 years      Sex: Male      Is Non- : No      Diabetic: Yes      Tobacco smoker: No      Systolic Blood Pressure: 801 mmHg      Is BP treated: Yes      HDL Cholesterol: 29 mg/dL      Total Cholesterol: 145 mg/dL       PLAN  Attempting to reach patient to review.  Left message asking for return call.   · Lisinopril adh? ([facility] getting refill ready today, but was due @10/14)  · Can request 90ds fill when pravastatin due again (is also LIS1, so may be cost savings)    Vince Conte, PharmD, [address]  Direct: [phone]  Department, toll free: [phone], option 7

## 2020-11-12 NOTE — TELEPHONE ENCOUNTER
Attempting again to reach patient. Left another message advising refill is ready for  for one medication and another medication should be eligible for 3-month supply with his next refill; will send Mychart.    =======================================================   For Pharmacy Admin Tracking Only    PHSO: Yes  Total # of Interventions Recommended: 2  - New Order #: 1 New Medication Order Reason(s):  Adherence  - Maintenance Safety Lab Monitoring #: 1  - New Therapy Lab Monitoring #: 0  Recommended intervention potential cost savings: 0  Total Interventions Accepted: 0  Time Spent (min): 15

## 2020-11-19 RX ORDER — SEMAGLUTIDE 1.34 MG/ML
0.5 INJECTION, SOLUTION SUBCUTANEOUS WEEKLY
Qty: 1 PEN | Refills: 2 | Status: SHIPPED | OUTPATIENT
Start: 2020-11-19 | End: 2021-02-19

## 2020-12-10 ENCOUNTER — TELEPHONE (OUTPATIENT)
Dept: PHARMACY | Facility: CLINIC | Age: 60
End: 2020-12-10

## 2020-12-11 NOTE — TELEPHONE ENCOUNTER
Incoming call received from the patient - states that he will  pravastatin. Thankful for the 90 day supply. Will sign off. Cliff Byrd, PharmD, [facility]  Direct: [phone]  Department, toll free [phone], option 7         For Pharmacy Admin Tracking Only    PHSO: Yes  Total # of Interventions Recommended: 2  - New Order #: 1 New Medication Order Reason(s): Adherence  - Refills Provided #: 1  - Updated Order #: 0 Updated Order Reason(s):  Other  - Maintenance Safety Lab Monitoring #: 1  - New Therapy Lab Monitoring #: 1  Recommended intervention potential cost savings: 1  Total Interventions Accepted: 1  Time Spent (min): 15
[facility]  Direct: [phone]  Department, toll free [phone], option 7

## 2021-01-04 ENCOUNTER — TELEPHONE (OUTPATIENT)
Dept: ADMINISTRATIVE | Age: 61
End: 2021-01-04

## 2021-01-04 NOTE — TELEPHONE ENCOUNTER
Submitted PA for Lidocaine 5% patches, Key: [id number]. Medication has been APPROVED through 12/31/2021. Please notify patient. Thank you.

## 2021-01-06 ENCOUNTER — OFFICE VISIT (OUTPATIENT)
Dept: ENDOCRINOLOGY | Age: 61
End: 2021-01-06
Payer: MEDICARE

## 2021-01-06 VITALS
SYSTOLIC BLOOD PRESSURE: 120 MMHG | BODY MASS INDEX: 30.06 KG/M2 | OXYGEN SATURATION: 97 % | HEIGHT: 70 IN | HEART RATE: 101 BPM | RESPIRATION RATE: 14 BRPM | WEIGHT: 210 LBS | DIASTOLIC BLOOD PRESSURE: 70 MMHG

## 2021-01-06 DIAGNOSIS — I10 ESSENTIAL HYPERTENSION: ICD-10-CM

## 2021-01-06 DIAGNOSIS — E78.2 MIXED HYPERLIPIDEMIA: ICD-10-CM

## 2021-01-06 DIAGNOSIS — E55.9 VITAMIN D DEFICIENCY: ICD-10-CM

## 2021-01-06 DIAGNOSIS — E11.40 TYPE 2 DIABETES MELLITUS WITH DIABETIC NEUROPATHY, WITH LONG-TERM CURRENT USE OF INSULIN (HCC): Primary | ICD-10-CM

## 2021-01-06 DIAGNOSIS — Z79.4 TYPE 2 DIABETES MELLITUS WITH DIABETIC NEUROPATHY, WITH LONG-TERM CURRENT USE OF INSULIN (HCC): Primary | ICD-10-CM

## 2021-01-06 LAB — HBA1C MFR BLD: 7.9 %

## 2021-01-06 PROCEDURE — 83036 HEMOGLOBIN GLYCOSYLATED A1C: CPT | Performed by: NURSE PRACTITIONER

## 2021-01-06 PROCEDURE — 99214 OFFICE O/P EST MOD 30 MIN: CPT | Performed by: NURSE PRACTITIONER

## 2021-01-06 PROCEDURE — 3051F HG A1C>EQUAL 7.0%<8.0%: CPT | Performed by: NURSE PRACTITIONER

## 2021-01-06 RX ORDER — GABAPENTIN 800 MG/1
TABLET ORAL
COMMUNITY
Start: 2020-12-29 | End: 2021-11-03 | Stop reason: ALTCHOICE

## 2021-01-06 RX ORDER — LISINOPRIL 5 MG/1
TABLET ORAL
COMMUNITY
Start: 2020-12-29 | End: 2021-01-06 | Stop reason: ALTCHOICE

## 2021-01-06 RX ORDER — BUSPIRONE HYDROCHLORIDE 10 MG/1
TABLET ORAL
COMMUNITY
Start: 2020-12-29 | End: 2021-11-03 | Stop reason: ALTCHOICE

## 2021-01-06 RX ORDER — SEMAGLUTIDE 1.34 MG/ML
1 INJECTION, SOLUTION SUBCUTANEOUS WEEKLY
Qty: 4 PEN | Refills: 5 | Status: SHIPPED
Start: 2021-01-06 | End: 2021-02-19 | Stop reason: SINTOL

## 2021-01-06 RX ORDER — INSULIN ASPART 100 [IU]/ML
10 INJECTION, SOLUTION INTRAVENOUS; SUBCUTANEOUS 2 TIMES DAILY
COMMUNITY
Start: 2020-12-08

## 2021-01-06 ASSESSMENT — ENCOUNTER SYMPTOMS
CONSTIPATION: 0
COLOR CHANGE: 0
DIARRHEA: 0
SHORTNESS OF BREATH: 0
EYE PAIN: 0
NAUSEA: 0

## 2021-01-06 NOTE — PATIENT INSTRUCTIONS
Basaglar: 20 units twice a day  Humalog: no change    Stop Metformin for 1 week and follow up with change in symptoms if any    Do not increase ozempic at this time    HbA1c  4.0 4.1 4.2 4.3 4.4 4.5 4.6 4.7 4.8 4.9  Glucose 68 71 74 77 80 82 85 88 91 94    HbA1c  5.0 5.1 5.2 5.3 5.4 5.5 5.6 5.7 5.8 5.9  Glucose 97 100 103 105 108 111 114 117 120 123    HbA1c  6.0 6.1 6.2 6.3 6.4 6.5 6.6 6.7 6.8 6.9  Glucose 125 128 131 134 137 140 143 146 148 151    HbA1c  7.0 7.1 7.2 7.3 7.4 7.5 7.6 7.7 7.8 7.9  Glucose 154 157 160 163 166 169 171 174 177 180    HbA1c  8.0 8.1 8.2 8.3 8.4 8.5 8.6 8.7 8.8 8.9  Glucose 183 186 189 192 194 197 200 203 206 209    HbA1c  9.0 9.1 9.2 9.3 9.4 9.5 9.6 9.7 9.8 9.9  Glucose 212 214 217 220 223 226 229 232 235 237    HbA1c  10.0 10.1 10.2 10.3 10.4 10.5 10.6 10.7 10.8 10.9  Glucose 240 243 246 249 252 255 258 260 263 266    HbA1c  11.0 11.1 11.2 11.3 11.4 11.5 11.6 11.7 11.8 11.9  Glucose 269 272 275 278 280 283 286 289 292 295    HbA1c  12.0 12.1 12.2 12.3 12.4 12.5 12.6 12.7 12.8 12.9  Glucose 298 301 303 306 309 312 315 318 321 324    HbA1c  13.0 13.1 13.2 13.3 13.4 13.5 13.6 13.7 13.8 13.9  Glucose 326 329 332 335 338 341 344 346 349 352

## 2021-01-06 NOTE — PROGRESS NOTES
Endocrinology  Cherelle Jiang CNP, [address]  [place], [address]  Phone [phone]  Fax [phone]    Ty Grace is a 63 y.o. male who is a new patient following up for management of Diabetes Mellitus Type 2. PCP ANA Velazquez CNP    Last A1C:   Lab Results   Component Value Date    LABA1C 7.9 2021    LABA1C 10.5 2020    LABA1C 11.2 2020     Last BP Readings:  BP Readings from Last 3 Encounters:   21 120/70   10/14/20 118/72   10/01/20 110/75     Last LDL:   Lab Results   Component Value Date    LDLCALC 65 10/15/2020     Aspirin Use: no    Tobacco/Alcohol History:    Smoking status: Former Smoker     Packs/day: 3.00     Years: 31.00     Pack years: 93.00     Last attempt to quit: 2001     Years since quittin.6    Smokeless tobacco: Never Used    Tobacco comment:     Alcohol use: No     Alcohol/week: 0.0 standard drinks     Diabetes:   yT Grace  was diagnosed with diabetes type 2 in : 2016   On insulin: since 2016   Patient reports that disease course has been variable and is currently poorly controlled   Current microvascular complications include peripheral neuropathy, fluctuating vision   Current Macrovascular complications there is no history of  CVD, CAD, PVD   Patient reports compliance for about 50 %  to medication, but usually not to diet and exercise instructions.  There have been no recent hospital or ED admissions for hypoglycemia, hyperglycemia or DKA.   Sukhdeep Sarabia Other ED visit include for none    PMH includes  Past Medical History:   Diagnosis Date    Adenomatous polyps     scoped ;acc to pt ,repeat in one yr    Anxiety     CTS (carpal tunnel syndrome)     CTS (carpal tunnel syndrome)     Diabetes mellitus ([address]     GERD (gastroesophageal reflux disease)     has had stricture dilated mult yrs ago    Hyperlipidemia     Hypertension     Obesity with serious comorbidity 2020    Peripheral neuropathy     Type II or unspecified type diabetes mellitus without mention of complication, not stated as uncontrolled     Urinary frequency      Blood glucose trends:  Patient brought log glucometer for download: no  Readings per day  3 per patient report  BG Range 200s to 400s--> 115-220  Hypoglycemia awareness and symptoms:none recently  Has not done CGM    Current Medication regimen:   Biguanides: Metformin 1000 mg BID  Insulin  · Lantus : 50 units--> 25 -30 units  · Humalo units AC TID (usually takes once a day)--> 15 fixed dose with 2: 50> 150  GFR > 60    Current Dietary regimen:   BF: eggs and toast/hash browns/cereal/ often skips  Lunch: ham sw with cheese  Dinner: may skip/ eat out  Snacks: not much any more  Beverages: mountain dew x 2/sweet tea with lemon/ 160x 4 bottles per day--> has stopped almost entirely  Average carbs per day: > 150-200--> usually 100 or less per day    Microvascular Complications:  · Neuropathy: severe peripheral neuropathy extending to knees, on Gabapentin 600mg BID  · Retinopathy: no concerns with vision, field of vision  · Nephropathy: no microalbuminuria no recent MACR    Diabetic Health Maintenance   · Last Eye Exam:   · Last Foot exam:   · Has patient seen a dietitian? Yes  · Current Exercise: No structured exercise  · On ACEI or ARB:  · On statin:     Hyperlipidemia: Current complaints include occasional myalgias but otherwise tolerates well.   Lab Results   Component Value Date    CHOL 145 10/15/2020    CHOL 134 2019    CHOL 164 2019     Lab Results   Component Value Date    TRIG 256 10/15/2020    TRIG 284 2019    TRIG 298 2019     Lab Results   Component Value Date    HDL 29 10/15/2020    HDL 27 2019    HDL 28 2019     Lab Results   Component Value Date    LDLCALC 65 10/15/2020    LDLCALC 50 2019    LDLCALC 76 2019     No results found for: LDLDIRECT  No results found for: CHOLHDLRATIO    Vitamin D deficiency: Currently is on no supplementation. Current complaints include fatigue on daily basis. Last vitamin Dlevel is:  Lab Results   Component Value Date    VITD25 58.6 10/15/2020       Hypertension  Controlled , denies symptoms of dizziness, light headedness. Occasional dependent edema. Tries to follow a salt restricted diet. Lab Results   Component Value Date     10/01/2020    K 4.3 10/01/2020     10/01/2020    CO2 23 10/01/2020    BUN 27 (H) 10/01/2020    CREATININE 1.2 10/01/2020    GLUCOSE 215 (H) 10/01/2020    CALCIUM 9.0 10/01/2020    PROT 6.3 (L) 10/01/2020    LABALBU 4.2 10/01/2020    BILITOT 0.6 10/01/2020    ALKPHOS 54 10/01/2020    AST 17 10/01/2020    ALT 18 10/01/2020    LABGLOM >60 10/01/2020    GFRAA >60 10/01/2020    AGRATIO 2.0 10/01/2020    GLOB 2.1 10/01/2020     The 10-year ASCVD risk score (Julia Nunez et al., 2013) is: 19.7%    Values used to calculate the score:      Age: 63 years      Sex: Male      Is Non- : No      Diabetic: Yes      Tobacco smoker: No      Systolic Blood Pressure: 688 mmHg      Is BP treated: Yes      HDL Cholesterol: 29 mg/dL      Total Cholesterol: 145 mg/dL    Family History   Problem Relation Age of Onset    Anemia Mother     Hypertension Mother     Heart Disease Father     Diabetes Father     No Known Problems Sister     No Known Problems Brother     No Known Problems Daughter     Other Son         ? etiol;    No Known Problems Son      Review of Systems   Constitutional: Negative for activity change, appetite change, diaphoresis, fever and unexpected weight change. HENT: Negative for dental problem. Eyes: Negative for pain and visual disturbance. Respiratory: Negative for shortness of breath. Cardiovascular: Negative for chest pain, palpitations and leg swelling. Gastrointestinal: Negative for constipation, diarrhea and nausea.    Endocrine: Negative for cold intolerance, heat intolerance, polydipsia, polyphagia and polyuria. Genitourinary: Negative for frequency and urgency. Musculoskeletal: Negative for arthralgias, joint swelling and myalgias. Skin: Negative for color change and pallor. Neurological: Negative for weakness, numbness and headaches. Psychiatric/Behavioral: Negative for dysphoric mood and sleep disturbance. The patient is not nervous/anxious. Vitals:    01/06/21 1618   BP: 120/70   Pulse: 101   Resp: 14   SpO2: 97%   Weight: 210 lb (95.3 kg)   Height: 5' 10\" (1.778 m)     Physical Exam  Vitals signs reviewed. Constitutional:       Appearance: He is well-developed. Eyes:      Pupils: Pupils are equal, round, and reactive to light. Neck:      Musculoskeletal: Normal range of motion. Thyroid: No thyromegaly. Cardiovascular:      Rate and Rhythm: Normal rate and regular rhythm. Heart sounds: Normal heart sounds. Pulmonary:      Effort: Pulmonary effort is normal.      Breath sounds: Normal breath sounds. Abdominal:      General: There is no distension. Musculoskeletal: Normal range of motion. Skin:     General: Skin is warm and dry. Comments: No skin changes or evidence of trauma. Neurological:      Mental Status: He is alert and oriented to person, place, and time. Sensory: No sensory deficit. Psychiatric:         Behavior: Behavior normal.         Thought Content: Thought content normal.       Skeletal foot exam:   Toenails are normal.   Pulses are +1 DP bilaterally. Sensory: 10 g monofilament is 0/10 on the right and 0/10 on the left,   128 Hz vibration sense is absent bilaterally.     Assessment  Lena Crouch is a 63 y.o. male with uncontrolled Diabetes Mellitus type 2 complicated by peripheral neuropathy and associated with HTN /HLD/ Depression/    Plan  Diabetes Mellitus Type 2  Lab Results   Component Value Date    LABA1C 7.9 01/06/2021     Goal A1c  < 6.5%  Lab Results   Component Value Date    LABA1C 10.5 09/09/2020     Basaglar: 20 units BID  Humalog 15 u AC TID with 2: 50 > 150  Ozempic : increase to 1 mg QW ( will not start for next 2 weeks)  Metformin: Trial stopping it for 1 week to review impact on G/I s/e      Diet :   30-40 grams per meal , 3 meals per day, avoid carbs in snack choices  Include moderate proteins and good fats   Ensure adequate hydration and  electrolyte replacement    Exercise :  Recommended exercise is 5-7 days a week for 30-60 mins at least, per day OR a total of 2.5 hours per week , which ever is more feasible. Ambulate as possible     Diabetic Health Maintenance  Follow up with annual eye exams--> referral provided  Follow up with annual podiatry exams if needed  Reviewed sick day management of BG     Other areas of Diabetic Education reviewed:   Carbs: good carbs and bad carbs, importance of carb counting, incorporation of protein with each meal to reduce Glycemic index, importance of portions, Carb/insulin ratio   Fats: Good fats and bad fats, meal planning and supplements.  Discussed how food affects blood sugar readings.  Different diabetic medications   Managing high and low sugar readings   Rotation of sites for subcutaneous medication injection    Mixed Hyperlipidemia  Lab Results   Component Value Date    LDLCALC 65 10/15/2020    LDLCALC 50 09/20/2019    LDLCALC 76 03/29/2019     No results found for: LDLDIRECT  Lab Results   Component Value Date    TRIG 256 10/15/2020    TRIG 284 09/20/2019    TRIG 298 03/29/2019     LDL goal  < 100;   TG elevated at 284  Continue current regimen  Managed by PCP    Essential Hypertension  Blood pressure controlled.  Continue current regimen       Vitamin D deficiency  Lab Results   Component Value Date    VITD25 58.6 10/15/2020     Problem List Items Addressed This Visit     HTN (hypertension)    Hyperlipidemia    Type 2 diabetes mellitus with diabetic neuropathy, with long-term current use of insulin ([facility] - Primary    Relevant Medications    NOVOLOG FLEXPEN 100 UNIT/ML injection pen    Semaglutide, 1 MG/DOSE, (OZEMPIC, 1 MG/DOSE,) 2 MG/1.5ML SOPN    Other Relevant Orders    POCT glycosylated hemoglobin (Hb A1C) (Completed)    External Referral To Ophthalmology    Vitamin D deficiency          Greater than 40 minutes spent directly counseling patient about topics listed above (such as lifestyle modifications, preventative screenings and/or disease related processes. Return in about 3 months (around 4/6/2021).

## 2021-02-08 RX ORDER — BISOPROLOL FUMARATE AND HYDROCHLOROTHIAZIDE 10; 6.25 MG/1; MG/1
TABLET ORAL
Qty: 30 TABLET | Refills: 1 | Status: SHIPPED | OUTPATIENT
Start: 2021-02-08 | End: 2021-11-03 | Stop reason: ALTCHOICE

## 2021-02-17 ENCOUNTER — TELEPHONE (OUTPATIENT)
Dept: ENDOCRINOLOGY | Age: 61
End: 2021-02-17

## 2021-02-19 ENCOUNTER — VIRTUAL VISIT (OUTPATIENT)
Dept: ENDOCRINOLOGY | Age: 61
End: 2021-02-19
Payer: MEDICARE

## 2021-02-19 DIAGNOSIS — E55.9 VITAMIN D DEFICIENCY: ICD-10-CM

## 2021-02-19 DIAGNOSIS — I10 ESSENTIAL HYPERTENSION: ICD-10-CM

## 2021-02-19 DIAGNOSIS — E11.40 TYPE 2 DIABETES MELLITUS WITH DIABETIC NEUROPATHY, WITH LONG-TERM CURRENT USE OF INSULIN (HCC): ICD-10-CM

## 2021-02-19 DIAGNOSIS — Z79.4 TYPE 2 DIABETES MELLITUS WITH DIABETIC NEUROPATHY, WITH LONG-TERM CURRENT USE OF INSULIN (HCC): Primary | ICD-10-CM

## 2021-02-19 DIAGNOSIS — E11.40 TYPE 2 DIABETES MELLITUS WITH DIABETIC NEUROPATHY, WITH LONG-TERM CURRENT USE OF INSULIN (HCC): Primary | ICD-10-CM

## 2021-02-19 DIAGNOSIS — E78.2 MIXED HYPERLIPIDEMIA: ICD-10-CM

## 2021-02-19 DIAGNOSIS — E66.9 CLASS 1 OBESITY WITH SERIOUS COMORBIDITY AND BODY MASS INDEX (BMI) OF 30.0 TO 30.9 IN ADULT, UNSPECIFIED OBESITY TYPE: ICD-10-CM

## 2021-02-19 DIAGNOSIS — Z79.4 TYPE 2 DIABETES MELLITUS WITH DIABETIC NEUROPATHY, WITH LONG-TERM CURRENT USE OF INSULIN (HCC): ICD-10-CM

## 2021-02-19 PROCEDURE — 3051F HG A1C>EQUAL 7.0%<8.0%: CPT | Performed by: NURSE PRACTITIONER

## 2021-02-19 PROCEDURE — 99214 OFFICE O/P EST MOD 30 MIN: CPT | Performed by: NURSE PRACTITIONER

## 2021-02-19 RX ORDER — INSULIN GLARGINE 100 [IU]/ML
25-30 INJECTION, SOLUTION SUBCUTANEOUS 2 TIMES DAILY
Qty: 5 PEN | Refills: 5 | Status: SHIPPED | OUTPATIENT
Start: 2021-02-19

## 2021-02-19 ASSESSMENT — ENCOUNTER SYMPTOMS
DIARRHEA: 0
NAUSEA: 0
COLOR CHANGE: 0
CONSTIPATION: 0
SHORTNESS OF BREATH: 0
EYE PAIN: 0

## 2021-02-19 NOTE — PROGRESS NOTES
Endocrinology  Zack Metcalf CNP, [address] [facility] [address] [facility]  989 [facility], [address]  Phone [phone]  Fax [phone]    Patient is  being evaluated by a Virtual Visit (phone visit) encounter to address concerns as mentioned above. Due to this being a TeleHealth encounter (During  public health emergency), evaluation of the following organ systems was limited: Vitals/Constitutional/EENT/Resp/CV/GI//MS/Neuro/Skin/Heme-Lymph-Imm. Pursuant to the emergency declaration under the [address] [place] [address], [address] [place] [address] authority and the [employer] Act, this Virtual Visit was conducted with patient's (and/or legal guardian's) consent, to reduce the patient's risk of exposure to COVID-19 and provide necessary medical care. The patient (and/or legal guardian) has also been advised to contact this office for worsening conditions or problems, and seek emergency medical treatment and/or call 911 if deemed necessary. Services were provided through a phone synchronous discussion virtually to substitute for in-person clinic visit. Patient and provider were located at their individual homes    Patient was identified via name,  on the phone visit    Eleazar Thompson is a 63 y.o. male who is a new patient following up for management of Diabetes Mellitus Type 2.     PCP  Has a new PCP, cannot recall name    Last A1C:   Lab Results   Component Value Date    LABA1C 7.9 2021    LABA1C 10.5 2020    LABA1C 11.2 2020     Last BP Readings:  BP Readings from Last 3 Encounters:   21 120/70   10/14/20 118/72   10/01/20 110/75     Last LDL:   Lab Results   Component Value Date    LDLCALC 65 10/15/2020     Aspirin Use: no    Tobacco/Alcohol History:    Smoking status: Former Smoker     Packs/day: 3.00     Years: 31.00     Pack years: 93.00     Last attempt to quit: 2001 Years since quittin.6    Smokeless tobacco: Never Used    Tobacco comment:     Alcohol use: No     Alcohol/week: 0.0 standard drinks     Diabetes:  ? Rohit Cade  was diagnosed with diabetes type 2 in : 2016  ? On insulin: since 2016  ? Patient reports that disease course has been variable and is currently poorly controlled  ? Current microvascular complications include peripheral neuropathy, fluctuating vision  ? Current Macrovascular complications there is no history of  CVD, CAD, PVD  ? Patient reports compliance for about 50 %  to medication, but usually not to diet and exercise instructions. ? There have been no recent hospital or ED admissions for hypoglycemia, hyperglycemia or DKA. ?  Other ED visit include for none    PMH includes  Past Medical History:   Diagnosis Date    Adenomatous polyps     scoped ;acc to pt ,repeat in one yr    Anxiety     CTS (carpal tunnel syndrome)     CTS (carpal tunnel syndrome)     Diabetes mellitus ([facility] [address]     GERD (gastroesophageal reflux disease)     has had stricture dilated mult yrs ago    Hyperlipidemia     Hypertension     Obesity with serious comorbidity 2020    Peripheral neuropathy     Type II or unspecified type diabetes mellitus without mention of complication, not stated as uncontrolled     Urinary frequency      Blood glucose trends:  Patient brought log glucometer for download: no  Readings per day  3 per patient report  BG Range 200s to 400s--> 115-220  FBG : 200 to 350   Hypoglycemia awareness and symptoms:none recently  Has not done CGM    Current Medication regimen:   Biguanides: Metformin 1000 mg BID  Insulin  · Lantus : 50 units--> 25 -30 units-->  · Humalo units AC TID (usually takes once a day)--> 15 fixed dose with 2: 50> 150  GFR > 60    Current Dietary regimen:   BF: eggs and toast/hash browns/cereal/ often skips  Lunch: ham sw with cheese  Dinner: may skip/ eat out  Snacks: not much any more Beverages: mountain dew x 2/sweet tea with lemon/ 160x 4 bottles per day--> has stopped almost entirely  Average carbs per day: > 150-200--> usually 100 or less per day    Microvascular Complications:  · Neuropathy: severe peripheral neuropathy extending to knees, on Gabapentin 600mg BID  · Retinopathy: no concerns with vision, field of vision  · Nephropathy: no microalbuminuria no recent MACR    Diabetic Health Maintenance   · Last Eye Exam:   · Last Foot exam:   · Has patient seen a dietitian? Yes  · Current Exercise: No structured exercise  · On ACEI or ARB:  · On statin:     Hyperlipidemia: Current complaints include occasional myalgias but otherwise tolerates well. Lab Results   Component Value Date    CHOL 145 10/15/2020    CHOL 134 09/20/2019    CHOL 164 03/29/2019     Lab Results   Component Value Date    TRIG 256 10/15/2020    TRIG 284 09/20/2019    TRIG 298 03/29/2019     Lab Results   Component Value Date    HDL 29 10/15/2020    HDL 27 09/20/2019    HDL 28 03/29/2019     Lab Results   Component Value Date    LDLCALC 65 10/15/2020    [address] 50 09/20/2019    LDLCALC 76 03/29/2019     No results found for: LDLDIRECT  No results found for: CHOLHDLRATIO    Vitamin D deficiency: Currently is on no supplementation. Current complaints include fatigue on daily basis. Last vitamin Dlevel is:  Lab Results   Component Value Date    VITD25 58.6 10/15/2020       Hypertension  Controlled , denies symptoms of dizziness, light headedness. Occasional dependent edema. Tries to follow a salt restricted diet.    Lab Results   Component Value Date     10/01/2020    K 4.3 10/01/2020     10/01/2020    CO2 23 10/01/2020    BUN 27 (H) 10/01/2020    CREATININE 1.2 10/01/2020    GLUCOSE 215 (H) 10/01/2020    CALCIUM 9.0 10/01/2020    PROT 6.3 (L) 10/01/2020    LABALBU 4.2 10/01/2020    BILITOT 0.6 10/01/2020    ALKPHOS 54 10/01/2020    AST 17 10/01/2020    ALT 18 10/01/2020    LABGLOM >60 10/01/2020 Dahiana Mata is a 63 y.o. male with uncontrolled Diabetes Mellitus type 2 complicated by peripheral neuropathy and associated with HTN /HLD/ Depression/    Plan  Diabetes Mellitus Type 2  Lab Results   Component Value Date    LABA1C 7.9 01/06/2021     Goal A1c  < 6.5%  Lab Results   Component Value Date    LABA1C 10.5 09/09/2020     Basaglar: 25 units BID  Humalog 15 u AC TID with 2: 50 > 150; or 10 units minimum if not checking and having a regular meal  Metformin: restart ; rx sent    D/c Ozempic re side effects    Diet :   Stop regular pop completely; \"will try\"  Has cut down significantly  30-40 grams per meal , 3 meals per day, avoid carbs in snack choices  Include moderate proteins and good fats   Ensure adequate hydration and  electrolyte replacement    Exercise :  Recommended exercise is 5-7 days a week for 30-60 mins at least, per day OR a total of 2.5 hours per week , which ever is more feasible. Ambulate as possible     Diabetic Health Maintenance  Follow up with annual eye exams--> referral provided  Follow up with annual podiatry exams if needed  Reviewed sick day management of BG     Other areas of Diabetic Education reviewed:  ? Carbs: good carbs and bad carbs, importance of carb counting, incorporation of protein with each meal to reduce Glycemic index, importance of portions, Carb/insulin ratio  ? Fats: Good fats and bad fats, meal planning and supplements. ? Discussed how food affects blood sugar readings. ? Different diabetic medications  ? Managing high and low sugar readings  ?  Rotation of sites for subcutaneous medication injection    Mixed Hyperlipidemia  Lab Results   Component Value Date    LDLCALC 65 10/15/2020    [address] 50 09/20/2019    [address] 76 03/29/2019     No results found for: LDLDIRECT  Lab Results   Component Value Date    TRIG 256 10/15/2020    TRIG 284 09/20/2019    TRIG 298 03/29/2019     LDL goal  < 100;   TG elevated at 284  Continue current regimen  Managed by PCP Essential Hypertension  Blood pressure controlled. Continue current regimen       Vitamin D deficiency  Lab Results   Component Value Date    VITD25 58.6 10/15/2020     Problem List Items Addressed This Visit     HTN (hypertension)    Hyperlipidemia    Obesity with serious comorbidity    Relevant Medications    metFORMIN (GLUCOPHAGE) 1000 MG tablet    Type 2 diabetes mellitus with diabetic neuropathy, with long-term current use of insulin (HCC) - Primary    Relevant Medications    metFORMIN (GLUCOPHAGE) 1000 MG tablet    Other Relevant Orders    Microalbumin / Creatinine Urine Ratio    Hemoglobin A1C    TSH WITH REFLEX TO FT4    Comprehensive Metabolic Panel    Vitamin D deficiency          Greater than 40 minutes spent directly counseling patient about topics listed above (such as lifestyle modifications, preventative screenings and/or disease related processes. Return in about 3 months (around 5/19/2021).

## 2021-02-19 NOTE — TELEPHONE ENCOUNTER
Pt needs basaglar sent to . [facility] [address] - F [phone]

## 2021-04-12 ENCOUNTER — TELEPHONE (OUTPATIENT)
Dept: PHARMACY | Facility: CLINIC | Age: 61
End: 2021-04-12

## 2021-04-12 NOTE — LETTER
[place]  [address], [address]        [phone] [facility] [address] [facility]           04/14/21     Dear Kenyatta Sharma,     Our records indicate that you are eligible for a complete medication therapy review performed by a [facility]) clinical pharmacist.     This review helps ensure that you are getting the most benefit from your medications and includes the following:  - Review of your medications, including over-the-counter and herbal medications. - Answering questions about your medications and how to get the most benefit from them. - Identifying and helping to prevent potential drug interactions or side effects.  - Possibly identifying alternatives that are equally effective. Under this program, [facility] pharmacist will contact you via telephone and complete the medication review and will work with you and your doctor to manage your drug therapy. If you are interested in a complete medication review, please contact us at the number below! Additionally, we wanted to make sure you are taking pravastatin 10 mg once daily as prescribed. It appears this medication has not been filled on time.  If you are no longer taking this medication, please call us back so we can update your home medication list.     Sincerely,    [address]  Department, toll free [phone], option 7
99.2

## 2021-04-12 NOTE — TELEPHONE ENCOUNTER
[facility] CLINICAL PHARMACY: STATIN THERAPY REVIEW  Identified statin use in persons with diabetes care gap per [employer]. Records dated: 3/12/21. Last Office Visit: unknown (PCP); 2/19/21 (endocrinology)    Patient also appears to be taking: insulin glargine, lisinopril, metformin and Novolog. Patient found in Outcomes MTM and is currently eligible for CMR and TIP    ASSESSMENT  ACE/ARB ADHERENCE    Per Insurance Records through 3/12/21: no insurance claims to review     Per Outcomes MT Records:  Lisinopril last filled on 12/29/20 for 90 day supply. BP Readings from Last 3 Encounters:   01/06/21 120/70   10/14/20 118/72   10/01/20 110/75     CrCl cannot be calculated (Patient's most recent lab result is older than the maximum 120 days allowed. ). DIABETES ADHERENCE    Per Insurance Records through 3/12/21: n/a; prescribed insulin    Lab Results   Component Value Date    LABA1C 7.9 01/06/2021    LABA1C 10.5 09/09/2020    LABA1C 11.2 02/03/2020     NOTE A1c <9%    STATIN GAP IDENTIFIED    Per chart review, patient is prescribed pravastatin 10 mg daily. Per Outcomes Records:   Pravastatin last filled on 12/10/20 for a 90 day supply. Lab Results   Component Value Date    CHOL 145 10/15/2020    TRIG 256 (H) 10/15/2020    HDL 29 (L) 10/15/2020    LDLCALC 65 10/15/2020     ALT   Date Value Ref Range Status   10/01/2020 18 10 - 40 U/L Final     AST   Date Value Ref Range Status   10/01/2020 17 15 - 37 U/L Final       The 10-year ASCVD risk score (Logandarlene García, et al., 2013) is: 19.7%    Values used to calculate the score:      Age: 63 years      Sex: Male      Is Non- : No      Diabetic: Yes      Tobacco smoker: No      Systolic Blood Pressure: 689 mmHg      Is BP treated: Yes      HDL Cholesterol: 29 mg/dL      Total Cholesterol: 145 mg/dL     Hyperlipidemia Goal: Patient is prescribed low-intensity statin therapy.     PLAN  Unable to contact [address] [place] [address] [place] - on hold > 5 minutes    Attempting to reach patient to review.  Left message asking for return call. Will attempt to contact the patient again to complete CMR and TIP and to discuss medication adherence. Will attempt to contact [address] [place] [address] tomorrow to confirm most recent refill dates as well as refill status.     Gutierrez Siddiqui, PharmD, [facility]  Direct: [phone]  Department, toll free [phone], option 7

## 2021-04-13 NOTE — TELEPHONE ENCOUNTER
Called and spoke with patient - states that he is still taking pravastatin and lisinopril, but is not home to review medication bottles to see if he needs refills. Patient is on his way to [facility] now - requests PharmD to call [facility] and refill pravastatin and lisinopril, he will pick it up when he is there. Advised patient that he is out of refills for lisinopril. Patient reports that he is no longer seeing Mónica Stark CNP - he switched PCP to Dr. Selina Ruiz and saw her yesterday. Called [facility] - pharmacist will process 90-ds refill for pravastatin today. No new prescriptions on file from Dr. Kev Erwin. Called patient back, advised him that [facility] is getting pravastatin ready, but no other prescriptions from Dr. Kev Erwin ready/called in. Patient will call PCP office - patient agreeable to CMR. Will attempt to call me back later this afternoon when he is home so we can review his medication bottles, otherwise agreeable to call tomorrow morning to review meds and complete CMR.      Loren Hubbard, PharmD, [facility]  Direct: [phone]  Department, toll free [phone], option 7

## 2021-04-14 NOTE — TELEPHONE ENCOUNTER
Attempted to contact the patient on the agreed upon time to complete CMR and review medications. Left message for patient to return my call. [employer] - they have 3 medications in process (pravastatin, amitriptyline and Januvia - all prescribed by Dr. Liana Castro) and all billed with [facility]. Will prepare and send letter to the patient today and will sign off at this time.      Jaycee Franco, PharmD, [facility]  Direct: [phone]  Department, toll free [phone], option 7          For Pharmacy Admin Tracking Only    PHSO: Yes  Total # of Interventions Recommended: 1  - New Order #: 1 New Medication Order Reason(s): Needs Additional Medication Therapy  - Maintenance Safety Lab Monitoring #: 1  Recommended intervention potential cost savings: 1  Total Interventions Accepted: 1  Time Spent (min): 30

## 2021-09-16 ENCOUNTER — TELEPHONE (OUTPATIENT)
Dept: PHARMACY | Facility: CLINIC | Age: 61
End: 2021-09-16

## 2021-09-16 NOTE — LETTER
[place]  [address], [address]        [phone] [facility] [address] [facility]           09/20/21     Dear Brenda Chaudhari,    We tried to reach you recently regarding your LISINOPRIL TAB 5MG, but were unable to reach you on the telephone. We have on file that you are currently taking Lisinopril (Take 0.5 tablets by mouth daily). If you are no longer taking or taking differently, please call us at the number below so that we can discuss this and update your medication profile. It appears that this medication has not been filled at proper times. We are worried you might be missing doses or not taking it as directed. It is important that you take your medications regularly and try not to miss a single dose.     Some ways to help you remember to take and refill your medications are to:  · Use a pill box, set an alarm, and/or keep your medication near something that you do every day  · Ask your pharmacy if they participate in [facility]", a program where you can  all of your medications on the same day  · Ask your pharmacy if you can be set up with automatic refill, where they will automatically refill your prescription when it is due and let you know it's ready to     Sincerely,   [address] [facility] [address]  // Department, toll free [phone], Option 2

## 2021-09-16 NOTE — TELEPHONE ENCOUNTER
[facility] CLINICAL PHARMACY REVIEW: ADHERENCE REVIEW  Identified care gap per [employer]; fills at [facility]: ACE/ARB and Statin adherence    Last Visit: 2/19/21    Patient identified as LIS = 1, therefore patient may be able to receive a 90-day supply for the same cost as a 30-day supply    Patient also appears to be prescribed: LISINOPRIL   TAB 5MG and  PRAVASTATIN  TAB 20MG    Patient found in Outcomes MTM and is currently eligible for CMR    ASSESSMENT  ACE/ARB ADHERENCE    Per Insurance Records through [employer] ([facility] YTD [facility] = 91%; Potential Fail Date: 9/20/21):   Lisinopril last filled on 5/7/21 for 90 day supply. Next refill due: 8/5/21    Per Reconciled Dispense Report:  Lisinopril last filled on 5/7/21 for 90 day supply. Per [employer]:   Lisinopril last picked up on 5/12/21 for 90 day supply. 3 refills remaining. Billed through [employer]     BP Readings from Last 3 Encounters:   01/06/21 120/70   10/14/20 118/72   10/01/20 110/75     CrCl cannot be calculated (Patient's most recent lab result is older than the maximum 120 days allowed. ). STATIN ADHERENCE    Per Insurance Records through [employer] (2020 [place] = 85%; YTD PDC = 85%; Potential Fail Date: 10/12/21):   Pravastatin last filled on 5/7/21 for 90 day supply. Next refill due: 8/24/21    Per Reconciled Dispense Report:  Pravastatin last filled on 5/7/21 for 90 day supply. Per [employer]:   Pravastatin last picked up on 5/7/21 for 90 day supply. 2 refills remaining.  Billed through [employer]     Pravastatin is currently ready for p/u as of 9/15/21, with 1rr and billed through [facility]    Lab Results   Component Value Date    CHOL 145 10/15/2020    TRIG 256 (H) 10/15/2020    HDL 29 (L) 10/15/2020    LDLCALC 65 10/15/2020     ALT   Date Value Ref Range Status   10/01/2020 18 10 - 40 U/L Final     AST   Date Value Ref Range Status   10/01/2020 17 15 - 37 U/L Final     The 10-year ASCVD risk score (Samanta Faith., et al., 2013) is: 21.2%    Values used to calculate the score:      Age: 57 years      Sex: Male      Is Non- : No      Diabetic: Yes      Tobacco smoker: No      Systolic Blood Pressure: 479 mmHg      Is BP treated: Yes      HDL Cholesterol: 29 mg/dL      Total Cholesterol: 145 mg/dL     PLAN  The following are interventions that have been identified:   - Patient eligible for CMR in Outcomes MTM    Attempting to reach patient to review.  Left message asking for return call. No future appointments.     [address] [place] [address]  // Department, toll free [phone], Option 2

## 2021-09-20 NOTE — TELEPHONE ENCOUNTER
2nd attempt to contact this patient regarding the previous message**    CLINICAL PHARMACY: ADHERENCE REVIEW  Patient unavailable at the time of call. Unable to leave a vm. Someone answered then hung up. I will send a letter     Letter mailed to patient.     Sincerely,   [address]  // Department, toll free [phone], Option 2    For Pharmacy Admin Tracking Only     CPA in place:  No   Time Spent (min): 15

## 2021-11-03 ENCOUNTER — APPOINTMENT (OUTPATIENT)
Dept: GENERAL RADIOLOGY | Age: 61
DRG: 368 | End: 2021-11-03
Payer: MEDICARE

## 2021-11-03 ENCOUNTER — APPOINTMENT (OUTPATIENT)
Dept: CT IMAGING | Age: 61
DRG: 368 | End: 2021-11-03
Payer: MEDICARE

## 2021-11-03 ENCOUNTER — HOSPITAL ENCOUNTER (INPATIENT)
Age: 61
LOS: 1 days | Discharge: HOME | DRG: 368 | End: 2021-11-04
Attending: EMERGENCY MEDICINE | Admitting: INTERNAL MEDICINE
Payer: MEDICARE

## 2021-11-03 DIAGNOSIS — R73.9 HYPERGLYCEMIA: ICD-10-CM

## 2021-11-03 DIAGNOSIS — I95.89 HYPOTENSION DUE TO HYPOVOLEMIA: ICD-10-CM

## 2021-11-03 DIAGNOSIS — R00.0 SINUS TACHYCARDIA: ICD-10-CM

## 2021-11-03 DIAGNOSIS — D72.829 LEUKOCYTOSIS, UNSPECIFIED TYPE: ICD-10-CM

## 2021-11-03 DIAGNOSIS — K92.2 ACUTE UPPER GI HEMORRHAGE: Primary | ICD-10-CM

## 2021-11-03 DIAGNOSIS — E86.1 HYPOTENSION DUE TO HYPOVOLEMIA: ICD-10-CM

## 2021-11-03 DIAGNOSIS — E87.20 LACTIC ACIDOSIS: ICD-10-CM

## 2021-11-03 DIAGNOSIS — D75.1 POLYCYTHEMIA: ICD-10-CM

## 2021-11-03 DIAGNOSIS — E87.29 HIGH ANION GAP METABOLIC ACIDOSIS: ICD-10-CM

## 2021-11-03 PROBLEM — R06.6 INTRACTABLE HICCUPS: Status: ACTIVE | Noted: 2021-11-03

## 2021-11-03 PROBLEM — R79.89 ELEVATED SERUM CREATININE: Status: ACTIVE | Noted: 2021-11-03

## 2021-11-03 PROBLEM — K22.89 ESOPHAGEAL THICKENING: Status: ACTIVE | Noted: 2021-11-03

## 2021-11-03 PROBLEM — E86.0 DEHYDRATION: Status: ACTIVE | Noted: 2021-11-03

## 2021-11-03 LAB
A/G RATIO: 1.4 (ref 1.1–2.2)
ALBUMIN SERPL-MCNC: 4.5 G/DL (ref 3.4–5)
ALP BLD-CCNC: 73 U/L (ref 40–129)
ALT SERPL-CCNC: 9 U/L (ref 10–40)
ANION GAP SERPL CALCULATED.3IONS-SCNC: 20 MMOL/L (ref 3–16)
ANION GAP SERPL CALCULATED.3IONS-SCNC: 25 MMOL/L (ref 3–16)
AST SERPL-CCNC: 13 U/L (ref 15–37)
BASOPHILS ABSOLUTE: 0.1 K/UL (ref 0–0.2)
BASOPHILS RELATIVE PERCENT: 0.4 %
BETA-HYDROXYBUTYRATE: 3.9 MMOL/L (ref 0–0.27)
BILIRUB SERPL-MCNC: 1 MG/DL (ref 0–1)
BILIRUBIN URINE: NEGATIVE
BLOOD, URINE: NEGATIVE
BUN BLDV-MCNC: 49 MG/DL (ref 7–20)
BUN BLDV-MCNC: 49 MG/DL (ref 7–20)
CALCIUM SERPL-MCNC: 10.2 MG/DL (ref 8.3–10.6)
CALCIUM SERPL-MCNC: 9.1 MG/DL (ref 8.3–10.6)
CHLORIDE BLD-SCNC: 92 MMOL/L (ref 99–110)
CHLORIDE BLD-SCNC: 94 MMOL/L (ref 99–110)
CLARITY: CLEAR
CO2: 22 MMOL/L (ref 21–32)
CO2: 22 MMOL/L (ref 21–32)
COLOR: YELLOW
CREAT SERPL-MCNC: 1.4 MG/DL (ref 0.8–1.3)
CREAT SERPL-MCNC: 1.6 MG/DL (ref 0.8–1.3)
EKG ATRIAL RATE: 123 BPM
EKG DIAGNOSIS: NORMAL
EKG P AXIS: 52 DEGREES
EKG P-R INTERVAL: 164 MS
EKG Q-T INTERVAL: 320 MS
EKG QRS DURATION: 84 MS
EKG QTC CALCULATION (BAZETT): 458 MS
EKG R AXIS: 36 DEGREES
EKG T AXIS: 46 DEGREES
EKG VENTRICULAR RATE: 123 BPM
EOSINOPHILS ABSOLUTE: 0 K/UL (ref 0–0.6)
EOSINOPHILS RELATIVE PERCENT: 0 %
GFR AFRICAN AMERICAN: 53
GFR AFRICAN AMERICAN: >60
GFR NON-AFRICAN AMERICAN: 44
GFR NON-AFRICAN AMERICAN: 51
GLUCOSE BLD-MCNC: 159 MG/DL (ref 70–99)
GLUCOSE BLD-MCNC: 160 MG/DL (ref 70–99)
GLUCOSE BLD-MCNC: 176 MG/DL (ref 70–99)
GLUCOSE BLD-MCNC: 270 MG/DL (ref 70–99)
GLUCOSE BLD-MCNC: 302 MG/DL (ref 70–99)
GLUCOSE URINE: >=1000 MG/DL
HCT VFR BLD CALC: 53.7 % (ref 40.5–52.5)
HCT VFR BLD CALC: 59.9 % (ref 40.5–52.5)
HEMOGLOBIN: 17.7 G/DL (ref 13.5–17.5)
HEMOGLOBIN: 19.7 G/DL (ref 13.5–17.5)
INR BLD: 1.07 (ref 0.88–1.12)
KETONES, URINE: 40 MG/DL
LACTIC ACID: 1.6 MMOL/L (ref 0.4–2)
LACTIC ACID: 3.2 MMOL/L (ref 0.4–2)
LEUKOCYTE ESTERASE, URINE: NEGATIVE
LIPASE: 11 U/L (ref 13–60)
LYMPHOCYTES ABSOLUTE: 1.1 K/UL (ref 1–5.1)
LYMPHOCYTES RELATIVE PERCENT: 6.5 %
MAGNESIUM: 2 MG/DL (ref 1.8–2.4)
MCH RBC QN AUTO: 27.5 PG (ref 26–34)
MCHC RBC AUTO-ENTMCNC: 32.8 G/DL (ref 31–36)
MCV RBC AUTO: 83.6 FL (ref 80–100)
MICROSCOPIC EXAMINATION: ABNORMAL
MONOCYTES ABSOLUTE: 0.9 K/UL (ref 0–1.3)
MONOCYTES RELATIVE PERCENT: 5.5 %
NEUTROPHILS ABSOLUTE: 14.2 K/UL (ref 1.7–7.7)
NEUTROPHILS RELATIVE PERCENT: 87.6 %
NITRITE, URINE: NEGATIVE
OCCULT BLOOD DIAGNOSTIC: NORMAL
OCCULT BLOOD, OTHER: ABNORMAL
PDW BLD-RTO: 14.9 % (ref 12.4–15.4)
PERFORMED ON: ABNORMAL
PH UA: 5 (ref 5–8)
PH, GASTRIC: ABNORMAL
PLATELET # BLD: 194 K/UL (ref 135–450)
PMV BLD AUTO: 10.6 FL (ref 5–10.5)
POTASSIUM SERPL-SCNC: 4.1 MMOL/L (ref 3.5–5.1)
POTASSIUM SERPL-SCNC: 4.2 MMOL/L (ref 3.5–5.1)
PROTEIN UA: NEGATIVE MG/DL
PROTHROMBIN TIME: 12.2 SEC (ref 9.9–12.7)
RBC # BLD: 7.16 M/UL (ref 4.2–5.9)
SARS-COV-2, NAAT: NOT DETECTED
SODIUM BLD-SCNC: 136 MMOL/L (ref 136–145)
SODIUM BLD-SCNC: 139 MMOL/L (ref 136–145)
SPECIFIC GRAVITY UA: 1.02 (ref 1–1.03)
TOTAL PROTEIN: 7.7 G/DL (ref 6.4–8.2)
URINE REFLEX TO CULTURE: ABNORMAL
URINE TYPE: ABNORMAL
UROBILINOGEN, URINE: 0.2 E.U./DL
WBC # BLD: 16.2 K/UL (ref 4–11)

## 2021-11-03 PROCEDURE — 2060000000 HC ICU INTERMEDIATE R&B

## 2021-11-03 PROCEDURE — G0378 HOSPITAL OBSERVATION PER HR: HCPCS

## 2021-11-03 PROCEDURE — C9113 INJ PANTOPRAZOLE SODIUM, VIA: HCPCS | Performed by: EMERGENCY MEDICINE

## 2021-11-03 PROCEDURE — 85018 HEMOGLOBIN: CPT

## 2021-11-03 PROCEDURE — 81003 URINALYSIS AUTO W/O SCOPE: CPT

## 2021-11-03 PROCEDURE — 99284 EMERGENCY DEPT VISIT MOD MDM: CPT

## 2021-11-03 PROCEDURE — 6370000000 HC RX 637 (ALT 250 FOR IP): Performed by: EMERGENCY MEDICINE

## 2021-11-03 PROCEDURE — 82270 OCCULT BLOOD FECES: CPT

## 2021-11-03 PROCEDURE — 80053 COMPREHEN METABOLIC PANEL: CPT

## 2021-11-03 PROCEDURE — 6370000000 HC RX 637 (ALT 250 FOR IP): Performed by: PHYSICIAN ASSISTANT

## 2021-11-03 PROCEDURE — 6370000000 HC RX 637 (ALT 250 FOR IP): Performed by: NURSE PRACTITIONER

## 2021-11-03 PROCEDURE — 93010 ELECTROCARDIOGRAM REPORT: CPT | Performed by: INTERNAL MEDICINE

## 2021-11-03 PROCEDURE — 83605 ASSAY OF LACTIC ACID: CPT

## 2021-11-03 PROCEDURE — 85025 COMPLETE CBC W/AUTO DIFF WBC: CPT

## 2021-11-03 PROCEDURE — 99223 1ST HOSP IP/OBS HIGH 75: CPT | Performed by: PHYSICIAN ASSISTANT

## 2021-11-03 PROCEDURE — 96374 THER/PROPH/DIAG INJ IV PUSH: CPT

## 2021-11-03 PROCEDURE — 96375 TX/PRO/DX INJ NEW DRUG ADDON: CPT

## 2021-11-03 PROCEDURE — 74177 CT ABD & PELVIS W/CONTRAST: CPT

## 2021-11-03 PROCEDURE — 96376 TX/PRO/DX INJ SAME DRUG ADON: CPT

## 2021-11-03 PROCEDURE — 96361 HYDRATE IV INFUSION ADD-ON: CPT

## 2021-11-03 PROCEDURE — 93005 ELECTROCARDIOGRAM TRACING: CPT | Performed by: EMERGENCY MEDICINE

## 2021-11-03 PROCEDURE — 2580000003 HC RX 258: Performed by: EMERGENCY MEDICINE

## 2021-11-03 PROCEDURE — 6360000002 HC RX W HCPCS: Performed by: EMERGENCY MEDICINE

## 2021-11-03 PROCEDURE — C9113 INJ PANTOPRAZOLE SODIUM, VIA: HCPCS | Performed by: NURSE PRACTITIONER

## 2021-11-03 PROCEDURE — 85014 HEMATOCRIT: CPT

## 2021-11-03 PROCEDURE — 6360000004 HC RX CONTRAST MEDICATION: Performed by: EMERGENCY MEDICINE

## 2021-11-03 PROCEDURE — 6360000002 HC RX W HCPCS: Performed by: NURSE PRACTITIONER

## 2021-11-03 PROCEDURE — 83735 ASSAY OF MAGNESIUM: CPT

## 2021-11-03 PROCEDURE — 71046 X-RAY EXAM CHEST 2 VIEWS: CPT

## 2021-11-03 PROCEDURE — 36415 COLL VENOUS BLD VENIPUNCTURE: CPT

## 2021-11-03 PROCEDURE — 82271 OCCULT BLOOD OTHER SOURCES: CPT

## 2021-11-03 PROCEDURE — 2580000003 HC RX 258: Performed by: NURSE PRACTITIONER

## 2021-11-03 PROCEDURE — 82010 KETONE BODYS QUAN: CPT

## 2021-11-03 PROCEDURE — 83036 HEMOGLOBIN GLYCOSYLATED A1C: CPT

## 2021-11-03 PROCEDURE — 83690 ASSAY OF LIPASE: CPT

## 2021-11-03 PROCEDURE — 87635 SARS-COV-2 COVID-19 AMP PRB: CPT

## 2021-11-03 PROCEDURE — 85610 PROTHROMBIN TIME: CPT

## 2021-11-03 RX ORDER — DIVALPROEX SODIUM 500 MG/1
500 TABLET, DELAYED RELEASE ORAL EVERY EVENING
COMMUNITY

## 2021-11-03 RX ORDER — SERTRALINE HYDROCHLORIDE 100 MG/1
100 TABLET, FILM COATED ORAL DAILY
Status: DISCONTINUED | OUTPATIENT
Start: 2021-11-03 | End: 2021-11-04 | Stop reason: HOSPADM

## 2021-11-03 RX ORDER — ACETAMINOPHEN 650 MG/1
650 SUPPOSITORY RECTAL EVERY 6 HOURS PRN
Status: DISCONTINUED | OUTPATIENT
Start: 2021-11-03 | End: 2021-11-04 | Stop reason: HOSPADM

## 2021-11-03 RX ORDER — PRAVASTATIN SODIUM 40 MG
40 TABLET ORAL DAILY
Status: DISCONTINUED | OUTPATIENT
Start: 2021-11-03 | End: 2021-11-04 | Stop reason: HOSPADM

## 2021-11-03 RX ORDER — LIDOCAINE 4 G/G
1 PATCH TOPICAL DAILY
Status: DISCONTINUED | OUTPATIENT
Start: 2021-11-03 | End: 2021-11-04 | Stop reason: HOSPADM

## 2021-11-03 RX ORDER — FENOFIBRATE 145 MG/1
145 TABLET, COATED ORAL DAILY
COMMUNITY

## 2021-11-03 RX ORDER — ACETAMINOPHEN 325 MG/1
650 TABLET ORAL EVERY 6 HOURS PRN
Status: DISCONTINUED | OUTPATIENT
Start: 2021-11-03 | End: 2021-11-04 | Stop reason: HOSPADM

## 2021-11-03 RX ORDER — TAMSULOSIN HYDROCHLORIDE 0.4 MG/1
0.4 CAPSULE ORAL DAILY
Status: DISCONTINUED | OUTPATIENT
Start: 2021-11-03 | End: 2021-11-04 | Stop reason: HOSPADM

## 2021-11-03 RX ORDER — SODIUM CHLORIDE 9 MG/ML
25 INJECTION, SOLUTION INTRAVENOUS PRN
Status: DISCONTINUED | OUTPATIENT
Start: 2021-11-03 | End: 2021-11-04 | Stop reason: HOSPADM

## 2021-11-03 RX ORDER — ONDANSETRON 4 MG/1
4 TABLET, ORALLY DISINTEGRATING ORAL DAILY
COMMUNITY

## 2021-11-03 RX ORDER — DEXTROSE MONOHYDRATE 25 G/50ML
12.5 INJECTION, SOLUTION INTRAVENOUS PRN
Status: DISCONTINUED | OUTPATIENT
Start: 2021-11-03 | End: 2021-11-04 | Stop reason: HOSPADM

## 2021-11-03 RX ORDER — SODIUM CHLORIDE 0.9 % (FLUSH) 0.9 %
5-40 SYRINGE (ML) INJECTION PRN
Status: DISCONTINUED | OUTPATIENT
Start: 2021-11-03 | End: 2021-11-04 | Stop reason: HOSPADM

## 2021-11-03 RX ORDER — DIVALPROEX SODIUM 500 MG/1
500 TABLET, DELAYED RELEASE ORAL EVERY EVENING
Status: DISCONTINUED | OUTPATIENT
Start: 2021-11-03 | End: 2021-11-04 | Stop reason: HOSPADM

## 2021-11-03 RX ORDER — 0.9 % SODIUM CHLORIDE 0.9 %
1000 INTRAVENOUS SOLUTION INTRAVENOUS ONCE
Status: COMPLETED | OUTPATIENT
Start: 2021-11-03 | End: 2021-11-03

## 2021-11-03 RX ORDER — PRAVASTATIN SODIUM 40 MG
40 TABLET ORAL DAILY
Status: ON HOLD | COMMUNITY
End: 2022-03-08 | Stop reason: HOSPADM

## 2021-11-03 RX ORDER — PANTOPRAZOLE SODIUM 20 MG/1
40 TABLET, DELAYED RELEASE ORAL DAILY
Status: ON HOLD | COMMUNITY
End: 2021-11-04 | Stop reason: HOSPADM

## 2021-11-03 RX ORDER — ONDANSETRON 2 MG/ML
4 INJECTION INTRAMUSCULAR; INTRAVENOUS ONCE
Status: COMPLETED | OUTPATIENT
Start: 2021-11-03 | End: 2021-11-03

## 2021-11-03 RX ORDER — SODIUM CHLORIDE 0.9 % (FLUSH) 0.9 %
5-40 SYRINGE (ML) INJECTION EVERY 12 HOURS SCHEDULED
Status: DISCONTINUED | OUTPATIENT
Start: 2021-11-03 | End: 2021-11-04 | Stop reason: HOSPADM

## 2021-11-03 RX ORDER — NICOTINE POLACRILEX 4 MG
15 LOZENGE BUCCAL PRN
Status: DISCONTINUED | OUTPATIENT
Start: 2021-11-03 | End: 2021-11-04 | Stop reason: HOSPADM

## 2021-11-03 RX ORDER — DIVALPROEX SODIUM 250 MG/1
250 TABLET, EXTENDED RELEASE ORAL EVERY MORNING
Status: DISCONTINUED | OUTPATIENT
Start: 2021-11-04 | End: 2021-11-04 | Stop reason: HOSPADM

## 2021-11-03 RX ORDER — BACLOFEN 10 MG/1
5 TABLET ORAL 3 TIMES DAILY
Status: DISCONTINUED | OUTPATIENT
Start: 2021-11-03 | End: 2021-11-04 | Stop reason: HOSPADM

## 2021-11-03 RX ORDER — FENOFIBRATE 160 MG/1
160 TABLET ORAL DAILY
Status: DISCONTINUED | OUTPATIENT
Start: 2021-11-03 | End: 2021-11-04 | Stop reason: HOSPADM

## 2021-11-03 RX ORDER — INSULIN GLARGINE 100 [IU]/ML
20 INJECTION, SOLUTION SUBCUTANEOUS 2 TIMES DAILY
Status: DISCONTINUED | OUTPATIENT
Start: 2021-11-03 | End: 2021-11-03

## 2021-11-03 RX ORDER — BISOPROLOL FUMARATE AND HYDROCHLOROTHIAZIDE 10; 6.25 MG/1; MG/1
1 TABLET ORAL DAILY
Status: CANCELLED | OUTPATIENT
Start: 2021-11-03

## 2021-11-03 RX ORDER — DEXTROSE MONOHYDRATE 50 MG/ML
100 INJECTION, SOLUTION INTRAVENOUS PRN
Status: DISCONTINUED | OUTPATIENT
Start: 2021-11-03 | End: 2021-11-04 | Stop reason: HOSPADM

## 2021-11-03 RX ORDER — ONDANSETRON 2 MG/ML
4 INJECTION INTRAMUSCULAR; INTRAVENOUS EVERY 6 HOURS PRN
Status: DISCONTINUED | OUTPATIENT
Start: 2021-11-03 | End: 2021-11-04 | Stop reason: HOSPADM

## 2021-11-03 RX ORDER — PANTOPRAZOLE SODIUM 40 MG/10ML
40 INJECTION, POWDER, LYOPHILIZED, FOR SOLUTION INTRAVENOUS ONCE
Status: COMPLETED | OUTPATIENT
Start: 2021-11-03 | End: 2021-11-03

## 2021-11-03 RX ORDER — DIVALPROEX SODIUM 250 MG/1
250 TABLET, EXTENDED RELEASE ORAL EVERY MORNING
COMMUNITY

## 2021-11-03 RX ORDER — ONDANSETRON 4 MG/1
4 TABLET, ORALLY DISINTEGRATING ORAL EVERY 8 HOURS PRN
Status: DISCONTINUED | OUTPATIENT
Start: 2021-11-03 | End: 2021-11-04 | Stop reason: HOSPADM

## 2021-11-03 RX ORDER — SODIUM CHLORIDE 9 MG/ML
INJECTION, SOLUTION INTRAVENOUS CONTINUOUS
Status: DISCONTINUED | OUTPATIENT
Start: 2021-11-03 | End: 2021-11-04 | Stop reason: HOSPADM

## 2021-11-03 RX ORDER — INSULIN GLARGINE 100 [IU]/ML
10 INJECTION, SOLUTION SUBCUTANEOUS 2 TIMES DAILY
Status: DISCONTINUED | OUTPATIENT
Start: 2021-11-03 | End: 2021-11-04 | Stop reason: HOSPADM

## 2021-11-03 RX ORDER — EMPAGLIFLOZIN 25 MG/1
25 TABLET, FILM COATED ORAL DAILY
COMMUNITY

## 2021-11-03 RX ADMIN — INSULIN LISPRO 2 UNITS: 100 INJECTION, SOLUTION INTRAVENOUS; SUBCUTANEOUS at 20:42

## 2021-11-03 RX ADMIN — DIVALPROEX SODIUM 500 MG: 500 TABLET, DELAYED RELEASE ORAL at 18:25

## 2021-11-03 RX ADMIN — INSULIN HUMAN 10 UNITS: 100 INJECTION, SOLUTION PARENTERAL at 12:48

## 2021-11-03 RX ADMIN — SODIUM CHLORIDE 8 MG/HR: 9 INJECTION, SOLUTION INTRAVENOUS at 20:41

## 2021-11-03 RX ADMIN — BACLOFEN 5 MG: 10 TABLET ORAL at 18:25

## 2021-11-03 RX ADMIN — TAMSULOSIN HYDROCHLORIDE 0.4 MG: 0.4 CAPSULE ORAL at 18:25

## 2021-11-03 RX ADMIN — SODIUM CHLORIDE 1000 ML: 9 INJECTION, SOLUTION INTRAVENOUS at 10:01

## 2021-11-03 RX ADMIN — Medication 10 ML: at 20:43

## 2021-11-03 RX ADMIN — INSULIN GLARGINE 10 UNITS: 100 INJECTION, SOLUTION SUBCUTANEOUS at 20:41

## 2021-11-03 RX ADMIN — INSULIN LISPRO 2 UNITS: 100 INJECTION, SOLUTION INTRAVENOUS; SUBCUTANEOUS at 18:25

## 2021-11-03 RX ADMIN — PRAVASTATIN SODIUM 40 MG: 40 TABLET ORAL at 18:25

## 2021-11-03 RX ADMIN — SERTRALINE 100 MG: 100 TABLET, FILM COATED ORAL at 18:25

## 2021-11-03 RX ADMIN — ONDANSETRON HYDROCHLORIDE 4 MG: 2 INJECTION, SOLUTION INTRAMUSCULAR; INTRAVENOUS at 10:00

## 2021-11-03 RX ADMIN — FENOFIBRATE 160 MG: 160 TABLET ORAL at 18:25

## 2021-11-03 RX ADMIN — IOPAMIDOL 75 ML: 755 INJECTION, SOLUTION INTRAVENOUS at 11:02

## 2021-11-03 RX ADMIN — PANTOPRAZOLE SODIUM 40 MG: 40 INJECTION, POWDER, FOR SOLUTION INTRAVENOUS at 09:59

## 2021-11-03 RX ADMIN — SODIUM CHLORIDE: 9 INJECTION, SOLUTION INTRAVENOUS at 17:31

## 2021-11-03 RX ADMIN — ONDANSETRON HYDROCHLORIDE 4 MG: 2 INJECTION, SOLUTION INTRAMUSCULAR; INTRAVENOUS at 18:25

## 2021-11-03 ASSESSMENT — ENCOUNTER SYMPTOMS
BACK PAIN: 0
SHORTNESS OF BREATH: 0
ABDOMINAL PAIN: 1
VOMITING: 1
NAUSEA: 1

## 2021-11-03 NOTE — ED NOTES
[facility] phoned and states they are repaging GI and Dr Driss Roy, pts GI dr who he is established with, will be returning the call.  Dr Yaakov Stinson made aware     Shawnee Lawson RN  11/03/21 [id number]

## 2021-11-03 NOTE — ED NOTES
Unable to verify medication list at time of triage, patient states \"I'm not sure of the names of the medications that I am taking. Medications verified with [facility]. [facility] pharmacy.       Batsheva Slater RN  11/03/21 [phone]

## 2021-11-03 NOTE — ED NOTES
priyanka call to the transfer center to call gi doctor they have not called back yet     Ronnie Huerta  11/03/21 [phone]

## 2021-11-03 NOTE — ED TRIAGE NOTES
Presents with c/o N&V since Saturday. States vomiting black/brown emesis. Brought in emesis, guiac + for blood per Dr. Sergio Lyle. Denies pain, SOB, CP, at this time. Skin W/D pink nail beds. Good skin turgor.

## 2021-11-03 NOTE — ED NOTES
Squad here to transport pt to [facility]. Report given.       Columba Mathews RN  11/03/21 [phone]

## 2021-11-03 NOTE — PROGRESS NOTES
Patient admitted to room __302__ from [facility] 4098. [place] [facility]. Patient oriented to room, call light, bed rails, phone, lights and bathroom. Patient instructed about the schedule of the day including: vital sign frequency, lab draws, possible tests, frequency of MD and staff rounds, daily weights, I &O's and prescribed diet. Bed alarm deferred patient low fall risk and refuses alarm. Telemetry box in place, patient aware of placement and reason. Bed locked, in lowest position, side rails up 2/4, call light within reach. Recliner Assessment:     Patient is able to demonstrate the ability to move from a reclining position to an upright position within the recliner. 4 Eyes Skin Assessment     The patient is being assess for   Admission    I agree that 2 RN's have performed a thorough Head to Toe Skin Assessment on the patient. ALL assessment sites listed below have been assessed. Areas assessed for pressure by both nurses:   [x]   Head, Face, and Ears   [x]   Shoulders, Back, and Chest, Abdomen  [x]   Arms, Elbows, and Hands   [x]   Coccyx, Sacrum, and Ischium  [x]   Legs, Feet, and Heels    Abrasion RUE    Skin Assessed Under all Medical Devices by both nurses:  N/A              All Mepilex Borders were peeled back and area peeked at by both nurses:  No: N/A  Please list where Mepilex Borders are located:  N/A             **SHARE this note so that the co-signing nurse is able to place an eSignature**    Co-signer eSignature: {Esignature:[id number]}    Does the Patient have Skin Breakdown related to pressure?   No              [place] Prevention initiated:  No   Wound Care Orders initiated:  No      [facility] nurse consulted for Pressure Injury (Stage 3,4, Unstageable, DTI, NWPT, Complex wounds)and New or Established Ostomies:  No      Primary Nurse eSignature: Electronically signed by Kaylan Beavers RN on 11/3/21 at 5:34 PM EDT

## 2021-11-03 NOTE — H&P
Hospital Medicine History & Physical      PCP: Agustin Swan MD    Date of Admission: 11/3/2021    Date of Service: Pt seen/examined on 11/3/2021     Chief Complaint:    Chief Complaint   Patient presents with    Emesis     c/o emesis x 5 days         History Of Present Illness: The patient is a 57 y.o. male with diabetes mellitus type 2, diabetic neuropathy, depression and anxiety who presented to [facility] ED with complaint of nausea, vomiting, coffee-ground emesis, hiccups. Patient states he started vomiting 3 to 4 days ago. He has been vomiting about 7-8 times per day. Initially his vomit was dark brown in color, this progressed to black in color. Patient has had some upper abdominal discomfort but no significant pain. He has a sensation that food and liquids getting stuck in his lower chest when he tries to drink currently. He denies any chest pain. He denies any fever, chills. Denies diarrhea or constipation. He has never had symptoms of significant vomiting or coffee-ground emesis in the past.  He has had an EGD many years ago and required an esophageal dilatation, denies any recent interventions. He had a colonoscopy last year, states some polyps removed but no other major findings. He denies taking any blood thinners. He does not use any NSAIDs. He rarely drinks alcohol-maybe once or twice per year. He does not smoke cigarettes. ER work-up revealed dehydration, hyperglycemia. He was not anemic, in fact hemoconcentration noted on labs. Hemoccult positive. He was treated with IV fluids and PPI. He is admitted for further care and evaluation.     He has had persistent hiccups since symptoms onset     Past Medical History:        Diagnosis Date    Adenomatous polyps     scoped 2019;acc to pt ,repeat in one yr    Anxiety     CTS (carpal tunnel syndrome)     CTS (carpal tunnel syndrome)     Diabetes mellitus (HCC)     GERD (gastroesophageal reflux disease)     has had stricture dilated mult yrs ago    Hyperlipidemia     Hypertension     Obesity with serious comorbidity 9/9/2020    Peripheral neuropathy     Type II or unspecified type diabetes mellitus without mention of complication, not stated as uncontrolled     Urinary frequency        Past Surgical History:        Procedure Laterality Date    APPENDECTOMY      CARPAL TUNNEL RELEASE      COLONOSCOPY N/A 4/30/2019    COLONOSCOPY POLYPECTOMY SNARE/COLD BIOPSY performed by Jasmina Hamilton MD at [address] N/A 4/30/2019    COLONOSCOPY CONTROL HEMORRHAGE performed by Jasmina Hamilton MD at [address] Left    [address]  10/12/15    Laparoscopic Hernia Repair    CO SHLDR ARTHROSCOP,SURG,W/ROTAT CUFF REPR Left 11/26/2018    LEFT SHOULDER ARTHROSCOPY WITH DEBRIDEMENT, ROTATOR CUFF REPAIR, OPEN SUBSCAPULARIS REPAIR, BICEPS TENODESIS, SUBACROMIAL DECOMPRESSION   performed by Lizet Ricketts MD at [place] [id number]       Medications Prior to Admission:    Prior to Admission medications    Medication Sig Start Date End Date Taking?  Authorizing Provider   Semaglutide (OZEMPIC, 1 MG/DOSE, SC) Inject into the skin once a week   Yes Historical Provider, MD   pravastatin (PRAVACHOL) 40 MG tablet Take 40 mg by mouth daily   Yes Historical Provider, MD   empagliflozin (JARDIANCE) 25 MG tablet Take 25 mg by mouth daily   Yes Historical Provider, MD   pantoprazole (PROTONIX) 20 MG tablet Take 40 mg by mouth daily   Yes Historical Provider, MD   divalproex (DEPAKOTE ER) 250 MG extended release tablet Take 250 mg by mouth every morning   Yes Historical Provider, MD   divalproex (DEPAKOTE) 500 MG DR tablet Take 500 mg by mouth every evening   Yes Historical Provider, MD   insulin glargine (BASAGLAR KWIKPEN) 100 UNIT/ML injection pen Inject 25-30 Units into the skin 2 times daily  Patient taking differently: Inject 20 Units into the skin 2 times daily  2/19/21  Yes ANA Mclain CNP   NOVOLOG FLEXPEN 100 UNIT/ML injection pen Inject 10 Units into the skin 2 times daily  12/8/20  Yes Historical Provider, MD   tamsulosin (FLOMAX) 0.4 MG capsule Take 0.4 mg by mouth daily  9/6/20  Yes Historical Provider, MD   sertraline (ZOLOFT) 50 MG tablet Take 100 mg by mouth daily  8/17/20  Yes Historical Provider, MD   lidocaine (LIDODERM) 5 % PLACE 3 PATCHES ONTO THE SKIN EVERY 24 HOURS, 12 HOURS ON AND 12 HOURS OFF 5/24/20  Yes ANA Dangelo CNP   oxyCODONE-acetaminophen (PERCOCET) 7.5-325 MG per tablet Take 1 tablet by mouth every 8 hours as needed for Pain (three times daily). Yes Annmarie Ivey MD   fenofibrate (TRICOR) 145 MG tablet Take 145 mg by mouth daily    Historical Provider, MD   ondansetron (ZOFRAN-ODT) 4 MG disintegrating tablet Take 4 mg by mouth daily    Historical Provider, MD   Blood Glucose Monitoring Suppl (Xavi Tobar) w/Device KIT 1 Device by Does not apply route once for 1 dose 7/7/20 7/7/20  ANA Dangelo CNP   blood glucose test strips (ONETOUCH VERIO) strip 1 each by In Vitro route 3 times daily As needed.  7/7/20   ANA Dangelo CNP   ONE TOUCH ULTRASOFT LANCETS MISC 1 each by Does not apply route 3 times daily 7/7/20   ANA Dangelo CNP   blood glucose test strips (FREESTYLE LITE) strip USE TO TEST 3 TIMES DAILY AS NEEDED E11.9 GIVE WHAT IS COVERED. 7/6/20   ANA Dangelo CNP   Blood Glucose Monitoring Suppl (ACCU-CHEK KATHIA PLUS) w/Device KIT 1 Device by Does not apply route once for 1 dose 12/30/19 1/27/20  ANA Escamilla CNP   SOFT TOUCH LANCETS MISC 1 Device by Does not apply route 3 times daily 7/19/19   ANA Dangelo CNP   [employer] LANCETS MICRO THIN 33G MISC USE TO TEST BLOOD SUGAR 1-2 TIMES A DAY MAY TEST MORE IF BLOOD SUGAR SEEMS LOW 5/13/19   Kyle Mtz, APRN - CNP   Insulin Pen Needle (PEN NEEDLES) 31G X 6 MM MISC TEST BLOOD SUGARS ONCE DAILY FOR E11.42 3/14/19   ANA Graham CNP   APPLE CIDER VINEGAR PO Take by mouth    Historical Provider, MD   Alcohol Swabs (ALCOHOL PREP) 70 % PADS USE ONCE DAILY FOR [facility] INJECTION 3/28/18   ANA Graham CNP       Allergies:  Sulfa antibiotics and Victoza [liraglutide]    Social History:  The patient currently lives at home alone     TOBACCO:   reports that he quit smoking about 20 years ago. He has a 93.00 pack-year smoking history. He has never used smokeless tobacco.  ETOH:   reports no history of alcohol use. Family History:   Positive as follows:        Problem Relation Age of Onset    Anemia Mother     Hypertension Mother     Heart Disease Father     Diabetes Father     No Known Problems Sister     No Known Problems Brother     No Known Problems Daughter     Other Son         ? etiol;    No Known Problems Son        REVIEW OF SYSTEMS:       Constitutional: Negative for fever   HENT: Negative for sore throat   Eyes: Negative for redness   Respiratory: Negative  for dyspnea, cough   Cardiovascular: Negative for chest pain   Gastrointestinal: +for vomiting, coffee ground emesis  + hiccups   Negative for  diarrhea   Genitourinary: Negative for hematuria   Musculoskeletal: Negative for arthralgias   Skin: Negative for rash   Neurological: Negative for syncope   Hematological: Negative for adenopathy   Psychiatric/Behavorial: Negative for anxiety    PHYSICAL EXAM:    BP (!) 159/95   Pulse 117   Temp 98.7 °F (37.1 °C) (Oral)   Resp 18   Ht 5' 10\" (1.778 m)   Wt 200 lb (90.7 kg)   SpO2 96%   BMI 28.70 kg/m²     Gen: No distress. Alert. Hiccups noted throughout exam  Eyes: PERRL. No sclera icterus. No conjunctival injection. ENT: No discharge. Pharynx clear. Neck: No JVD. Trachea midline. Resp: No accessory muscle use. No crackles. No wheezes. No rhonchi. CV: Regular rate. Regular rhythm. No murmur. No rub. No edema.    Capillary Refill: Brisk,< 3 seconds   Peripheral Pulses: +2 palpable, equal bilaterally   GI: Non-tender. Non-distended. No masses. No organomegaly. Normal bowel sounds. No hernia. Skin: Warm and dry. No nodule on exposed extremities. No rash on exposed extremities. M/S: No cyanosis. No joint deformity. No clubbing. Neuro: Awake. Grossly nonfocal    Psych: Oriented x 3. No anxiety or agitation. CBC:   Recent Labs     11/03/21  0945 11/03/21  1750   WBC 16.2*  --    HGB 19.7* 17.7*   HCT 59.9* 53.7*   MCV 83.6  --      --      BMP:   Recent Labs     11/03/21  0945 11/03/21  1750    136   K 4.2 4.1   CL 92* 94*   CO2 22 22   BUN 49* 49*   CREATININE 1.6* 1.4*     LIVER PROFILE:   Recent Labs     11/03/21  0945   AST 13*   ALT 9*   LIPASE 11.0*   BILITOT 1.0   ALKPHOS 73     PT/INR:   Recent Labs     11/03/21  0945   PROTIME 12.2   INR 1.07     APTT: No results for input(s): APTT in the last 72 hours. UA:  Recent Labs     11/03/21  1150   COLORU Yellow   PHUR 5.0   CLARITYU Clear   SPECGRAV 1.020   LEUKOCYTESUR Negative   UROBILINOGEN 0.2   BILIRUBINUR Negative   BLOODU Negative   GLUCOSEU >=1000*          CARDIAC ENZYMES  No results for input(s): CKTOTAL, CKMB, CKMBINDEX, TROPONINI in the last 72 hours. U/A:    Lab Results   Component Value Date    COLORU Yellow 11/03/2021    CLARITYU Clear 11/03/2021    SPECGRAV 1.020 11/03/2021    LEUKOCYTESUR Negative 11/03/2021    BLOODU Negative 11/03/2021    GLUCOSEU >=1000 11/03/2021     CULTURES  COVID 19, NAAT: not detected     EKG:     Sinus tachycardia ventricular rate 123 bpm     RADIOLOGY  CT ABDOMEN PELVIS W IV CONTRAST Additional Contrast? None   Final Result   Diffuse wall thickening involving the visualized distal esophagus concerning   for esophagitis. Consider direct visualization to exclude malignancy. Splenomegaly. Nonobstructing bilateral nephrolithiasis. RECOMMENDATIONS:   There is a 3 mm subpleural nodule in left lung base.   If the patient has risk   factors for malignancy, consider 1 year follow-up dedicated chest CT. XR CHEST (2 VW)   Final Result   Borderline cardiomegaly. No acute pulmonary findings. Pertinent previous results reviewed   Colonoscopy 4/2019  Impression:   - Mild diverticulosis in the sigmoid, descending and transverse colon. One 1.5 cm sessile polyp in the transverse colon removed with hot snare, one clip placed prophylactically at the site. One 1.5 cm sessile descending colon polyp removed with hot snare. One 1 cm sessile sigmoid polyp removed with hot snare. There are few small 5-7 mm sessile sigmoid and rectal hyperplastic appearing polyps that were not removed. Recommendations:  -Await pathology. - Avoid NSAIDs for 1 week  - High fiber diet for diverticulosis     ASSESSMENT/PLAN:    #Nausea and vomiting  #Coffee ground emesis  #GI Bleed  #Abnormal CT with esophageal thickening   - presents with 3-4 days of coffee ground emesis, hemoccult positive   - history of esophogeal stricture requiring remote dilatation   - CT now with distal esophageal thickening   - IV PPI gtt  - GI c/s   - Hb elevated on admit- suspect hemoconcentration. Monitor h/h q6 hours for transfusion needs     #Hiccups   - persistent since symptom onset and very bothersome for patient  - try baclofen 5 mg TID    #Dehydration   #Elevated creatinine   - Crt 1.6 on admit-> 1.4  - baseline Crt ~1.1  - suspect pre renal etiology related to GI losses and poor PO intake  - cont IVF and monitor BMP    #Lactic acidosis  - 3.1-> 1.6 after IVF  - no sepsis, suspect 2/2 dehydration     #Leucocytosis  - suspect with reactive and with dehydration, no apparent infection as of now. . Cont to monitor     #Elevated hemoglobin  - suspect hemoconcentration from dehydration  - monitor CBC with IVF admin    #DM2  - with hyperglycemia on admit, not in DKA  - cont Lantus at half dose- 10 u BID  - NPO SSI humalog  - hold ozempic and jardiance #Depression and anxiety   - cont home meds: Zoloft and Depakote     #Pulmonary nodule   - 3mm in left lung base- patient denies current tobacco abuse. Will need to risk assess further.   Repeat CT chest in 1 year recommendation placed on dc paperwork    DVT Prophylaxis: SCD  Diet: Diet NPO Exceptions are: Ice Chips, Sips of Water with Meds, Sips of Clear Liquids  Code Status: Full Code    Leila Armstrong PA-C  11/3/2021 6:46 PM

## 2021-11-03 NOTE — PLAN OF CARE
Admit to PCU    Upper GI bleed  GI consulted  CT showed diffuse wall thickening involving the distal esophagus concerning for esophagitis and pulmonary nodule   Hyperglycemia- does not appear to be in DKA- beta-hydroxybutyrate ordered  NPO  IVF  Protonix gtt  Tachycardia, leukocytosis, polycythemia likely related to dehydration  CXR negative  Urine culture ordered      ANA Lord - CNP

## 2021-11-03 NOTE — ED PROVIDER NOTES
[facility]      Pt Name: Chalino Sal  MRN: [id number]  [place] 1960  Date of evaluation: 11/3/2021  Provider: Anthony Rock MD    [address] [place] [address]       Chief Complaint   Patient presents with    Emesis     c/o emesis x 5 days         HISTORY OF PRESENT ILLNESS   (Location/Symptom, Timing/Onset, Context/Setting, Quality, Duration, Modifying Factors, Severity)  Note limiting factors. Chalino Sal is a 57 y.o. male who presents to the emergency department     Patient states for the last 2 days he has had some mild epigastric discomfort. Patient states that he was started on a new insulin ozempic for elevated blood sugars running in the 600s last week he had been on Basaglar 20 units twice a day and NovoLog coverage during the day  Ever since starting the new insulin he said he felt nauseated the next day and then started to vomit      He has had an appendectomy and an umbilical hernia repair in the past  He tells me that he also has had history of ulcers  He tells me that he also has had history of ulcers but never bled from had pain for several days and went away he is not sure how they were diagnosed either somewhat of a poor historian    He says he has vomited about 8-9 times this morning he says it is all been coffee-ground appearance  The patient denies any bright red flow but he also denies any black stools or melena  Has mild epigastric discomfort with nausea vomiting and belching he says it is kind of a pressure in his epigastric area    Patient says that he is not a drinker  He denies being on any blood thinners      The history is provided by the patient. Nursing Notes were reviewed. REVIEW OF SYSTEMS    (2-9 systems for level 4, 10 or more for level 5)     Review of Systems   Constitutional: Positive for activity change and appetite change. Negative for chills, fatigue and fever. Respiratory: Negative for shortness of breath. Cardiovascular: Negative for chest pain. Gastrointestinal: Positive for abdominal pain, nausea and vomiting. Musculoskeletal: Negative for back pain and neck pain. Neurological: Negative for dizziness. Psychiatric/Behavioral: Negative for behavioral problems. All other systems reviewed and are negative. Except as noted above the remainder of the review of systems was reviewed and negative.        PAST MEDICAL HISTORY     Past Medical History:   Diagnosis Date    Adenomatous polyps     scoped 2019;acc to pt ,repeat in one yr    Anxiety     CTS (carpal tunnel syndrome)     CTS (carpal tunnel syndrome)     Diabetes mellitus ([facility] [address]     GERD (gastroesophageal reflux disease)     has had stricture dilated mult yrs ago    Hyperlipidemia     Hypertension     Obesity with serious comorbidity 9/9/2020    Peripheral neuropathy     Type II or unspecified type diabetes mellitus without mention of complication, not stated as uncontrolled     Urinary frequency          SURGICAL HISTORY       Past Surgical History:   Procedure Laterality Date    APPENDECTOMY      CARPAL TUNNEL RELEASE      COLONOSCOPY N/A 4/30/2019    COLONOSCOPY POLYPECTOMY SNARE/COLD BIOPSY performed by Travis Grey MD at [facility] N/A 4/30/2019    COLONOSCOPY CONTROL HEMORRHAGE performed by Travis Grey MD at [address] Left     HERNIA REPAIR  10/12/15    Laparoscopic Hernia Repair    [place] SHLDR ARTHROSCOP,SURG,W/ROTAT CUFF REPR Left 11/26/2018    LEFT SHOULDER ARTHROSCOPY WITH DEBRIDEMENT, ROTATOR CUFF REPAIR, OPEN SUBSCAPULARIS REPAIR, BICEPS TENODESIS, SUBACROMIAL DECOMPRESSION   performed by Allison Jack MD at [address]       Previous Medications    ALCOHOL SWABS (ALCOHOL PREP) 70 % PADS    USE ONCE DAILY FOR EACH INJECTION    APPLE CIDER VINEGAR PO    Take by mouth    BISOPROLOL-HYDROCHLOROTHIAZIDE (ZIAC) 10-6.25 MG PER TABLET    TAKE ONE TABLET BY MOUTH DAILY    BLOOD GLUCOSE MONITORING SUPPL (ACCU-CHEK KATHIA PLUS) W/DEVICE KIT    1 Device by Does not apply route once for 1 dose    BLOOD GLUCOSE MONITORING SUPPL (ONETOUCH VERIO) W/DEVICE KIT    1 Device by Does not apply route once for 1 dose    BLOOD GLUCOSE TEST STRIPS (FREESTYLE LITE) STRIP    USE TO TEST 3 TIMES DAILY AS NEEDED E11.9 GIVE WHAT IS COVERED.    BLOOD GLUCOSE TEST STRIPS (ONETOUCH VERIO) STRIP    1 each by In Vitro route 3 times daily As needed. BUSPIRONE (BUSPAR) 10 MG TABLET        DULOXETINE (CYMBALTA) 60 MG EXTENDED RELEASE CAPSULE    TAKE ONE CAPSULE BY MOUTH DAILY    EPINEPHRINE (EPIPEN 2-SHO) 0.3 MG/0.3ML SOAJ INJECTION    Inject 0.3 mLs into the muscle once as needed (Hives) Use as directed for allergic reaction    INSULIN GLARGINE (BASAGLAR KWIKPEN) 100 UNIT/ML INJECTION PEN    Inject 25-30 Units into the skin 2 times daily    INSULIN PEN NEEDLE (PEN NEEDLES) 31G X 6 MM MISC    TEST BLOOD SUGARS ONCE DAILY FOR E11.42    [employer] LANCETS MICRO THIN 33G MISC    USE TO TEST BLOOD SUGAR 1-2 TIMES A DAY MAY TEST MORE IF BLOOD SUGAR SEEMS LOW    LIDOCAINE (LIDODERM) 5 %    PLACE 3 PATCHES ONTO THE SKIN EVERY 24 HOURS, 12 HOURS ON AND 12 HOURS OFF    NOVOLOG FLEXPEN 100 UNIT/ML INJECTION PEN    Inject 20 Units into the skin 3 times daily    OMEPRAZOLE (PRILOSEC) 40 MG DELAYED RELEASE CAPSULE    TAKE ONE CAPSULE BY MOUTH DAILY    ONE TOUCH ULTRASOFT LANCETS MISC    1 each by Does not apply route 3 times daily    OXYCODONE-ACETAMINOPHEN (PERCOCET) 7.5-325 MG PER TABLET    Take 1 tablet by mouth every 8 hours as needed for Pain (three times daily).      PRAVASTATIN (PRAVACHOL) 10 MG TABLET    Take 1 tablet by mouth daily    SEMAGLUTIDE (OZEMPIC, 1 MG/DOSE, SC)    Inject into the skin once a week    SERTRALINE (ZOLOFT) 50 MG TABLET        SOFT TOUCH LANCETS MISC    1 Device by Does not apply route 3 times daily    TAMSULOSIN (FLOMAX) 0.4 MG CAPSULE Ex-Partner:     Emotionally Abused:     Physically Abused:     Sexually Abused:        SCREENINGS               PHYSICAL EXAM    (up to 7 for level 4, 8 or more for level 5)     ED Triage Vitals [11/03/21 0921]   BP Temp Temp Source Pulse Resp SpO2 Height Weight   (!) 156/119 98.2 °F (36.8 °C) Oral 131 20 98 % -- --       Physical Exam  Vitals and nursing note reviewed. Constitutional:       General: He is not in acute distress. Appearance: He is well-developed. He is not diaphoretic. HENT:      Head: Normocephalic. Right Ear: Tympanic membrane, ear canal and external ear normal.      Left Ear: Tympanic membrane, ear canal and external ear normal.      Nose: Nose normal.   Eyes:      Conjunctiva/sclera: Conjunctivae normal.      Pupils: Pupils are equal, round, and reactive to light. Neck:      Thyroid: No thyromegaly. Cardiovascular:      Rate and Rhythm: Regular rhythm. Tachycardia present. Heart sounds: Normal heart sounds. No murmur heard. No friction rub. No gallop. Pulmonary:      Effort: Pulmonary effort is normal. No respiratory distress. Breath sounds: Normal breath sounds. Abdominal:      General: Bowel sounds are normal. There is no distension. Palpations: Abdomen is soft. Tenderness: There is no abdominal tenderness. Genitourinary:     Comments: Patient had a large cup probably with about 300 cc in it of very dark coffee colored emesis. Which is guaiac positive  Musculoskeletal:      Cervical back: Normal range of motion and neck supple. No rigidity or tenderness. Skin:     General: Skin is warm. Neurological:      Mental Status: He is alert and oriented to person, place, and time. GCS: GCS eye subscore is 4. GCS verbal subscore is 5. GCS motor subscore is 6. Cranial Nerves: No cranial nerve deficit. Sensory: No sensory deficit. Motor: No abnormal muscle tone.       Coordination: Coordination normal.      Deep Tendon Reflexes: Reflexes normal.   Psychiatric:         Behavior: Behavior normal.     Rectal exam was performed dark brown stool but it was Hemoccult negative according to our     DIAGNOSTIC RESULTS     EKG: All EKG's are interpreted by the Emergency Department Physician who either signs or Co-signs this chart in the absence of a cardiologist.  Rate 123  Sinus tachycardia  No acute ischemic or injury pattern  Intervals are normal  No ectopy  Sinus tachycardia      RADIOLOGY:   Non-plain film images such as CT, Ultrasound and MRI are read by the radiologist. Plain radiographic images are visualized and preliminarily interpreted by the emergency physician with the below findings:        Interpretation per the Radiologist below, if available at the time of this note:    CT ABDOMEN PELVIS W IV CONTRAST Additional Contrast? None   Final Result   Diffuse wall thickening involving the visualized distal esophagus concerning   for esophagitis. Consider direct visualization to exclude malignancy. Splenomegaly. Nonobstructing bilateral nephrolithiasis. RECOMMENDATIONS:   There is a 3 mm subpleural nodule in left lung base. If the patient has risk   factors for malignancy, consider 1 year follow-up dedicated chest CT. XR CHEST (2 VW)   Final Result   Borderline cardiomegaly. No acute pulmonary findings.                  LABS:  Results for orders placed or performed during the hospital encounter of 11/03/21   CBC Auto Differential   Result Value Ref Range    WBC 16.2 (H) 4.0 - 11.0 K/uL    RBC 7.16 (H) 4.20 - 5.90 M/uL    Hemoglobin 19.7 (H) 13.5 - 17.5 g/dL    Hematocrit 59.9 (H) 40.5 - 52.5 %    MCV 83.6 80.0 - 100.0 fL    MCH 27.5 26.0 - 34.0 pg    MCHC 32.8 31.0 - 36.0 g/dL    RDW 14.9 12.4 - 15.4 %    Platelets 932 705 - 392 K/uL    MPV 10.6 (H) 5.0 - 10.5 fL    Neutrophils % 87.6 %    Lymphocytes % 6.5 %    Monocytes % 5.5 %    Eosinophils % 0.0 %    Basophils % 0.4 %    Neutrophils Absolute 14.2 (H) 1.7 - 7.7 K/uL    Lymphocytes Absolute 1.1 1.0 - 5.1 K/uL    Monocytes Absolute 0.9 0.0 - 1.3 K/uL    Eosinophils Absolute 0.0 0.0 - 0.6 K/uL    Basophils Absolute 0.1 0.0 - 0.2 K/uL   Comprehensive Metabolic Panel   Result Value Ref Range    Sodium 139 136 - 145 mmol/L    Potassium 4.2 3.5 - 5.1 mmol/L    Chloride 92 (L) 99 - 110 mmol/L    CO2 22 21 - 32 mmol/L    Anion Gap 25 (H) 3 - 16    Glucose 302 (H) 70 - 99 mg/dL    BUN 49 (H) 7 - 20 mg/dL    CREATININE 1.6 (H) 0.8 - 1.3 mg/dL    GFR Non- 44 (A) >60    GFR  53 (A) >60    Calcium 10.2 8.3 - 10.6 mg/dL    Total Protein 7.7 6.4 - 8.2 g/dL    Albumin 4.5 3.4 - 5.0 g/dL    Albumin/Globulin Ratio 1.4 1.1 - 2.2    Total Bilirubin 1.0 0.0 - 1.0 mg/dL    Alkaline Phosphatase 73 40 - 129 U/L    ALT 9 (L) 10 - 40 U/L    AST 13 (L) 15 - 37 U/L   Protime-INR   Result Value Ref Range    Protime 12.2 9.9 - 12.7 sec    INR 1.07 0.88 - 1.12   Lipase   Result Value Ref Range    Lipase 11.0 (L) 13.0 - 60.0 U/L   Lactic Acid, Plasma   Result Value Ref Range    Lactic Acid 3.2 (H) 0.4 - 2.0 mmol/L   Blood Occult Stool Diagnostic   Result Value Ref Range    Occult Blood Diagnostic Result: Negative  Normal range: Negative      Occult blood gastric / duodenum   Result Value Ref Range    Occult Blood, Other Result: POSITIVE  Normal range: Negative   (A)     pH, Gastric pH=3    POCT Glucose   Result Value Ref Range    POC Glucose 270 (H) 70 - 99 mg/dl    Performed on [facility]    EKG 12 Lead   Result Value Ref Range    Ventricular Rate 123 BPM    Atrial Rate 123 BPM    P-R Interval 164 ms    QRS Duration 84 ms    Q-T Interval 320 ms    QTc Calculation (Bazett) 458 ms    P Axis 52 degrees    R Axis 36 degrees    T Axis 46 degrees    Diagnosis       Sinus tachycardiaOtherwise normal ECGNo previous ECGs available            EMERGENCY DEPARTMENT COURSE and DIFFERENTIAL DIAGNOSIS/MDM:     Vitals:    11/03/21 7527 11/03/21 0953 11/03/21 1100 11/03/21 1142   BP: (!) 156/119 (!) 156/108 (!) 154/98 (!) 136/98   Pulse: 131 128 124 120   Resp: 20  20 20   Temp: 98.2 °F (36.8 °C) 98.3 °F (36.8 °C)  98.3 °F (36.8 °C)   TempSrc: Oral Oral  Oral   SpO2: 98% 98% 94% 94%           MDM      REASSESSMENT          CRITICAL CARE TIME   Is a 50-year-old white male presents emergency department history of vomiting coffee-ground emesis for the last 2 days. He said and last 12 hours he has vomited about 8-9 times  Presents unstable with tachycardia around 1 30-1 35  The patient is complaining of epigastric pain  He is a diabetic also. He is on 3 forms of insulin. Patient has had no fever  He underwent continuous cardiac monitoring for his unstable vital signs  Full history was obtained old records reviewed  All labs ordered and reviewed which revealed lactic acidosis, hyperglycemia, GI bleed upper  Patient was immediately given Protonix was also given Zofran 2 IVs were started. Patient was discussed with the hospitalist.  As well as her GI team.  Patient is being transferred on a cardiac monitor bed line EKG ordered and reviewed. A total of 45 minutes of critical care time was spent directly on this patient (no procedure time) CONSULTS:  None      PROCEDURES:     Procedures    MEDICATIONS GIVEN THIS VISIT:  Medications   0.9 % sodium chloride bolus (0 mLs IntraVENous Stopped 11/3/21 1040)   pantoprazole (PROTONIX) injection 40 mg (40 mg IntraVENous Given 11/3/21 0959)   ondansetron (ZOFRAN) injection 4 mg (4 mg IntraVENous Given 11/3/21 1000)   iopamidol (ISOVUE-370) 76 % injection 75 mL (75 mLs IntraVENous Given 11/3/21 1102)        FINAL IMPRESSION      1. Acute upper GI hemorrhage    2. Hypotension due to hypovolemia    3. Sinus tachycardia    4. Polycythemia    5. Lactic acidosis    6. Leukocytosis, unspecified type    7. High anion gap metabolic acidosis    8.  Hyperglycemia            DISPOSITION/PLAN   DISPOSITION Decision To Admit 11/03/2021 09:59:59 AM      PATIENT REFERRED TO:  No follow-up provider specified. DISCHARGE MEDICATIONS:  New Prescriptions    No medications on file       Controlled Substances Monitoring  RX Monitoring 12/22/2017   Attestation The Prescription Monitoring Report for this patient was reviewed today. (Please note that portions of this note were completed with a voice recognition program.  Efforts were made to edit the dictations but occasionally words are mis-transcribed.)    Patient was advised to return to the Emergency Department if there was any worsening.     Rocky Rivera MD (electronically signed)  Attending Emergency Physician          Penny Hatchet, MD  11/03/21 1143

## 2021-11-03 NOTE — ED NOTES
ambulatroy to bathroom for bm. Denies dizziness, nausea, diarrhea, pain.       Ismael Fletcher RN  11/03/21 4540

## 2021-11-04 ENCOUNTER — ANESTHESIA EVENT (OUTPATIENT)
Dept: ENDOSCOPY | Age: 61
DRG: 368 | End: 2021-11-04
Payer: MEDICARE

## 2021-11-04 ENCOUNTER — ANESTHESIA (OUTPATIENT)
Dept: ENDOSCOPY | Age: 61
DRG: 368 | End: 2021-11-04
Payer: MEDICARE

## 2021-11-04 VITALS
WEIGHT: 198 LBS | BODY MASS INDEX: 28.35 KG/M2 | OXYGEN SATURATION: 96 % | DIASTOLIC BLOOD PRESSURE: 90 MMHG | RESPIRATION RATE: 18 BRPM | HEART RATE: 106 BPM | HEIGHT: 70 IN | SYSTOLIC BLOOD PRESSURE: 143 MMHG | TEMPERATURE: 97.3 F

## 2021-11-04 VITALS
OXYGEN SATURATION: 97 % | DIASTOLIC BLOOD PRESSURE: 91 MMHG | RESPIRATION RATE: 18 BRPM | SYSTOLIC BLOOD PRESSURE: 141 MMHG

## 2021-11-04 LAB
ANION GAP SERPL CALCULATED.3IONS-SCNC: 15 MMOL/L (ref 3–16)
BASOPHILS ABSOLUTE: 0 K/UL (ref 0–0.2)
BASOPHILS RELATIVE PERCENT: 0.1 %
BUN BLDV-MCNC: 45 MG/DL (ref 7–20)
CALCIUM SERPL-MCNC: 8.3 MG/DL (ref 8.3–10.6)
CHLORIDE BLD-SCNC: 102 MMOL/L (ref 99–110)
CO2: 23 MMOL/L (ref 21–32)
CREAT SERPL-MCNC: 1.4 MG/DL (ref 0.8–1.3)
EOSINOPHILS ABSOLUTE: 0.1 K/UL (ref 0–0.6)
EOSINOPHILS RELATIVE PERCENT: 0.9 %
ESTIMATED AVERAGE GLUCOSE: 257.5 MG/DL
GFR AFRICAN AMERICAN: >60
GFR NON-AFRICAN AMERICAN: 51
GLUCOSE BLD-MCNC: 105 MG/DL (ref 70–99)
GLUCOSE BLD-MCNC: 106 MG/DL (ref 70–99)
GLUCOSE BLD-MCNC: 115 MG/DL (ref 70–99)
GLUCOSE BLD-MCNC: 93 MG/DL (ref 70–99)
HBA1C MFR BLD: 10.6 %
HCT VFR BLD CALC: 49.4 % (ref 40.5–52.5)
HCT VFR BLD CALC: 50 % (ref 40.5–52.5)
HCT VFR BLD CALC: 51.2 % (ref 40.5–52.5)
HEMOGLOBIN: 16.2 G/DL (ref 13.5–17.5)
HEMOGLOBIN: 16.2 G/DL (ref 13.5–17.5)
HEMOGLOBIN: 16.6 G/DL (ref 13.5–17.5)
LYMPHOCYTES ABSOLUTE: 1.1 K/UL (ref 1–5.1)
LYMPHOCYTES RELATIVE PERCENT: 11.4 %
MCH RBC QN AUTO: 27.7 PG (ref 26–34)
MCHC RBC AUTO-ENTMCNC: 32.4 G/DL (ref 31–36)
MCV RBC AUTO: 85.4 FL (ref 80–100)
MONOCYTES ABSOLUTE: 1 K/UL (ref 0–1.3)
MONOCYTES RELATIVE PERCENT: 9.7 %
NEUTROPHILS ABSOLUTE: 7.8 K/UL (ref 1.7–7.7)
NEUTROPHILS RELATIVE PERCENT: 77.9 %
PDW BLD-RTO: 14.6 % (ref 12.4–15.4)
PERFORMED ON: ABNORMAL
PERFORMED ON: ABNORMAL
PERFORMED ON: NORMAL
PLATELET # BLD: 128 K/UL (ref 135–450)
PMV BLD AUTO: 10.5 FL (ref 5–10.5)
POTASSIUM REFLEX MAGNESIUM: 3.6 MMOL/L (ref 3.5–5.1)
RBC # BLD: 5.85 M/UL (ref 4.2–5.9)
SODIUM BLD-SCNC: 140 MMOL/L (ref 136–145)
WBC # BLD: 10.1 K/UL (ref 4–11)

## 2021-11-04 PROCEDURE — 36415 COLL VENOUS BLD VENIPUNCTURE: CPT

## 2021-11-04 PROCEDURE — 85018 HEMOGLOBIN: CPT

## 2021-11-04 PROCEDURE — 6360000002 HC RX W HCPCS: Performed by: NURSE PRACTITIONER

## 2021-11-04 PROCEDURE — G0378 HOSPITAL OBSERVATION PER HR: HCPCS

## 2021-11-04 PROCEDURE — 6360000002 HC RX W HCPCS: Performed by: INTERNAL MEDICINE

## 2021-11-04 PROCEDURE — 99238 HOSP IP/OBS DSCHRG MGMT 30/<: CPT | Performed by: INTERNAL MEDICINE

## 2021-11-04 PROCEDURE — 80048 BASIC METABOLIC PNL TOTAL CA: CPT

## 2021-11-04 PROCEDURE — 2580000003 HC RX 258

## 2021-11-04 PROCEDURE — 88305 TISSUE EXAM BY PATHOLOGIST: CPT

## 2021-11-04 PROCEDURE — 2709999900 HC NON-CHARGEABLE SUPPLY: Performed by: INTERNAL MEDICINE

## 2021-11-04 PROCEDURE — 2580000003 HC RX 258: Performed by: NURSE PRACTITIONER

## 2021-11-04 PROCEDURE — 6370000000 HC RX 637 (ALT 250 FOR IP): Performed by: PHYSICIAN ASSISTANT

## 2021-11-04 PROCEDURE — 6370000000 HC RX 637 (ALT 250 FOR IP): Performed by: NURSE PRACTITIONER

## 2021-11-04 PROCEDURE — C9113 INJ PANTOPRAZOLE SODIUM, VIA: HCPCS | Performed by: NURSE PRACTITIONER

## 2021-11-04 PROCEDURE — 85025 COMPLETE CBC W/AUTO DIFF WBC: CPT

## 2021-11-04 PROCEDURE — 2580000003 HC RX 258: Performed by: PHYSICIAN ASSISTANT

## 2021-11-04 PROCEDURE — 90686 IIV4 VACC NO PRSV 0.5 ML IM: CPT | Performed by: INTERNAL MEDICINE

## 2021-11-04 PROCEDURE — 88341 IMHCHEM/IMCYTCHM EA ADD ANTB: CPT

## 2021-11-04 PROCEDURE — 3609012400 HC EGD TRANSORAL BIOPSY SINGLE/MULTIPLE: Performed by: INTERNAL MEDICINE

## 2021-11-04 PROCEDURE — 7100000010 HC PHASE II RECOVERY - FIRST 15 MIN: Performed by: INTERNAL MEDICINE

## 2021-11-04 PROCEDURE — 0DB58ZX EXCISION OF ESOPHAGUS, VIA NATURAL OR ARTIFICIAL OPENING ENDOSCOPIC, DIAGNOSTIC: ICD-10-PCS | Performed by: INTERNAL MEDICINE

## 2021-11-04 PROCEDURE — 7100000011 HC PHASE II RECOVERY - ADDTL 15 MIN: Performed by: INTERNAL MEDICINE

## 2021-11-04 PROCEDURE — 3700000000 HC ANESTHESIA ATTENDED CARE: Performed by: INTERNAL MEDICINE

## 2021-11-04 PROCEDURE — 85014 HEMATOCRIT: CPT

## 2021-11-04 PROCEDURE — G0008 ADMIN INFLUENZA VIRUS VAC: HCPCS | Performed by: INTERNAL MEDICINE

## 2021-11-04 PROCEDURE — 3700000001 HC ADD 15 MINUTES (ANESTHESIA): Performed by: INTERNAL MEDICINE

## 2021-11-04 PROCEDURE — 88342 IMHCHEM/IMCYTCHM 1ST ANTB: CPT

## 2021-11-04 PROCEDURE — 2500000003 HC RX 250 WO HCPCS

## 2021-11-04 PROCEDURE — 6360000002 HC RX W HCPCS

## 2021-11-04 PROCEDURE — 88312 SPECIAL STAINS GROUP 1: CPT

## 2021-11-04 RX ORDER — SODIUM CHLORIDE, SODIUM LACTATE, POTASSIUM CHLORIDE, CALCIUM CHLORIDE 600; 310; 30; 20 MG/100ML; MG/100ML; MG/100ML; MG/100ML
INJECTION, SOLUTION INTRAVENOUS CONTINUOUS PRN
Status: DISCONTINUED | OUTPATIENT
Start: 2021-11-04 | End: 2021-11-04 | Stop reason: SDUPTHER

## 2021-11-04 RX ORDER — ONDANSETRON 2 MG/ML
4 INJECTION INTRAMUSCULAR; INTRAVENOUS
Status: DISCONTINUED | OUTPATIENT
Start: 2021-11-04 | End: 2021-11-04 | Stop reason: HOSPADM

## 2021-11-04 RX ORDER — FENTANYL CITRATE 50 UG/ML
25 INJECTION, SOLUTION INTRAMUSCULAR; INTRAVENOUS EVERY 5 MIN PRN
Status: DISCONTINUED | OUTPATIENT
Start: 2021-11-04 | End: 2021-11-04 | Stop reason: HOSPADM

## 2021-11-04 RX ORDER — PANTOPRAZOLE SODIUM 40 MG/1
40 TABLET, DELAYED RELEASE ORAL
Qty: 60 TABLET | Refills: 3 | Status: SHIPPED | OUTPATIENT
Start: 2021-11-04

## 2021-11-04 RX ORDER — PROPOFOL 10 MG/ML
INJECTION, EMULSION INTRAVENOUS PRN
Status: DISCONTINUED | OUTPATIENT
Start: 2021-11-04 | End: 2021-11-04 | Stop reason: SDUPTHER

## 2021-11-04 RX ORDER — HYDRALAZINE HYDROCHLORIDE 20 MG/ML
5 INJECTION INTRAMUSCULAR; INTRAVENOUS EVERY 10 MIN PRN
Status: DISCONTINUED | OUTPATIENT
Start: 2021-11-04 | End: 2021-11-04 | Stop reason: HOSPADM

## 2021-11-04 RX ORDER — LIDOCAINE HYDROCHLORIDE 20 MG/ML
INJECTION, SOLUTION EPIDURAL; INFILTRATION; INTRACAUDAL; PERINEURAL PRN
Status: DISCONTINUED | OUTPATIENT
Start: 2021-11-04 | End: 2021-11-04 | Stop reason: SDUPTHER

## 2021-11-04 RX ORDER — PROMETHAZINE HYDROCHLORIDE 25 MG/ML
6.25 INJECTION, SOLUTION INTRAMUSCULAR; INTRAVENOUS
Status: DISCONTINUED | OUTPATIENT
Start: 2021-11-04 | End: 2021-11-04 | Stop reason: HOSPADM

## 2021-11-04 RX ORDER — MEPERIDINE HYDROCHLORIDE 25 MG/ML
12.5 INJECTION INTRAMUSCULAR; INTRAVENOUS; SUBCUTANEOUS EVERY 5 MIN PRN
Status: DISCONTINUED | OUTPATIENT
Start: 2021-11-04 | End: 2021-11-04 | Stop reason: HOSPADM

## 2021-11-04 RX ORDER — OXYCODONE HYDROCHLORIDE AND ACETAMINOPHEN 5; 325 MG/1; MG/1
2 TABLET ORAL PRN
Status: DISCONTINUED | OUTPATIENT
Start: 2021-11-04 | End: 2021-11-04 | Stop reason: HOSPADM

## 2021-11-04 RX ORDER — OXYCODONE HYDROCHLORIDE AND ACETAMINOPHEN 5; 325 MG/1; MG/1
1 TABLET ORAL PRN
Status: DISCONTINUED | OUTPATIENT
Start: 2021-11-04 | End: 2021-11-04 | Stop reason: HOSPADM

## 2021-11-04 RX ORDER — PANTOPRAZOLE SODIUM 40 MG/1
40 TABLET, DELAYED RELEASE ORAL
Status: DISCONTINUED | OUTPATIENT
Start: 2021-11-04 | End: 2021-11-04 | Stop reason: HOSPADM

## 2021-11-04 RX ADMIN — PROPOFOL 70 MG: 10 INJECTION, EMULSION INTRAVENOUS at 11:47

## 2021-11-04 RX ADMIN — PROPOFOL 30 MG: 10 INJECTION, EMULSION INTRAVENOUS at 11:55

## 2021-11-04 RX ADMIN — Medication 10 ML: at 08:39

## 2021-11-04 RX ADMIN — BACLOFEN 5 MG: 10 TABLET ORAL at 12:57

## 2021-11-04 RX ADMIN — DIVALPROEX SODIUM 250 MG: 250 TABLET, EXTENDED RELEASE ORAL at 12:57

## 2021-11-04 RX ADMIN — PRAVASTATIN SODIUM 40 MG: 40 TABLET ORAL at 12:57

## 2021-11-04 RX ADMIN — SERTRALINE 100 MG: 100 TABLET, FILM COATED ORAL at 12:57

## 2021-11-04 RX ADMIN — LIDOCAINE HYDROCHLORIDE 80 MG: 20 INJECTION, SOLUTION EPIDURAL; INFILTRATION; INTRACAUDAL; PERINEURAL at 11:47

## 2021-11-04 RX ADMIN — TAMSULOSIN HYDROCHLORIDE 0.4 MG: 0.4 CAPSULE ORAL at 12:58

## 2021-11-04 RX ADMIN — SODIUM CHLORIDE 8 MG/HR: 9 INJECTION, SOLUTION INTRAVENOUS at 07:18

## 2021-11-04 RX ADMIN — PROPOFOL 20 MG: 10 INJECTION, EMULSION INTRAVENOUS at 11:59

## 2021-11-04 RX ADMIN — SODIUM CHLORIDE: 9 INJECTION, SOLUTION INTRAVENOUS at 02:23

## 2021-11-04 RX ADMIN — ONDANSETRON HYDROCHLORIDE 4 MG: 2 INJECTION, SOLUTION INTRAMUSCULAR; INTRAVENOUS at 00:56

## 2021-11-04 RX ADMIN — PROPOFOL 30 MG: 10 INJECTION, EMULSION INTRAVENOUS at 11:51

## 2021-11-04 RX ADMIN — FENOFIBRATE 160 MG: 160 TABLET ORAL at 12:57

## 2021-11-04 RX ADMIN — SODIUM CHLORIDE, POTASSIUM CHLORIDE, SODIUM LACTATE AND CALCIUM CHLORIDE: 600; 310; 30; 20 INJECTION, SOLUTION INTRAVENOUS at 11:12

## 2021-11-04 RX ADMIN — PROPOFOL 20 MG: 10 INJECTION, EMULSION INTRAVENOUS at 11:57

## 2021-11-04 RX ADMIN — INFLUENZA A VIRUS A/VICTORIA/2570/2019 IVR-215 (H1N1) ANTIGEN (PROPIOLACTONE INACTIVATED), INFLUENZA A VIRUS A/CAMBODIA/E0826360/2020 IVR-224 (H3N2) ANTIGEN (PROPIOLACTONE INACTIVATED), INFLUENZA B VIRUS B/VICTORIA/705/2018 BVR-11 ANTIGEN (PROPIOLACTONE INACTIVATED), INFLUENZA B VIRUS B/PHUKET/3073/2013 BVR-1B ANTIGEN (PROPIOLACTONE INACTIVATED) 0.5 ML: 15; 15; 15; 15 INJECTION, SUSPENSION INTRAMUSCULAR at 14:53

## 2021-11-04 ASSESSMENT — PAIN SCALES - GENERAL
PAINLEVEL_OUTOF10: 0
PAINLEVEL_OUTOF10: 0

## 2021-11-04 ASSESSMENT — LIFESTYLE VARIABLES: SMOKING_STATUS: 0

## 2021-11-04 NOTE — ANESTHESIA PRE PROCEDURE
Department of Anesthesiology  Preprocedure Note       Name:  Malik [place]   Age:  57 y.o.  :  1960                                          MRN:  [id number]         Date:  2021      Surgeon: Tunde Patel MD    Procedure:  EGD W/CORTNEY. (11:30)    HPI:  57 y.o. male with diabetes mellitus type 2, diabetic neuropathy, depression and anxiety who presented to [facility] ED with complaint of nausea, vomiting, coffee-ground emesis, hiccups. Patient states he started vomiting 3 to 4 days ago. He has been vomiting about 7-8 times per day. Initially his vomit was dark brown in color, this progressed to black in color. Patient has had some upper abdominal discomfort but no significant pain. He has a sensation that food and liquids getting stuck in his lower chest when he tries to drink currently. He denies any chest pain. He denies any fever, chills. Denies diarrhea or constipation. He has never had symptoms of significant vomiting or coffee-ground emesis in the past.  He has had an EGD many years ago and required an esophageal dilatation, denies any recent interventions. He has had persistent hiccups since symptoms onset. [id number]  Sinus tachycardia 123; Otherwise normal ECG. No significant change was found when compared with ECG of 10. 1.20    Medications prior to admission:    Semaglutide (OZEMPIC, 1 MG/DOSE, SC) Inject into the skin once a week   pravastatin (PRAVACHOL) 40 MG tablet Take 40 mg by mouth daily   empagliflozin (JARDIANCE) 25 MG tablet Take 25 mg by mouth daily   pantoprazole (PROTONIX) 20 MG tablet Take 40 mg by mouth daily   divalproex (DEPAKOTE ER) 250 MG extended release tablet Take 250 mg by mouth every morning   divalproex (DEPAKOTE) 500 MG DR tablet Take 500 mg by mouth every evening   insulin glargine (BASAGLAR KWIKPEN) 100 UNIT/ML injection pen Inject 20 Units into the skin 2 times daily    NOVOLOG FLEXPEN 100 UNIT/ML injection pen Inject 10 Units into the skin 2 times daily    tamsulosin (FLOMAX) 0.4 MG capsule Take 0.4 mg by mouth daily    sertraline (ZOLOFT) 50 MG tablet Take 100 mg by mouth daily    lidocaine (LIDODERM) 5 % PLACE 3 PATCHES ONTO THE SKIN EVERY 24 HOURS, 12 HOURS ON AND 12 HOURS OFF   oxyCODONE-acetaminophen (PERCOCET) 7.5-325 MG per tablet Take 1 tablet by mouth every 8 hours as needed for Pain (three times daily).     fenofibrate (TRICOR) 145 MG tablet Take 145 mg by mouth daily   ondansetron (ZOFRAN-ODT) 4 MG disintegrating tablet Take 4 mg by mouth daily   APPLE CIDER VINEGAR PO Take by mouth   Alcohol Swabs (ALCOHOL PREP) 70 % PADS USE ONCE DAILY FOR EACH INJECTION     Current medications:     glucose (GLUTOSE) 40 % oral gel 15 g  15 g Oral PRN    dextrose 50 % IV solution  12.5 g IntraVENous PRN    glucagon (rDNA) injection 1 mg  1 mg IntraMUSCular PRN    dextrose 5 % solution  100 mL/hr IntraVENous PRN    ondansetron (ZOFRAN-ODT) disintegrating tablet 4 mg  4 mg Oral Q8H PRN       ondansetron (ZOFRAN) injection 4 mg  4 mg IntraVENous Q6H PRN    acetaminophen (TYLENOL) tablet 650 mg  650 mg Oral Q6H PRN       acetaminophen (TYLENOL) suppository 650 mg  650 mg Rectal Q6H PRN    insulin lispro (HUMALOG) injection vial 0-12 Units  0-12 Units SubCUTAneous Q4H    pantoprazole (PROTONIX) 80 mg in sodium chloride 0.9 %  8 mg/hr IntraVENous Continuous    divalproex (DEPAKOTE ER) extended release tablet 250 mg  250 mg Oral QAM    divalproex (DEPAKOTE) DR tablet 500 mg  500 mg Oral QPM    [Held by provider] empagliflozin (JARDIANCE) tablet TABS 25 mg  25 mg Oral Daily    fenofibrate (TRIGLIDE) tablet 160 mg  160 mg Oral Daily    lidocaine 4 % external patch 1 patch  1 patch Topical Daily    pravastatin (PRAVACHOL) tablet 40 mg  40 mg Oral Daily    sertraline (ZOLOFT) tablet 100 mg  100 mg Oral Daily    tamsulosin (FLOMAX) capsule 0.4 mg  0.4 mg Oral Daily    influenza quadrivalent split vaccine (FLUZONE;FLUARIX;FLULAVAL;AFLURIA) injection 0.5 mL  0.5 mL IntraMUSCular Prior to discharge    insulin glargine (LANTUS) injection vial 10 Units  10 Units SubCUTAneous BID    baclofen (LIORESAL) tablet 5 mg  5 mg Oral TID     Allergies:     Sulfa Antibiotics Hives    Victoza [Liraglutide] Diarrhea     Problem List:     HTN (hypertension)    Hyperlipidemia    Type 2 diabetes mellitus with diabetic neuropathy, with long-term current use of insulin (HCC)    Chronic foot pain    CTS (carpal tunnel syndrome)    Peripheral neuropathy    Urinary frequency    Anxiety    Elbow pain    Pain of finger of left hand    Lateral epicondylitis    Primary osteoarthritis of first carpometacarpal joint of left hand    Acute sciatica    Incarcerated umbilical hernia    Lumbar radiculitis    Arthritis, midfoot    Cervical radiculopathy at C7    Degeneration of lumbar or lumbosacral intervertebral disc    Displacement of lumbar intervertebral disc without myelopathy    Lumbosacral spondylosis without myelopathy    Spinal stenosis, lumbar region, without neurogenic claudication    Disc displacement, lumbar    Lumbar facet arthropathy    Lumbar stenosis    Neck pain, chronic    Impingement syndrome of right shoulder    Incomplete tear of right rotator cuff    Shoulder impingement    Incomplete tear of left rotator cuff    Occult blood in stools    Arthritis of carpometacarpal (CMC) joint of right thumb    Polyp of transverse colon    Polyp of descending colon    Adenomatous polyps    Obesity with serious comorbidity    Vitamin D deficiency    Acute upper GI hemorrhage    Esophageal thickening    Dehydration    Lactic acidosis    Elevated serum creatinine    Intractable hiccups    Leukocytosis     Past Medical History:     Adenomatous polyps     scoped 2019;acc to pt ,repeat in one yr    Anxiety     CTS (carpal tunnel syndrome)     CTS (carpal tunnel syndrome)     Diabetes mellitus (HCC)     GERD (gastroesophageal reflux disease)     has had stricture dilated mult yrs ago    Hyperlipidemia     Hypertension     Obesity with serious comorbidity 2020    Peripheral neuropathy     Type II or unspecified type diabetes mellitus without mention of complication, not stated as uncontrolled     Urinary frequency      Past Surgical History:    Procedure Laterality Date    APPENDECTOMY      CARPAL TUNNEL RELEASE      COLONOSCOPY N/A 2019    COLONOSCOPY POLYPECTOMY SNARE/COLD BIOPSY performed by Iris Rock MD at [facility] N/A 2019    COLONOSCOPY CONTROL HEMORRHAGE performed by Iris Rock MD at [address] Left    [address]  10/12/15    Laparoscopic Hernia Repair    [place] SHLDR ARTHROSCOP,SURG,W/ROTAT CUFF REPR Left 2018    LEFT SHOULDER ARTHROSCOPY WITH DEBRIDEMENT, ROTATOR CUFF REPAIR, OPEN SUBSCAPULARIS REPAIR, BICEPS TENODESIS, SUBACROMIAL DECOMPRESSION   performed by Cindy Lemon MD at [place] [address]     Social History:    Tobacco Use    Smoking status: Former Smoker     Packs/day: 3.00     Years: 31.00     Pack years: 93.00     Quit date: 2001     Years since quittin.7    Smokeless tobacco: Never Used    Tobacco comment:    Substance Use Topics    Alcohol use: No     Alcohol/week: 0.0 standard drinks                                Counseling given: Not Answered  Comment:     Vital Signs (Current):    21 1700 21 2030 21 0045 21 0730   BP: (!) 159/95 136/84 (!) 143/86 117/77   Pulse: 117 115 111 111   Resp: 18 16 16 16   Temp: 98.7 °F (37.1 °C) 98.5 °F (36.9 °C) 98.9 °F (37.2 °C) 98.6 °F (37 °C)   TempSrc: Oral Oral Oral Oral   SpO2: 96% 97% 92% 93%   Weight: 200 lb (90.7 kg)  205 lb (93 kg)    Height: 5' 10\" (1.778 m)         BP Readings from Last 3 Encounters:   21 117/77   21 120/70   10/14/20 118/72     NPO Status: >8 hrs                       BMI:   Wt Readings from Last 3 Encounters:   11/04/21 205 lb (93 kg)   01/06/21 210 lb (95.3 kg)   10/14/20 210 lb (95.3 kg)     Body mass index is 29.41 kg/m². CBC:    WBC 10.1 11/04/2021    HGB 16.2 11/04/2021    HCT 49.4 11/04/2021     11/04/2021     CMP:     11/04/2021    K 3.6 11/04/2021     11/04/2021    CO2 23 11/04/2021    BUN 45 11/04/2021    CREATININE 1.4 11/04/2021    GFRAA >60 11/04/2021    GLUCOSE 106 11/04/2021    PROT 7.7 11/03/2021    PROT 6.5 08/10/2011    CALCIUM 8.3 11/04/2021    BILITOT 1.0 11/03/2021    ALKPHOS 73 11/03/2021    AST 13 11/03/2021    ALT 9 11/03/2021     POC Tests:     11/04/21   0701   POCGLU   93     Coags:    PROTIME 12.2 11/03/2021    INR 1.07 11/03/2021    APTT 26.8 10/18/2017     COVID-19 Screening (If Applicable): COVID19 Not Detected 11/03/2021     Anesthesia Evaluation  Patient summary reviewed and Nursing notes reviewed  Airway: Mallampati: III  TM distance: >3 FB   Neck ROM: limited  Mouth opening: > = 3 FB Dental:    (+) edentulous      Pulmonary: breath sounds clear to auscultation      (-) COPD, asthma, recent URI, sleep apnea, wheezes and not a current smoker                           Cardiovascular:  Exercise tolerance: good (>4 METS),   (+) hypertension:, hyperlipidemia    (-) past MI, CABG/stent,  angina,  CHARLES and murmur    ECG reviewed  Rhythm: regular  Rate: normal                    Neuro/Psych:   (+) neuromuscular disease:,    (-) seizures, TIA and CVA            ROS comment: Lumbar and Cervical DDD GI/Hepatic/Renal:   (+) GERD:,      (-) hepatitis, liver disease and no renal disease       Endo/Other:    (+) DiabetesType II DM, using insulin, .    (-) hypothyroidism               Abdominal:             Vascular:     - DVT and PE. Other Findings:           Anesthesia Plan      MAC and TIVA     ASA 3       Induction: intravenous. MIPS: Prophylactic antiemetics administered. Anesthetic plan and risks discussed with patient.       Plan discussed with Tony Acosta MD

## 2021-11-04 NOTE — PROGRESS NOTES
Bedside report received from [address] [place] [address] and CRNA, pt awake vitals WNL to pre-op baseline, pt denies any pain student nurse at bedside. Tika Osborne RN

## 2021-11-04 NOTE — FLOWSHEET NOTE
11/04/21 1245   Vitals   Temp 97.3 °F (36.3 °C)   Temp Source Oral   Pulse 106   Heart Rate Source Monitor   Resp 18   BP (!) 143/90   BP Location Right upper arm   BP Upper/Lower Upper   Patient Position Semi fowlers   Level of Consciousness Alert (0)   MEWS Score 2   Oxygen Therapy   SpO2 96 %   O2 Device None (Room air)   Patient back from [facility]. Vitals stable. Medications given per order. Patient denies any further needs,call light within reach.

## 2021-11-04 NOTE — BRIEF OP NOTE
Brief Postoperative Note      Patient: Aiden Sargent  YOB: 1960  MRN: [id number]    Date of Procedure: 11/4/2021    Pre-Op Diagnosis: GI BLEED    Post-Op Diagnosis: severe esophagitis and hiatal hernia       Procedure(s):  EGD BIOPSY    Surgeon(s):  Vasiliy Esparza MD    Assistant:  * No surgical staff found *    Anesthesia: Monitor Anesthesia Care    Estimated Blood Loss (mL): Minimal    Complications: None    Specimens:   ID Type Source Tests Collected by Time Destination   A :  Tissue Biopsy SURGICAL PATHOLOGY Vasiliy Esparza MD 11/4/2021 1158        Implants:  * No implants in log *      Drains: * No LDAs found *    Findings: severe esophagitis and hiatal hernia    Recommendation  1. Continue supportive care  2. PPI BID x 8wks  3. Start clear diet and advance as tolerated  4. Monitor Hgb  5. Consider alternatives to Ozempic  6.  OK to d/c if diet tolerated    Electronically signed by Vasiliy Esparza MD on 11/4/2021 at 12:19 PM

## 2021-11-04 NOTE — DISCHARGE SUMMARY
Name:  Minal Roper  Room:  ENDO/NONE  MRN:    [id number]    Discharge Summary      This discharge summary is in conjunction with a complete physical exam done on the day of discharge. Attending Physician: Dr. Yojana Chairez  Discharging Physician: Dr. Pema Almanzar: 11/3/2021  Discharge:   11/4/2021    HPI:  The patient is a 57 y.o. male with diabetes mellitus type 2, diabetic neuropathy, depression and anxiety who presented to [facility] ED with complaint of nausea, vomiting, coffee-ground emesis, hiccups. Patient states he started vomiting 3 to 4 days ago. He has been vomiting about 7-8 times per day. Initially his vomit was dark brown in color, this progressed to black in color. Patient has had some upper abdominal discomfort but no significant pain. He has a sensation that food and liquids getting stuck in his lower chest when he tries to drink currently. He denies any chest pain. He denies any fever, chills. Denies diarrhea or constipation. He has never had symptoms of significant vomiting or coffee-ground emesis in the past.  He has had an EGD many years ago and required an esophageal dilatation, denies any recent interventions. He had a colonoscopy last year, states some polyps removed but no other major findings. He denies taking any blood thinners. He does not use any NSAIDs. He rarely drinks alcohol-maybe once or twice per year. He does not smoke cigarettes.     ER work-up revealed dehydration, hyperglycemia. He was not anemic, in fact hemoconcentration noted on labs. Hemoccult positive. He was treated with IV fluids and PPI.   He is admitted for further care and evaluation.     He has had persistent hiccups since symptoms onset     Diagnoses this Admission and Hospital Course   #Nausea and vomiting  #Coffee ground emesis  #GI Bleed  #Abnormal CT with esophageal thickening   - presents with 3-4 days of coffee ground emesis, hemoccult positive   - history of esophogeal stricture GI      Physical Exam at Discharge:    BP (!) 143/90   Pulse 106   Temp 97.3 °F (36.3 °C) (Oral)   Resp 18   Ht 5' 10\" (1.778 m)   Wt 198 lb (89.8 kg)   SpO2 96%   BMI 28.41 kg/m²      Gen: No distress. Alert. Eyes: PERRL. No sclera icterus. No conjunctival injection. ENT: No discharge. Pharynx clear. Neck: Trachea midline. Normal thyroid. Resp: No accessory muscle use. No crackles. No wheezes. No rhonchi. No dullness on percussion. CV: Regular rate. Regular rhythm. No murmur or rub. No edema. GI: Non-tender. Non-distended. No masses. No organomegaly. Normal bowel sounds. No hernia. CBC: Recent Labs     11/03/21  0945 11/03/21  0945 11/03/21  1750 11/04/21  0111 11/04/21  0437   WBC 16.2*  --   --   --  10.1   HGB 19.7*   < > 17.7* 16.6 16.2  16.2   HCT 59.9*   < > 53.7* 51.2 49.4  50.0   MCV 83.6  --   --   --  85.4     --   --   --  128*    < > = values in this interval not displayed. BMP:   Recent Labs     11/03/21  0945 11/03/21  1750 11/04/21  0437    136 140   K 4.2 4.1 3.6   CL 92* 94* 102   CO2 22 22 23   BUN 49* 49* 45*   CREATININE 1.6* 1.4* 1.4*     LIVER PROFILE:   Recent Labs     11/03/21 0945   AST 13*   ALT 9*   LIPASE 11.0*   BILITOT 1.0   ALKPHOS 73     PT/INR:   Recent Labs     11/03/21 0945   PROTIME 12.2   INR 1.07     UA:  Recent Labs     11/03/21  1150   COLORU Yellow   PHUR 5.0   CLARITYU Clear   SPECGRAV 1.020   LEUKOCYTESUR Negative   UROBILINOGEN 0.2   BILIRUBINUR Negative   BLOODU Negative   GLUCOSEU >=1000*         CULTURES  COVID 19, NAAT: not detected     RADIOLOGY  CT ABDOMEN PELVIS W IV CONTRAST Additional Contrast? None   Final Result   Diffuse wall thickening involving the visualized distal esophagus concerning   for esophagitis. Consider direct visualization to exclude malignancy. Splenomegaly. Nonobstructing bilateral nephrolithiasis. RECOMMENDATIONS:   There is a 3 mm subpleural nodule in left lung base.   If the patient has risk   factors for malignancy, consider 1 year follow-up dedicated chest CT. XR CHEST (2 VW)   Final Result   Borderline cardiomegaly. No acute pulmonary findings. Discharge Medications     Medication List      CHANGE how you take these medications    Basaglar KwikPen 100 UNIT/ML injection pen  Generic drug: insulin glargine  Inject 25-30 Units into the skin 2 times daily  What changed: how much to take     pantoprazole 40 MG tablet  Commonly known as: PROTONIX  Take 1 tablet by mouth 2 times daily (before meals)  What changed:   · medication strength  · when to take this     Pravachol 40 MG tablet  Generic drug: pravastatin  What changed: Another medication with the same name was removed. Continue taking this medication, and follow the directions you see here. CONTINUE taking these medications    * Accu-Chek Evy Plus w/Device Kit  1 Device by Does not apply route once for 1 dose     * OneTouch Verio w/Device Kit  1 Device by Does not apply route once for 1 dose     Alcohol Prep 70 % Pads  USE ONCE DAILY FOR EACH INJECTION     APPLE CIDER VINEGAR PO     * divalproex 250 MG extended release tablet  Commonly known as: DEPAKOTE ER     * divalproex 500 MG DR tablet  Commonly known as: DEPAKOTE     fenofibrate 145 MG tablet  Commonly known as: TRICOR     * FREESTYLE LITE strip  Generic drug: blood glucose test strips  USE TO TEST 3 TIMES DAILY AS NEEDED E11.9 GIVE WHAT IS COVERED. * OneTouch Verio strip  Generic drug: blood glucose test strips  1 each by In Vitro route 3 times daily As needed.      Jardiance 25 MG tablet  Generic drug: empagliflozin     * [employer] Lancets Micro Thin 33G Misc  USE TO TEST BLOOD SUGAR 1-2 TIMES A DAY MAY TEST MORE IF BLOOD SUGAR SEEMS LOW     * SOFT TOUCH LANCETS Misc  1 Device by Does not apply route 3 times daily     * ONE TOUCH ULTRASOFT LANCETS Misc  1 each by Does not apply route 3 times daily     lidocaine 5 %  Commonly known as: LIDODERM  PLACE 3 PATCHES ONTO THE SKIN EVERY 24 HOURS, 12 HOURS ON AND 12 HOURS OFF     NovoLOG FlexPen 100 UNIT/ML injection pen  Generic drug: insulin aspart     ondansetron 4 MG disintegrating tablet  Commonly known as: ZOFRAN-ODT     oxyCODONE-acetaminophen 7.5-325 MG per tablet  Commonly known as: PERCOCET     Pen Needles 31G X 6 MM Misc  TEST BLOOD SUGARS ONCE DAILY FOR E11.42     sertraline 50 MG tablet  Commonly known as: ZOLOFT     tamsulosin 0.4 MG capsule  Commonly known as: FLOMAX         * This list has 9 medication(s) that are the same as other medications prescribed for you. Read the directions carefully, and ask your doctor or other care provider to review them with you. STOP taking these medications    bisoprolol-hydroCHLOROthiazide 10-6.25 MG per tablet  Commonly known as: ZIAC     busPIRone 10 MG tablet  Commonly known as: BUSPAR     DULoxetine 60 MG extended release capsule  Commonly known as: CYMBALTA     EPINEPHrine 0.3 MG/0.3ML Soaj injection  Commonly known as: EpiPen 2-Mike     gabapentin 800 MG tablet  Commonly known as: NEURONTIN     lisinopril 10 MG tablet  Commonly known as: PRINIVIL;ZESTRIL     metFORMIN 1000 MG tablet  Commonly known as: GLUCOPHAGE     omeprazole 40 MG delayed release capsule  Commonly known as: PRILOSEC     OZEMPIC (1 MG/DOSE) SC           Where to Get Your Medications      These medications were sent to [address]    Phone: [phone]   · pantoprazole 40 MG tablet           Discharged in stable condition to home. Follow Up:   Follow up with PCP and GI    Kaylee Howell MD 11/7/2021 3:38 PM

## 2021-11-04 NOTE — PROGRESS NOTES
PM assessment completed. Scheduled medications given per MAR. VSS room air, A/O x4, hiccup still present, patient complained of nausea no emesis, denies any needs at this time. Call light in reach, will monitor.

## 2021-11-04 NOTE — PROGRESS NOTES
Patient educated on discharge instructions as well as new medications use, dosage, administration and possible side effects. Patient verified knowledge. IV removed without difficulty and dry dressing in place. Telemetry monitor removed and returned to [facility]. Pt left facility in stable condition to Home with all of their personal belongings.

## 2021-11-04 NOTE — PLAN OF CARE
Problem: Safety:  Goal: Free from accidental physical injury  Description: Free from accidental physical injury  11/4/2021 0927 by Quin Machuca RN  Outcome: Ongoing     Problem: Pain:  Goal: Patient's pain/discomfort is manageable  Description: Patient's pain/discomfort is manageable  11/4/2021 0927 by Quin Machuca RN  Outcome: Ongoing

## 2021-11-04 NOTE — OP NOTE
[address]                 [address] [address]                                OPERATIVE REPORT    PATIENT NAME: Steve WALL                     :        1960  MED REC NO:   [id number]                          ROOM:         ACCOUNT NO:   [de-identified]                           ADMIT DATE: 2021  PROVIDER:     Yesica Chavarria MD    DATE OF PROCEDURE:  2021    PREPROCEDURE DIAGNOSES:  1.  Nausea and vomiting. 2.  Coffee-ground emesis. POSTPROCEDURE DIAGNOSES:  1. Severe esophagitis. 2.  Hiatal hernia. 3.  Otherwise normal EGD. PROCEDURE:  EGD with biopsy. SURGEON:  Yesica Chavarria MD    PROCEDURE INDICATIONS:  A 76-year-old male with history of hypertension,  hyperlipidemia, diabetes, anxiety, obesity, admitted with intractable  nausea, vomiting and coffee-ground emesis. He developed symptoms of  abdominal pain in the epigastric region two days prior to admission. He  does admit to new medication namely Ozempic for diabetes. He has never  had such symptoms in the past.  He denies any recent NSAID use. He was  seen in the emergency room and underwent further workup with labs and  imaging. His hemoglobin remained stable but CT scan showed severe  esophageal thickening concerning for esophagitis. EGD is being  performed for diagnostic purposes. LABORATORY DATA:  White blood cell count 10.1, hemoglobin 16.3,  hematocrit 50.0, platelets 117. Sodium 140, potassium 3.6, chloride  102, bicarb 23, BUN 45, creatinine 1.4, glucose 106. IMAGIN.  CT scan of the abdomen and pelvis; diffuse wall thickening involving  the visualized distal esophagus concerning for esophagitis. Consider  direct visualization to exclude malignancy. 2.  Splenomegaly. 3.  Nonobstructive bilateral nephrolithiasis. MEDICATIONS:  MAC per Anesthesia.     PROCEDURE DETAILS:  Informed consent was obtained after discussing  risks, benefits, and alternatives. Full history and physical was  performed. The patient was classed as ASA class III. Medications were  given by Anesthesia. Cardiopulmonary status was continuously monitored  throughout the procedure where the patient was placed in the left  lateral decubitus position. Once adequately sedated, a standard upper  gastroscope was inserted in the mouth and advanced under direct  visualization to the second portion of the duodenum. The entire mucosa  of the esophagus and stomach (retroflexion and forward views) and  duodenum (bulb, sweep and second portion) was examined carefully during  withdrawal.  The patient tolerated the procedure well without any  difficulties. FINDINGS:    ESOPHAGUS:  There was evidence of severe circumferential ulceration in  the esophagus arising from 27-40 cm from entry. There is no active  bleeding or high-risk stigmata. Biopsies were taken. This is most  likely reflux induced esophagitis. There was a medium-sized hiatal  hernia from 40-44 cm from entry in the distal esophagus. STOMACH:  Examined portion of the stomach appeared normal.  There was a  small amount of bilious material in the stomach but no active bleeding  or ulcers. Retroflexed view of the stomach was normal.    DUODENUM:  Examined portion of the duodenum appeared normal.    SUMMARY  1. Severe ulcerative esophagitis, likely reflux induced. 2.  Medium-sized hiatal hernia. 3.  Otherwise normal EGD. 4.  No active bleeding. RECOMMENDATIONS:  1. Return the patient to floor for continuous medical care. 2.  Continue PPI b.i.d. for eight weeks. 3.  Start clear liquid diet and advance as tolerated. 4.  His symptoms may be a result of the new medication Ozempic. 5.  Consider alternatives. 6.  Strict glycemic control. 7.  Okay to discharge if diet is tolerated.     EBL: <5mL    Case Field MD    D: 11/04/2021 12:17:25       T: 11/04/2021 12:20:35     [email or web address]  Job#: [id number]     Doc#: [id number]    CC:  MD Bhavesh Madden MD

## 2021-11-04 NOTE — ANESTHESIA POSTPROCEDURE EVALUATION
Department of Anesthesiology  Postprocedure Note    Patient: Simi Roe  MRN: [id number]  YOB: 1960  Date of evaluation: 11/4/2021    Procedure Summary     Date: 11/04/21 Room / Location: [address] [place] [address] 01 / [facility]    Anesthesia Start: 0734 Anesthesia Stop: 5264    Procedure: EGD BIOPSY (N/A ) Diagnosis: (GI BLEED)    Surgeons: Kylee Downs MD Responsible Provider: Yohan Hernandez MD    Anesthesia Type: MAC, TIVA ASA Status: 3        Anesthesia Type: MAC, TIVA    Beata Phase I: Beata Score: 10    Beata Phase II: Beata Score: 10    Last vitals: Reviewed and per EMR flowsheets.      Anesthesia Post Evaluation   Anesthetic Problems: no   Cardiovascular System Stable: yes  Respiratory Function: Airway Patent yes  ETT no  Ventilator no  Level of consciousness: awake, alert and oriented  Post-op pain: adequate analgesia  Hydration Adequate: yes  Nausea/Vomiting:no  Other Issues:     Lucien Carrillo MD

## 2021-11-04 NOTE — FLOWSHEET NOTE
11/04/21 0730   Vitals   Temp 98.6 °F (37 °C)   Temp Source Oral   Pulse 111   Heart Rate Source Monitor   Resp 16   /77   BP Location Right upper arm   Patient Position Supine;Lying left side   Level of Consciousness Alert (0)   MEWS Score 3   Patient Currently in Pain Denies   Oxygen Therapy   SpO2 93 %   O2 Device None (Room air)   Skin Assessment Clean, dry, & intact   Shift assessment completed-see flow sheet. Patient in bed awake,alert, and oriented x4. Vitals stable. 93% on RA. Morning medications given per order. Protonix gtt infusing per order. Patient denies any needs at this time,call light within reach.

## 2021-11-04 NOTE — PROGRESS NOTES
Handoff report given to Russell Redding RN. Care transferred.      Electronically signed by Ismael Escalona RN on 11/4/2021 at 7:40 AM

## 2021-11-04 NOTE — H&P
History and Physical / Pre-Sedation Assessment    Patient:  America Eisenmenger Amiott   :   1960     Intended Procedure:  EGD    HPI: 57year old male with HTN, HLD, DM, anxiety, obesity admitted with nausea, vomiting and coffee ground emesis. Past Medical History:   Diagnosis Date    Adenomatous polyps     scoped ;acc to pt ,repeat in one yr    Anxiety     CTS (carpal tunnel syndrome)     CTS (carpal tunnel syndrome)     [facility]     GERD (gastroesophageal reflux disease)     has had stricture dilated mult yrs ago    Hyperlipidemia     Hypertension     Obesity with serious comorbidity 2020    Peripheral neuropathy     Type II or unspecified type diabetes mellitus without mention of complication, not stated as uncontrolled     Urinary frequency      Past Surgical History:   Procedure Laterality Date    APPENDECTOMY      CARPAL TUNNEL RELEASE      COLONOSCOPY N/A 2019    COLONOSCOPY POLYPECTOMY SNARE/COLD BIOPSY performed by Octavio Birmingham MD at [facility] N/A 2019    COLONOSCOPY CONTROL HEMORRHAGE performed by Octavio Birmingham MD at [facility] Left     HERNIA REPAIR  10/12/15    Laparoscopic Hernia Repair    MI SHLDR ARTHROSCOP,SURG,W/ROTAT CUFF REPR Left 2018    LEFT SHOULDER ARTHROSCOPY WITH DEBRIDEMENT, ROTATOR CUFF REPAIR, OPEN SUBSCAPULARIS REPAIR, BICEPS TENODESIS, SUBACROMIAL DECOMPRESSION   performed by Zhang Pappas MD at [place] [address]       Medications reviewed  Prior to Admission medications    Medication Sig Start Date End Date Taking?  Authorizing Provider   Semaglutide (OZEMPIC, 1 MG/DOSE, SC) Inject into the skin once a week   Yes Historical Provider, MD   pravastatin (PRAVACHOL) 40 MG tablet Take 40 mg by mouth daily   Yes Historical Provider, MD   empagliflozin (JARDIANCE) 25 MG tablet Take 25 mg by mouth daily   Yes Historical Provider, MD   pantoprazole (PROTONIX) 20 MG tablet Take 40 mg by mouth daily   Yes Historical Provider, MD   divalproex (DEPAKOTE ER) 250 MG extended release tablet Take 250 mg by mouth every morning   Yes Historical Provider, MD   divalproex (DEPAKOTE) 500 MG DR tablet Take 500 mg by mouth every evening   Yes Historical Provider, MD   insulin glargine (BASAGLAR KWIKPEN) 100 UNIT/ML injection pen Inject 25-30 Units into the skin 2 times daily  Patient taking differently: Inject 20 Units into the skin 2 times daily  2/19/21  Yes ANA Hernandez CNP   NOVOLOG FLEXPEN 100 UNIT/ML injection pen Inject 10 Units into the skin 2 times daily  12/8/20  Yes Historical Provider, MD   tamsulosin (FLOMAX) 0.4 MG capsule Take 0.4 mg by mouth daily  9/6/20  Yes Historical Provider, MD   sertraline (ZOLOFT) 50 MG tablet Take 100 mg by mouth daily  8/17/20  Yes Historical Provider, MD   lidocaine (LIDODERM) 5 % PLACE 3 PATCHES ONTO THE SKIN EVERY 24 HOURS, 12 HOURS ON AND 12 HOURS OFF 5/24/20  Yes ANA Ma CNP   oxyCODONE-acetaminophen (PERCOCET) 7.5-325 MG per tablet Take 1 tablet by mouth every 8 hours as needed for Pain (three times daily). Yes Joselyn Ortez MD   fenofibrate (TRICOR) 145 MG tablet Take 145 mg by mouth daily    Historical Provider, MD   ondansetron (ZOFRAN-ODT) 4 MG disintegrating tablet Take 4 mg by mouth daily    Historical Provider, MD   Blood Glucose Monitoring Suppl (Grant Hussein) w/Device KIT 1 Device by Does not apply route once for 1 dose 7/7/20 7/7/20  ANA Ma CNP   blood glucose test strips (ONETOUCH VERIO) strip 1 each by In Vitro route 3 times daily As needed.  7/7/20   ANA Ma CNP   ONE TOUCH ULTRASOFT LANCETS MISC 1 each by Does not apply route 3 times daily 7/7/20   ANA Ma CNP   blood glucose test strips (FREESTYLE LITE) strip USE TO TEST 3 TIMES DAILY AS NEEDED E11.9 GIVE WHAT IS COVERED. 7/6/20   ANA Ma CNP   Blood Glucose Monitoring Suppl (ACCU-CHEK KATHIA PLUS) w/Device KIT 1 Device by Does not apply route once for 1 dose 12/30/19 1/27/20  ANA Pascual CNP   SOFT TOUCH LANCETS MISC 1 Device by Does not apply route 3 times daily 7/19/19   Sahil PuttANA CNP   [employer] LANCETS MICRO THIN 33G MISC USE TO TEST BLOOD SUGAR 1-2 TIMES A DAY MAY TEST MORE IF BLOOD SUGAR SEEMS LOW 5/13/19   Sahil PuttANA CNP   Insulin Pen Needle (PEN NEEDLES) 31G X 6 MM MISC TEST BLOOD SUGARS ONCE DAILY FOR E11.42 3/14/19   Sahil PuttANA CNP   APPLE CIDER VINEGAR PO Take by mouth    Historical Provider, MD   Alcohol Swabs (ALCOHOL PREP) 70 % PADS USE ONCE DAILY FOR [facility] INJECTION 3/28/18   Sahil PuttANA CNP        Allergies: Allergies   Allergen Reactions    Sulfa Antibiotics Hives    Victoza [Liraglutide] Diarrhea       Nurses notes reviewed and agreed. Physical Exam:  Vital Signs: BP (!) 139/96   Pulse 114   Temp 97.9 °F (36.6 °C) (Temporal)   Resp 18   Ht 5' 10\" (1.778 m)   Wt 198 lb (89.8 kg)   SpO2 98%   BMI 28.41 kg/m²    Airway: Mallampati: II (soft palate, uvula, fauces visible)  Pulmonary:Normal  Cardiac:Normal  Abdomen:Normal    Pre-Procedure Assessment / Plan:  ASA: Class 3 - A patient with severe systemic disease that limits activity but is not incapacitating  Level of Sedation Plan: Moderate sedation  Post Procedure plan: Return to same level of care    I assessed the patient and find that the patient is in satisfactory condition to proceed with the planned procedure and sedation plan. I have explained the risk, benefits, and alternatives to the procedure; the patient understands and agrees to proceed.        Faizan Mccarty MD  11/4/2021

## 2021-11-04 NOTE — PLAN OF CARE
Problem: Infection:  Goal: Will remain free from infection  Description: Will remain free from infection  11/3/2021 2346 by Ad Segura RN  Outcome: Ongoing  11/3/2021 1735 by Stefania Dang RN  Outcome: Ongoing     Problem: Safety:  Goal: Free from accidental physical injury  Description: Free from accidental physical injury  11/3/2021 2346 by Ad Segura RN  Outcome: Ongoing  11/3/2021 1735 by Stefania Dang RN  Outcome: Ongoing  Goal: Free from intentional harm  Description: Free from intentional harm  11/3/2021 2346 by Ad Segura RN  Outcome: Ongoing  11/3/2021 1735 by Stefania Dang RN  Outcome: Ongoing     Problem: Daily Care:  Goal: Daily care needs are met  Description: Daily care needs are met  11/3/2021 2346 by Ad Segura RN  Outcome: Ongoing  11/3/2021 1735 by Stefania Dang RN  Outcome: Ongoing     Problem: Pain:  Goal: Patient's pain/discomfort is manageable  Description: Patient's pain/discomfort is manageable  11/3/2021 2346 by Ad Segura RN  Outcome: Ongoing  11/3/2021 1735 by Stefania Dang RN  Outcome: Ongoing     Problem: Skin Integrity:  Goal: Skin integrity will stabilize  Description: Skin integrity will stabilize  11/3/2021 2346 by Ad Segura RN  Outcome: Ongoing  11/3/2021 1735 by Stefania Dang RN  Outcome: Ongoing     Problem: Discharge Planning:  Goal: Patients continuum of care needs are met  Description: Patients continuum of care needs are met  11/3/2021 2346 by Ad Segura RN  Outcome: Ongoing  11/3/2021 1735 by Stefania Dang RN  Outcome: Ongoing

## 2021-11-05 ENCOUNTER — CARE COORDINATION (OUTPATIENT)
Dept: CASE MANAGEMENT | Age: 61
End: 2021-11-05

## 2021-11-05 DIAGNOSIS — I10 PRIMARY HYPERTENSION: Primary | ICD-10-CM

## 2021-11-05 PROCEDURE — 1111F DSCHRG MED/CURRENT MED MERGE: CPT | Performed by: INTERNAL MEDICINE

## 2021-11-05 NOTE — CARE COORDINATION
Marlee [facility] Transitions Initial Follow Up Call    Call within 2 business days of discharge: Yes    Patient: Yany Wolfe Patient : 1960   MRN: [id number]  Reason for Admission: GI Bleed  Discharge Date: 21 RARS: Readmission Risk Score: 10.7      Last Discharge [address] [address]       Complaint Diagnosis Description Type Department Provider    11/3/21 Emesis Acute upper GI hemorrhage . .. ED to Hosp-Admission (Discharged) (ADMITTED) Harsh Pascual MD; Juno Ware . .. Spoke with: 10 [facility]  Non-face-to-face services provided:  Obtained and reviewed discharge summary and/or continuity of care documents   Transitions of Care Initial Call    Was this an external facility discharge? No Discharge Facility:     Challenges to be reviewed by the provider   Additional needs identified to be addressed with provider: No  none             Method of communication with provider : none      Advance Care Planning:   Does patient have an Advance Directive: Not on file  Was this a readmission? No  Patient stated reason for admission: GI Bleed  Patients top risk factors for readmission: ineffective coping, lack of knowledge about disease and multiple health system providers    Care Transition Nurse (CTN) contacted the patient by telephone to perform post hospital discharge assessment. Verified name and  with patient as identifiers. Provided introduction to self, and explanation of the CTN role. CTN reviewed discharge instructions, medical action plan and red flags with patient who verbalized understanding. Patient given an opportunity to ask questions and does not have any further questions or concerns at this time. Were discharge instructions available to patient? Yes. Reviewed appropriate site of care based on symptoms and resources available to patient including:PCP, Specialist, Urgent care clinics, When to call 911 and [address].  The patient agrees to contact the PCP office for questions related to their healthcare. Medication reconciliation was performed with patient, who verbalizes understanding of administration of home medications. Advised obtaining a 90-day supply of all daily and as-needed medications. Covid Risk Education     Educated patient about risk for severe COVID-19 due to risk factors according to CDC guidelines. LPN CC reviewed discharge instructions, medical action plan and red flag symptoms with the patient who verbalized understanding. Discussed COVID vaccination status: Yes. Education provided on COVID-19 vaccination as appropriate. Discussed exposure protocols and quarantine with CDC Guidelines. Patient was given an opportunity to verbalize any questions and concerns and agrees to contact LPN CC or health care provider for questions related to their healthcare. Reviewed and educated patient on any new and changed medications related to discharge diagnosis. Was patient discharged with a pulse oximeter? No Discussed and confirmed pulse oximeter discharge instructions and when to notify provider or seek emergency care. LPN CC provided contact information. Plan for follow-up call in 5-7 days based on severity of symptoms and risk factors. Plan for next call: symptom management-ABD Pain? and follow up appointment-How did it go? This [facility] spoke with pt and pt stated that he is nauseas but denied vomiting. He stated that he is still experiencing ABD pain rated at a level of five. Pain is manageable and pt instructed to take pain med as prescribed to achieve maximum relief and pt agreed. Denied SOB, chest pain and palpitations. Pt stated that he does not have much of an appetite. Pt encouraged to eat small amounts to keep BS stable as pt is a diabetic and pt agreed. Pt also agreed to stay well hydrated. BS reported to be 98.  Medication review performed and patient verbalized understanding and is taking all medications as prescribed. Pt has f/u with PCP  on 11/11/2021. Denied any needs or concerns at this time. Advised pt to immediately report any worsening symptoms to the PCP. Patient verbalized understanding and agreed. Jacklyn Villanueva LPN, [facility]  PH: [phone]              Care Transitions 24 Hour Call    Schedule Follow Up Appointment with PCP: Completed  Do you have any ongoing symptoms?: Yes  Patient-reported symptoms: Nausea, Pain  Interventions for patient-reported symptoms: Other  Do you have a copy of your discharge instructions?: Yes  Do you have all of your prescriptions and are they filled?: Yes  Have you been contacted by a [facility] Pharmacist?: No  Have you scheduled your follow up appointment?: Yes  How are you going to get to your appointment?: Car - family or friend to transport  Were you discharged with any Home Care or Post Acute Services: No  Do you feel like you have everything you need to keep you well at home?: Yes  Care Transitions Interventions  No Identified Needs         Follow Up  No future appointments.     Jacklyn Villanueva LPN

## 2021-11-11 ENCOUNTER — CARE COORDINATION (OUTPATIENT)
Dept: CASE MANAGEMENT | Age: 61
End: 2021-11-11

## 2021-11-11 NOTE — CARE COORDINATION
[facility] Transitions Follow Up Call    2021    Patient: Nikunj Aragon  Patient : 1960   MRN: [id number]  Reason for Admission: n/v, coffee ground emesis, GIB, abnormal CT with esophageal thickening, sinus tach, hiccups, dehydration, elevated creatinine, lactic acidosis, leukocytosis, elevated hemoglobin, DM2 with hyperglycemia, depression anxiety, pulm nodule. Discharge Date: 21 RARS: Readmission Risk Score: 10.7       Unable to reach patient or leave message at this time. Patient follows with non[facility] PCP Dr Maureen Cornejo. Per notes he is scheduled for OV today, 2021. No further CTN outreach scheduled at this time. Follow Up  No future appointments.     Alyssa Chavez RN

## 2021-12-03 PROBLEM — E86.0 DEHYDRATION: Status: RESOLVED | Noted: 2021-11-03 | Resolved: 2021-12-03

## 2022-01-26 ENCOUNTER — HOSPITAL ENCOUNTER (OUTPATIENT)
Dept: GENERAL RADIOLOGY | Age: 62
Discharge: HOME | End: 2022-01-26
Payer: MEDICARE

## 2022-01-26 ENCOUNTER — HOSPITAL ENCOUNTER (OUTPATIENT)
Age: 62
Discharge: HOME | End: 2022-01-26
Payer: MEDICARE

## 2022-01-26 DIAGNOSIS — M47.816 LUMBAR SPONDYLOSIS: ICD-10-CM

## 2022-01-26 PROCEDURE — 72100 X-RAY EXAM L-S SPINE 2/3 VWS: CPT

## 2022-03-06 ENCOUNTER — HOSPITAL ENCOUNTER (INPATIENT)
Age: 62
LOS: 2 days | Discharge: HOME | DRG: 247 | End: 2022-03-08
Attending: INTERNAL MEDICINE | Admitting: INTERNAL MEDICINE
Payer: MEDICARE

## 2022-03-06 ENCOUNTER — HOSPITAL ENCOUNTER (EMERGENCY)
Age: 62
Discharge: ANOTHER HOSPITAL | End: 2022-03-06
Attending: EMERGENCY MEDICINE
Payer: MEDICARE

## 2022-03-06 ENCOUNTER — APPOINTMENT (OUTPATIENT)
Dept: GENERAL RADIOLOGY | Age: 62
End: 2022-03-06
Payer: MEDICARE

## 2022-03-06 VITALS
TEMPERATURE: 99.3 F | WEIGHT: 178 LBS | DIASTOLIC BLOOD PRESSURE: 79 MMHG | RESPIRATION RATE: 16 BRPM | HEIGHT: 70 IN | SYSTOLIC BLOOD PRESSURE: 114 MMHG | OXYGEN SATURATION: 95 % | HEART RATE: 99 BPM | BODY MASS INDEX: 25.48 KG/M2

## 2022-03-06 DIAGNOSIS — I21.3 ST ELEVATION MYOCARDIAL INFARCTION (STEMI), UNSPECIFIED ARTERY (HCC): Primary | ICD-10-CM

## 2022-03-06 LAB
A/G RATIO: 1.7 (ref 1.1–2.2)
ALBUMIN SERPL-MCNC: 4.5 G/DL (ref 3.4–5)
ALP BLD-CCNC: 58 U/L (ref 40–129)
ALT SERPL-CCNC: 34 U/L (ref 10–40)
ANION GAP SERPL CALCULATED.3IONS-SCNC: 15 MMOL/L (ref 3–16)
AST SERPL-CCNC: 144 U/L (ref 15–37)
BILIRUB SERPL-MCNC: 0.6 MG/DL (ref 0–1)
BUN BLDV-MCNC: 27 MG/DL (ref 7–20)
CALCIUM SERPL-MCNC: 9.2 MG/DL (ref 8.3–10.6)
CHLORIDE BLD-SCNC: 102 MMOL/L (ref 99–110)
CO2: 21 MMOL/L (ref 21–32)
CREAT SERPL-MCNC: 1.7 MG/DL (ref 0.8–1.3)
GFR AFRICAN AMERICAN: 50
GFR NON-AFRICAN AMERICAN: 41
GLUCOSE BLD-MCNC: 176 MG/DL (ref 70–99)
HCT VFR BLD CALC: 49.7 % (ref 40.5–52.5)
HEMOGLOBIN: 16.7 G/DL (ref 13.5–17.5)
LV EF: 53 %
LVEF MODALITY: NORMAL
MCH RBC QN AUTO: 27.1 PG (ref 26–34)
MCHC RBC AUTO-ENTMCNC: 33.7 G/DL (ref 31–36)
MCV RBC AUTO: 80.3 FL (ref 80–100)
PDW BLD-RTO: 14.1 % (ref 12.4–15.4)
PLATELET # BLD: 199 K/UL (ref 135–450)
PMV BLD AUTO: 10.2 FL (ref 5–10.5)
POTASSIUM REFLEX MAGNESIUM: 4.1 MMOL/L (ref 3.5–5.1)
PRO-BNP: 2798 PG/ML (ref 0–124)
RBC # BLD: 6.18 M/UL (ref 4.2–5.9)
SODIUM BLD-SCNC: 138 MMOL/L (ref 136–145)
TOTAL PROTEIN: 7.2 G/DL (ref 6.4–8.2)
TROPONIN: 2.29 NG/ML
WBC # BLD: 14.5 K/UL (ref 4–11)

## 2022-03-06 PROCEDURE — 83880 ASSAY OF NATRIURETIC PEPTIDE: CPT

## 2022-03-06 PROCEDURE — C1725 CATH, TRANSLUMIN NON-LASER: HCPCS

## 2022-03-06 PROCEDURE — 99285 EMERGENCY DEPT VISIT HI MDM: CPT

## 2022-03-06 PROCEDURE — 96375 TX/PRO/DX INJ NEW DRUG ADDON: CPT

## 2022-03-06 PROCEDURE — 85027 COMPLETE CBC AUTOMATED: CPT

## 2022-03-06 PROCEDURE — 99152 MOD SED SAME PHYS/QHP 5/>YRS: CPT | Performed by: INTERNAL MEDICINE

## 2022-03-06 PROCEDURE — 6360000002 HC RX W HCPCS

## 2022-03-06 PROCEDURE — C1874 STENT, COATED/COV W/DEL SYS: HCPCS

## 2022-03-06 PROCEDURE — 80053 COMPREHEN METABOLIC PANEL: CPT

## 2022-03-06 PROCEDURE — 99220 PR INITIAL OBSERVATION CARE/DAY 70 MINUTES: CPT | Performed by: INTERNAL MEDICINE

## 2022-03-06 PROCEDURE — 6370000000 HC RX 637 (ALT 250 FOR IP): Performed by: EMERGENCY MEDICINE

## 2022-03-06 PROCEDURE — 84484 ASSAY OF TROPONIN QUANT: CPT

## 2022-03-06 PROCEDURE — C1887 CATHETER, GUIDING: HCPCS

## 2022-03-06 PROCEDURE — 2709999900 HC NON-CHARGEABLE SUPPLY

## 2022-03-06 PROCEDURE — 92941 PRQ TRLML REVSC TOT OCCL AMI: CPT | Performed by: INTERNAL MEDICINE

## 2022-03-06 PROCEDURE — 027034Z DILATION OF CORONARY ARTERY, ONE ARTERY WITH DRUG-ELUTING INTRALUMINAL DEVICE, PERCUTANEOUS APPROACH: ICD-10-PCS | Performed by: INTERNAL MEDICINE

## 2022-03-06 PROCEDURE — B2151ZZ FLUOROSCOPY OF LEFT HEART USING LOW OSMOLAR CONTRAST: ICD-10-PCS | Performed by: INTERNAL MEDICINE

## 2022-03-06 PROCEDURE — 2580000003 HC RX 258: Performed by: INTERNAL MEDICINE

## 2022-03-06 PROCEDURE — 4A023N7 MEASUREMENT OF CARDIAC SAMPLING AND PRESSURE, LEFT HEART, PERCUTANEOUS APPROACH: ICD-10-PCS | Performed by: INTERNAL MEDICINE

## 2022-03-06 PROCEDURE — 2580000003 HC RX 258

## 2022-03-06 PROCEDURE — C9606 PERC D-E COR REVASC W AMI S: HCPCS

## 2022-03-06 PROCEDURE — 93458 L HRT ARTERY/VENTRICLE ANGIO: CPT | Performed by: INTERNAL MEDICINE

## 2022-03-06 PROCEDURE — 6360000002 HC RX W HCPCS: Performed by: EMERGENCY MEDICINE

## 2022-03-06 PROCEDURE — 93458 L HRT ARTERY/VENTRICLE ANGIO: CPT

## 2022-03-06 PROCEDURE — 2500000003 HC RX 250 WO HCPCS

## 2022-03-06 PROCEDURE — 99291 CRITICAL CARE FIRST HOUR: CPT | Performed by: INTERNAL MEDICINE

## 2022-03-06 PROCEDURE — 93005 ELECTROCARDIOGRAM TRACING: CPT | Performed by: EMERGENCY MEDICINE

## 2022-03-06 PROCEDURE — 85347 COAGULATION TIME ACTIVATED: CPT

## 2022-03-06 PROCEDURE — 6360000002 HC RX W HCPCS: Performed by: INTERNAL MEDICINE

## 2022-03-06 PROCEDURE — 36415 COLL VENOUS BLD VENIPUNCTURE: CPT

## 2022-03-06 PROCEDURE — C1769 GUIDE WIRE: HCPCS

## 2022-03-06 PROCEDURE — 96374 THER/PROPH/DIAG INJ IV PUSH: CPT

## 2022-03-06 PROCEDURE — 6370000000 HC RX 637 (ALT 250 FOR IP)

## 2022-03-06 PROCEDURE — B2111ZZ FLUOROSCOPY OF MULTIPLE CORONARY ARTERIES USING LOW OSMOLAR CONTRAST: ICD-10-PCS | Performed by: INTERNAL MEDICINE

## 2022-03-06 PROCEDURE — C1894 INTRO/SHEATH, NON-LASER: HCPCS

## 2022-03-06 PROCEDURE — 2000000000 HC ICU R&B

## 2022-03-06 PROCEDURE — 71045 X-RAY EXAM CHEST 1 VIEW: CPT

## 2022-03-06 RX ORDER — ONDANSETRON 2 MG/ML
4 INJECTION INTRAMUSCULAR; INTRAVENOUS
Status: DISCONTINUED | OUTPATIENT
Start: 2022-03-06 | End: 2022-03-06 | Stop reason: HOSPADM

## 2022-03-06 RX ORDER — 0.9 % SODIUM CHLORIDE 0.9 %
1000 INTRAVENOUS SOLUTION INTRAVENOUS ONCE
Status: COMPLETED | OUTPATIENT
Start: 2022-03-06 | End: 2022-03-06

## 2022-03-06 RX ORDER — HEPARIN SODIUM 1000 [USP'U]/ML
INJECTION, SOLUTION INTRAVENOUS; SUBCUTANEOUS
Status: COMPLETED
Start: 2022-03-06 | End: 2022-03-06

## 2022-03-06 RX ORDER — HEPARIN SODIUM 10000 [USP'U]/100ML
5-30 INJECTION, SOLUTION INTRAVENOUS CONTINUOUS
Status: DISCONTINUED | OUTPATIENT
Start: 2022-03-06 | End: 2022-03-06 | Stop reason: HOSPADM

## 2022-03-06 RX ORDER — HEPARIN SODIUM 1000 [USP'U]/ML
4000 INJECTION, SOLUTION INTRAVENOUS; SUBCUTANEOUS ONCE
Status: COMPLETED | OUTPATIENT
Start: 2022-03-06 | End: 2022-03-06

## 2022-03-06 RX ORDER — EPTIFIBATIDE 0.75 MG/ML
1 INJECTION, SOLUTION INTRAVENOUS CONTINUOUS
Status: DISPENSED | OUTPATIENT
Start: 2022-03-06 | End: 2022-03-07

## 2022-03-06 RX ORDER — HEPARIN SODIUM 1000 [USP'U]/ML
4000 INJECTION, SOLUTION INTRAVENOUS; SUBCUTANEOUS PRN
Status: DISCONTINUED | OUTPATIENT
Start: 2022-03-06 | End: 2022-03-06 | Stop reason: HOSPADM

## 2022-03-06 RX ORDER — MIDAZOLAM HYDROCHLORIDE 5 MG/ML
INJECTION INTRAMUSCULAR; INTRAVENOUS
Status: COMPLETED | OUTPATIENT
Start: 2022-03-06 | End: 2022-03-06

## 2022-03-06 RX ORDER — ASPIRIN 81 MG/1
324 TABLET, CHEWABLE ORAL ONCE
Status: COMPLETED | OUTPATIENT
Start: 2022-03-06 | End: 2022-03-06

## 2022-03-06 RX ORDER — ONDANSETRON 4 MG/1
4 TABLET, ORALLY DISINTEGRATING ORAL ONCE
Status: DISCONTINUED | OUTPATIENT
Start: 2022-03-06 | End: 2022-03-06

## 2022-03-06 RX ORDER — FENTANYL CITRATE 50 UG/ML
INJECTION, SOLUTION INTRAMUSCULAR; INTRAVENOUS
Status: COMPLETED | OUTPATIENT
Start: 2022-03-06 | End: 2022-03-06

## 2022-03-06 RX ORDER — SODIUM CHLORIDE 9 MG/ML
INJECTION, SOLUTION INTRAVENOUS
Status: COMPLETED
Start: 2022-03-06 | End: 2022-03-06

## 2022-03-06 RX ORDER — HEPARIN SODIUM 1000 [USP'U]/ML
2000 INJECTION, SOLUTION INTRAVENOUS; SUBCUTANEOUS PRN
Status: DISCONTINUED | OUTPATIENT
Start: 2022-03-06 | End: 2022-03-06 | Stop reason: HOSPADM

## 2022-03-06 RX ADMIN — ASPIRIN 324 MG: 81 TABLET, CHEWABLE ORAL at 21:13

## 2022-03-06 RX ADMIN — Medication 1000 ML: at 21:17

## 2022-03-06 RX ADMIN — MIDAZOLAM HYDROCHLORIDE 1 MG: 5 INJECTION INTRAMUSCULAR; INTRAVENOUS at 22:18

## 2022-03-06 RX ADMIN — HEPARIN SODIUM AND DEXTROSE 12 UNITS/KG/HR: 10000; 5 INJECTION INTRAVENOUS at 21:22

## 2022-03-06 RX ADMIN — SODIUM CHLORIDE 1000 ML: 9 INJECTION, SOLUTION INTRAVENOUS at 21:17

## 2022-03-06 RX ADMIN — FENTANYL CITRATE 25 MCG: 50 INJECTION, SOLUTION INTRAMUSCULAR; INTRAVENOUS at 22:17

## 2022-03-06 RX ADMIN — EPTIFIBATIDE 1 MCG/KG/MIN: 0.75 INJECTION INTRAVENOUS at 22:33

## 2022-03-06 RX ADMIN — MIDAZOLAM HYDROCHLORIDE 1 MG: 5 INJECTION INTRAMUSCULAR; INTRAVENOUS at 22:27

## 2022-03-06 RX ADMIN — HEPARIN SODIUM 4000 UNITS: 1000 INJECTION INTRAVENOUS; SUBCUTANEOUS at 21:11

## 2022-03-06 RX ADMIN — HEPARIN SODIUM 4000 UNITS: 1000 INJECTION, SOLUTION INTRAVENOUS; SUBCUTANEOUS at 21:11

## 2022-03-06 RX ADMIN — FENTANYL CITRATE 25 MCG: 50 INJECTION, SOLUTION INTRAMUSCULAR; INTRAVENOUS at 22:27

## 2022-03-06 RX ADMIN — ONDANSETRON HYDROCHLORIDE 4 MG: 2 INJECTION, SOLUTION INTRAMUSCULAR; INTRAVENOUS at 21:16

## 2022-03-06 RX ADMIN — TICAGRELOR 180 MG: 90 TABLET ORAL at 21:16

## 2022-03-06 ASSESSMENT — ENCOUNTER SYMPTOMS
DIARRHEA: 0
SHORTNESS OF BREATH: 1
SORE THROAT: 0
NAUSEA: 1
VOMITING: 0
COUGH: 0
ABDOMINAL PAIN: 0

## 2022-03-06 ASSESSMENT — PAIN SCALES - GENERAL: PAINLEVEL_OUTOF10: 4

## 2022-03-06 ASSESSMENT — PAIN DESCRIPTION - PAIN TYPE: TYPE: ACUTE PAIN

## 2022-03-06 ASSESSMENT — PAIN DESCRIPTION - LOCATION: LOCATION: CHEST

## 2022-03-06 ASSESSMENT — PAIN DESCRIPTION - DESCRIPTORS: DESCRIPTORS: DISCOMFORT

## 2022-03-07 ENCOUNTER — APPOINTMENT (OUTPATIENT)
Dept: GENERAL RADIOLOGY | Age: 62
DRG: 247 | End: 2022-03-07
Attending: INTERNAL MEDICINE
Payer: MEDICARE

## 2022-03-07 LAB
ANION GAP SERPL CALCULATED.3IONS-SCNC: 13 MMOL/L (ref 3–16)
BUN BLDV-MCNC: 28 MG/DL (ref 7–20)
CALCIUM SERPL-MCNC: 8.2 MG/DL (ref 8.3–10.6)
CHLORIDE BLD-SCNC: 104 MMOL/L (ref 99–110)
CO2: 21 MMOL/L (ref 21–32)
CREAT SERPL-MCNC: 1.5 MG/DL (ref 0.8–1.3)
EKG ATRIAL RATE: 92 BPM
EKG ATRIAL RATE: 99 BPM
EKG DIAGNOSIS: NORMAL
EKG DIAGNOSIS: NORMAL
EKG P AXIS: 25 DEGREES
EKG P AXIS: 33 DEGREES
EKG P-R INTERVAL: 166 MS
EKG P-R INTERVAL: 180 MS
EKG Q-T INTERVAL: 330 MS
EKG Q-T INTERVAL: 338 MS
EKG QRS DURATION: 88 MS
EKG QRS DURATION: 94 MS
EKG QTC CALCULATION (BAZETT): 408 MS
EKG QTC CALCULATION (BAZETT): 433 MS
EKG R AXIS: -5 DEGREES
EKG R AXIS: 47 DEGREES
EKG T AXIS: 60 DEGREES
EKG T AXIS: 61 DEGREES
EKG VENTRICULAR RATE: 92 BPM
EKG VENTRICULAR RATE: 99 BPM
GFR AFRICAN AMERICAN: 57
GFR NON-AFRICAN AMERICAN: 47
GLUCOSE BLD-MCNC: 110 MG/DL (ref 70–99)
GLUCOSE BLD-MCNC: 123 MG/DL (ref 70–99)
GLUCOSE BLD-MCNC: 127 MG/DL (ref 70–99)
GLUCOSE BLD-MCNC: 200 MG/DL (ref 70–99)
GLUCOSE BLD-MCNC: 88 MG/DL (ref 70–99)
GLUCOSE BLD-MCNC: 93 MG/DL (ref 70–99)
HCT VFR BLD CALC: 41.6 % (ref 40.5–52.5)
HEMOGLOBIN: 14.1 G/DL (ref 13.5–17.5)
LV EF: 43 %
LVEF MODALITY: NORMAL
MCH RBC QN AUTO: 27.2 PG (ref 26–34)
MCHC RBC AUTO-ENTMCNC: 34 G/DL (ref 31–36)
MCV RBC AUTO: 80 FL (ref 80–100)
PDW BLD-RTO: 14.1 % (ref 12.4–15.4)
PERFORMED ON: ABNORMAL
PERFORMED ON: NORMAL
PERFORMED ON: NORMAL
PLATELET # BLD: 152 K/UL (ref 135–450)
PMV BLD AUTO: 10.3 FL (ref 5–10.5)
POTASSIUM SERPL-SCNC: 3.8 MMOL/L (ref 3.5–5.1)
RBC # BLD: 5.19 M/UL (ref 4.2–5.9)
SARS-COV-2, NAAT: NOT DETECTED
SODIUM BLD-SCNC: 138 MMOL/L (ref 136–145)
WBC # BLD: 9.9 K/UL (ref 4–11)

## 2022-03-07 PROCEDURE — 87635 SARS-COV-2 COVID-19 AMP PRB: CPT

## 2022-03-07 PROCEDURE — 6370000000 HC RX 637 (ALT 250 FOR IP): Performed by: INTERNAL MEDICINE

## 2022-03-07 PROCEDURE — 2060000000 HC ICU INTERMEDIATE R&B

## 2022-03-07 PROCEDURE — 93010 ELECTROCARDIOGRAM REPORT: CPT | Performed by: INTERNAL MEDICINE

## 2022-03-07 PROCEDURE — 36415 COLL VENOUS BLD VENIPUNCTURE: CPT

## 2022-03-07 PROCEDURE — 2580000003 HC RX 258: Performed by: INTERNAL MEDICINE

## 2022-03-07 PROCEDURE — 99233 SBSQ HOSP IP/OBS HIGH 50: CPT | Performed by: INTERNAL MEDICINE

## 2022-03-07 PROCEDURE — C8929 TTE W OR WO FOL WCON,DOPPLER: HCPCS

## 2022-03-07 PROCEDURE — 83036 HEMOGLOBIN GLYCOSYLATED A1C: CPT

## 2022-03-07 PROCEDURE — 85027 COMPLETE CBC AUTOMATED: CPT

## 2022-03-07 PROCEDURE — 93005 ELECTROCARDIOGRAM TRACING: CPT | Performed by: INTERNAL MEDICINE

## 2022-03-07 PROCEDURE — 6360000002 HC RX W HCPCS: Performed by: INTERNAL MEDICINE

## 2022-03-07 PROCEDURE — 71045 X-RAY EXAM CHEST 1 VIEW: CPT

## 2022-03-07 PROCEDURE — 80048 BASIC METABOLIC PNL TOTAL CA: CPT

## 2022-03-07 RX ORDER — ONDANSETRON 2 MG/ML
4 INJECTION INTRAMUSCULAR; INTRAVENOUS EVERY 6 HOURS PRN
Status: DISCONTINUED | OUTPATIENT
Start: 2022-03-07 | End: 2022-03-08 | Stop reason: HOSPADM

## 2022-03-07 RX ORDER — SODIUM CHLORIDE 9 MG/ML
INJECTION, SOLUTION INTRAVENOUS CONTINUOUS
Status: ACTIVE | OUTPATIENT
Start: 2022-03-07 | End: 2022-03-07

## 2022-03-07 RX ORDER — ACETAMINOPHEN 325 MG/1
650 TABLET ORAL EVERY 4 HOURS PRN
Status: DISCONTINUED | OUTPATIENT
Start: 2022-03-07 | End: 2022-03-08 | Stop reason: HOSPADM

## 2022-03-07 RX ORDER — DEXTROSE MONOHYDRATE 25 G/50ML
12.5 INJECTION, SOLUTION INTRAVENOUS PRN
Status: DISCONTINUED | OUTPATIENT
Start: 2022-03-07 | End: 2022-03-07

## 2022-03-07 RX ORDER — TRAMADOL HYDROCHLORIDE 50 MG/1
50 TABLET ORAL EVERY 6 HOURS PRN
Status: DISCONTINUED | OUTPATIENT
Start: 2022-03-07 | End: 2022-03-08 | Stop reason: HOSPADM

## 2022-03-07 RX ORDER — DIGOXIN 0.25 MG/ML
500 INJECTION INTRAMUSCULAR; INTRAVENOUS ONCE
Status: COMPLETED | OUTPATIENT
Start: 2022-03-07 | End: 2022-03-07

## 2022-03-07 RX ORDER — PANTOPRAZOLE SODIUM 40 MG/1
40 TABLET, DELAYED RELEASE ORAL
Status: DISCONTINUED | OUTPATIENT
Start: 2022-03-07 | End: 2022-03-08 | Stop reason: HOSPADM

## 2022-03-07 RX ORDER — ATORVASTATIN CALCIUM 80 MG/1
80 TABLET, FILM COATED ORAL NIGHTLY
Status: DISCONTINUED | OUTPATIENT
Start: 2022-03-07 | End: 2022-03-08 | Stop reason: HOSPADM

## 2022-03-07 RX ORDER — ASPIRIN 81 MG/1
81 TABLET, CHEWABLE ORAL DAILY
Status: DISCONTINUED | OUTPATIENT
Start: 2022-03-07 | End: 2022-03-08 | Stop reason: HOSPADM

## 2022-03-07 RX ORDER — INSULIN GLARGINE 100 [IU]/ML
0.15 INJECTION, SOLUTION SUBCUTANEOUS NIGHTLY
Status: DISCONTINUED | OUTPATIENT
Start: 2022-03-07 | End: 2022-03-07

## 2022-03-07 RX ORDER — NICOTINE POLACRILEX 4 MG
15 LOZENGE BUCCAL PRN
Status: DISCONTINUED | OUTPATIENT
Start: 2022-03-07 | End: 2022-03-08 | Stop reason: HOSPADM

## 2022-03-07 RX ORDER — DEXTROSE MONOHYDRATE 50 MG/ML
100 INJECTION, SOLUTION INTRAVENOUS PRN
Status: DISCONTINUED | OUTPATIENT
Start: 2022-03-07 | End: 2022-03-08 | Stop reason: HOSPADM

## 2022-03-07 RX ORDER — CARVEDILOL 3.12 MG/1
3.12 TABLET ORAL 2 TIMES DAILY WITH MEALS
Status: DISCONTINUED | OUTPATIENT
Start: 2022-03-07 | End: 2022-03-08 | Stop reason: HOSPADM

## 2022-03-07 RX ORDER — MORPHINE SULFATE 2 MG/ML
2 INJECTION, SOLUTION INTRAMUSCULAR; INTRAVENOUS
Status: DISCONTINUED | OUTPATIENT
Start: 2022-03-07 | End: 2022-03-07

## 2022-03-07 RX ORDER — INSULIN GLARGINE 100 [IU]/ML
20 INJECTION, SOLUTION SUBCUTANEOUS NIGHTLY
Status: DISCONTINUED | OUTPATIENT
Start: 2022-03-07 | End: 2022-03-08 | Stop reason: HOSPADM

## 2022-03-07 RX ADMIN — ACETAMINOPHEN 650 MG: 325 TABLET ORAL at 00:27

## 2022-03-07 RX ADMIN — MUPIROCIN: 20 OINTMENT TOPICAL at 20:15

## 2022-03-07 RX ADMIN — MORPHINE SULFATE 2 MG: 2 INJECTION, SOLUTION INTRAMUSCULAR; INTRAVENOUS at 03:51

## 2022-03-07 RX ADMIN — SERTRALINE 100 MG: 50 TABLET, FILM COATED ORAL at 08:25

## 2022-03-07 RX ADMIN — INSULIN GLARGINE 20 UNITS: 100 INJECTION, SOLUTION SUBCUTANEOUS at 20:17

## 2022-03-07 RX ADMIN — TRAMADOL HYDROCHLORIDE 50 MG: 50 TABLET, COATED ORAL at 20:14

## 2022-03-07 RX ADMIN — EPTIFIBATIDE 1 MCG/KG/MIN: 0.75 INJECTION INTRAVENOUS at 09:16

## 2022-03-07 RX ADMIN — MORPHINE SULFATE 2 MG: 2 INJECTION, SOLUTION INTRAMUSCULAR; INTRAVENOUS at 09:31

## 2022-03-07 RX ADMIN — ATORVASTATIN CALCIUM 80 MG: 80 TABLET, FILM COATED ORAL at 20:14

## 2022-03-07 RX ADMIN — INSULIN LISPRO 2 UNITS: 100 INJECTION, SOLUTION INTRAVENOUS; SUBCUTANEOUS at 20:15

## 2022-03-07 RX ADMIN — PANTOPRAZOLE SODIUM 40 MG: 40 TABLET, DELAYED RELEASE ORAL at 16:47

## 2022-03-07 RX ADMIN — CARVEDILOL 3.12 MG: 3.12 TABLET, FILM COATED ORAL at 16:47

## 2022-03-07 RX ADMIN — EPTIFIBATIDE 1 MCG/KG/MIN: 0.75 INJECTION INTRAVENOUS at 04:17

## 2022-03-07 RX ADMIN — TICAGRELOR 90 MG: 90 TABLET ORAL at 08:26

## 2022-03-07 RX ADMIN — ACETAMINOPHEN 650 MG: 325 TABLET ORAL at 20:13

## 2022-03-07 RX ADMIN — ATORVASTATIN CALCIUM 80 MG: 80 TABLET, FILM COATED ORAL at 00:27

## 2022-03-07 RX ADMIN — TICAGRELOR 90 MG: 90 TABLET ORAL at 20:14

## 2022-03-07 RX ADMIN — MORPHINE SULFATE 2 MG: 2 INJECTION, SOLUTION INTRAMUSCULAR; INTRAVENOUS at 16:49

## 2022-03-07 RX ADMIN — DIGOXIN 500 MCG: 250 INJECTION, SOLUTION INTRAMUSCULAR; INTRAVENOUS; PARENTERAL at 18:36

## 2022-03-07 RX ADMIN — ACETAMINOPHEN 650 MG: 325 TABLET ORAL at 04:54

## 2022-03-07 RX ADMIN — PANTOPRAZOLE SODIUM 40 MG: 40 TABLET, DELAYED RELEASE ORAL at 06:18

## 2022-03-07 RX ADMIN — TRAMADOL HYDROCHLORIDE 50 MG: 50 TABLET, COATED ORAL at 10:35

## 2022-03-07 RX ADMIN — MORPHINE SULFATE 2 MG: 2 INJECTION, SOLUTION INTRAMUSCULAR; INTRAVENOUS at 00:27

## 2022-03-07 RX ADMIN — ASPIRIN 81 MG 81 MG: 81 TABLET ORAL at 08:25

## 2022-03-07 RX ADMIN — SODIUM CHLORIDE: 9 INJECTION, SOLUTION INTRAVENOUS at 00:39

## 2022-03-07 ASSESSMENT — PAIN SCALES - GENERAL
PAINLEVEL_OUTOF10: 5
PAINLEVEL_OUTOF10: 2
PAINLEVEL_OUTOF10: 2
PAINLEVEL_OUTOF10: 1
PAINLEVEL_OUTOF10: 4
PAINLEVEL_OUTOF10: 5
PAINLEVEL_OUTOF10: 1
PAINLEVEL_OUTOF10: 4
PAINLEVEL_OUTOF10: 5
PAINLEVEL_OUTOF10: 4

## 2022-03-07 ASSESSMENT — PAIN DESCRIPTION - PROGRESSION
CLINICAL_PROGRESSION: NOT CHANGED

## 2022-03-07 ASSESSMENT — PAIN DESCRIPTION - LOCATION
LOCATION: CHEST
LOCATION: HEAD

## 2022-03-07 ASSESSMENT — PAIN DESCRIPTION - ONSET: ONSET: GRADUAL

## 2022-03-07 ASSESSMENT — PAIN DESCRIPTION - DESCRIPTORS
DESCRIPTORS: PRESSURE
DESCRIPTORS: DISCOMFORT
DESCRIPTORS: PRESSURE

## 2022-03-07 ASSESSMENT — PAIN - FUNCTIONAL ASSESSMENT: PAIN_FUNCTIONAL_ASSESSMENT: PREVENTS OR INTERFERES SOME ACTIVE ACTIVITIES AND ADLS

## 2022-03-07 ASSESSMENT — PAIN DESCRIPTION - PAIN TYPE
TYPE: ACUTE PAIN

## 2022-03-07 ASSESSMENT — PAIN DESCRIPTION - FREQUENCY: FREQUENCY: INTERMITTENT

## 2022-03-07 NOTE — CARE COORDINATION
Chart reviewed and met with patient at bedside. IPTA. Has PCP and payor source. Denies needs. Please notify CM if discharge needs arise.       Orion Brothers RN

## 2022-03-07 NOTE — PROGRESS NOTES
Pt complaining of SOB and some mild chest pain. Morphine given for pain. Pt on appears to be AFIB on the monitor. This RN obtained an EKG that read undetermined. Pt appears to be Sinus Tach with arrhythmia. Page sent to cardiology on call and to angela valiente MD. MD Gretta Flowers read EKG and agrees with Sinus with arrhythmia. On call cardio gave verbal orders for 500mcg digoxin one time dose. Vitals secure and stable. Will continue to monitor.

## 2022-03-07 NOTE — H&P
[address] [facility]   Cardiovascular Evaluation    PATIENT: Adrián Skaggs  DATE: 3/6/2022  MRN: [id number]  CSN: [id number]  : 1960    Primary Care Doctor/Referring provider: Cassi Tolbert MD, Yazan Anne MD     Reason for evaluation/Chief complaint:   Abnormal EKG    Subjective:    History of present illness on initial date of evaluation:   Lucian Sender is a 57 y.o. patient who presented to the [facility] ER with complaints of ACS. Evaluation concluded inferior injury pattern on EKG. The patient was discussed with ER staff and decision made for emergent LHC. Per nursing, the patient refused [employer] and transport was delayed to [facility] cath lab due to this reason. On arrival to the cath lab at [facility], the patient continued to have moderate chest pain. The patient is not able to give detailed information about the events of hospitalization due to their underlying medical illness. The majority of the information is taken from the chart, medical staff, and available family. The pain is a burning pain that he describes as indigestion. The pain started 2-3 days prior to presentation on the ER.        Patient Active Problem List   Diagnosis    HTN (hypertension)    Hyperlipidemia    Type 2 diabetes mellitus with diabetic neuropathy, with long-term current use of insulin (HCC)    Chronic foot pain    CTS (carpal tunnel syndrome)    Peripheral neuropathy    Urinary frequency    Anxiety    Elbow pain    Pain of finger of left hand    Lateral epicondylitis    Primary osteoarthritis of first carpometacarpal joint of left hand    Acute sciatica    Incarcerated umbilical hernia    Lumbar radiculitis    Arthritis, midfoot    Cervical radiculopathy at C7    Degeneration of lumbar or lumbosacral intervertebral disc    Displacement of lumbar intervertebral disc without myelopathy    Lumbosacral spondylosis without myelopathy    Spinal stenosis, lumbar region, without neurogenic claudication    Disc displacement, lumbar    Lumbar facet arthropathy    Lumbar stenosis    Neck pain, chronic    Impingement syndrome of right shoulder    Incomplete tear of right rotator cuff    Shoulder impingement    Incomplete tear of left rotator cuff    Occult blood in stools    Arthritis of carpometacarpal (CMC) joint of right thumb    Polyp of transverse colon    Polyp of descending colon    Adenomatous polyps    Obesity with serious comorbidity    Vitamin D deficiency    Acute upper GI hemorrhage    Esophageal thickening    Lactic acidosis    Elevated serum creatinine    Intractable hiccups    Leukocytosis    Hypotension due to hypovolemia    Sinus tachycardia    Polycythemia    STEMI (ST elevation myocardial infarction) [facility])         Cardiac Testing: I have reviewed the findings below. EKG:  ECHO:   STRESS TEST:  CATH:  BYPASS:  VASCULAR:    Past Medical History:   has a past medical history of Adenomatous polyps, Anxiety, CTS (carpal tunnel syndrome), CTS (carpal tunnel syndrome), Diabetes mellitus ([facility] [address] GERD (gastroesophageal reflux disease), Hyperlipidemia, Hypertension, Obesity with serious comorbidity, Peripheral neuropathy, Type II or unspecified type diabetes mellitus without mention of complication, not stated as uncontrolled, and Urinary frequency. Surgical History:   has a past surgical history that includes Appendectomy; Carpal tunnel release; hernia repair (10/12/15); Hand Arthroplasty (Left); pr shldr arthroscop,surg,w/rotat cuff repr (Left, 11/26/2018); Colonoscopy (N/A, 4/30/2019); Colonoscopy (N/A, 4/30/2019); Upper gastrointestinal endoscopy (11/04/2021); and Upper gastrointestinal endoscopy (N/A, 11/4/2021). Social History:   reports that he quit smoking about 21 years ago. He has a 93.00 pack-year smoking history. He has never used smokeless tobacco. He reports that he does not drink alcohol and does not use drugs.      Family History:  No evidence for sudden cardiac death or premature CAD    Medications:  Reviewed and are listed in nursing record. and/or listed below  Outpatient Medications:  Prior to Admission medications    Medication Sig Start Date End Date Taking? Authorizing Provider   pantoprazole (PROTONIX) 40 MG tablet Take 1 tablet by mouth 2 times daily (before meals) 11/4/21   Barbara Gentile MD   fenofibrate (TRICOR) 145 MG tablet Take 145 mg by mouth daily    Historical Provider, MD   ondansetron (ZOFRAN-ODT) 4 MG disintegrating tablet Take 4 mg by mouth daily    Historical Provider, MD   pravastatin (PRAVACHOL) 40 MG tablet Take 40 mg by mouth daily    Historical Provider, MD   empagliflozin (JARDIANCE) 25 MG tablet Take 25 mg by mouth daily    Historical Provider, MD   divalproex (DEPAKOTE ER) 250 MG extended release tablet Take 250 mg by mouth every morning    Historical Provider, MD   divalproex (DEPAKOTE) 500 MG DR tablet Take 500 mg by mouth every evening    Historical Provider, MD   insulin glargine (BASAGLAR KWIKPEN) 100 UNIT/ML injection pen Inject 25-30 Units into the skin 2 times daily  Patient taking differently: Inject 20 Units into the skin 2 times daily  2/19/21   Thedford Dubin, APRN - CNP   NOVOLOG FLEXPEN 100 UNIT/ML injection pen Inject 10 Units into the skin 2 times daily  12/8/20   Historical Provider, MD   tamsulosin (FLOMAX) 0.4 MG capsule Take 0.4 mg by mouth daily  9/6/20   Historical Provider, MD   sertraline (ZOLOFT) 50 MG tablet Take 100 mg by mouth daily  8/17/20   Historical Provider, MD   Blood Glucose Monitoring Suppl ([address]) w/Device KIT 1 Device by Does not apply route once for 1 dose 7/7/20 7/7/20  ANA Patterson CNP   blood glucose test strips (ONETOUCH VERIO) strip 1 each by In Vitro route 3 times daily As needed.  7/7/20   ANA Quintanilla CNP   ONE TOUCH ULTRASOFT LANCETS MISC 1 each by Does not apply route 3 times daily 7/7/20   ANA Quintanilla CNP   blood glucose test strips (FREESTYLE LITE) strip USE TO TEST 3 TIMES DAILY AS NEEDED E11.9 GIVE WHAT IS COVERED. 7/6/20   ANA Gage CNP   lidocaine (LIDODERM) 5 % PLACE 3 PATCHES ONTO THE SKIN EVERY 24 HOURS, 12 HOURS ON AND 12 HOURS OFF 5/24/20   ANA Gage CNP   Blood Glucose Monitoring Suppl (ACCU-CHEK KATHIA PLUS) w/Device KIT 1 Device by Does not apply route once for 1 dose 12/30/19 1/27/20  ANA Hernandez CNP   SOFT TOUCH LANCETS MISC 1 Device by Does not apply route 3 times daily 7/19/19   ANA Gage CNP   [employer] LANCETS MICRO THIN 33G MISC USE TO TEST BLOOD SUGAR 1-2 TIMES A DAY MAY TEST MORE IF BLOOD SUGAR SEEMS LOW 5/13/19   ANA Gage CNP   Insulin Pen Needle (PEN NEEDLES) 31G X 6 MM MISC TEST BLOOD SUGARS ONCE DAILY FOR E11.42 3/14/19   ANA Gage CNP   APPLE CIDER VINEGAR PO Take by mouth     Historical Provider, MD   oxyCODONE-acetaminophen (PERCOCET) 7.5-325 MG per tablet Take 1 tablet by mouth every 8 hours as needed for Pain (three times daily). Miguelito Taylor MD   Alcohol Swabs (ALCOHOL PREP) 70 % PADS USE ONCE DAILY FOR [facility] INJECTION 3/28/18   ANA Gage CNP       In-patient schedule medications:        Infusion Medications: Allergies:  Sulfa antibiotics and Victoza [liraglutide]     Review of Systems:   14 point review of systems unable to be completed due to patient condition/cooperation. All available positives mentioned in history of present illness. Physical Examination:    [unfilled]  There were no vitals taken for this visit.          Wt Readings from Last 3 Encounters:   03/06/22 178 lb (80.7 kg)   11/04/21 198 lb (89.8 kg)   01/06/21 210 lb (95.3 kg)     No intake or output data in the 24 hours ending 03/06/22 2206    General Appearance:  Alert, cooperative, no distress, appears stated age   Head:  Normocephalic, without obvious abnormality, atraumatic   Eyes:  PERRL, conjunctiva/corneas clear       Nose: Nares normal, no drainage or sinus tenderness   Throat: Lips, mucosa, and tongue normal   Neck: Supple, symmetrical, trachea midline, no adenopathy, thyroid: not enlarged, symmetric, no tenderness/mass/nodules, no carotid bruit or JVD       Lungs:   Clear to auscultation bilaterally, respirations unlabored   Chest Wall:  No tenderness or deformity   Heart:  Regular rhythm and normal rate; S1, S2 are normal; no murmur noted; no rub or gallop   Abdomen:   Soft, non-tender, bowel sounds active all four quadrants,  no masses, no organomegaly           Extremities: Extremities normal, atraumatic, no cyanosis or edema   Pulses: 2+ and symmetric   Skin: Skin color, texture, turgor normal, no rashes or lesions   Pysch: Anxious mood and affect   Neurologic: Normal gross motor and sensory exam.         Labs  Recent Labs     03/06/22 2112   WBC 14.5*   HGB 16.7   HCT 49.7   MCV 80.3        Recent Labs     03/06/22 2117   CREATININE 1.7*   BUN 27*      K 4.1      CO2 21     No results for input(s): INR, PROTIME in the last 72 hours. Recent Labs     03/06/22 2117   TROPONINI 2.29*     Invalid input(s): PRO-BNP  No results for input(s): CHOL, LDL, HDL in the last 72 hours. Invalid input(s): TG      Imaging:  I have reviewed the below testing personally and my interpretation is below. EKG:  Normal sinus rhythm with sinus arrhythmia  Inferior infarct , possibly acute  ACUTE MI   Consider right ventricular involvement in acute inferior infarct  Abnormal ECG  When compared with ECG of 03-NOV-2021 09:50,  Inferior infarct is now Present  ST elevation now present in Inferior leads    CXR:      Assessment:  57 y.o. patient with:  Principal Problem:    STEMI (ST elevation myocardial infarction) ([facility] [address]  Resolved Problems:    * No resolved hospital problems.  *      Problem List Items Addressed This Visit     None          Plan:  I had the opportunity to review the [address] [place] [address] clinical presentation and the available pertinent data. With the patient's clinical risk factors and symptoms, there is a high pretest likelihood for CAD. I have asked [address] [place] [address] to undergo cardiac angiography for further diagnostic testing.    ~Emergent C   The procedure was explained in depth. All questions and alternate treatment strategies were discussed. The patient agrees to proceed. They understand all the risks associated with the procedure, including myocardial infarction, stroke, death, vascular complications, and the possible need for emergent surgery. 2. Serial troponin levels will be ordered and reviewed  3. The patient has been given instructions on addressing diet, regular exercise, weight control, smoking abstention, medication compliance, and stress minimization. 4. The patient should be treated with these therapies unless contraindicated:   ~asa daily   ~beta-blockers   ~statin therapy   ~ACE/ARB   ~repeat troponin until down trending   ~EKG as clinically needed  5. Keep NPO   6. Pre-cath orders will be placed. Medical Decision Making: The following items were considered in medical decision making:  Independent review of images  Review / order clinical lab tests  Review / order radiology tests  Decision to obtain old records  Review and summation of old records as accessed through [employer] (a summary of my findings in these old records)      Due to the high probability of clinically significant life threating deterioration of the patient's condition that required my urgent intervention, a total critical care time 60 minutes was used. This time excludes any time that may have been spent performing procedures.  This includes but not limited to vital sign monitoring, telemetry monitoring, continuous pulse oximety, IV medication, clinical response to the IV medications, documentation time , consultation time, interpretation of lab data, review of nursing notes and old record review. All questions and concerns were addressed to the patient/family. Alternatives to my treatment were discussed. The note was completed using EMR. Every effort was made to ensure accuracy; however, inadvertent computerized transcription errors may be present.     Chantal Garcia MD, [address], [address] [facility]  [phone] [facility] office  [phone] [facility]  3/6/2022  10:06 PM

## 2022-03-07 NOTE — PROGRESS NOTES
[facility]   Daily Cardiovascular Progress Note    Admit Date: 3/6/2022    CC: Being seen for post-procedure cardiac evaluation. HPI: Xiao Shetty has no new complaints today. The patient is post cardiac cath.      Medications/Labs all Reviewed:  Patient Active Problem List   Diagnosis    HTN (hypertension)    Hyperlipidemia    Type 2 diabetes mellitus with diabetic neuropathy, with long-term current use of insulin ([facility])    Chronic foot pain    CTS (carpal tunnel syndrome)    Peripheral neuropathy    Urinary frequency    Anxiety    Elbow pain    Pain of finger of left hand    Lateral epicondylitis    Primary osteoarthritis of first carpometacarpal joint of left hand    Acute sciatica    Incarcerated umbilical hernia    Lumbar radiculitis    Arthritis, midfoot    Cervical radiculopathy at C7    Degeneration of lumbar or lumbosacral intervertebral disc    Displacement of lumbar intervertebral disc without myelopathy    Lumbosacral spondylosis without myelopathy    Spinal stenosis, lumbar region, without neurogenic claudication    Disc displacement, lumbar    Lumbar facet arthropathy    Lumbar stenosis    Neck pain, chronic    Impingement syndrome of right shoulder    Incomplete tear of right rotator cuff    Shoulder impingement    Incomplete tear of left rotator cuff    Occult blood in stools    Arthritis of carpometacarpal (CMC) joint of right thumb    Polyp of transverse colon    Polyp of descending colon    Adenomatous polyps    Obesity with serious comorbidity    Vitamin D deficiency    Acute upper GI hemorrhage    Esophageal thickening    Lactic acidosis    Elevated serum creatinine    Intractable hiccups    Leukocytosis    Hypotension due to hypovolemia    Sinus tachycardia    Polycythemia    STEMI (ST elevation myocardial infarction) ([facility])       Medications:   empagliflozin  25 mg Oral Daily    insulin glargine  25 Units SubCUTAneous BID    insulin lispro  10 Units SubCUTAneous BID WC    pantoprazole  40 mg Oral BID AC    sertraline  100 mg Oral Daily    aspirin  81 mg Oral Daily    carvedilol  3.125 mg Oral BID WC    atorvastatin  80 mg Oral Nightly    ticagrelor  90 mg Oral BID    insulin glargine  0.15 Units/kg SubCUTAneous Nightly    insulin lispro  0.05 Units/kg SubCUTAneous TID WC    insulin lispro  0-12 Units SubCUTAneous TID WC    insulin lispro  0-6 Units SubCUTAneous Nightly    mupirocin   Nasal BID       Infusion Medications:   dextrose      phenylephrine (HI-SYNEPHRINE) 50mg/250mL infusion         Labs:  Lab Results   Component Value Date    WBC 9.9 03/07/2022    HGB 14.1 03/07/2022    HCT 41.6 03/07/2022    MCV 80.0 03/07/2022     03/07/2022     Lab Results   Component Value Date    CREATININE 1.5 (H) 03/07/2022    BUN 28 (H) 03/07/2022     03/07/2022    K 3.8 03/07/2022     03/07/2022    CO2 21 03/07/2022     Lab Results   Component Value Date    INR 1.07 11/03/2021    PROTIME 12.2 11/03/2021        Physical Examination:    /79   Pulse 83   Temp 98.6 °F (37 °C) (Oral)   Resp 18   Ht 5' 10\" (1.778 m)   Wt 209 lb 3.5 oz (94.9 kg)   SpO2 94%   BMI 30.02 kg/m²    Wt Readings from Last 3 Encounters:   03/07/22 209 lb 3.5 oz (94.9 kg)   03/06/22 178 lb (80.7 kg)   11/04/21 198 lb (89.8 kg)       Intake/Output Summary (Last 24 hours) at 3/7/2022 1145  Last data filed at 3/7/2022 1047  Gross per 24 hour   Intake 1138.72 ml   Output 1125 ml   Net 13.72 ml       Respiratory:  · Resp Assessment: Normal respiratory effort  · Resp Auscultation: Normal to ascultation   Cardiovascular:  · Auscultation: regular rhythm and normal rate, normal S1S2, no murmur, rub or gallop  · Palpation:  Nl PMI  · JVP:  normal  · Extremities: no edema  · Cath site incision bandage and post-procedure stable. Normal pulse.  No erythema or drainage  Abdomen:  · Soft, non-tender  · Normal bowel sounds  Extremities:  ·  No Cyanosis or Clubbing  Neurological/Psychiatric:  · Oriented to time, place, and person  · Non-anxious   Skin Warm and dry    Rhythm - NSR and no arrhthymias noted      Assessment:    Principal Problem:    STEMI (ST elevation myocardial infarction) ([address]  Resolved Problems:    * No resolved hospital problems. *        Plan:  1. The patient should be treated with the following   ~asa 81mg daily   ~beta-blockers to target HR 60-80 and -130   ~high intensity statin therapy    ~anti-anginal therapy as necessary   ~ACE inhibitor   2. DAPT   3. >50% of the time was devoted to giving the patient detailed instructions instructions on addressing diet, regular exercise, weight control, smoking abstention, medication compliance, and stress minimization. The patient was provided written and verbal instructions regarding risk factor modification. 4. Cardiac rehab referral  5. LVEF documented   6. Medical team to address DM management          All questions and concerns were addressed to the patient/family. Alternatives to my treatment were discussed. The note was completed using EMR. Every effort was made to ensure accuracy; however, inadvertent computerized transcription errors may be present.     Bennett Lundberg MD, GLORY, [facility], -[address] [place] [address]  3/7/2022 11:45 AM

## 2022-03-07 NOTE — POST SEDATION
Patient:  Tatum Stoll   :   1960    A pre-sedation re-evaluation was performed immediately at the end of the procedure. Procedure:  Cardiac cath  Medications: Procedural sedation with minimal conscious sedation  Complications: None  Estimated Blood Loss: none  Specimens: Were not obtained        [place] Medication and Procedural Reconciliation:  I agree that the documented medications and procedures performed are true. The medications were given under my order. The procedures were performed under my direct supervision.

## 2022-03-07 NOTE — ED PROVIDER NOTES
Emergency Department Provider Note  [facility] EMERGENCY DEPARTMENT  3/6/2022     Patient Identification  Jennifer Babin is a 57 y.o. male    Chief Complaint  Nausea (C/O nausea \"for a few days and some chest pain today, sick\")          HPI  (History provided by patient)      41-year-old male with a history of hypertension, hyperlipidemia, type 2 diabetes who presents to the ED for 2 to 3 days of abdominal discomfort and nausea without vomiting. States he has felt sick and has not really left his house or been able to eat much. Blood sugar 199 this morning. Later in the morning he started feeling chest discomfort with more difficulty to breathe, which prompted him to come to the ED. Associated symptoms include a minor bilateral temporal headache. Pain severity 1-2 out of 10. Denies any other symptoms including dizziness, lightheadedness, changes in vision. No Hx of MI or stents. Was able to take his medications this morning. I have reviewed the following nursing documentation:  Allergies: Allergies   Allergen Reactions    Sulfa Antibiotics Hives    Victoza [Liraglutide] Diarrhea       Past medical history:  has a past medical history of Adenomatous polyps, Anxiety, CTS (carpal tunnel syndrome), CTS (carpal tunnel syndrome), Diabetes mellitus ([address] GERD (gastroesophageal reflux disease), Hyperlipidemia, Hypertension, Obesity with serious comorbidity (9/9/2020), Peripheral neuropathy, Type II or unspecified type diabetes mellitus without mention of complication, not stated as uncontrolled, and Urinary frequency. Past surgical history:  has a past surgical history that includes Appendectomy; Carpal tunnel release; hernia repair (10/12/15); Hand Arthroplasty (Left); pr shldr arthroscop,surg,w/rotat cuff repr (Left, 11/26/2018); Colonoscopy (N/A, 4/30/2019); Colonoscopy (N/A, 4/30/2019);  Upper gastrointestinal endoscopy (11/04/2021); and Upper gastrointestinal endoscopy (N/A, 11/4/2021). Home medications:   Prior to Admission medications    Medication Sig Start Date End Date Taking? Authorizing Provider   pantoprazole (PROTONIX) 40 MG tablet Take 1 tablet by mouth 2 times daily (before meals) 11/4/21  Yes Sachin Dominguez MD   fenofibrate (TRICOR) 145 MG tablet Take 145 mg by mouth daily   Yes Historical Provider, MD   ondansetron (ZOFRAN-ODT) 4 MG disintegrating tablet Take 4 mg by mouth daily   Yes Historical Provider, MD   pravastatin (PRAVACHOL) 40 MG tablet Take 40 mg by mouth daily   Yes Historical Provider, MD   empagliflozin (JARDIANCE) 25 MG tablet Take 25 mg by mouth daily   Yes Historical Provider, MD   divalproex (DEPAKOTE ER) 250 MG extended release tablet Take 250 mg by mouth every morning   Yes Historical Provider, MD   divalproex (DEPAKOTE) 500 MG DR tablet Take 500 mg by mouth every evening   Yes Historical Provider, MD   insulin glargine (BASAGLAR KWIKPEN) 100 UNIT/ML injection pen Inject 25-30 Units into the skin 2 times daily  Patient taking differently: Inject 20 Units into the skin 2 times daily  2/19/21  Yes ANA Cole CNP   NOVOLOG FLEXPEN 100 UNIT/ML injection pen Inject 10 Units into the skin 2 times daily  12/8/20  Yes Historical Provider, MD   tamsulosin (FLOMAX) 0.4 MG capsule Take 0.4 mg by mouth daily  9/6/20  Yes Historical Provider, MD   sertraline (ZOLOFT) 50 MG tablet Take 100 mg by mouth daily  8/17/20  Yes Historical Provider, MD   blood glucose test strips (ONETOUCH VERIO) strip 1 each by In Vitro route 3 times daily As needed.  7/7/20  Yes ANA Branham CNP   ONE TOUCH ULTRASOFT LANCETS MISC 1 each by Does not apply route 3 times daily 7/7/20  Yes ANA Branham CNP   blood glucose test strips (FREESTYLE LITE) strip USE TO TEST 3 TIMES DAILY AS NEEDED E11.9 GIVE WHAT IS COVERED. 7/6/20  Yes ANA Branham CNP   lidocaine (LIDODERM) 5 % PLACE 3 PATCHES ONTO THE SKIN EVERY 24 HOURS, 12 HOURS ON AND 12 HOURS OFF 5/24/20  Yes ANA Tanner CNP   SOFT TOUCH LANCETS MISC 1 Device by Does not apply route 3 times daily 7/19/19  Yes ANA Pace CNP   [employer] LANCETS MICRO THIN 33G MISC USE TO TEST BLOOD SUGAR 1-2 TIMES A DAY MAY TEST MORE IF BLOOD SUGAR SEEMS LOW 5/13/19  Yes ANA Tanner CNP   Insulin Pen Needle (PEN NEEDLES) 31G X 6 MM MISC TEST BLOOD SUGARS ONCE DAILY FOR E11.42 3/14/19  Yes ANA Pace CNP   APPLE CIDER VINEGAR PO Take by mouth    Yes Historical Provider, MD   oxyCODONE-acetaminophen (PERCOCET) 7.5-325 MG per tablet Take 1 tablet by mouth every 8 hours as needed for Pain (three times daily). Yes Jennifer Multani MD   Alcohol Swabs (ALCOHOL PREP) 70 % PADS USE ONCE DAILY FOR [facility] INJECTION 3/28/18  Yes ANA Tanner CNP   Blood Glucose Monitoring Suppl (Dedra Johnsonins) w/Device KIT 1 Device by Does not apply route once for 1 dose 7/7/20 7/7/20  ANA Tanner CNP   Blood Glucose Monitoring Suppl (ACCU-CHEK KATHIA PLUS) w/Device KIT 1 Device by Does not apply route once for 1 dose 12/30/19 1/27/20  ANA Tanner CNP       Social history:  reports that he quit smoking about 21 years ago. He has a 93.00 pack-year smoking history. He has never used smokeless tobacco. He reports that he does not drink alcohol and does not use drugs. Family history:    Family History   Problem Relation Age of Onset    Anemia Mother     Hypertension Mother     Heart Disease Father     Diabetes Father     No Known Problems Sister     No Known Problems Brother     No Known Problems Daughter     Other Son         ? etiol;    No Known Problems Son          ROS  Review of Systems   Constitutional: Negative for chills, fatigue and fever. HENT: Negative for sore throat. Eyes: Negative for visual disturbance. Respiratory: Positive for shortness of breath. Negative for cough. Cardiovascular: Positive for chest pain (Generalized, left-sided). Negative for palpitations and leg swelling. Gastrointestinal: Positive for nausea. Negative for abdominal pain, diarrhea and vomiting. Genitourinary: Negative for dysuria. Musculoskeletal: Negative for gait problem and neck pain. Neurological: Positive for headaches (Mild, bilateral at temples). Negative for light-headedness. All other systems reviewed and are negative. Exam  ED Triage Vitals [03/06/22 2051]   BP Temp Temp Source Pulse Resp SpO2 Height Weight   114/79 99.3 °F (37.4 °C) Oral 99 16 95 % 5' 10\" (1.778 m) 178 lb (80.7 kg)       Physical Exam  Constitutional:       Appearance: Normal appearance. Cardiovascular:      Rate and Rhythm: Normal rate and regular rhythm. Pulses: Normal pulses. Heart sounds: Normal heart sounds. Pulmonary:      Effort: Pulmonary effort is normal.      Breath sounds: Normal breath sounds. Abdominal:      General: Abdomen is flat. Bowel sounds are normal. There is no distension. Palpations: Abdomen is soft. Tenderness: There is no abdominal tenderness. There is no guarding or rebound. Musculoskeletal:         General: Normal range of motion. Cervical back: Normal range of motion. Right lower leg: No edema. Left lower leg: No edema. Skin:     General: Skin is warm and dry. Neurological:      General: No focal deficit present. Mental Status: He is alert and oriented to person, place, and time. Mental status is at baseline. Sensory: No sensory deficit. Motor: No weakness.            ED Course    ED Medication Orders (From admission, onward)    Start Ordered     Status Ordering Provider    03/06/22 2145 03/06/22 2116  0.9 % sodium chloride bolus  ONCE         Last MAR action: Stopped - by Verna Whitten on 03/06/22 at 2139 GISEL MADDOX    03/06/22 2145 03/06/22 2116  ticagrelor (BRILINTA) tablet 180 mg  ONCE         Last MAR action: Given - by Radha Lawson on 03/06/22 at 2116 GISEL MADDOX    03/06/22 2130 03/06/22 2110  aspirin chewable tablet 324 mg  ONCE         Last MAR action: Given - by Radha Lawson on 03/06/22 at 2113 [facility]    03/06/22 2130 03/06/22 2110  heparin (porcine) injection 4,000 Units  ONCE         Last MAR action: Given - by Radha Lawson on 03/06/22 at 2111 NIKOS MOFFETT          EKG  NSR, HR 99. ST elevations in II, III, aVF. Radiology  XR CHEST PORTABLE    Result Date: 3/6/2022  EXAMINATION: ONE XRAY VIEW OF THE CHEST 3/6/2022 9:19 pm COMPARISON: 11/03/2021 HISTORY: ORDERING SYSTEM PROVIDED HISTORY: chest pain, STEMI TECHNOLOGIST PROVIDED HISTORY: Reason for exam:->chest pain, STEMI Reason for Exam: chest pain    stemi FINDINGS: Cardiac mediastinal silhouettes appear within normal limits for size. Pulmonary vascularity is normal.  No parenchymal consolidation or pleural effusion is identified. No pneumothorax. No acute osseous abnormality. Degenerative changes in the spine and shoulders. Clear chest without acute cardiopulmonary process.          Labs  Results for orders placed or performed during the hospital encounter of 03/06/22   CBC   Result Value Ref Range    WBC 14.5 (H) 4.0 - 11.0 K/uL    RBC 6.18 (H) 4.20 - 5.90 M/uL    Hemoglobin 16.7 13.5 - 17.5 g/dL    Hematocrit 49.7 40.5 - 52.5 %    MCV 80.3 80.0 - 100.0 fL    MCH 27.1 26.0 - 34.0 pg    MCHC 33.7 31.0 - 36.0 g/dL    RDW 14.1 12.4 - 15.4 %    Platelets 202 410 - 722 K/uL    MPV 10.2 5.0 - 10.5 fL   Comprehensive Metabolic Panel w/ Reflex to MG   Result Value Ref Range    Sodium 138 136 - 145 mmol/L    Potassium reflex Magnesium 4.1 3.5 - 5.1 mmol/L    Chloride 102 99 - 110 mmol/L    CO2 21 21 - 32 mmol/L    Anion Gap 15 3 - 16    Glucose 176 (H) 70 - 99 mg/dL    BUN 27 (H) 7 - 20 mg/dL    CREATININE 1.7 (H) 0.8 - 1.3 mg/dL    GFR Non- 41 (A) >60    GFR  50 (A) >60    Calcium 9.2 8.3 - 10.6 mg/dL Total Protein 7.2 6.4 - 8.2 g/dL    Albumin 4.5 3.4 - 5.0 g/dL    Albumin/Globulin Ratio 1.7 1.1 - 2.2    Total Bilirubin 0.6 0.0 - 1.0 mg/dL    Alkaline Phosphatase 58 40 - 129 U/L    ALT 34 10 - 40 U/L     (H) 15 - 37 U/L   Troponin   Result Value Ref Range    Troponin 2.29 (HH) <0.01 ng/mL   Brain Natriuretic Peptide   Result Value Ref Range    Pro-BNP 2,798 (H) 0 - 124 pg/mL   EKG 12 Lead   Result Value Ref Range    Ventricular Rate 99 BPM    Atrial Rate 99 BPM    P-R Interval 166 ms    QRS Duration 88 ms    Q-T Interval 338 ms    QTc Calculation (Bazett) 433 ms    P Axis 33 degrees    R Axis 47 degrees    T Axis 60 degrees    Diagnosis       Normal sinus rhythm with sinus arrhythmiaInferior infarct , possibly acute  ACUTE MI  Consider right ventricular involvement in acute inferior infarctAbnormal ECGWhen compared with ECG of 03-NOV-2021 09:50,Inferior infarct is now PresentST elevation now present in Inferior leads         MDM  Patient seen and evaluated. Relevant records reviewed. 57-year-old male with no significant cardiac history other than hypertension, hyperlipidemia, diabetes who presented to the ED for 2 to 3 days of abdominal discomfort and nausea without vomiting and chest discomfort and shortness of breath that started late morning today. Associated symptoms include a mild bilateral temporal headache. No other symptoms. EKG showed NSR, HR 99 with ST elevations in II, III, aVF indicative of inferior STEMI. CXR showed no acute cardiopulmonary process. Labs drawn include CBC, CMP, troponin, BNP. , Cr 1.7, troponin II 0.29, BNP 2798. WBC 14.5. After interpretation of the EKG, gave aspirin 324 mg, Brilinta 180 mg, Zofran IV. Started on IV heparin 4000 units, and contacted EMS for transport to [facility] cath lab. Never had stress test, no history of MI or stent. Clinical Impression:  1.  ST elevation myocardial infarction (STEMI), unspecified artery ([facility] [address] Disposition:  Transfer to [facility] lab. Blood pressure 114/79, pulse 99, temperature 99.3 °F (37.4 °C), temperature source Oral, resp. rate 16, height 5' 10\" (1.778 m), weight 178 lb (80.7 kg), SpO2 95 %. Patient was given scripts for the following medications. I counseled patient how to take these medications. Discharge Medication List as of 3/6/2022  9:42 PM          Disposition referral (if applicable):  No follow-up provider specified. Total critical care time is 10 minutes, which excludes separately billable procedures and updating family. Time spent is specifically for management of the presenting complaint and symptoms initially, direct bedside care, reevaluation, review of records, and consultation. There was a high probability of clinically significant life-threatening deterioration in the patient's condition, which required my urgent intervention. This chart was generated in part by using Dragon Dictation system and may contain errors related to that system including errors in grammar, punctuation, and spelling, as well as words and phrases that may be inappropriate. If there are any questions or concerns please feel free to contact the dictating provider for clarification.          Susie Navarrete, DO  Resident  03/07/22 0030

## 2022-03-07 NOTE — PROCEDURES
[facility]       Cardiac Catheterization Lab Report    PATIENT: Brynn Skaggs  DATE: 3/6/2022  MRN: [id number]  CSN: [id number]  : 1960      Performing Physician: Alicia Jung MD, [facility], [facility]  Primary Care Physician: Sunita Long MD  Admitting/Referring provider: Cece Hernandez MD     Procedures Performed:   1. Left heart catheterization  2. Selective left and right coronary angiogram  3. Left ventriculography   4. PCI of the RCA with RHEA x 1  5. Due to the high probability of clinically significant life threating deterioration of the patient's condition that required my urgent intervention, a total critical care time 60 minutes was used. This time excludes any time that may have been spent performing procedures. This includes but not limited to vital sign monitoring, telemetry monitoring, continuous pulse oximety, IV medication, clinical response to the IV medications, documentation time , consultation time, interpretation of lab data, review of nursing notes and old record review. Procedure Findings:  1. 100% proximal RCA stenosis   ~moderate LAD and CIRC  2. Normal left ventricular function with EF estimated at 50-55%  3.  Normal left heart hemodynamics    Indications:   Patient Active Problem List   Diagnosis    HTN (hypertension)    Hyperlipidemia    Type 2 diabetes mellitus with diabetic neuropathy, with long-term current use of insulin (HCC)    Chronic foot pain    CTS (carpal tunnel syndrome)    Peripheral neuropathy    Urinary frequency    Anxiety    Elbow pain    Pain of finger of left hand    Lateral epicondylitis    Primary osteoarthritis of first carpometacarpal joint of left hand    Acute sciatica    Incarcerated umbilical hernia    Lumbar radiculitis    Arthritis, midfoot    Cervical radiculopathy at C7    Degeneration of lumbar or lumbosacral intervertebral disc    Displacement of lumbar intervertebral disc without myelopathy  Lumbosacral spondylosis without myelopathy    Spinal stenosis, lumbar region, without neurogenic claudication    Disc displacement, lumbar    Lumbar facet arthropathy    Lumbar stenosis    Neck pain, chronic    Impingement syndrome of right shoulder    Incomplete tear of right rotator cuff    Shoulder impingement    Incomplete tear of left rotator cuff    Occult blood in stools    Arthritis of carpometacarpal (CMC) joint of right thumb    Polyp of transverse colon    Polyp of descending colon    Adenomatous polyps    Obesity with serious comorbidity    Vitamin D deficiency    Acute upper GI hemorrhage    Esophageal thickening    Lactic acidosis    Elevated serum creatinine    Intractable hiccups    Leukocytosis    Hypotension due to hypovolemia    Sinus tachycardia    Polycythemia    STEMI (ST elevation myocardial infarction) ([facility] [address]       Details:   Darrell Burt was brought to the cardiac catheterization lab in a fasting state after informed consent was obtained. If the patient was able to provide written consent, it was obtained. The patient's vitals were monitored through out the procedure. The patient was sedated using the appropriate levels of sedation and ASA guidelines. The appropriate access site area was prepped and drapped in a sterile fashion. The area was anesthetized with 2% lidocaine. Using the modified Seldinger technique, an arterial sheath was introduced into the arterial access site using a single anterior wall puncture. The sheath was flushed and prepped in usual fashion. Catheters used during the procedure included 5 Finnish TIG 4.0 catheter. The catheters were advanced and removed over a .035\" wire, into the appropriate positions. Multiple angiographic views were obtained. An LV gram was obtained. ~The interventional portion of the procedure was completed utilizing a 6 Western Anahi system. A JR4 6 Western Anahi guide was advanced to the right coronary ostium.   A ASIFW was advanced but would not cross the lesion. We separately then changed out to a Choice PT extra-support wire. This did advance through the lesion. We separately did predilate the lesion with a 2.5 mm x 12 mm trek balloon. This followed by a noncompliant 3.0 mm x 15 mm and a 4.0 mm x 12 mm. We separately stented this lesion with a 4.0 mm x 22 mm resolute Snyder drug-eluting stent and then postdilated with a 4.5 mm x 8 mm noncompliant balloon. Findings:    1. Left main coronary artery was normal. It gave off the left anterior descending artery and left circumflex. 2. Left anterior descending artery has mild to moderate atherosclerotic disease. It was moderate in size. It gave off septal perforators and a moderate sized diagonal branch. The LAD covered the entire apex of the left ventricle. 3. Left circumflex has mild to moderate atherosclerotic disease. It was moderate in size. There was a moderate sized obtuse marginal branch. 4. Right coronary artery was occluded in the proximal portion. It was 100% blocked and SARAH 0 flow. 5. Left ventriculogram showed normal LVEF at 50-55%. Wall motion was normal . There was no significant mitral valve or aortic valve disease noted. LVEDP was normal. There was no gradient noted across the aortic valve during pullback of the catheter. There were no vitals taken for this visit. The access site was controlled with manual pressure and/or appropriate closure device. Moderate Conscious Sedation Details: An independent trained observer pushed medications at my direction. We monitoring the patient's level of consciousness and vital signs/physiologic status throughout the procedure.   CPT codes 53828 and 15485    Start time: 0823  Stop time: 2244  ASA class: 4    Sedation totals:  Versad - 3mg  Fentanyl - 50mcg    EBL - minimal <5 cc blood loss    The patient was monitored continuously with the ECG, pulse oximetry, blood pressure, and direct observation. CONCLUSIONS:    1. Successful primary reperfusion of the right coronary artery in the setting of acute inferior ST segment elevation myocardial infarction. ASSESSMENT/RECOMMENDATIONS:    1. Dual antiplatelet therapy  2. The patient should be treated with these therapies unless contraindicated:   ~asa daily   ~beta-blockers   ~statin therapy   ~ACE/ARB -acute renal failure and on hold. ~repeat troponin until down trending   ~EKG as clinically needed   ~phase 1 in-patient cardiac rehab  3. Transfer to the intensive care unit  4.   Case discussed with patient's Sister Garrett Zuniga MD, [place]ORY, [place] [id number] Cardiology  [address]  [phone] Main central office  [phone] [facility] office  3/6/2022  10:46 PM

## 2022-03-07 NOTE — PROGRESS NOTES
Belongings packed & pt(and family) informed of room change. Telemetry applied. C4 called to bring w/c & get report.

## 2022-03-07 NOTE — PRE SEDATION
Brief Pre-Op Note/Sedation Assessment      Ginger Barreto  1960  [id number]  10:07 PM    Planned Procedure: Cardiac Catheterization Procedure  Post Procedure Plan: Return to same level of care  Consent: I have discussed with the patient and/or the patient representative the indication, alternatives, and the possible risks and/or complications of the planned procedure and the anesthesia methods. The patient and/or patient representative appear to understand and agree to proceed. Chief Complaint:   Chest Pain/Pressure      Indications for Cath Procedure:  1. Presentation:  ACS <= 24 hrs and Worsening Angina  2. Anginal Classification within 2 weeks:  CCS IV - Inability to perform any activity without angina or angina at rest, i.e., severe limitation  3. Angina Symptoms Assessment:  Typical Chest Pain  4. Heart Failure Class within last 2 weeks:  Yes:  Heart Failure Type: Unknown Severity:  Class IV - Symptoms of HF at rest  5. Cardiovascular Instability:  Yes:  Persistent ischemic symptoms (CP, ST Elevation)    Prior Ischemic Workup/Eval:  1. Pre-Procedural Medications: Yes: Aspirin  2. Stress Test Completed? No    Does Patient need surgery? Cath Valve Surgery:  No    Pre-Procedure Medical History:  Vital Signs: There were no vitals taken for this visit. Allergies: Allergies   Allergen Reactions    Sulfa Antibiotics Hives    Victoza [Liraglutide] Diarrhea     Medications:    No current facility-administered medications for this encounter.        Past Medical History:    Past Medical History:   Diagnosis Date    Adenomatous polyps     scoped 2019;acc to pt ,repeat in one yr    Anxiety     CTS (carpal tunnel syndrome)     CTS (carpal tunnel syndrome)     Diabetes mellitus ([facility] [address]     GERD (gastroesophageal reflux disease)     has had stricture dilated mult yrs ago    Hyperlipidemia     Hypertension     Obesity with serious comorbidity 9/9/2020    Peripheral neuropathy     Type II or unspecified type diabetes mellitus without mention of complication, not stated as uncontrolled     Urinary frequency        Surgical History:    Past Surgical History:   Procedure Laterality Date    APPENDECTOMY      CARPAL TUNNEL RELEASE      COLONOSCOPY N/A 4/30/2019    COLONOSCOPY POLYPECTOMY SNARE/COLD BIOPSY performed by Sri Echols MD at [address] N/A 4/30/2019    COLONOSCOPY CONTROL HEMORRHAGE performed by Sri Echols MD at [address] Left     HERNIA REPAIR  10/12/15    Laparoscopic Hernia Repair    AR SHLDR ARTHROSCOP,SURG,W/ROTAT CUFF REPR Left 11/26/2018    LEFT SHOULDER ARTHROSCOPY WITH DEBRIDEMENT, ROTATOR CUFF REPAIR, OPEN SUBSCAPULARIS REPAIR, BICEPS TENODESIS, SUBACROMIAL DECOMPRESSION   performed by Patricia Braga MD at [address]  11/04/2021    with biopsy    UPPER GASTROINTESTINAL ENDOSCOPY N/A 11/4/2021    [facility]             Pre-Sedation:  Pre-Sedation Documentation and Exam:  I have assessed the patient and reviewed the H&P on the chart. Prior History of Anesthesia Complications:   none    Modified Mallampati:  II (soft palate, uvula, fauces visible)    ASA Classification:  Class 4 - A patient with severe systemic disease that limits activity but is not severe  Beata Scale: Activity:  2 - Able to move 4 extremities voluntarily on command  Respiration:  2 - Able to breathe deeply and cough freely  Circulation:  2 - BP+/- 20mmHg of normal  Consciousness:  2 - Fully awake  Oxygen Saturation (color):  1 - Needs oxygen to maintain oxygen saturation >90%    Sedation/Anesthesia Plan:  Guard the patient's safety and welfare. Minimize physical discomfort and pain. Minimize negative psychological responses to treatment by providing sedation and analgesia and maximize the potential amnesia.   Patient to meet pre-procedure discharge plan.     Medication Planned:  midazolam intravenously and fentanyl intravenously    Patient is an appropriate candidate for plan of sedation:   yes      Electronically signed by Jorge Gray MD on 3/6/2022 at 10:07 PM

## 2022-03-07 NOTE — ED PROVIDER NOTES
Emergency Physician Note        Note Open Time: 10:50 PM EST    Chief Complaint  Nausea (C/O nausea \"for a few days and some chest pain today, sick\")       History of Present Illness  Yves Riedel is a 57 y.o. male who presents to the ED for nausea. Patient reports last 3 days he has had nausea but no vomiting. He has had decreased appetite because of the nausea. He states that today however he developed some anterior chest pain across the upper chest that was fairly constant and moderate and felt like pressure to him. No diaphoresis, lightheadedness or palpitations. Patient does complain of some slight shortness of breath. No cardiac history. He does have history of hypertension, hyperlipidemia and diabetes all of which are treated. Never had a stress test.  Pain is mild. 10 systems reviewed, pertinent positives per HPI otherwise noted to be negative    I have reviewed the following from the nursing documentation:      Prior to Admission medications    Medication Sig Start Date End Date Taking?  Authorizing Provider   pantoprazole (PROTONIX) 40 MG tablet Take 1 tablet by mouth 2 times daily (before meals) 11/4/21  Yes Melissa Burnett MD   fenofibrate (TRICOR) 145 MG tablet Take 145 mg by mouth daily   Yes Historical Provider, MD   ondansetron (ZOFRAN-ODT) 4 MG disintegrating tablet Take 4 mg by mouth daily   Yes Historical Provider, MD   pravastatin (PRAVACHOL) 40 MG tablet Take 40 mg by mouth daily   Yes Historical Provider, MD   empagliflozin (JARDIANCE) 25 MG tablet Take 25 mg by mouth daily   Yes Historical Provider, MD   divalproex (DEPAKOTE ER) 250 MG extended release tablet Take 250 mg by mouth every morning   Yes Historical Provider, MD   divalproex (DEPAKOTE) 500 MG DR tablet Take 500 mg by mouth every evening   Yes Historical Provider, MD   insulin glargine (BASAGLAR KWIKPEN) 100 UNIT/ML injection pen Inject 25-30 Units into the skin 2 times daily  Patient taking differently: Inject 20 Units into the skin 2 times daily  2/19/21  Yes ANA Conley CNP   NOVOLOG FLEXPEN 100 UNIT/ML injection pen Inject 10 Units into the skin 2 times daily  12/8/20  Yes Historical Provider, MD   tamsulosin (FLOMAX) 0.4 MG capsule Take 0.4 mg by mouth daily  9/6/20  Yes Historical Provider, MD   sertraline (ZOLOFT) 50 MG tablet Take 100 mg by mouth daily  8/17/20  Yes Historical Provider, MD   blood glucose test strips (ONETOUCH VERIO) strip 1 each by In Vitro route 3 times daily As needed. 7/7/20  Yes AAN Ni CNP   ONE TOUCH ULTRASOFT LANCETS MISC 1 each by Does not apply route 3 times daily 7/7/20  Yes ANA Ni CNP   blood glucose test strips (FREESTYLE LITE) strip USE TO TEST 3 TIMES DAILY AS NEEDED E11.9 GIVE WHAT IS COVERED. 7/6/20  Yes ANA Ni CNP   lidocaine (LIDODERM) 5 % PLACE 3 PATCHES ONTO THE SKIN EVERY 24 HOURS, 12 HOURS ON AND 12 HOURS OFF 5/24/20  Yes ANA Ni CNP   SOFT TOUCH LANCETS MISC 1 Device by Does not apply route 3 times daily 7/19/19  Yes ANA Garcia CNP   [employer] LANCETS MICRO THIN 33G MISC USE TO TEST BLOOD SUGAR 1-2 TIMES A DAY MAY TEST MORE IF BLOOD SUGAR SEEMS LOW 5/13/19  Yes ANA Ni CNP   Insulin Pen Needle (PEN NEEDLES) 31G X 6 MM MISC TEST BLOOD SUGARS ONCE DAILY FOR E11.42 3/14/19  Yes ANA Garcia CNP   APPLE CIDER VINEGAR PO Take by mouth    Yes Historical Provider, MD   oxyCODONE-acetaminophen (PERCOCET) 7.5-325 MG per tablet Take 1 tablet by mouth every 8 hours as needed for Pain (three times daily).     Yes Nancy Lawrence MD   Alcohol Swabs (ALCOHOL PREP) 70 % PADS USE ONCE DAILY FOR [facility] INJECTION 3/28/18  Yes ANA Ni CNP   Blood Glucose Monitoring Suppl (Red Fresh) w/Device KIT 1 Device by Does not apply route once for 1 dose 7/7/20 7/7/20  Belinda Pulido, ANA - CNP   Blood Glucose Monitoring Suppl (ACCU-CHEK KATHIA PLUS) w/Device KIT 1 Device by Does not apply route once for 1 dose 12/30/19 1/27/20  ANA Valencia - CNP       Allergies as of 03/06/2022 - Fully Reviewed 03/06/2022   Allergen Reaction Noted    Sulfa antibiotics Hives 03/16/2018    Victoza [liraglutide] Diarrhea 10/14/2020       Past Medical History:   Diagnosis Date    Adenomatous polyps     scoped 2019;acc to pt ,repeat in one yr    Anxiety     CTS (carpal tunnel syndrome)     CTS (carpal tunnel syndrome)     Diabetes mellitus ([facility] [address]     GERD (gastroesophageal reflux disease)     has had stricture dilated mult yrs ago    Hyperlipidemia     Hypertension     Obesity with serious comorbidity 9/9/2020    Peripheral neuropathy     Type II or unspecified type diabetes mellitus without mention of complication, not stated as uncontrolled     Urinary frequency         Surgical History:   Past Surgical History:   Procedure Laterality Date    APPENDECTOMY      CARPAL TUNNEL RELEASE      COLONOSCOPY N/A 4/30/2019    COLONOSCOPY POLYPECTOMY SNARE/COLD BIOPSY performed by Fiordaliza Roper MD at [facility] COLONOSCOPY N/A 4/30/2019    COLONOSCOPY CONTROL HEMORRHAGE performed by Fiordaliza Roper MD at [address] Left     HERNIA REPAIR  10/12/15    Laparoscopic Hernia Repair    HI SHLDR ARTHROSCOP,SURG,W/ROTAT CUFF REPR Left 11/26/2018    LEFT SHOULDER ARTHROSCOPY WITH DEBRIDEMENT, ROTATOR CUFF REPAIR, OPEN SUBSCAPULARIS REPAIR, BICEPS TENODESIS, SUBACROMIAL DECOMPRESSION   performed by Nash Morfin MD at [address]  11/04/2021    with biopsy    UPPER GASTROINTESTINAL ENDOSCOPY N/A 11/4/2021    EGD BIOPSY performed by Hailey Hardy MD at [facility] ENDOSCOPY        Family History:    Family History   Problem Relation Age of Onset    Anemia Mother     Hypertension Mother     Heart Disease Father     Diabetes Father     No Known Problems Sister     No Known Problems Brother     No Known Problems Daughter     Other Son         ? etiol;    No Known Problems Son        Social History     Socioeconomic History    Marital status: Single     Spouse name: Not on file    Number of children: 2    Years of education: Not on file    Highest education level: Not on file   Occupational History    Occupation: disability   Tobacco Use    Smoking status: Former Smoker     Packs/day: 3.00     Years: 31.00     Pack years: 93.00     Quit date: 2001     Years since quittin.1    Smokeless tobacco: Never Used    Tobacco comment:    Substance and Sexual Activity    Alcohol use: No     Alcohol/week: 0.0 standard drinks    Drug use: No    Sexual activity: Yes     Partners: Female   Other Topics Concern    Not on file   Social History Narrative    2019 lives by self ;on disability due to per neurop;since ;     Social Determinants of Health     Financial Resource Strain:     Difficulty of Paying Living Expenses: Not on file   Food Insecurity:     Worried About [address] [employer] in the Last Year: Not on file    [address] in the Last Year: Not on file   Transportation Needs:     Lack of Transportation (Medical): Not on file    Lack of Transportation (Non-Medical):  Not on file   Physical Activity:     Days of Exercise per Week: Not on file    Minutes of Exercise per Session: Not on file   Stress:     Feeling of Stress : Not on file   Social Connections:     Frequency of Communication with Friends and Family: Not on file    Frequency of Social Gatherings with Friends and Family: Not on file    Attends Cheondoism Services: Not on file    Active Member of Clubs or Organizations: Not on file    Attends Club or Organization Meetings: Not on file    Marital Status: Not on file   Intimate Partner Violence:     Fear of Current or Ex-Partner: Not on file    Emotionally Abused: Not on file   Bibi Physically Abused: Not on file    Sexually Abused: Not on file   Housing Stability:     Unable to Pay for Housing in the Last Year: Not on file    Number of Places Lived in the Last Year: Not on file    Unstable Housing in the Last Year: Not on file       Nursing notes reviewed. ED Triage Vitals [03/06/22 2051]   Enc Vitals Group      /79      Pulse 99      Resp 16      Temp 99.3 °F (37.4 °C)      Temp Source Oral      SpO2 95 %      Weight 178 lb (80.7 kg)      Height 5' 10\" (1.778 m)      Head Circumference       Peak Flow       Pain Score       Pain Loc       Pain Edu? Excl. in [address]? GENERAL:  Awake, alert. Well developed, well nourished with no apparent distress. HENT:  Normocephalic, Atraumatic, moist mucous membranes. EYES:  Pupils equal round and reactive to light, Conjunctiva normal, extraocular movements normal.  NECK:  No meningeal signs, Supple. CHEST:  Regular rate and rhythm, chest wall non-tender. LUNGS:  Clear to auscultation bilaterally. ABDOMEN:  Soft, non-tender, no rebound, rigidity or guarding, non-distended, normal bowel sounds. No costovertebral angle tenderness to palpation. BACK:  No tenderness. EXTREMITIES:  Normal range of motion, no edema, no bony tenderness, no deformity, distal pulses present. SKIN: Warm, dry and intact. NEUROLOGIC: Normal mental status. Moving all extremities to command. LABS and DIAGNOSTIC RESULTS  EKG  The Ekg interpreted by me shows  normal sinus rhythm with a rate of 99  Axis is   Normal  QTc is  normal  Intervals and Durations are unremarkable. ST Segments: elevation in  inferior leads  No significant change from prior EKG dated 11/3/21    RADIOLOGY  X-RAYS:  I have reviewed radiologic plain film image(s). ALL OTHER NON-PLAIN FILM IMAGES SUCH AS CT, ULTRASOUND AND MRI HAVE BEEN READ BY THE RADIOLOGIST. XR CHEST PORTABLE   Final Result   Clear chest without acute cardiopulmonary process.               LABS  Labs artery (HCC)        /79   Pulse 99   Temp 99.3 °F (37.4 °C) (Oral)   Resp 16   Ht 5' 10\" (1.778 m)   Wt 178 lb (80.7 kg)   SpO2 95%   BMI 25.54 kg/m²     Disposition  Pt is in critical condition upon Transfer to cath lab. This chart was generated using the [address] [place] [address] dictation system. I created this record but it may contain dictation errors.           Trupti Bush MD  03/06/22 [id number]

## 2022-03-08 VITALS
HEART RATE: 67 BPM | HEIGHT: 70 IN | BODY MASS INDEX: 28.9 KG/M2 | OXYGEN SATURATION: 95 % | WEIGHT: 201.9 LBS | RESPIRATION RATE: 18 BRPM | SYSTOLIC BLOOD PRESSURE: 97 MMHG | DIASTOLIC BLOOD PRESSURE: 62 MMHG | TEMPERATURE: 99.1 F

## 2022-03-08 LAB
ANION GAP SERPL CALCULATED.3IONS-SCNC: 17 MMOL/L (ref 3–16)
BUN BLDV-MCNC: 29 MG/DL (ref 7–20)
CALCIUM SERPL-MCNC: 8.4 MG/DL (ref 8.3–10.6)
CHLORIDE BLD-SCNC: 99 MMOL/L (ref 99–110)
CO2: 19 MMOL/L (ref 21–32)
CREAT SERPL-MCNC: 1.5 MG/DL (ref 0.8–1.3)
EKG ATRIAL RATE: 129 BPM
EKG ATRIAL RATE: 131 BPM
EKG ATRIAL RATE: 138 BPM
EKG DIAGNOSIS: NORMAL
EKG Q-T INTERVAL: 358 MS
EKG Q-T INTERVAL: 370 MS
EKG Q-T INTERVAL: 374 MS
EKG QRS DURATION: 82 MS
EKG QRS DURATION: 82 MS
EKG QRS DURATION: 92 MS
EKG QTC CALCULATION (BAZETT): 464 MS
EKG QTC CALCULATION (BAZETT): 467 MS
EKG QTC CALCULATION (BAZETT): 477 MS
EKG R AXIS: -2 DEGREES
EKG R AXIS: -4 DEGREES
EKG R AXIS: -6 DEGREES
EKG T AXIS: 17 DEGREES
EKG T AXIS: 5 DEGREES
EKG T AXIS: 9 DEGREES
EKG VENTRICULAR RATE: 100 BPM
EKG VENTRICULAR RATE: 101 BPM
EKG VENTRICULAR RATE: 94 BPM
ESTIMATED AVERAGE GLUCOSE: 280.5 MG/DL
GFR AFRICAN AMERICAN: 57
GFR NON-AFRICAN AMERICAN: 47
GLUCOSE BLD-MCNC: 121 MG/DL (ref 70–99)
GLUCOSE BLD-MCNC: 150 MG/DL (ref 70–99)
HBA1C MFR BLD: 11.4 %
HCT VFR BLD CALC: 38.7 % (ref 40.5–52.5)
HEMOGLOBIN: 13 G/DL (ref 13.5–17.5)
MCH RBC QN AUTO: 27.6 PG (ref 26–34)
MCHC RBC AUTO-ENTMCNC: 33.7 G/DL (ref 31–36)
MCV RBC AUTO: 81.8 FL (ref 80–100)
PDW BLD-RTO: 14.5 % (ref 12.4–15.4)
PERFORMED ON: ABNORMAL
PLATELET # BLD: 139 K/UL (ref 135–450)
PMV BLD AUTO: 10.4 FL (ref 5–10.5)
POTASSIUM REFLEX MAGNESIUM: 3.9 MMOL/L (ref 3.5–5.1)
RBC # BLD: 4.73 M/UL (ref 4.2–5.9)
SODIUM BLD-SCNC: 135 MMOL/L (ref 136–145)
WBC # BLD: 9.7 K/UL (ref 4–11)

## 2022-03-08 PROCEDURE — 93010 ELECTROCARDIOGRAM REPORT: CPT | Performed by: INTERNAL MEDICINE

## 2022-03-08 PROCEDURE — 6370000000 HC RX 637 (ALT 250 FOR IP): Performed by: INTERNAL MEDICINE

## 2022-03-08 PROCEDURE — 36415 COLL VENOUS BLD VENIPUNCTURE: CPT

## 2022-03-08 PROCEDURE — 99232 SBSQ HOSP IP/OBS MODERATE 35: CPT | Performed by: NURSE PRACTITIONER

## 2022-03-08 PROCEDURE — 85027 COMPLETE CBC AUTOMATED: CPT

## 2022-03-08 PROCEDURE — 80048 BASIC METABOLIC PNL TOTAL CA: CPT

## 2022-03-08 RX ORDER — CARVEDILOL 3.12 MG/1
3.12 TABLET ORAL 2 TIMES DAILY WITH MEALS
Qty: 60 TABLET | Refills: 3 | Status: SHIPPED | OUTPATIENT
Start: 2022-03-08

## 2022-03-08 RX ORDER — ASPIRIN 81 MG/1
81 TABLET, CHEWABLE ORAL DAILY
Qty: 30 TABLET | Refills: 11 | Status: SHIPPED | OUTPATIENT
Start: 2022-03-08

## 2022-03-08 RX ORDER — ATORVASTATIN CALCIUM 80 MG/1
80 TABLET, FILM COATED ORAL NIGHTLY
Qty: 30 TABLET | Refills: 3 | Status: SHIPPED | OUTPATIENT
Start: 2022-03-08 | End: 2022-07-20 | Stop reason: SDUPTHER

## 2022-03-08 RX ADMIN — TRAMADOL HYDROCHLORIDE 50 MG: 50 TABLET, COATED ORAL at 05:14

## 2022-03-08 RX ADMIN — ASPIRIN 81 MG 81 MG: 81 TABLET ORAL at 08:24

## 2022-03-08 RX ADMIN — INSULIN LISPRO 2 UNITS: 100 INJECTION, SOLUTION INTRAVENOUS; SUBCUTANEOUS at 08:27

## 2022-03-08 RX ADMIN — SERTRALINE 100 MG: 50 TABLET, FILM COATED ORAL at 08:24

## 2022-03-08 RX ADMIN — TICAGRELOR 90 MG: 90 TABLET ORAL at 08:23

## 2022-03-08 RX ADMIN — PANTOPRAZOLE SODIUM 40 MG: 40 TABLET, DELAYED RELEASE ORAL at 05:11

## 2022-03-08 RX ADMIN — CARVEDILOL 3.12 MG: 3.12 TABLET, FILM COATED ORAL at 08:24

## 2022-03-08 ASSESSMENT — PAIN SCALES - GENERAL
PAINLEVEL_OUTOF10: 3
PAINLEVEL_OUTOF10: 0
PAINLEVEL_OUTOF10: 3

## 2022-03-08 ASSESSMENT — PAIN DESCRIPTION - LOCATION: LOCATION: CHEST

## 2022-03-08 ASSESSMENT — PAIN DESCRIPTION - PAIN TYPE: TYPE: ACUTE PAIN

## 2022-03-08 NOTE — PLAN OF CARE
Problem: Falls - Risk of:  Goal: Will remain free from falls  Description: Will remain free from falls  Outcome: Ongoing  Goal: Absence of physical injury  Description: Absence of physical injury  Outcome: Ongoing     Problem: Skin Integrity:  Goal: Will show no infection signs and symptoms  Description: Will show no infection signs and symptoms  Outcome: Ongoing  Goal: Absence of new skin breakdown  Description: Absence of new skin breakdown  Outcome: Ongoing     Problem: Discharge Planning:  Goal: Discharged to appropriate level of care  Description: Discharged to appropriate level of care  Outcome: Ongoing     Problem: Pain - Acute:  Goal: Pain level will decrease  Description: Pain level will decrease  Outcome: Ongoing     Problem: Fluid Volume - Imbalance:  Goal: Will show no signs and symptoms of excessive bleeding  Description: Will show no signs and symptoms of excessive bleeding  Outcome: Ongoing  Goal: Absence of imbalanced fluid volume signs and symptoms  Description: Absence of imbalanced fluid volume signs and symptoms  Outcome: Ongoing     Problem: Anxiety:  Goal: Level of anxiety will decrease  Description: Level of anxiety will decrease  Outcome: Ongoing     Problem: Pain:  Goal: Pain level will decrease  Description: Pain level will decrease  Outcome: Ongoing

## 2022-03-08 NOTE — DISCHARGE SUMMARY
light.  Extra ocular muscles intact. Conjunctivae/corneas clear. Neck: Supple, with full range of motion. No jugular venous distention. Trachea midline. Respiratory:  Normal respiratory effort. Clear to auscultation, bilaterally without Rales/Wheezes/Rhonchi. Cardiovascular:  Regular rate and rhythm with normal S1/S2 without murmurs, rubs or gallops. Abdomen: Soft, non-tender, non-distended with normal bowel sounds. Musculoskeletal:  No clubbing, cyanosis or edema bilaterally. Full range of motion without deformity. Skin: Skin color, texture, turgor normal.  No rashes or lesions. Neurologic:  Neurovascularly intact without any focal sensory/motor deficits. Cranial nerves: II-XII intact, grossly non-focal.  Psychiatric:  Alert and oriented, thought content appropriate, normal insight  Capillary Refill: Brisk,< 3 seconds   Peripheral Pulses: +2 palpable, equal bilaterally       Labs: For convenience and continuity at follow-up the following most recent labs are provided:      CBC:    Lab Results   Component Value Date    WBC 9.7 03/08/2022    HGB 13.0 03/08/2022    HCT 38.7 03/08/2022     03/08/2022       Renal:    Lab Results   Component Value Date     03/08/2022    K 3.9 03/08/2022    CL 99 03/08/2022    CO2 19 03/08/2022    BUN 29 03/08/2022    CREATININE 1.5 03/08/2022    CALCIUM 8.4 03/08/2022    PHOS 3.4 11/08/2018         Significant Diagnostic Studies    Radiology:   XR CHEST PORTABLE   Final Result      1. No acute cardiopulmonary abnormality. Consults:     IP CONSULT TO CARDIAC REHAB  IP CONSULT TO HOSPITALIST    Disposition:  home     Condition at Discharge: Stable    Discharge Instructions/Follow-up:  Follow up with PCP within 1-2 weeks. Follow up with cardiology per their instructions, probably in about two weeks.   Be sure to take all of your diabetes medicines every day and get it under control with your primary doctor so that you don't have worsening heart and kidney problems. Code Status:  Full Code     Activity: activity as tolerated    Diet: diabetic diet      Discharge Medications:     Current Discharge Medication List           Details   aspirin 81 MG chewable tablet Take 1 tablet by mouth daily  Qty: 30 tablet, Refills: 11      atorvastatin (LIPITOR) 80 MG tablet Take 1 tablet by mouth nightly  Qty: 30 tablet, Refills: 3      carvedilol (COREG) 3.125 MG tablet Take 1 tablet by mouth 2 times daily (with meals)  Qty: 60 tablet, Refills: 3      ticagrelor (BRILINTA) 90 MG TABS tablet Take 1 tablet by mouth 2 times daily  Qty: 60 tablet, Refills: 11              Details   pantoprazole (PROTONIX) 40 MG tablet Take 1 tablet by mouth 2 times daily (before meals)  Qty: 60 tablet, Refills: 3      fenofibrate (TRICOR) 145 MG tablet Take 145 mg by mouth daily      ondansetron (ZOFRAN-ODT) 4 MG disintegrating tablet Take 4 mg by mouth daily      empagliflozin (JARDIANCE) 25 MG tablet Take 25 mg by mouth daily      divalproex (DEPAKOTE ER) 250 MG extended release tablet Take 250 mg by mouth every morning      divalproex (DEPAKOTE) 500 MG DR tablet Take 500 mg by mouth every evening      insulin glargine (BASAGLAR KWIKPEN) 100 UNIT/ML injection pen Inject 25-30 Units into the skin 2 times daily  Qty: 5 pen, Refills: 5    Associated Diagnoses: Type 2 diabetes mellitus with diabetic neuropathy, with long-term current use of insulin ([facility])      NOVOLOG FLEXPEN 100 UNIT/ML injection pen Inject 10 Units into the skin 2 times daily       tamsulosin (FLOMAX) 0.4 MG capsule Take 0.4 mg by mouth daily       sertraline (ZOLOFT) 50 MG tablet Take 100 mg by mouth daily       Blood Glucose Monitoring Suppl (ONETOUCH VERIO) w/Device KIT 1 Device by Does not apply route once for 1 dose  Qty: 1 kit, Refills: 0      !! blood glucose test strips (ONETOUCH VERIO) strip 1 each by In Vitro route 3 times daily As needed.   Qty: 100 each, Refills: 11      !! ONE TOUCH ULTRASOFT LANCETS MISC 1 each by Does not apply route 3 times daily  Qty: 100 each, Refills: 11      !! blood glucose test strips (FREESTYLE LITE) strip USE TO TEST 3 TIMES DAILY AS NEEDED E11.9 GIVE WHAT IS COVERED. Qty: 300 each, Refills: 11    Associated Diagnoses: Type 2 diabetes mellitus with diabetic polyneuropathy, with long-term current use of insulin (HCC)      lidocaine (LIDODERM) 5 % PLACE 3 PATCHES ONTO THE SKIN EVERY 24 HOURS, 12 HOURS ON AND 12 HOURS OFF  Qty: 90 patch, Refills: 10      Blood Glucose Monitoring Suppl (ACCU-CHEK KATHIA PLUS) w/Device KIT 1 Device by Does not apply route once for 1 dose  Qty: 1 kit, Refills: 0      !! SOFT TOUCH LANCETS MISC 1 Device by Does not apply route 3 times daily  Qty: 100 each, Refills: 5      !! [employer] LANCETS MICRO THIN 33G MISC USE TO TEST BLOOD SUGAR 1-2 TIMES A DAY MAY TEST MORE IF BLOOD SUGAR SEEMS LOW  Qty: 100 each, Refills: 3      Insulin Pen Needle (PEN NEEDLES) 31G X 6 MM MISC TEST BLOOD SUGARS ONCE DAILY FOR E11.42  Qty: 300 each, Refills: 11    Associated Diagnoses: Type 2 diabetes mellitus with diabetic polyneuropathy, with long-term current use of insulin (HCC)      APPLE CIDER VINEGAR PO Take by mouth       oxyCODONE-acetaminophen (PERCOCET) 7.5-325 MG per tablet Take 1 tablet by mouth every 8 hours as needed for Pain (three times daily). Alcohol Swabs (ALCOHOL PREP) 70 % PADS USE ONCE DAILY FOR EACH INJECTION  Qty: 100 each, Refills: 1       !! - Potential duplicate medications found. Please discuss with provider. Time Spent on discharge is more than 30 minutes in the examination, evaluation, counseling and review of medications and discharge plan. Signed:    Jamlia Jeronimo MD   3/8/2022      Thank you Vannesa Jimenez MD for the opportunity to be involved in this patient's care. If you have any questions or concerns please feel free to contact me at [phone].

## 2022-03-08 NOTE — PROGRESS NOTES
Discharge orders noted. Patient's IV and telemetry box removed without complications. Discharge paperwork reviewed with patient, verbalized understanding. Patient discharge via private vehicle, wheeled to vehicle in wheelchair. [address] notified.

## 2022-03-08 NOTE — PROGRESS NOTES
[address]   Daily Progress Note    Admit Date:  3/6/2022  HPI:  No chief complaint on file. Interval history: Severiano Washburn is being followed for STEMI. Subjective:  Mr. Letitia Scott is dressed and ready to leave. No lightheadedness or dizziness. Objective:   BP 97/62   Pulse 67   Temp 99.1 °F (37.3 °C) (Oral)   Resp 18   Ht 5' 10\" (1.778 m)   Wt 201 lb 14.4 oz (91.6 kg)   SpO2 95%   BMI 28.97 kg/m²     Intake/Output Summary (Last 24 hours) at 3/8/2022 1040  Last data filed at 3/8/2022 0518  Gross per 24 hour   Intake 1468 ml   Output --   Net 1468 ml     Physical Exam:  General:  Awake, alert, NAD, ambulating in the room, dressing and getting into wheelchair to leave     Medications:    empagliflozin  25 mg Oral Daily    pantoprazole  40 mg Oral BID AC    sertraline  100 mg Oral Daily    aspirin  81 mg Oral Daily    carvedilol  3.125 mg Oral BID WC    atorvastatin  80 mg Oral Nightly    ticagrelor  90 mg Oral BID    insulin lispro  0-12 Units SubCUTAneous TID WC    insulin lispro  0-6 Units SubCUTAneous Nightly    mupirocin   Nasal BID    insulin glargine  20 Units SubCUTAneous Nightly    enoxaparin  40 mg SubCUTAneous Daily      dextrose      phenylephrine (HI-SYNEPHRINE) 50mg/250mL infusion         Lab Data:  CBC:   Recent Labs     03/06/22 2112 03/07/22  0421 03/08/22  0431   WBC 14.5* 9.9 9.7   HGB 16.7 14.1 13.0*    152 139     BMP:    Recent Labs     03/06/22 2117 03/07/22  0421 03/08/22  0431    138 135*   K 4.1 3.8 3.9   CO2 21 21 19*   BUN 27* 28* 29*   CREATININE 1.7* 1.5* 1.5*     INR:  No results for input(s): INR in the last 72 hours. BNP:    Recent Labs     03/06/22 2117   PROBNP 2,798*     No results found for: LVEF, LVEFMODE    Testing:    Procedures Performed:   1. Left heart catheterization  2. Selective left and right coronary angiogram  3. Left ventriculography   4.  PCI of the RCA with RHEA x 1    Procedure Findings:  1. 100% proximal RCA stenosis              ~moderate LAD and CIRC  2. Normal left ventricular function with EF estimated at 50-55%  3. Normal left heart hemodynamics  Details:              Myrtle Motley was brought to the cardiac catheterization lab in a fasting state after informed consent was obtained. If the patient was able to provide written consent, it was obtained. The patient's vitals were monitored through out the procedure. The patient was sedated using the appropriate levels of sedation and ASA guidelines. The appropriate access site area was prepped and drapped in a sterile fashion. The area was anesthetized with 2% lidocaine. Using the modified Seldinger technique, an arterial sheath was introduced into the arterial access site using a single anterior wall puncture. The sheath was flushed and prepped in usual fashion. Catheters used during the procedure included 5 Chinese TIG 4.0 catheter. The catheters were advanced and removed over a .035\" wire, into the appropriate positions. Multiple angiographic views were obtained. An LV gram was obtained. ~The interventional portion of the procedure was completed utilizing a 6 Western Anahi system. A JR4 6 Western Anahi guide was advanced to the right coronary ostium. A BMW was advanced but would not cross the lesion. We separately then changed out to a Choice PT extra-support wire. This did advance through the lesion. We separately did predilate the lesion with a 2.5 mm x 12 mm trek balloon. This followed by a noncompliant 3.0 mm x 15 mm and a 4.0 mm x 12 mm. We separately stented this lesion with a 4.0 mm x 22 mm resolute Leeds drug-eluting stent and then postdilated with a 4.5 mm x 8 mm noncompliant balloon.     Findings:     1. Left main coronary artery was normal. It gave off the left anterior descending artery and left circumflex.     2. Left anterior descending artery has mild to moderate atherosclerotic disease.   It was moderate in size. It gave off septal perforators and a moderate sized diagonal branch. The LAD covered the entire apex of the left ventricle.      3. Left circumflex has mild to moderate atherosclerotic disease. It was moderate in size. There was a moderate sized obtuse marginal branch.     4. Right coronary artery was occluded in the proximal portion. It was 100% blocked and SARAH 0 flow.     5. Left ventriculogram showed normal LVEF at 50-55%. Wall motion was normal . There was no significant mitral valve or aortic valve disease noted. LVEDP was normal. There was no gradient noted across the aortic valve during pullback of the catheter.        CONCLUSIONS:     1. Successful primary reperfusion of the right coronary artery in the setting of acute inferior ST segment elevation myocardial infarction.      echo 3/7/22   Summary   The left ventricular systolic function is mildly reduced with an ejection   fraction of 40 - 45 %. There is hypokinesis of the inferior, basal inferior and inferolateral   walls. Left ventricular diastolic filling pressure is elevated. The right ventricle is mildly enlarged. Right ventricular systolic function is mildly reduced . Systolic pulmonic artery pressure (SPAP) is normal estimated at 23 mmHg   (Right atrial pressure of 3 mmHg). The right atrium is mildly dilated. Principal Problem:    STEMI (ST elevation myocardial infarction) ([address]  Resolved Problems:    * No resolved hospital problems. *      Assessment:  STEMI of RCA   S/p PCI of RCA with 1 RHEA 3/6/22  Biventricular heart failure  Ischemic cardiomyopathy  DM- uncontrolled  CKD stage 3       Plan:  asirin and brilinta  lipitor 80mg daily   Coreg 3.125mg BID  Continue jardiance at discharge. HOlding ace/arb/arni due to renal function and lower b/p at this time. Re-evaluate as outpatient. Informed patient of his f/u appt at 9am; he tells me he is not able to make this appt.  Will have office call with a new appt date and time.        Kierra Birmingham, ANA - CNP,  3/8/2022, 10:40 AM

## 2022-03-08 NOTE — CONSULTS
Hospital Medicine  Consult History & Physical        Chief Complaint:  Chest pain    Date of Service: Pt seen/examined in consultation on 03/07/22     History Of Present Illness: The patient is a pleasant 57 Y M with a h/o former smoking, HTN, HLD, DM2, and CKD3. He presented with 3 days of worsening nausea without vomiting, which eventually lead to chest pain for one day. In the [address] ED he was found to have an inferior STEMI with an initial troponin of 2.29. He was transferred to [facility] and underwent an emergent LHC, with RHEA to 100% proximal RCA. Hospital medicine was consulted for management of his DM2. The patient says that it hurts his chest to breath deeply. Actually denies dyspnea.           Past Medical History:        Diagnosis Date    Adenomatous polyps     scoped 2019;acc to pt ,repeat in one yr    Anxiety     CTS (carpal tunnel syndrome)     CTS (carpal tunnel syndrome)     Diabetes mellitus ([facility]     GERD (gastroesophageal reflux disease)     has had stricture dilated mult yrs ago    Hyperlipidemia     Hypertension     Obesity with serious comorbidity 9/9/2020    Peripheral neuropathy     Type II or unspecified type diabetes mellitus without mention of complication, not stated as uncontrolled     Urinary frequency        Past Surgical History:        Procedure Laterality Date    APPENDECTOMY      CARPAL TUNNEL RELEASE      COLONOSCOPY N/A 4/30/2019    COLONOSCOPY POLYPECTOMY SNARE/COLD BIOPSY performed by Mable Godinez MD at [facility] N/A 4/30/2019    COLONOSCOPY CONTROL HEMORRHAGE performed by Mable Godinez MD at [address] Left     HERNIA REPAIR  10/12/15    Laparoscopic Hernia Repair    [place] SHLDR ARTHROSCOP,SURG,W/ROTAT CUFF REPR Left 11/26/2018    LEFT SHOULDER ARTHROSCOPY WITH DEBRIDEMENT, ROTATOR CUFF REPAIR, OPEN SUBSCAPULARIS REPAIR, BICEPS TENODESIS, SUBACROMIAL DECOMPRESSION performed by Rubi Ramos MD at [address]  11/04/2021    with biopsy    UPPER GASTROINTESTINAL ENDOSCOPY N/A 11/4/2021    EGD BIOPSY performed by Jenny Hook MD at [address] [facility]       Medications Prior to Admission:    Prior to Admission medications    Medication Sig Start Date End Date Taking? Authorizing Provider   pantoprazole (PROTONIX) 40 MG tablet Take 1 tablet by mouth 2 times daily (before meals) 11/4/21   Abhilash Morris MD   fenofibrate (TRICOR) 145 MG tablet Take 145 mg by mouth daily    Historical Provider, MD   ondansetron (ZOFRAN-ODT) 4 MG disintegrating tablet Take 4 mg by mouth daily    Historical Provider, MD   pravastatin (PRAVACHOL) 40 MG tablet Take 40 mg by mouth daily    Historical Provider, MD   empagliflozin (JARDIANCE) 25 MG tablet Take 25 mg by mouth daily    Historical Provider, MD   divalproex (DEPAKOTE ER) 250 MG extended release tablet Take 250 mg by mouth every morning    Historical Provider, MD   divalproex (DEPAKOTE) 500 MG DR tablet Take 500 mg by mouth every evening    Historical Provider, MD   insulin glargine (BASAGLAR KWIKPEN) 100 UNIT/ML injection pen Inject 25-30 Units into the skin 2 times daily  Patient taking differently: Inject 20 Units into the skin 2 times daily  2/19/21   ANA Salazar CNP   NOVOLOG FLEXPEN 100 UNIT/ML injection pen Inject 10 Units into the skin 2 times daily  12/8/20   Historical Provider, MD   tamsulosin (FLOMAX) 0.4 MG capsule Take 0.4 mg by mouth daily  9/6/20   Historical Provider, MD   sertraline (ZOLOFT) 50 MG tablet Take 100 mg by mouth daily  8/17/20   Historical Provider, MD   Blood Glucose Monitoring Suppl (Sandhya Hardwick) w/Device KIT 1 Device by Does not apply route once for 1 dose 7/7/20 7/7/20  ANA Munguia CNP   blood glucose test strips (ONETOUCH VERIO) strip 1 each by In Vitro route 3 times daily As needed.  7/7/20   Hillman Lombard, APRN - CNP   ONE TOUCH ULTRASOFT LANCETS MISC 1 each by Does not apply route 3 times daily 7/7/20   ANA Feliz CNP   blood glucose test strips (FREESTYLE LITE) strip USE TO TEST 3 TIMES DAILY AS NEEDED E11.9 GIVE WHAT IS COVERED. 7/6/20   ANA Feliz CNP   lidocaine (LIDODERM) 5 % PLACE 3 PATCHES ONTO THE SKIN EVERY 24 HOURS, 12 HOURS ON AND 12 HOURS OFF 5/24/20   ANA Feliz CNP   Blood Glucose Monitoring Suppl (ACCU-CHEK KATHIA PLUS) w/Device KIT 1 Device by Does not apply route once for 1 dose 12/30/19 1/27/20  Tiffany Hill, APRN - CNP   SOFT TOUCH LANCETS MISC 1 Device by Does not apply route 3 times daily 7/19/19   ANA Feliz CNP   [employer] LANCETS MICRO THIN 33G MISC USE TO TEST BLOOD SUGAR 1-2 TIMES A DAY MAY TEST MORE IF BLOOD SUGAR SEEMS LOW 5/13/19   ANA Feliz CNP   Insulin Pen Needle (PEN NEEDLES) 31G X 6 MM MISC TEST BLOOD SUGARS ONCE DAILY FOR E11.42 3/14/19   ANA Feliz CNP   APPLE CIDER VINEGAR PO Take by mouth     Historical Provider, MD   oxyCODONE-acetaminophen (PERCOCET) 7.5-325 MG per tablet Take 1 tablet by mouth every 8 hours as needed for Pain (three times daily). Karolyn Flores MD   Alcohol Swabs (ALCOHOL PREP) 70 % PADS USE ONCE DAILY FOR [facility] INJECTION 3/28/18   ANA Feliz CNP       Allergies:  Sulfa antibiotics and Victoza [liraglutide]    Social History:      The patient currently lives at home  TOBACCO:   reports that he quit smoking about 21 years ago. He has a 93.00 pack-year smoking history. He has never used smokeless tobacco.  ETOH:   reports no history of alcohol use. Family History:     Reviewed in detail and negative for ESRD.   Positive as follows:        Problem Relation Age of Onset    Anemia Mother     Hypertension Mother     Heart Disease Father     Diabetes Father     No Known Problems Sister     No Known Problems Brother  No Known Problems Daughter     Other Son         ? etiol;    No Known Problems Son        REVIEW OF SYSTEMS COMPLETED:   Pertinent positives as noted in the HPI. All other systems reviewed and negative. PHYSICAL EXAM PERFORMED:  /69   Pulse 63   Temp 100.3 °F (37.9 °C) (Oral)   Resp 20   Ht 5' 10\" (1.778 m)   Wt 209 lb 3.5 oz (94.9 kg)   SpO2 93%   BMI 30.02 kg/m²      General appearance: No apparent distress, appears stated age and cooperative. HEENT: Normal cephalic, atraumatic without obvious deformity. Pupils equal, round, and reactive to light. Extra ocular muscles intact. Conjunctivae/corneas clear. Neck: Supple, with full range of motion. No jugular venous distention. Trachea midline. Respiratory:  Normal respiratory effort. Clear to auscultation, bilaterally without Rales/Wheezes/Rhonchi. Cardiovascular: Regular rate and rhythm with normal S1/S2 without murmurs, rubs or gallops. Abdomen: Soft, non-tender, non-distended with normal bowel sounds. Musculoskeletal: No clubbing, cyanosis or edema bilaterally. Skin: Skin color, texture, turgor normal.  No rashes or lesions. Neurologic:  Neurovascularly intact without any focal sensory/motor deficits. Cranial nerves: II-XII intact, grossly non-focal.  Psychiatric: Alert and oriented, thought content appropriate, normal insight  Capillary Refill: Brisk,3 seconds, normal   Peripheral Pulses: +2 palpable, equal bilaterally     Labs:     Recent Labs     03/06/22 2112 03/07/22 0421   WBC 14.5* 9.9   HGB 16.7 14.1   HCT 49.7 41.6    152     Recent Labs     03/06/22 2117 03/07/22 0421    138   K 4.1 3.8    104   CO2 21 21   BUN 27* 28*   CREATININE 1.7* 1.5*   CALCIUM 9.2 8.2*     Recent Labs     03/06/22 2117   *   ALT 34   BILITOT 0.6   ALKPHOS 58     No results for input(s): INR in the last 72 hours.   Recent Labs     03/06/22 2117   TROPONINI 2.29*       Urinalysis:    Lab Results   Component Value Date

## 2022-03-09 ENCOUNTER — CARE COORDINATION (OUTPATIENT)
Dept: CASE MANAGEMENT | Age: 62
End: 2022-03-09

## 2022-03-09 DIAGNOSIS — I15.8 OTHER SECONDARY HYPERTENSION: Primary | ICD-10-CM

## 2022-03-09 LAB
POC ACT LR: 232 SEC
POC ACT LR: 268 SEC

## 2022-03-09 PROCEDURE — 1111F DSCHRG MED/CURRENT MED MERGE: CPT | Performed by: INTERNAL MEDICINE

## 2022-03-09 NOTE — CARE COORDINATION
[address] Transitions Initial Follow Up Call    Call within 2 business days of discharge: Yes    Patient: Parmjit Person Patient : 1960   MRN: [id number]  Reason for Admission: Cardiac cath   Discharge Date: 3/8/22 RARS: Readmission Risk Score: 13.5 ( )      Last Discharge [facility]       Complaint Diagnosis Description Type Department Provider    3/6/22   Admission (Discharged) [address] Sonido Caldera MD    3/6/22 Nausea ST elevation myocardial infarction (STEMI), unspecified artery [facility]) ED (TRANSFER) [facility] Nelia Jacobs MD           Spoke with: [facility]: [facility]     Transitions of Care Initial Call    Was this an external facility discharge? No Discharge Facility: n/a    Challenges to be reviewed by the provider   Additional needs identified to be addressed with provider: No  none             Method of communication with provider : none      Advance Care Planning:   Does patient have an Advance Directive: not on file. Was this a readmission? No  Patient stated reason for admission: cp   Patients top risk factors for readmission: medical condition-.    Care Transition Nurse (CTN) contacted the patient by telephone to perform post hospital discharge assessment. Verified name and  with patient as identifiers. Provided introduction to self, and explanation of the CTN role. CTN reviewed discharge instructions, medical action plan and red flags with patient who verbalized understanding. Patient given an opportunity to ask questions and does not have any further questions or concerns at this time. Were discharge instructions available to patient? Yes. Reviewed appropriate site of care based on symptoms and resources available to patient including: PCP, Specialist and When to call 911. The patient agrees to contact the PCP office for questions related to their healthcare.      Medication reconciliation was performed with patient, who verbalizes understanding of administration of home medications. Advised obtaining a 90-day supply of all daily and as-needed medications. Reviewed and educated patient on any new and changed medications related to discharge diagnosis. CTN provided contact information. Plan for follow-up call in 5-7 days based on severity of symptoms and risk factors. Plan for next call: Apt PCP/cardio/nausea     Spoke with patient who reported he is feeling a little nauseated today. Patient reported he has some sob unchanged since hospitalization. CTN reviewed all medications and patient reported he is taking all as prescribed. Patient reported his cardiac cath site via wrist is CD&I and denied any redness, drainage, or swelling. CTN reviewed post op instructions with patient and limitations. Patient reported he has apt with cardiologist on 3/22 and will reach out to his PCP to setup apt. CTN encouraged patient to reach out to cardiologist to discuss nausea. CTN advised patient of use of urgent care or physicians 24 hr access line if assistance is needed after hours.         Care Transitions 24 Hour Call    Do you have all of your prescriptions and are they filled?: Yes  Care Transitions Interventions         Follow Up  Future Appointments   Date Time Provider Jacki Sue   3/22/2022 11:00 AM ANA Harris - CNP P CLER CAR SUBHASH GRAJEDA, RN, [facility]  Care Transition Nurse  [phone] mobile

## 2022-03-10 ENCOUNTER — TELEPHONE (OUTPATIENT)
Dept: CARDIOLOGY CLINIC | Age: 62
End: 2022-03-10

## 2022-03-10 RX ORDER — NITROGLYCERIN 0.4 MG/1
0.4 TABLET SUBLINGUAL EVERY 5 MIN PRN
Qty: 25 TABLET | Refills: 3 | Status: SHIPPED | OUTPATIENT
Start: 2022-03-10

## 2022-03-10 NOTE — TELEPHONE ENCOUNTER
Pt had cath on 3/6/22. Pt states was done through his right wrist which is fine but pt having shoulder soreness, muscle sore soreness. Is this normal? Pt has tried creams/lotions for relief. Please advise.

## 2022-03-10 NOTE — TELEPHONE ENCOUNTER
I called and spoke to the patient. I wrote a prescription for sublingual nitroglycerin. Symptoms appear standard post MI and not indicative of stent thrombosis or heart failure. He scheduled to see Sydnie Calles in a couple , but he will call the office if he needs to get in sooner.

## 2022-03-16 ENCOUNTER — CARE COORDINATION (OUTPATIENT)
Dept: CASE MANAGEMENT | Age: 62
End: 2022-03-16

## 2022-03-16 NOTE — CARE COORDINATION
[facility] Transitions Follow Up Call    3/16/2022    Patient: Lu Wolfe  Patient : 1960   MRN: [id number]  Reason for Admission: CP   Discharge Date: 3/8/22 RARS: Readmission Risk Score: 13.5 ( )    Attempted to reach patient via phone for care transition. VM left stating purpose of call along with my contact information requesting a return call. Care Transitions Subsequent and Final Call    Subsequent and Final Calls  Care Transitions Interventions  Other Interventions:            Follow Up  Future Appointments   Date Time Provider Jacki Sue   3/22/2022 11:00 AM ANA Harris - CNP [facility] CLER CAR SUBHASH RICHN, RN, [facility]  Care Transition Nurse  [phone] mobile

## 2022-03-22 ENCOUNTER — OFFICE VISIT (OUTPATIENT)
Dept: CARDIOLOGY CLINIC | Age: 62
End: 2022-03-22
Payer: MEDICARE

## 2022-03-22 VITALS
TEMPERATURE: 98.3 F | SYSTOLIC BLOOD PRESSURE: 98 MMHG | WEIGHT: 199 LBS | HEART RATE: 85 BPM | HEIGHT: 70 IN | BODY MASS INDEX: 28.49 KG/M2 | OXYGEN SATURATION: 98 % | DIASTOLIC BLOOD PRESSURE: 62 MMHG

## 2022-03-22 DIAGNOSIS — I25.5 ISCHEMIC CARDIOMYOPATHY: ICD-10-CM

## 2022-03-22 DIAGNOSIS — I25.10 CORONARY ARTERY DISEASE INVOLVING NATIVE CORONARY ARTERY OF NATIVE HEART WITHOUT ANGINA PECTORIS: ICD-10-CM

## 2022-03-22 DIAGNOSIS — I21.3 ST ELEVATION MYOCARDIAL INFARCTION (STEMI), SUBSEQUENT EPISODE OF CARE (HCC): Primary | ICD-10-CM

## 2022-03-22 PROCEDURE — 99214 OFFICE O/P EST MOD 30 MIN: CPT | Performed by: NURSE PRACTITIONER

## 2022-03-22 ASSESSMENT — ENCOUNTER SYMPTOMS
GASTROINTESTINAL NEGATIVE: 1
SHORTNESS OF BREATH: 1

## 2022-03-22 NOTE — PATIENT INSTRUCTIONS
.Everything looks great today, good job!   If breathing does not improve, call the office  Continue current medications  Ok to start exercising  Check BP at home and call the office if consistently out of goal range  Follow up in 2 months

## 2022-03-22 NOTE — PROGRESS NOTES
[address]   Cardiology Note              Date:  March 22, 2022  Patientname: Demi Skaggs  YOB: 1960    Primary Care physician: Reynaldo Sheehan MD    HISTORY OF PRESENT ILLNESS: Brian Ashby is a 57 y.o. male with a history of CAD, cardiomyopathy, HLD, DM. He presented 3/6/2022 for chest pain and found to have inferior STEMI. LHC showed 100% RCA treated with RHEA. Echo showed EF 40-45%. Today he presents for hospital follow up for STEMI/CAD s/p PCI. He is feeling better. Chest pain he experienced with MI have resolved. He has intermittent shortness of breath that feels like he needs to take an extra breath, resolves quickly. He has no dizziness, syncope, edema, palpitations. He has not been active at home recently but plans to start exercising. Past Medical History:   has a past medical history of Adenomatous polyps, Anxiety, CTS (carpal tunnel syndrome), CTS (carpal tunnel syndrome), Diabetes mellitus ([facility] [address] GERD (gastroesophageal reflux disease), Hyperlipidemia, Hypertension, Obesity with serious comorbidity, Peripheral neuropathy, Type II or unspecified type diabetes mellitus without mention of complication, not stated as uncontrolled, and Urinary frequency. Past Surgical History:   has a past surgical history that includes Appendectomy; Carpal tunnel release; hernia repair (10/12/15); Hand Arthroplasty (Left); pr shldr arthroscop,surg,w/rotat cuff repr (Left, 11/26/2018); Colonoscopy (N/A, 4/30/2019); Colonoscopy (N/A, 4/30/2019); Upper gastrointestinal endoscopy (11/04/2021); and Upper gastrointestinal endoscopy (N/A, 11/4/2021). Home Medications:    Prior to Admission medications    Medication Sig Start Date End Date Taking?  Authorizing Provider   nitroGLYCERIN (NITROSTAT) 0.4 MG SL tablet Place 1 tablet under the tongue every 5 minutes as needed for Chest pain 3/10/22  Yes Herbie Lesches, MD   aspirin 81 MG chewable tablet Take 1 tablet by mouth daily 3/8/22 Yes Danny Natarajan MD   atorvastatin (LIPITOR) 80 MG tablet Take 1 tablet by mouth nightly 3/8/22  Yes Danny Natarajan MD   carvedilol (COREG) 3.125 MG tablet Take 1 tablet by mouth 2 times daily (with meals) 3/8/22  Yes Danny Natarajan MD   ticagrelor (BRILINTA) 90 MG TABS tablet Take 1 tablet by mouth 2 times daily 3/8/22  Yes Danny Natarajan MD   pantoprazole (PROTONIX) 40 MG tablet Take 1 tablet by mouth 2 times daily (before meals) 11/4/21  Yes Zenon Costello MD   fenofibrate (TRICOR) 145 MG tablet Take 145 mg by mouth daily   Yes Historical Provider, MD   ondansetron (ZOFRAN-ODT) 4 MG disintegrating tablet Take 4 mg by mouth daily   Yes Historical Provider, MD   empagliflozin (JARDIANCE) 25 MG tablet Take 25 mg by mouth daily   Yes Historical Provider, MD   divalproex (DEPAKOTE ER) 250 MG extended release tablet Take 250 mg by mouth every morning   Yes Historical Provider, MD   divalproex (DEPAKOTE) 500 MG DR tablet Take 500 mg by mouth every evening   Yes Historical Provider, MD   insulin glargine (BASAGLAR KWIKPEN) 100 UNIT/ML injection pen Inject 25-30 Units into the skin 2 times daily  Patient taking differently: Inject 20 Units into the skin 2 times daily  2/19/21  Yes ANA Rocha CNP   NOVOLOG FLEXPEN 100 UNIT/ML injection pen Inject 10 Units into the skin 2 times daily  12/8/20  Yes Historical Provider, MD   tamsulosin (FLOMAX) 0.4 MG capsule Take 0.4 mg by mouth daily  9/6/20  Yes Historical Provider, MD   sertraline (ZOLOFT) 50 MG tablet Take 100 mg by mouth daily  8/17/20  Yes Historical Provider, MD   blood glucose test strips (ONETOUCH VERIO) strip 1 each by In Vitro route 3 times daily As needed. 7/7/20  Yes Nandini Given, APRN - CNP   ONE TOUCH ULTRASOFT LANCETS MISC 1 each by Does not apply route 3 times daily 7/7/20  Yes Nandini Given, ANA - DICKSON   blood glucose test strips (FREESTYLE LITE) strip USE TO TEST 3 TIMES DAILY AS NEEDED E11.9 GIVE WHAT IS COVERED. 7/6/20  Yes Nandini Given, APRN - CNP   lidocaine (LIDODERM) 5 % PLACE 3 PATCHES ONTO THE SKIN EVERY 24 HOURS, 12 HOURS ON AND 12 HOURS OFF 5/24/20  Yes Nandini Given, APRN - CNP   SOFT TOUCH LANCETS MISC 1 Device by Does not apply route 3 times daily 7/19/19  Yes Tiffany Kathyta Graver, APRN - CNP   [employer] LANCETS MICRO THIN 33G MISC USE TO TEST BLOOD SUGAR 1-2 TIMES A DAY MAY TEST MORE IF BLOOD SUGAR SEEMS LOW 5/13/19  Yes Nandini Given, APRN - CNP   Insulin Pen Needle (PEN NEEDLES) 31G X 6 MM MISC TEST BLOOD SUGARS ONCE DAILY FOR E11.42 3/14/19  Yes Tiffany Sabrina Oreillyer, APRN - CNP   APPLE CIDER VINEGAR PO Take by mouth    Yes Historical Provider, MD   oxyCODONE-acetaminophen (PERCOCET) 7.5-325 MG per tablet Take 1 tablet by mouth every 8 hours as needed for Pain (three times daily). Yes Cee De La Vega MD   Alcohol Swabs (ALCOHOL PREP) 70 % PADS USE ONCE DAILY FOR [facility] INJECTION 3/28/18  Yes Nandini Given, APRN - CNP   Blood Glucose Monitoring Suppl (Vera Lapine) w/Device KIT 1 Device by Does not apply route once for 1 dose 7/7/20 7/7/20  Nandini Given, APRN - CNP   Blood Glucose Monitoring Suppl (ACCU-CHEK KATHIA PLUS) w/Device KIT 1 Device by Does not apply route once for 1 dose 12/30/19 1/27/20  Tiffany Sabrina Oro APRN - CNP     Allergies:  Sulfa antibiotics and Victoza [liraglutide]    Social History:   reports that he quit smoking about 21 years ago. He has a 93.00 pack-year smoking history. He has never used smokeless tobacco. He reports that he does not drink alcohol and does not use drugs. Family History: family history includes Anemia in his mother; Diabetes in his father; Heart Disease in his father; Hypertension in his mother; No Known Problems in his brother, daughter, sister, and son; Other in his son. Review of Systems   Review of Systems   Constitutional: Negative. Respiratory: Positive for shortness of breath. Cardiovascular: Negative. Gastrointestinal: Negative. Neurological: Negative. OBJECTIVE:    Vital signs:    BP 98/62   Pulse 85   Temp 98.3 °F (36.8 °C)   Ht 5' 10\" (1.778 m)   Wt 199 lb (90.3 kg)   SpO2 98%   BMI 28.55 kg/m²      Physical Exam:  Constitutional:  Comfortable and alert, NAD, appears stated age  Eyes: PERRL, sclera nonicteric  Neck:  Supple, no masses, no thyroidmegaly, no JVD  Skin:  Warm and dry; no rash or lesions  Heart: Regular, normal apex, S1 and S2 normal, no M/G/R  Lungs:  Normal respiratory effort; clear; no wheezing/rhonchi/rales  Abdomen: soft, non tender, + bowel sounds  Extremities:  No edema or cyanosis; no clubbing  Neuro: alert and oriented, moves legs and arms equally, normal mood and affect  Right radial site soft, no hematoma, 2+ pulse    Data Reviewed:      Echo 3/7/2022: The left ventricular systolic function is mildly reduced with an ejection   fraction of 40 - 45 %. There is hypokinesis of the inferior, basal inferior and inferolateral   walls. Left ventricular diastolic filling pressure is elevated. The right ventricle is mildly enlarged. Right ventricular systolic function is mildly reduced . Systolic pulmonic artery pressure (SPAP) is normal estimated at 23 mmHg   (Right atrial pressure of 3 mmHg). The right atrium is mildly dilated. Coronary angiogram 3/6/2022:  1. Left heart catheterization  2. Selective left and right coronary angiogram  3. Left ventriculography   4. PCI of the RCA with RHAE x 1  5. Due to the high probability of clinically significant life threating deterioration of the patient's condition that required my urgent intervention, a total critical care time 60 minutes was used. This time excludes any time that may have been spent performing procedures.  This includes but not limited to vital sign monitoring, telemetry monitoring, continuous pulse oximety, IV medication, clinical response to the IV medications, documentation time , consultation time, interpretation of lab data, review of nursing notes and old record review. Procedure Findings:  1. 100% proximal RCA stenosis              ~moderate LAD and CIRC  2. Normal left ventricular function with EF estimated at 50-55%  3. Normal left heart hemodynamics  Details:              Megan Nieto was brought to the cardiac catheterization lab in a fasting state after informed consent was obtained. If the patient was able to provide written consent, it was obtained. The patient's vitals were monitored through out the procedure. The patient was sedated using the appropriate levels of sedation and ASA guidelines. The appropriate access site area was prepped and drapped in a sterile fashion. The area was anesthetized with 2% lidocaine. Using the modified Seldinger technique, an arterial sheath was introduced into the arterial access site using a single anterior wall puncture. The sheath was flushed and prepped in usual fashion. Catheters used during the procedure included 5 P[place] TIG 4.0 catheter. The catheters were advanced and removed over a .035\" wire, into the appropriate positions. Multiple angiographic views were obtained. An LV gram was obtained. ~The interventional portion of the procedure was completed utilizing a 6 Western Anahi system. A JR4 6 Western Anahi guide was advanced to the right coronary ostium. A BMW was advanced but would not cross the lesion. We separately then changed out to a Choice PT extra-support wire. This did advance through the lesion. We separately did predilate the lesion with a 2.5 mm x 12 mm trek balloon. This followed by a noncompliant 3.0 mm x 15 mm and a 4.0 mm x 12 mm. We separately stented this lesion with a 4.0 mm x 22 mm resolute [place] drug-eluting stent and then postdilated with a 4.5 mm x 8 mm noncompliant balloon. Findings:  1.  Left main coronary artery was normal. It gave off the left anterior descending artery and left circumflex. 2. Left anterior descending artery has mild to moderate atherosclerotic disease. It was moderate in size. It gave off septal perforators and a moderate sized diagonal branch. The LAD covered the entire apex of the left ventricle. 3. Left circumflex has mild to moderate atherosclerotic disease. It was moderate in size. There was a moderate sized obtuse marginal branch. 4. Right coronary artery was occluded in the proximal portion. It was 100% blocked and SARAH 0 flow. 5. Left ventriculogram showed normal LVEF at 50-55%. Wall motion was normal . There was no significant mitral valve or aortic valve disease noted. LVEDP was normal. There was no gradient noted across the aortic valve during pullback of the catheter. CONCLUSIONS:  1. Successful primary reperfusion of the right coronary artery in the setting of acute inferior ST segment elevation myocardial infarction. ASSESSMENT/RECOMMENDATIONS:  1. Dual antiplatelet therapy  2. The patient should be treated with these therapies unless contraindicated:              ~asa daily              ~beta-blockers              ~statin therapy              ~ACE/ARB -acute renal failure and on hold. ~repeat troponin until down trending              ~EKG as clinically needed              ~phase 1 in-patient cardiac rehab  3. Transfer to the intensive care unit  4. Case discussed with patient's Sister Frank Siddiqui          Cardiology Labs Reviewed:   CBC: No results for input(s): WBC, HGB, HCT, PLT in the last 72 hours. BMP:No results for input(s): NA, K, CO2, BUN, CREATININE, LABGLOM, GLUCOSE in the last 72 hours. PT/INR: No results for input(s): PROTIME, INR in the last 72 hours. APTT:No results for input(s): APTT in the last 72 hours. FASTING LIPID PANEL:  Lab Results   Component Value Date    HDL 29 10/15/2020    LDLCALC 65 10/15/2020    TRIG 256 10/15/2020     LIVER PROFILE:No results for input(s): AST, ALT, ALB in the last 72 hours.   BNP:   Lab Results   Component Value Date    PROBNP 2,798 03/06/2022     Reviewed all labs and imaging today    Assessment:   Shortness of breath: likely due to brilinta, no signs of volume overload on exam  CAD: angina resolved; s/p RHEA RCA in the setting of STEMI 3/6/2022  Ischemic cardiomyopathy: EF 40-45% on echo 3/2022  Hypotension: stable but continued lower BP noted  HLD: controlled, LDL 65, statin  DM: uncontrolled, hgb a1c 11.4    Plan:   1. Cardiac rehab referral  2. Change brilinta in follow up if shortness of breath does not continue to improve  3. Continue aspirin, statin, carvedilol, jardiance  4. Unable to add ACE/ARB/ARNI due to relative hypotension, attempt in follow up  5. Check BP at home and call the office if consistently out of goal range  6. Repeat echo for EF in 2-3 months  7.  Follow up with Dr. Tim Mckinley, [address]  [phone]

## 2022-03-23 ENCOUNTER — CARE COORDINATION (OUTPATIENT)
Dept: CASE MANAGEMENT | Age: 62
End: 2022-03-23

## 2022-03-28 RX ORDER — PANTOPRAZOLE SODIUM 40 MG/1
TABLET, DELAYED RELEASE ORAL
Qty: 180 TABLET | OUTPATIENT
Start: 2022-03-28

## 2022-03-30 ENCOUNTER — CARE COORDINATION (OUTPATIENT)
Dept: CASE MANAGEMENT | Age: 62
End: 2022-03-30

## 2022-03-30 NOTE — CARE COORDINATION
[address] Transitions Follow Up Call    3/30/2022    Patient: Lu Wolfe  Patient : 1960   MRN: [id number]  Reason for Admission: cardiac cath   Discharge Date: 3/8/22 RARS: Readmission Risk Score: 13.5 ( )       Attempted to reach patient via phone for care transition. VM left stating purpose of call along with my contact information requesting a return call. Care Transitions Subsequent and Final Call    Subsequent and Final Calls  Care Transitions Interventions  Other Interventions:            Follow Up  Future Appointments   Date Time Provider Jacki Sue   2022  2:30 PM Paddy Sosa MD [facility]       Pedro Georges BSN, RN, [facility]  Care Transition Nurse  [phone] mobile

## 2022-04-08 ENCOUNTER — CARE COORDINATION (OUTPATIENT)
Dept: CASE MANAGEMENT | Age: 62
End: 2022-04-08

## 2022-04-08 NOTE — CARE COORDINATION
[address] Transitions Follow Up Call    2022    Patient: Jackie Taylor  Patient : 1960   MRN: [id number]  Reason for Admission: Cardiac cath   Discharge Date: 3/8/22 RARS: Readmission Risk Score: 13.5 ( )         Spoke with: [address] with patient who reported he is doing alright. Patient denied any cp and reports he continues to have sob. Patient reports sob remains the same no worse but no better. Patient reported he did let NP know about sob at apt on 3/22 and he was told it could be partly d/t brilinta and medication can be switched if sob continues. Patient reports at this time he is managing and will notify doctor if things worsen. Patient reports he is walking daily and feels he is tolerating well. CTN advised patient of use of urgent care or physicians 24 hr access line if assistance is needed after hours. Patient denied any further needs at this time. Patient called CTN back and reported he was watching a program today about benefits of tumeric and wanted to know if it would be ok for him to start taking. CTN encouraged patient to reach out to his provider or local pharmacist to discuss if ok to take tumeric with his current medications. Patient verbalized understanding. Care Transitions Subsequent and Final Call    Subsequent and Final Calls  Care Transitions Interventions  Other Interventions:            Follow Up  Future Appointments   Date Time Provider Jacki Sue   2022  2:30 PM MD Jazmyn BakerN, RN, [facility]  Care Transition Nurse  [phone] mobile

## 2022-04-18 ENCOUNTER — CARE COORDINATION (OUTPATIENT)
Dept: CASE MANAGEMENT | Age: 62
End: 2022-04-18

## 2022-04-18 NOTE — TELEPHONE ENCOUNTER
Please let him know we can change brilinta to see if shortness of breath improves. Change to plavix, first dose should be 600 mg x1 followed by 75 mg daily thereafter. If shortness of breath does not improve, he should call the office. Thanks!

## 2022-04-18 NOTE — CARE COORDINATION
CTN received call from patient who reported he wasn't sure of who to call or the number to follow up regarding ongoing SOB. CTN reviewed notes per [facility] and patient was instructed to follow up with SAMAN Perez if he continues to have sob could be related to [place] and may need to switch medications. CTN provided patient with office number for SAMAN Perez and encouraged patient to reach out to office to discuss sob. Patient denied any immediate distress. Patient reports sob occurs with exertion and he feels as if he can't get a full deep breath. CTN did encouraged patient to return to the ER should sob worsen or he is experiences any cp. Patient verbalized understanding and will follow up with cardiology.      Sukhjinder GRAJEDA, RN, [facility]  Care Transition Nurse  [phone] mobile

## 2022-04-18 NOTE — TELEPHONE ENCOUNTER
Pt is SOB, this has been going on for 6 weeks. Pt was told to give medications time to work. Denies any other SX. Taking meds as prescribed, no new meds started.  Please advise

## 2022-04-19 RX ORDER — CLOPIDOGREL BISULFATE 75 MG/1
TABLET ORAL
Qty: 98 TABLET | Refills: 1 | Status: SHIPPED | OUTPATIENT
Start: 2022-04-19 | End: 2022-10-05

## 2022-04-19 NOTE — TELEPHONE ENCOUNTER
LMOM for pt to contact the office. Will need to know what pharmacy pt would like medication sent to? Also will need to relay NPLR message, including instructions.

## 2022-04-19 NOTE — TELEPHONE ENCOUNTER
Spoke to pt and relayed NPLR'S message. V/U. Preferred pharmacy is [facility] in [facility].

## 2022-04-28 RX ORDER — BLOOD-GLUCOSE METER
EACH MISCELLANEOUS
OUTPATIENT
Start: 2022-04-28

## 2022-06-02 ENCOUNTER — OFFICE VISIT (OUTPATIENT)
Dept: CARDIOLOGY CLINIC | Age: 62
End: 2022-06-02
Payer: MEDICARE

## 2022-06-02 VITALS
OXYGEN SATURATION: 98 % | BODY MASS INDEX: 30.35 KG/M2 | WEIGHT: 212 LBS | SYSTOLIC BLOOD PRESSURE: 108 MMHG | HEART RATE: 90 BPM | HEIGHT: 70 IN | DIASTOLIC BLOOD PRESSURE: 70 MMHG

## 2022-06-02 DIAGNOSIS — I10 PRIMARY HYPERTENSION: Primary | ICD-10-CM

## 2022-06-02 DIAGNOSIS — R06.02 SOB (SHORTNESS OF BREATH): ICD-10-CM

## 2022-06-02 DIAGNOSIS — E78.2 MIXED HYPERLIPIDEMIA: ICD-10-CM

## 2022-06-02 DIAGNOSIS — I25.10 CORONARY ARTERY DISEASE INVOLVING NATIVE CORONARY ARTERY OF NATIVE HEART WITHOUT ANGINA PECTORIS: ICD-10-CM

## 2022-06-02 DIAGNOSIS — I25.5 ISCHEMIC CARDIOMYOPATHY: ICD-10-CM

## 2022-06-02 PROCEDURE — 99214 OFFICE O/P EST MOD 30 MIN: CPT | Performed by: INTERNAL MEDICINE

## 2022-06-02 NOTE — PROGRESS NOTES
[address] [facility]   Cardiovascular Evaluation    PATIENT: Jewel Skaggs  DATE: 2022  MRN: [id number]  CSN: [id number]  : 1960    Primary Care Doctor/Referring provider: Michell Gonzalez MD, No admitting provider for patient encounter. Reason for evaluation/Chief complaint:   Follow-up, Hypertension, Hyperlipidemia, and Shortness of Breath      Subjective:    History of present illness on initial date of evaluation:   Leonora Franz is a 57 y.o. male patient who presents for cardiology follow up. He has a past medical history including coronary artery disease, cardiomyopathy, hyperlipidemia, and diabetes mellitus. He presented to ED on 22 with chest pain and was found to have inferior STEMI. He underwent emergent left heart catheterization 22 showing 100% right coronary artery occlusion treated with drug eluting stent. Echocardiogram showed ejection fraction 40-45%. Today he states after heart catheterization he was experiencing SOB. He was switched from Brilinta to Plavix 6 weeks after MI. He states SOB has improved, however still prevalent. Patient currently denies any weight gain, edema, palpitations, chest pain,  dizziness, and syncope. He is taking all medications as prescribed, tolerating well. Denies recent issues with bleeding or bruising. He has previous smoking history of 3 ppd for 30 years quit.        Patient Active Problem List   Diagnosis    HTN (hypertension)    Hyperlipidemia    Type 2 diabetes mellitus with diabetic neuropathy, with long-term current use of insulin (HCC)    Chronic foot pain    CTS (carpal tunnel syndrome)    Peripheral neuropathy    Urinary frequency    Anxiety    Elbow pain    Pain of finger of left hand    Lateral epicondylitis    Primary osteoarthritis of first carpometacarpal joint of left hand    Acute sciatica    Incarcerated umbilical hernia    Lumbar radiculitis    Arthritis, midfoot    Cervical radiculopathy at C7    Degeneration of lumbar or lumbosacral intervertebral disc    Displacement of lumbar intervertebral disc without myelopathy    Lumbosacral spondylosis without myelopathy    Spinal stenosis, lumbar region, without neurogenic claudication    Disc displacement, lumbar    Lumbar facet arthropathy    Lumbar stenosis    Neck pain, chronic    Impingement syndrome of right shoulder    Incomplete tear of right rotator cuff    Shoulder impingement    Incomplete tear of left rotator cuff    Occult blood in stools    Arthritis of carpometacarpal (CMC) joint of right thumb    Polyp of transverse colon    Polyp of descending colon    Adenomatous polyps    Obesity with serious comorbidity    Vitamin D deficiency    Acute upper GI hemorrhage    Esophageal thickening    Lactic acidosis    Elevated serum creatinine    Intractable hiccups    Leukocytosis    Hypotension due to hypovolemia    Sinus tachycardia    Polycythemia    STEMI (ST elevation myocardial infarction) ([address]    Coronary artery disease involving native coronary artery of native heart without angina pectoris    Ischemic cardiomyopathy    SOB (shortness of breath)         Cardiac Testing: I have reviewed the findings below. EKG:  ECHO:   STRESS TEST:  CATH:  BYPASS:  VASCULAR:    Past Medical History:   has a past medical history of Adenomatous polyps, Anxiety, CTS (carpal tunnel syndrome), CTS (carpal tunnel syndrome), Diabetes mellitus ([address] GERD (gastroesophageal reflux disease), Hyperlipidemia, Hypertension, Obesity with serious comorbidity, Peripheral neuropathy, Type II or unspecified type diabetes mellitus without mention of complication, not stated as uncontrolled, and Urinary frequency. Surgical History:   has a past surgical history that includes Appendectomy; Carpal tunnel release; hernia repair (10/12/15);  Hand Arthroplasty (Left); pr shldr arthroscop,surg,w/rotat cuff repr (Left, 11/26/2018); Colonoscopy (N/A, 4/30/2019); Colonoscopy (N/A, 4/30/2019); Upper gastrointestinal endoscopy (11/04/2021); and Upper gastrointestinal endoscopy (N/A, 11/4/2021). Social History:   reports that he quit smoking about 21 years ago. He has a 93.00 pack-year smoking history. He has never used smokeless tobacco. He reports that he does not drink alcohol and does not use drugs. Family History:  No evidence for sudden cardiac death or premature CAD    Medications:  Reviewed and are listed in nursing record. and/or listed below  Outpatient Medications:  Prior to Admission medications    Medication Sig Start Date End Date Taking? Authorizing Provider   clopidogrel (PLAVIX) 75 MG tablet Take 8 tablets by mouth for the first dose and then take one tablet daily.  4/19/22  Yes ANA Butterfield CNP   nitroGLYCERIN (NITROSTAT) 0.4 MG SL tablet Place 1 tablet under the tongue every 5 minutes as needed for Chest pain 3/10/22  Yes Bladimir Riddle MD   aspirin 81 MG chewable tablet Take 1 tablet by mouth daily 3/8/22  Yes Staci Salmon MD   atorvastatin (LIPITOR) 80 MG tablet Take 1 tablet by mouth nightly 3/8/22  Yes Staci Salmon MD   carvedilol (COREG) 3.125 MG tablet Take 1 tablet by mouth 2 times daily (with meals) 3/8/22  Yes Staci Salmon MD   pantoprazole (PROTONIX) 40 MG tablet Take 1 tablet by mouth 2 times daily (before meals) 11/4/21  Yes Driss El MD   fenofibrate (TRICOR) 145 MG tablet Take 145 mg by mouth daily   Yes Historical Provider, MD   empagliflozin (JARDIANCE) 25 MG tablet Take 25 mg by mouth daily   Yes Historical Provider, MD   divalproex (DEPAKOTE ER) 250 MG extended release tablet Take 250 mg by mouth every morning   Yes Historical Provider, MD   divalproex (DEPAKOTE) 500 MG DR tablet Take 500 mg by mouth every evening   Yes Historical Provider, MD   insulin glargine (BASAGLAR KWIKPEN) 100 UNIT/ML injection pen Inject 25-30 Units into the skin 2 times daily  Patient taking differently: Inject 20 Units into the skin 2 times daily  2/19/21  Yes ANA Shah CNP   NOVOLOG FLEXPEN 100 UNIT/ML injection pen Inject 10 Units into the skin 2 times daily  12/8/20  Yes Historical Provider, MD   tamsulosin (FLOMAX) 0.4 MG capsule Take 0.4 mg by mouth daily  9/6/20  Yes Historical Provider, MD   sertraline (ZOLOFT) 50 MG tablet Take 100 mg by mouth daily  8/17/20  Yes Historical Provider, MD   Blood Glucose Monitoring Suppl (Eduardo Gift) w/Device KIT 1 Device by Does not apply route once for 1 dose 7/7/20 6/2/22 Yes ANA Reyez CNP   blood glucose test strips (ONETOUCH VERIO) strip 1 each by In Vitro route 3 times daily As needed. 7/7/20  Yes ANA Reyez CNP   ONE TOUCH ULTRASOFT LANCETS MISC 1 each by Does not apply route 3 times daily 7/7/20  Yes ANA Reyez CNP   blood glucose test strips (FREESTYLE LITE) strip USE TO TEST 3 TIMES DAILY AS NEEDED E11.9 GIVE WHAT IS COVERED. 7/6/20  Yes ANA Reyez CNP   Blood Glucose Monitoring Suppl (ACCU-CHEK KATHIA PLUS) w/Device KIT 1 Device by Does not apply route once for 1 dose 12/30/19 6/2/22 Yes ANA Reyez CNP   SOFT TOUCH LANCETS MISC 1 Device by Does not apply route 3 times daily 7/19/19  Yes ANA Reyez CNP   [employer] LANCETS MICRO THIN 33G MISC USE TO TEST BLOOD SUGAR 1-2 TIMES A DAY MAY TEST MORE IF BLOOD SUGAR SEEMS LOW 5/13/19  Yes ANA Reyez CNP   Insulin Pen Needle (PEN NEEDLES) 31G X 6 MM MISC TEST BLOOD SUGARS ONCE DAILY FOR E11.42 3/14/19  Yes ANA Danielle CNP   oxyCODONE-acetaminophen (PERCOCET) 7.5-325 MG per tablet Take 1 tablet by mouth every 8 hours as needed for Pain (three times daily).     Yes Arleene Hatchet, MD   Alcohol Swabs (ALCOHOL PREP) 70 % PADS USE ONCE DAILY FOR [facility] INJECTION 3/28/18  Yes Ala Batsheva, APRN - CNP   ondansetron (ZOFRAN-ODT) 4 MG disintegrating tablet Take 4 mg by mouth daily  Patient not taking: Reported on 6/2/2022    Historical Provider, MD   lidocaine (LIDODERM) 5 % PLACE 3 PATCHES ONTO THE SKIN EVERY 24 HOURS, 12 HOURS ON AND 12 HOURS OFF  Patient not taking: Reported on 6/2/2022 5/24/20   Rosalie Horse, APRN - CNP   APPLE CIDER VINEGAR PO Take by mouth   Patient not taking: Reported on 6/2/2022    Historical Provider, MD       In-patient schedule medications:        Infusion Medications: Allergies:  Sulfa antibiotics and Victoza [liraglutide]     Review of Systems:   All 14 point review of symptoms completed. Pertinent positives identified in the HPI, all other review of symptoms findings as below.      Review of Systems - History obtained from the patient  General ROS: negative for - chills, fever or night sweats  Psychological ROS: negative for - disorientation or hallucinations  Ophthalmic ROS: negative for - dry eyes, eye pain or loss of vision  ENT ROS: negative for - nasal discharge or sore throat  Allergy and Immunology ROS: negative for - hives or itchy/watery eyes  Hematological and Lymphatic ROS: negative for - jaundice or night sweats  Endocrine ROS: negative for - mood swings or temperature intolerance  Breast ROS: deferred  Respiratory ROS: negative for - hemoptysis or stridor  Gastrointestinal ROS: no abdominal pain, change in bowel habits, or black or bloody stools  Genito-Urinary ROS: no dysuria, trouble voiding, or hematuria  Musculoskeletal ROS: negative for - gait disturbance, joint pain or joint stiffness  Neurological ROS: negative for - seizures or speech problems  Dermatological ROS: negative for - rash or skin lesion changes      Physical Examination:    /70   Pulse 90   Ht 5' 10\" (1.778 m)   Wt 212 lb (96.2 kg)   SpO2 98%   BMI 30.42 kg/m²   /70   Pulse 90   Ht 5' 10\" (1.778 m)   Wt 212 lb (96.2 kg)   SpO2 98%   BMI 30.42 kg/m²    Weight: 212 lb (96.2 kg)     Wt Readings from Last 3 Encounters: 06/02/22 212 lb (96.2 kg)   03/22/22 199 lb (90.3 kg)   03/08/22 201 lb 14.4 oz (91.6 kg)     No intake or output data in the 24 hours ending 06/02/22 1604    General Appearance:  Alert, cooperative, no distress, appears stated age   Head:  Normocephalic, without obvious abnormality, atraumatic   Eyes:  PERRL, conjunctiva/corneas clear       Nose: Nares normal, no drainage or sinus tenderness   Throat: Lips, mucosa, and tongue normal   Neck: Supple, symmetrical, trachea midline, no adenopathy, thyroid: not enlarged, symmetric, no tenderness/mass/nodules, no carotid bruit or JVD       Lungs:   Clear to auscultation bilaterally, respirations unlabored   Chest Wall:  No tenderness or deformity   Heart:  Regular rhythm and normal rate; S1, S2 are normal; no murmur noted; no rub or gallop   Abdomen:   Soft, non-tender, bowel sounds active all four quadrants,  no masses, no organomegaly           Extremities: Extremities normal, atraumatic, no cyanosis or edema   Pulses: 2+ and symmetric   Skin: Skin color, texture, turgor normal, no rashes or lesions   Pysch: Normal mood and affect   Neurologic: Normal gross motor and sensory exam.         Labs  No results for input(s): WBC, HGB, HCT, MCV, PLT in the last 72 hours. No results for input(s): CREATININE, BUN, NA, K, CL, CO2 in the last 72 hours. No results for input(s): INR, PROTIME in the last 72 hours. No results for input(s): TROPONINI in the last 72 hours. Invalid input(s): PRO-BNP  No results for input(s): CHOL, LDL, HDL in the last 72 hours. Invalid input(s): TG      Imaging:  I have reviewed the below testing personally and my interpretation is below. EKG:  CXR:      Assessment:  57 y.o. patient with:  Active Problems:    * No active hospital problems. *  Resolved Problems:    * No resolved hospital problems.  *      Problem List Items Addressed This Visit     Coronary artery disease involving native coronary artery of native heart without angina pectoris Relevant Orders    Echo 2D w doppler w color complete    Ischemic cardiomyopathy    Relevant Orders    Echo 2D w doppler w color complete    SOB (shortness of breath)    Relevant Orders    Leila Mcpherson MD, Pulmonary, [facility]    Echo 2D w doppler w color complete    HTN (hypertension) - Primary    Hyperlipidemia          Plan:  1. Order Echocardiogram ~ evaluate heart strength and structure   ~A test that records movement of your heart valves and chambers by ultrasound. Evaluates heart valves, any chamber enlargement, abnormal openings, or any fluid in the sac surrounding the heart. 2. Patient given detailed instructions on addressing diet, regular exercise, weight control, smoking abstention, medication compliance, and stress minimization. The patient was provided written and verbal instructions regarding risk factor modification. 3. Medication management important in preventing future cardiac events, please call office if you cannot afford mediactions or side effects. 4. Referral to Pulmonary ~  ongoing shortness of breath, hx tobacco abuse. 5. Follow up in one year. Scribe's attestation: This note was scribed in the presence of Melina Kong by Dallas [id number] RN     I, Dr. Elizabeth Velazquez, personally performed the services described in this documentation, as scribed by the above signed scribe in my presence. It is both accurate and complete to my knowledge. I agree with the details independently gathered by the clinical support staff, while the remaining scribed note accurately describes my personal service to the patient. Medical Decision Making:   The following items were considered in medical decision making:  Independent review of images  Review / order clinical lab tests  Review / order radiology tests  Decision to obtain old records  Review and summation of old records as accessed through [facility] (a summary of my findings in these old records)      Time Based Itemization  A total of 30 minutes was spent on today's patient encounter. If applicable, non-patient-facing activities:  (X)Preparing to see the patient and reviewing records  (X) Individual interpretation of results  ( ) Discussion or coordination of care with other health care professionals  () Ordering of unique tests, medications, or procedures  (X) Documentation within the EHR               All questions and concerns were addressed to the patient/family. Alternatives to my treatment were discussed. The note was completed using EMR. Every effort was made to ensure accuracy; however, inadvertent computerized transcription errors may be present.     Elizabeth Velazquez MD, [address], [place], [place]  [phone] [facility]  [phone] [facility]  6/2/2022  4:04 PM

## 2022-06-02 NOTE — PATIENT INSTRUCTIONS
Your provider has ordered testing for further evaluation. An order/prescription has been included in your paper work.  To schedule outpatient testing, contact Central Scheduling by [facility] ([phone]). Plan:  1. Order Echocardiogram ~ evaluate heart strength and structure   ~A test that records movement of your heart valves and chambers by ultrasound. Evaluates heart valves, any chamber enlargement, abnormal openings, or any fluid in the sac surrounding the heart. 2. Patient given detailed instructions on addressing diet, regular exercise, weight control, smoking abstention, medication compliance, and stress minimization. The patient was provided written and verbal instructions regarding risk factor modification. 3. Medication management important in preventing future cardiac events, please call office if you cannot afford mediactions or side effects. 4. Referral to Pulmonary ~  ongoing shortness of breath, hx tobacco abuse. 5. Follow up in one year.

## 2022-06-02 NOTE — LETTER
[address] [place] [address]  1960      [address]   Cardiovascular Evaluation    PATIENT: Elana Skaggs  DATE: 2022  MRN: [id number]  CSN: [id number]  : 1960    Primary Care Doctor/Referring provider: Spencer Petersen MD, No admitting provider for patient encounter. Reason for evaluation/Chief complaint:   Follow-up, Hypertension, Hyperlipidemia, and Shortness of Breath      Subjective:    History of present illness on initial date of evaluation:   [address] [place] [address] is a 57 y.o. male patient who presents for cardiology follow up. He has a past medical history including coronary artery disease, cardiomyopathy, hyperlipidemia, and diabetes mellitus. He presented to ED on 22 with chest pain and was found to have inferior STEMI. He underwent emergent left heart catheterization 22 showing 100% right coronary artery occlusion treated with drug eluting stent. Echocardiogram showed ejection fraction 40-45%. Today he states after heart catheterization he was experiencing SOB. He was switched from Brilinta to Plavix 6 weeks after MI. He states SOB has improved, however still prevalent. Patient currently denies any weight gain, edema, palpitations, chest pain,  dizziness, and syncope. He is taking all medications as prescribed, tolerating well. Denies recent issues with bleeding or bruising. He has previous smoking history of 3 ppd for 30 years quit.        Patient Active Problem List   Diagnosis    HTN (hypertension)    Hyperlipidemia    Type 2 diabetes mellitus with diabetic neuropathy, with long-term current use of insulin (HCC)    Chronic foot pain    CTS (carpal tunnel syndrome)    Peripheral neuropathy    Urinary frequency    Anxiety    Elbow pain    Pain of finger of left hand    Lateral epicondylitis    Primary osteoarthritis of first carpometacarpal joint of left hand    Acute sciatica    Incarcerated umbilical hernia    Lumbar radiculitis    Arthritis, midfoot    Cervical radiculopathy at C7    Degeneration of lumbar or lumbosacral intervertebral disc    Displacement of lumbar intervertebral disc without myelopathy    Lumbosacral spondylosis without myelopathy    Spinal stenosis, lumbar region, without neurogenic claudication    Disc displacement, lumbar    Lumbar facet arthropathy    Lumbar stenosis    Neck pain, chronic    Impingement syndrome of right shoulder    Incomplete tear of right rotator cuff    Shoulder impingement    Incomplete tear of left rotator cuff    Occult blood in stools    Arthritis of carpometacarpal (CMC) joint of right thumb    Polyp of transverse colon    Polyp of descending colon    Adenomatous polyps    Obesity with serious comorbidity    Vitamin D deficiency    Acute upper GI hemorrhage    Esophageal thickening    Lactic acidosis    Elevated serum creatinine    Intractable hiccups    Leukocytosis    Hypotension due to hypovolemia    Sinus tachycardia    Polycythemia    STEMI (ST elevation myocardial infarction) ([address]    Coronary artery disease involving native coronary artery of native heart without angina pectoris    Ischemic cardiomyopathy    SOB (shortness of breath)         Cardiac Testing: I have reviewed the findings below. EKG:  ECHO:   STRESS TEST:  CATH:  BYPASS:  VASCULAR:    Past Medical History:   has a past medical history of Adenomatous polyps, Anxiety, CTS (carpal tunnel syndrome), CTS (carpal tunnel syndrome), Diabetes mellitus ([address] GERD (gastroesophageal reflux disease), Hyperlipidemia, Hypertension, Obesity with serious comorbidity, Peripheral neuropathy, Type II or unspecified type diabetes mellitus without mention of complication, not stated as uncontrolled, and Urinary frequency. Surgical History:   has a past surgical history that includes Appendectomy; Carpal tunnel release; hernia repair (10/12/15);  Hand Arthroplasty (Left); pr shldr arthroscop,surg,w/rotat cuff repr (Left, 11/26/2018); Colonoscopy (N/A, 4/30/2019); Colonoscopy (N/A, 4/30/2019); Upper gastrointestinal endoscopy (11/04/2021); and Upper gastrointestinal endoscopy (N/A, 11/4/2021). Social History:   reports that he quit smoking about 21 years ago. He has a 93.00 pack-year smoking history. He has never used smokeless tobacco. He reports that he does not drink alcohol and does not use drugs. Family History:  No evidence for sudden cardiac death or premature CAD    Medications:  Reviewed and are listed in nursing record. and/or listed below  Outpatient Medications:  Prior to Admission medications    Medication Sig Start Date End Date Taking? Authorizing Provider   clopidogrel (PLAVIX) 75 MG tablet Take 8 tablets by mouth for the first dose and then take one tablet daily.  4/19/22  Yes Arlene Kehr, APRN - CNP   nitroGLYCERIN (NITROSTAT) 0.4 MG SL tablet Place 1 tablet under the tongue every 5 minutes as needed for Chest pain 3/10/22  Yes Nadege Rodney MD   aspirin 81 MG chewable tablet Take 1 tablet by mouth daily 3/8/22  Yes Cris Agarwal MD   atorvastatin (LIPITOR) 80 MG tablet Take 1 tablet by mouth nightly 3/8/22  Yes Cris Agarwal MD   carvedilol (COREG) 3.125 MG tablet Take 1 tablet by mouth 2 times daily (with meals) 3/8/22  Yes Cris Agarwal MD   pantoprazole (PROTONIX) 40 MG tablet Take 1 tablet by mouth 2 times daily (before meals) 11/4/21  Yes Mirela Cueto MD   fenofibrate (TRICOR) 145 MG tablet Take 145 mg by mouth daily   Yes Historical Provider, MD   empagliflozin (JARDIANCE) 25 MG tablet Take 25 mg by mouth daily   Yes Historical Provider, MD   divalproex (DEPAKOTE ER) 250 MG extended release tablet Take 250 mg by mouth every morning   Yes Historical Provider, MD   divalproex (DEPAKOTE) 500 MG DR tablet Take 500 mg by mouth every evening   Yes Historical Provider, MD   insulin glargine (BASAGLAR KWIKPEN) 100 UNIT/ML injection pen Inject 25-30 Units into the skin 2 times daily  Patient taking differently: Inject 20 Units into the skin 2 times daily  2/19/21  Yes ANA Khan CNP   NOVOLOG FLEXPEN 100 UNIT/ML injection pen Inject 10 Units into the skin 2 times daily  12/8/20  Yes Historical Provider, MD   tamsulosin (FLOMAX) 0.4 MG capsule Take 0.4 mg by mouth daily  9/6/20  Yes Historical Provider, MD   sertraline (ZOLOFT) 50 MG tablet Take 100 mg by mouth daily  8/17/20  Yes Historical Provider, MD   Blood Glucose Monitoring Suppl (Sunshine Romo) w/Device KIT 1 Device by Does not apply route once for 1 dose 7/7/20 6/2/22 Yes ANA Yu CNP   blood glucose test strips (ONETOUCH VERIO) strip 1 each by In Vitro route 3 times daily As needed. 7/7/20  Yes ANA Yu CNP   ONE TOUCH ULTRASOFT LANCETS MISC 1 each by Does not apply route 3 times daily 7/7/20  Yes ANA Yu CNP   blood glucose test strips (FREESTYLE LITE) strip USE TO TEST 3 TIMES DAILY AS NEEDED E11.9 GIVE WHAT IS COVERED. 7/6/20  Yes ANA Yu CNP   Blood Glucose Monitoring Suppl (ACCU-CHEK KATHIA PLUS) w/Device KIT 1 Device by Does not apply route once for 1 dose 12/30/19 6/2/22 Yes ANA Yu CNP   SOFT TOUCH LANCETS MISC 1 Device by Does not apply route 3 times daily 7/19/19  Yes ANA Yu CNP   [employer] LANCETS MICRO THIN 33G MISC USE TO TEST BLOOD SUGAR 1-2 TIMES A DAY MAY TEST MORE IF BLOOD SUGAR SEEMS LOW 5/13/19  Yes ANA Yu CNP   Insulin Pen Needle (PEN NEEDLES) 31G X 6 MM MISC TEST BLOOD SUGARS ONCE DAILY FOR E11.42 3/14/19  Yes ANA Cardenas CNP   oxyCODONE-acetaminophen (PERCOCET) 7.5-325 MG per tablet Take 1 tablet by mouth every 8 hours as needed for Pain (three times daily).     Yes Markus Murphy MD   Alcohol Swabs (ALCOHOL PREP) 70 % PADS USE ONCE DAILY FOR [facility] INJECTION 3/28/18  Yes ANA Yu - CNP   ondansetron (ZOFRAN-ODT) 4 MG disintegrating tablet Take 4 mg by mouth daily  Patient not taking: Reported on 6/2/2022    Historical Provider, MD   lidocaine (LIDODERM) 5 % PLACE 3 PATCHES ONTO THE SKIN EVERY 24 HOURS, 12 HOURS ON AND 12 HOURS OFF  Patient not taking: Reported on 6/2/2022 5/24/20   ANA Reynolds - CNP   APPLE CIDER VINEGAR PO Take by mouth   Patient not taking: Reported on 6/2/2022    Historical Provider, MD       In-patient schedule medications:        Infusion Medications: Allergies:  Sulfa antibiotics and Victoza [liraglutide]     Review of Systems:   All 14 point review of symptoms completed. Pertinent positives identified in the HPI, all other review of symptoms findings as below.      Review of Systems - History obtained from the patient  General ROS: negative for - chills, fever or night sweats  Psychological ROS: negative for - disorientation or hallucinations  Ophthalmic ROS: negative for - dry eyes, eye pain or loss of vision  ENT ROS: negative for - nasal discharge or sore throat  Allergy and Immunology ROS: negative for - hives or itchy/watery eyes  Hematological and Lymphatic ROS: negative for - jaundice or night sweats  Endocrine ROS: negative for - mood swings or temperature intolerance  Breast ROS: deferred  Respiratory ROS: negative for - hemoptysis or stridor  Gastrointestinal ROS: no abdominal pain, change in bowel habits, or black or bloody stools  Genito-Urinary ROS: no dysuria, trouble voiding, or hematuria  Musculoskeletal ROS: negative for - gait disturbance, joint pain or joint stiffness  Neurological ROS: negative for - seizures or speech problems  Dermatological ROS: negative for - rash or skin lesion changes      Physical Examination:    /70   Pulse 90   Ht 5' 10\" (1.778 m)   Wt 212 lb (96.2 kg)   SpO2 98%   BMI 30.42 kg/m²   /70   Pulse 90   Ht 5' 10\" (1.778 m)   Wt 212 lb (96.2 kg)   SpO2 98%   BMI 30.42 kg/m²    Weight: 212 lb (96.2 kg)     Wt Readings from Last 3 Encounters:   06/02/22 212 lb (96.2 kg)   03/22/22 199 lb (90.3 kg)   03/08/22 201 lb 14.4 oz (91.6 kg)     No intake or output data in the 24 hours ending 06/02/22 1604    General Appearance:  Alert, cooperative, no distress, appears stated age   Head:  Normocephalic, without obvious abnormality, atraumatic   Eyes:  PERRL, conjunctiva/corneas clear       Nose: Nares normal, no drainage or sinus tenderness   Throat: Lips, mucosa, and tongue normal   Neck: Supple, symmetrical, trachea midline, no adenopathy, thyroid: not enlarged, symmetric, no tenderness/mass/nodules, no carotid bruit or JVD       Lungs:   Clear to auscultation bilaterally, respirations unlabored   Chest Wall:  No tenderness or deformity   Heart:  Regular rhythm and normal rate; S1, S2 are normal; no murmur noted; no rub or gallop   Abdomen:   Soft, non-tender, bowel sounds active all four quadrants,  no masses, no organomegaly           Extremities: Extremities normal, atraumatic, no cyanosis or edema   Pulses: 2+ and symmetric   Skin: Skin color, texture, turgor normal, no rashes or lesions   Pysch: Normal mood and affect   Neurologic: Normal gross motor and sensory exam.         Labs  No results for input(s): WBC, HGB, HCT, MCV, PLT in the last 72 hours. No results for input(s): CREATININE, BUN, NA, K, CL, CO2 in the last 72 hours. No results for input(s): INR, PROTIME in the last 72 hours. No results for input(s): TROPONINI in the last 72 hours. Invalid input(s): PRO-BNP  No results for input(s): CHOL, LDL, HDL in the last 72 hours. Invalid input(s): TG      Imaging:  I have reviewed the below testing personally and my interpretation is below. EKG:  CXR:      Assessment:  57 y.o. patient with:  Active Problems:    * No active hospital problems. *  Resolved Problems:    * No resolved hospital problems.  *      Problem List Items Addressed This Visit     Coronary artery disease involving native coronary artery of native heart without angina pectoris    Relevant Orders    Echo 2D w doppler w color complete    Ischemic cardiomyopathy    Relevant Orders    Echo 2D w doppler w color complete    SOB (shortness of breath)    Relevant Orders    Jj Lackey MD, Pulmonary, [facility]    Echo 2D w doppler w color complete    HTN (hypertension) - Primary    Hyperlipidemia          Plan:  1. Order Echocardiogram ~ evaluate heart strength and structure   ~A test that records movement of your heart valves and chambers by ultrasound. Evaluates heart valves, any chamber enlargement, abnormal openings, or any fluid in the sac surrounding the heart. 2. Patient given detailed instructions on addressing diet, regular exercise, weight control, smoking abstention, medication compliance, and stress minimization. The patient was provided written and verbal instructions regarding risk factor modification. 3. Medication management important in preventing future cardiac events, please call office if you cannot afford mediactions or side effects. 4. Referral to Pulmonary ~  ongoing shortness of breath, hx tobacco abuse. 5. Follow up in one year. Scribe's attestation: This note was scribed in the presence of Jakob Isbell by Smitha Brenner RN     I, Dr. Lolly Johnson, personally performed the services described in this documentation, as scribed by the above signed scribe in my presence. It is both accurate and complete to my knowledge. I agree with the details independently gathered by the clinical support staff, while the remaining scribed note accurately describes my personal service to the patient. Medical Decision Making:   The following items were considered in medical decision making:  Independent review of images  Review / order clinical lab tests  Review / order radiology tests  Decision to obtain old records  Review and summation of old records as accessed through [facility] (a summary of my findings in these old records)      Time Based Itemization  A total of 30 minutes was spent on today's patient encounter. If applicable, non-patient-facing activities:  (X)Preparing to see the patient and reviewing records  (X) Individual interpretation of results  ( ) Discussion or coordination of care with other health care professionals  () Ordering of unique tests, medications, or procedures  (X) Documentation within the EHR               All questions and concerns were addressed to the patient/family. Alternatives to my treatment were discussed. The note was completed using EMR. Every effort was made to ensure accuracy; however, inadvertent computerized transcription errors may be present.     Delilah Brenner MD, [address], [place], [place]  [phone] [facility] office  [phone] [facility]  6/2/2022  4:04 PM

## 2022-06-15 ENCOUNTER — HOSPITAL ENCOUNTER (OUTPATIENT)
Dept: NON INVASIVE DIAGNOSTICS | Age: 62
Discharge: HOME | End: 2022-06-15
Payer: MEDICARE

## 2022-06-15 DIAGNOSIS — I25.10 CORONARY ARTERY DISEASE INVOLVING NATIVE CORONARY ARTERY OF NATIVE HEART WITHOUT ANGINA PECTORIS: ICD-10-CM

## 2022-06-15 DIAGNOSIS — R06.02 SOB (SHORTNESS OF BREATH): ICD-10-CM

## 2022-06-15 DIAGNOSIS — I25.5 ISCHEMIC CARDIOMYOPATHY: ICD-10-CM

## 2022-06-15 LAB
LV EF: 48 %
LVEF MODALITY: NORMAL

## 2022-06-15 PROCEDURE — 93306 TTE W/DOPPLER COMPLETE: CPT

## 2022-06-16 ENCOUNTER — TELEPHONE (OUTPATIENT)
Dept: CARDIOLOGY CLINIC | Age: 62
End: 2022-06-16

## 2022-06-16 NOTE — TELEPHONE ENCOUNTER
----- Message from Leyda Barrios MD sent at 6/16/2022  8:30 AM EDT -----  The test does NOT show any major change from prior testing. Please inform the patient of the results.

## 2022-07-20 RX ORDER — ATORVASTATIN CALCIUM 80 MG/1
80 TABLET, FILM COATED ORAL NIGHTLY
Qty: 90 TABLET | Refills: 1 | Status: SHIPPED | OUTPATIENT
Start: 2022-07-20

## 2022-07-20 NOTE — TELEPHONE ENCOUNTER
Dr. Bran Tara,    Patient would like refill on atorvastatin sent to [facility]. Prescription pended for your convenience if you agree. Appears patient may be due for lipid panel. Thank you,  Hemanth Clayton, PharmD, [address], [address]  [facility] Pharmacist  Department, toll free: [phone], option 1   =======================================================    POPULATION HEALTH CLINICAL PHARMACY: ADHERENCE REVIEW  Identified care gap per [employer]: fills at [address]: Statin adherence    Last Visit: 6/2/22 cardiology    Patient identified as LIS = 1, therefore patient may be able to receive a 90-day supply for the same cost as a 30-day supply    Patient found in Outcomes [facility] and is not currently eligible for CMR/TIP    ASSESSMENT  [id number] Records claims through 7/2/22 (Prior Year [facility] =  83%; YTD [facility] =  77%; Potential Fail Date: 8/5/22 ):   Pravastatin 20 mg tablets last filled on 3/9/22 for 90 day supply. Next refill due: 6/7/22    Per  [employer] Portal:  Atorvastatin 80 mg tablets last filled on 3/8/22 for 90 day supply. Per chart review, appears 3/8 hospitalization, pravastatin changed to atorvastatin. Lab Results   Component Value Date    CHOL 145 10/15/2020    TRIG 256 (H) 10/15/2020    HDL 29 (L) 10/15/2020    LDLCALC 65 10/15/2020     ALT   Date Value Ref Range Status   03/06/2022 34 10 - 40 U/L Final     AST   Date Value Ref Range Status   03/06/2022 144 (H) 15 - 37 U/L Final     The ASCVD Risk score (Mario Mann., et al., 2013) failed to calculate for the following reasons: The patient has a prior MI or stroke diagnosis     PLAN  The following are interventions that have been identified:   - Patient overdue refilling atorvastatin and active on home medication list.     Reached patient to review. He states he is not at home right now, but knows he stopped pravastatin after the hospitalization. Not at home to view his atorvastatin bottle.   He would like the medication refilled at

## 2022-07-20 NOTE — TELEPHONE ENCOUNTER
Thank you Dr. Nikolai Nava, note refill sent. Spoke with patient and informed him refill sent.     For [address] [facility] in place:  No  Recommendation Provided To: Provider: 1 via Note to Provider and Patient/Caregiver: 1 via Telephone  Intervention Detail: Adherence Monitorin and Refill(s) Provided  Gap Closed?: Yes   Intervention Accepted By: Provider: 1 and Patient/Caregiver: 1  Time Spent (min): 20

## 2022-10-05 RX ORDER — CLOPIDOGREL BISULFATE 75 MG/1
75 TABLET ORAL DAILY
Qty: 90 TABLET | Refills: 3 | Status: SHIPPED | OUTPATIENT
Start: 2022-10-05

## 2022-10-05 NOTE — TELEPHONE ENCOUNTER
Last OV 3/22/22  VSP 6/2/22  Plan:   1. Cardiac rehab referral  2. Change brilinta in follow up if shortness of breath does not continue to improve  3. Continue aspirin, statin, carvedilol, jardiance  4. Unable to add ACE/ARB/ARNI due to relative hypotension, attempt in follow up  5. Check BP at home and call the office if consistently out of goal range  6. Repeat echo for EF in 2-3 months  7.  Follow up with Dr. Ameena Cr

## 2022-10-23 NOTE — TELEPHONE ENCOUNTER
I called [facility] and they said they don't have a current script on file so they had me send the rx over.  If it needs a PA they will let us know
PA APPROVED. Pharmacy informed. Maureen Carrillo informed.
Pa has been started on Cover my meds
Per patient he gets his meds at [facility] he thought we should know that.   Because Flory said they were going to contact us
None

## 2022-10-24 ENCOUNTER — TELEPHONE (OUTPATIENT)
Dept: ORTHOPEDIC SURGERY | Age: 62
End: 2022-10-24

## 2022-10-24 NOTE — TELEPHONE ENCOUNTER
Left patient VM regarding his Tuesday the 25th appointment. Needing to move him before 11am due to MD having surgery add on.  Please add him in any time slot before 11am.   Thanks

## 2022-11-01 ENCOUNTER — OFFICE VISIT (OUTPATIENT)
Dept: ORTHOPEDIC SURGERY | Age: 62
End: 2022-11-01
Payer: MEDICARE

## 2022-11-01 VITALS — BODY MASS INDEX: 30.35 KG/M2 | HEIGHT: 70 IN | WEIGHT: 212 LBS

## 2022-11-01 DIAGNOSIS — M18.11 ARTHRITIS OF CARPOMETACARPAL (CMC) JOINT OF RIGHT THUMB: Primary | ICD-10-CM

## 2022-11-01 DIAGNOSIS — M79.644 PAIN OF RIGHT THUMB: ICD-10-CM

## 2022-11-01 PROCEDURE — 99214 OFFICE O/P EST MOD 30 MIN: CPT | Performed by: STUDENT IN AN ORGANIZED HEALTH CARE EDUCATION/TRAINING PROGRAM

## 2022-11-01 NOTE — PROGRESS NOTES
Dr Sasha Canela      Date /Time 11/1/2022       10:29 AM EDT  Name Karrie Telles5 Desmond Cho              1960   Location  [address] [place] [address] DR ORTHOPEDIC SURG  MRN [id number]                Chief Complaint   Patient presents with    Hand Pain     Right thumb pain        History of Present Illness  Rafaela Byrd is a 60 y.o. male who presents with  right base of thumb pain    He was referred for evaluation of his pain which is chronic in nature. It is described to the point where he cannot work with his hand or use it for  any more. Previously had L CMC arthroplasty and this feels like similar symptoms, however hes not happy with this outcome. Not taking any medications for this.           Sent in consultation by Jerilyn Humphries MD,         Past History  Past Medical History:   Diagnosis Date    Adenomatous polyps     scoped 2019;acc to pt ,repeat in one yr    Anxiety     CTS (carpal tunnel syndrome)     CTS (carpal tunnel syndrome)     Diabetes mellitus (HCC)     GERD (gastroesophageal reflux disease)     has had stricture dilated mult yrs ago    Hyperlipidemia     Hypertension     Obesity with serious comorbidity 9/9/2020    Peripheral neuropathy     Type II or unspecified type diabetes mellitus without mention of complication, not stated as uncontrolled     Urinary frequency      Past Surgical History:   Procedure Laterality Date    APPENDECTOMY      CARPAL TUNNEL RELEASE      COLONOSCOPY N/A 4/30/2019    COLONOSCOPY POLYPECTOMY SNARE/COLD BIOPSY performed by Svetlana Gillette MD at [place] [address] N/A 4/30/2019    COLONOSCOPY CONTROL HEMORRHAGE performed by Svetlana Gillette MD at [address] [place] [address] Left     HERNIA REPAIR  10/12/15    Laparoscopic Hernia Repair    OH SHLDR ARTHROSCOP,SURG,W/ROTAT CUFF REPR Left 11/26/2018    LEFT SHOULDER ARTHROSCOPY WITH DEBRIDEMENT, ROTATOR CUFF REPAIR, OPEN SUBSCAPULARIS REPAIR, BICEPS TENODESIS, SUBACROMIAL DECOMPRESSION performed by Sunhsine eHath MD at [address]  2021    with biopsy    UPPER GASTROINTESTINAL ENDOSCOPY N/A 2021    EGD BIOPSY performed by Reynaldo Kauffman MD at [address] History     Tobacco Use    Smoking status: Former     Packs/day: 3.00     Years: 31.00     Pack years: 93.00     Types: Cigarettes     Quit date: 2001     Years since quittin.7    Smokeless tobacco: Never    Tobacco comments:        Substance Use Topics    Alcohol use: No     Alcohol/week: 0.0 standard drinks      Current Outpatient Medications on File Prior to Visit   Medication Sig Dispense Refill    clopidogrel (PLAVIX) 75 MG tablet Take 1 tablet by mouth daily TAKE ONE TABLET BY MOUTH DAILY 90 tablet 3    atorvastatin (LIPITOR) 80 MG tablet Take 1 tablet by mouth nightly 90 tablet 1    nitroGLYCERIN (NITROSTAT) 0.4 MG SL tablet Place 1 tablet under the tongue every 5 minutes as needed for Chest pain 25 tablet 3    aspirin 81 MG chewable tablet Take 1 tablet by mouth daily 30 tablet 11    carvedilol (COREG) 3.125 MG tablet Take 1 tablet by mouth 2 times daily (with meals) 60 tablet 3    pantoprazole (PROTONIX) 40 MG tablet Take 1 tablet by mouth 2 times daily (before meals) 60 tablet 3    fenofibrate (TRICOR) 145 MG tablet Take 145 mg by mouth daily      ondansetron (ZOFRAN-ODT) 4 MG disintegrating tablet Take 4 mg by mouth daily (Patient not taking: Reported on 2022)      empagliflozin (JARDIANCE) 25 MG tablet Take 25 mg by mouth daily      divalproex (DEPAKOTE ER) 250 MG extended release tablet Take 250 mg by mouth every morning      divalproex (DEPAKOTE) 500 MG DR tablet Take 500 mg by mouth every evening      insulin glargine (BASAGLAR KWIKPEN) 100 UNIT/ML injection pen Inject 25-30 Units into the skin 2 times daily (Patient taking differently: Inject 20 Units into the skin 2 times daily ) 5 pen 5    NOVOLOG FLEXPEN 100 UNIT/ML injection pen Inject 10 Units into the skin 2 times daily       tamsulosin (FLOMAX) 0.4 MG capsule Take 0.4 mg by mouth daily       sertraline (ZOLOFT) 50 MG tablet Take 100 mg by mouth daily       Blood Glucose Monitoring Suppl (ONETOUCH VERIO) w/Device KIT 1 Device by Does not apply route once for 1 dose 1 kit 0    blood glucose test strips (ONETOUCH VERIO) strip 1 each by In Vitro route 3 times daily As needed. 100 each 11    ONE TOUCH ULTRASOFT LANCETS MISC 1 each by Does not apply route 3 times daily 100 each 11    blood glucose test strips (FREESTYLE LITE) strip USE TO TEST 3 TIMES DAILY AS NEEDED E11.9 GIVE WHAT IS COVERED. 300 each 11    lidocaine (LIDODERM) 5 % PLACE 3 PATCHES ONTO THE SKIN EVERY 24 HOURS, 12 HOURS ON AND 12 HOURS OFF (Patient not taking: Reported on 6/2/2022) 90 patch 10    Blood Glucose Monitoring Suppl (ACCU-CHEK KATHIA PLUS) w/Device KIT 1 Device by Does not apply route once for 1 dose 1 kit 0    SOFT TOUCH LANCETS MISC 1 Device by Does not apply route 3 times daily 100 each 5    [employer] LANCETS MICRO THIN 33G MISC USE TO TEST BLOOD SUGAR 1-2 TIMES A DAY MAY TEST MORE IF BLOOD SUGAR SEEMS  each 3    Insulin Pen Needle (PEN NEEDLES) 31G X 6 MM MISC TEST BLOOD SUGARS ONCE DAILY FOR E11.42 300 each 11    APPLE CIDER VINEGAR PO Take by mouth  (Patient not taking: Reported on 6/2/2022)      oxyCODONE-acetaminophen (PERCOCET) 7.5-325 MG per tablet Take 1 tablet by mouth every 8 hours as needed for Pain (three times daily). Alcohol Swabs (ALCOHOL PREP) 70 % PADS USE ONCE DAILY FOR EACH INJECTION 100 each 1     No current facility-administered medications on file prior to visit. Review of Systems  10-point ROS is negative other than HPI. Physical Exam  Based off 1997 Exam Criteria  Ht 5' 10\" (1.778 m)   Wt 212 lb (96.2 kg)   BMI 30.42 kg/m²      Constitutional:       General: He is not in acute distress. Appearance: Normal appearance.    Cardiovascular:      Rate and Rhythm: Normal rate and regular rhythm. Pulses: Normal pulses. Pulmonary:      Effort: Pulmonary effort is normal. No respiratory distress. Neurological:      Mental Status: He is alert and oriented to person, place, and time. Mental status is at baseline. Gait:  WNL    R hand    Skin wnl  TTP over 1st CMC  Positive grind test  No pain with deQuervain testing    Imaging      Radiographs: Multiple views r hand ordered obtained reviewed and interpreted show end-stage CMC arthritis at the first ray. Procedure:  Orders Placed This Encounter   Procedures    XR HAND RIGHT (MIN 3 VIEWS)     Standing Status:   Future     Number of Occurrences:   1     Standing Expiration Date:   11/1/2023     Order Specific Question:   Reason for exam:     Answer:   Right Thumb pain    External Referral To Hand Surgery     Referral Priority:   Routine     Referral Type:   Eval and Treat     Referral Reason:   Specialty Services Required     Requested Specialty:   Hand Surgery     Number of Visits Requested:   1       Assessment and Plan  Ghazala Clark was seen today for hand pain. Diagnoses and all orders for this visit:    Arthritis of carpometacarpal (CMC) joint of right thumb  -     External Referral To Hand Surgery    Pain of right thumb  -     XR HAND RIGHT (MIN 3 VIEWS); Future    Rey Richardson has end-stage CMC arthritis of his right thumb. He was not happy with the result on his contralateral side and is looking for another referral.  I provided him with referral to [address] [place] [address] in [place] and a mobic prescription. I wanted to hold off on an injection today in case she is able to have surgery in the near future. Total time spent reviewing past notes, imaging, labs, clinical exam, and treatment plan was > 45 min. Electronically signed by Yulia Au MD on 28/2/9784 at 10:29 AM  This dictation was generated by voice recognition computer software.   Although all attempts are made to edit the dictation for accuracy, there may be errors in the transcription that are not intended.

## 2022-11-28 ENCOUNTER — TELEPHONE (OUTPATIENT)
Dept: CARDIOLOGY CLINIC | Age: 62
End: 2022-11-28

## 2022-11-28 NOTE — TELEPHONE ENCOUNTER
[facility] requests cardiac clearance for upcoming right hand carpometacarpal arthroplasty with Dr. Michael Alston on 12/13/22. They request advise on how long to hold ASA and Plavix. Please advise? Fax letter with last EKG tracings to [phone]. Call [phone] opt 8 with concerns.

## 2022-11-28 NOTE — TELEPHONE ENCOUNTER
Please schedule pre-operative visit with Dr. Anna Patel so that patient can undergo full pre-operative risk assessment. Thanks.

## 2022-11-28 NOTE — TELEPHONE ENCOUNTER
Left messages at home and mobile number to schedule for clearance. [facility] has openings on Wednesday in [place]. This is the soonest and only day we can see pt. Please schedule him for a visit. Thanks.

## 2022-11-30 ENCOUNTER — OFFICE VISIT (OUTPATIENT)
Dept: CARDIOLOGY CLINIC | Age: 62
End: 2022-11-30
Payer: MEDICARE

## 2022-11-30 VITALS
HEIGHT: 70 IN | WEIGHT: 216.8 LBS | SYSTOLIC BLOOD PRESSURE: 118 MMHG | HEART RATE: 91 BPM | BODY MASS INDEX: 31.04 KG/M2 | OXYGEN SATURATION: 97 % | DIASTOLIC BLOOD PRESSURE: 76 MMHG

## 2022-11-30 DIAGNOSIS — I10 PRIMARY HYPERTENSION: ICD-10-CM

## 2022-11-30 DIAGNOSIS — I25.10 CORONARY ARTERY DISEASE INVOLVING NATIVE CORONARY ARTERY OF NATIVE HEART WITHOUT ANGINA PECTORIS: Primary | ICD-10-CM

## 2022-11-30 DIAGNOSIS — Z01.810 ENCOUNTER FOR PRE-OPERATIVE CARDIOVASCULAR CLEARANCE: ICD-10-CM

## 2022-11-30 DIAGNOSIS — E78.2 MIXED HYPERLIPIDEMIA: ICD-10-CM

## 2022-11-30 PROCEDURE — 99214 OFFICE O/P EST MOD 30 MIN: CPT | Performed by: INTERNAL MEDICINE

## 2022-11-30 PROCEDURE — 3078F DIAST BP <80 MM HG: CPT | Performed by: INTERNAL MEDICINE

## 2022-11-30 PROCEDURE — 3074F SYST BP LT 130 MM HG: CPT | Performed by: INTERNAL MEDICINE

## 2022-11-30 PROCEDURE — 93000 ELECTROCARDIOGRAM COMPLETE: CPT | Performed by: INTERNAL MEDICINE

## 2022-11-30 RX ORDER — HYDROXYZINE HYDROCHLORIDE 25 MG/1
TABLET, FILM COATED ORAL
COMMUNITY
Start: 2022-10-06

## 2022-11-30 NOTE — LETTER
[facility] [address]  Phone: [phone]  Fax: [phone]    Mateo Leger MD        November 30, 2022     Patient: Jnaa Whatley   YOB: 1960   Date of Visit: 11/30/2022       To Whom It May Concern: It is my medical opinion that De Jesus Iha 1960 may proceed at moderate cardiac risk for non-cardiac surgery. Instructed to keep taking Plavix 75 mg once daily and aspirin 81 mg once daily. If you have any questions or concerns, please don't hesitate to call.     Sincerely,        Mateo Leger MD

## 2022-11-30 NOTE — PROGRESS NOTES
[id number]  [facility]   Cardiovascular Evaluation    PATIENT: Antoine Skaggs  DATE: 2022  MRN: [id number]  CSN: [id number]  : 1960    Primary Care Doctor/Referring provider: Francisco Salcedo MD, No admitting provider for patient encounter. Reason for evaluation/Chief complaint:   Cardiac Clearance, Coronary Artery Disease, Cardiomyopathy, Hypertension, and Hyperlipidemia      Subjective:    History of present illness on initial date of evaluation:   Paddy Walters is a 60 y.o. male patient who presents for cardiology follow up. He has a past medical history including coronary artery disease, cardiomyopathy, hyperlipidemia, and diabetes mellitus. He presented to ED on 22 with chest pain and was found to have inferior STEMI. He underwent emergent left heart catheterization 22 showing 100% right coronary artery occlusion treated with drug eluting stent. Echocardiogram showed ejection fraction 40-45%. He has previous smoking history of 3 ppd for 30 years quit in 2001. Since last office visit he had an echocardiogram 06/15/22 EF 45-50%, mild LVH, and mild MR. He was referred to pulmonary given his shortness of breath, however no consultation has been observed. Today he states he is here for cardiac clearance for hand surgery with Dr. Rom Cooley. He states his breathing has improved since medication changes from Brilinta to Plavix. Patient currently denies any weight gain, edema, palpitations, chest pain, shortness of breath, dizziness, and syncope. He is taking medications as prescribed, tolerating well.        Patient Active Problem List   Diagnosis    Primary hypertension    Hyperlipidemia    Type 2 diabetes mellitus with diabetic neuropathy, with long-term current use of insulin ([facility])    Chronic foot pain    CTS (carpal tunnel syndrome)    Peripheral neuropathy    Urinary frequency    Anxiety    Elbow pain    Pain of finger of left hand    Lateral epicondylitis    Primary osteoarthritis of first carpometacarpal joint of left hand    Acute sciatica    Incarcerated umbilical hernia    Lumbar radiculitis    Arthritis, midfoot    Cervical radiculopathy at C7    Degeneration of lumbar or lumbosacral intervertebral disc    Displacement of lumbar intervertebral disc without myelopathy    Lumbosacral spondylosis without myelopathy    Spinal stenosis, lumbar region, without neurogenic claudication    Disc displacement, lumbar    Lumbar facet arthropathy    Lumbar stenosis    Neck pain, chronic    Impingement syndrome of right shoulder    Incomplete tear of right rotator cuff    Shoulder impingement    Incomplete tear of left rotator cuff    Occult blood in stools    Arthritis of carpometacarpal (CMC) joint of right thumb    Polyp of transverse colon    Polyp of descending colon    Adenomatous polyps    Obesity with serious comorbidity    Vitamin D deficiency    Acute upper GI hemorrhage    Esophageal thickening    Lactic acidosis    Elevated serum creatinine    Intractable hiccups    Leukocytosis    Hypotension due to hypovolemia    Sinus tachycardia    Polycythemia    STEMI (ST elevation myocardial infarction) ([address]    Coronary artery disease involving native coronary artery of native heart without angina pectoris    Ischemic cardiomyopathy    SOB (shortness of breath)    Encounter for pre-operative cardiovascular clearance         Cardiac Testing: I have reviewed the findings below.   EKG:  ECHO:   STRESS TEST:  CATH:  BYPASS:  VASCULAR:    Past Medical History:   has a past medical history of Adenomatous polyps, Anxiety, CTS (carpal tunnel syndrome), CTS (carpal tunnel syndrome), Diabetes mellitus ([address] GERD (gastroesophageal reflux disease), Hyperlipidemia, Hypertension, Obesity with serious comorbidity, Peripheral neuropathy, Type II or unspecified type diabetes mellitus without mention of complication, not stated as uncontrolled, and Urinary frequency. Surgical History:   has a past surgical history that includes Appendectomy; Carpal tunnel release; hernia repair (10/12/15); Hand Arthroplasty (Left); pr shldr arthroscop,surg,w/rotat cuff repr (Left, 11/26/2018); Colonoscopy (N/A, 4/30/2019); Colonoscopy (N/A, 4/30/2019); Upper gastrointestinal endoscopy (11/04/2021); and Upper gastrointestinal endoscopy (N/A, 11/4/2021). Social History:   reports that he quit smoking about 21 years ago. His smoking use included cigarettes. He has a 93.00 pack-year smoking history. He has never used smokeless tobacco. He reports that he does not drink alcohol and does not use drugs. Family History:  No evidence for sudden cardiac death or premature CAD    Medications:  Reviewed and are listed in nursing record. and/or listed below  Outpatient Medications:  Prior to Admission medications    Medication Sig Start Date End Date Taking?  Authorizing Provider   hydrOXYzine HCl (ATARAX) 25 MG tablet  10/6/22  Yes Historical Provider, MD   clopidogrel (PLAVIX) 75 MG tablet Take 1 tablet by mouth daily TAKE ONE TABLET BY MOUTH DAILY 10/5/22  Yes ANA Redding CNP   atorvastatin (LIPITOR) 80 MG tablet Take 1 tablet by mouth nightly 7/20/22  Yes Jonathan Hatchet, MD   nitroGLYCERIN (NITROSTAT) 0.4 MG SL tablet Place 1 tablet under the tongue every 5 minutes as needed for Chest pain 3/10/22  Yes Jonathan Hatchet, MD   aspirin 81 MG chewable tablet Take 1 tablet by mouth daily 3/8/22  Yes Santos Travis MD   carvedilol (COREG) 3.125 MG tablet Take 1 tablet by mouth 2 times daily (with meals) 3/8/22  Yes Santos Travis MD   pantoprazole (PROTONIX) 40 MG tablet Take 1 tablet by mouth 2 times daily (before meals) 11/4/21  Yes Elia Castro MD   fenofibrate (TRICOR) 145 MG tablet Take 145 mg by mouth daily   Yes Historical Provider, MD   empagliflozin (JARDIANCE) 25 MG tablet Take 25 mg by mouth daily   Yes Historical Provider, MD   divalproex (DEPAKOTE ER) 250 MG extended release tablet Take 250 mg by mouth every morning   Yes Historical Provider, MD   divalproex (DEPAKOTE) 500 MG DR tablet Take 500 mg by mouth every evening   Yes Historical Provider, MD   insulin glargine (BASAGLAR KWIKPEN) 100 UNIT/ML injection pen Inject 25-30 Units into the skin 2 times daily  Patient taking differently: Inject 20 Units into the skin 2 times daily 2/19/21  Yes ANA Hooper CNP   NOVOLOG FLEXPEN 100 UNIT/ML injection pen Inject 10 Units into the skin 2 times daily  12/8/20  Yes Historical Provider, MD   tamsulosin (FLOMAX) 0.4 MG capsule Take 0.4 mg by mouth daily  9/6/20  Yes Historical Provider, MD   sertraline (ZOLOFT) 50 MG tablet Take 100 mg by mouth daily  8/17/20  Yes Historical Provider, MD   Blood Glucose Monitoring Suppl (Hansa Magallon) w/Device KIT 1 Device by Does not apply route once for 1 dose 7/7/20 11/30/22 Yes ANA Peacock CNP   blood glucose test strips (ONETOUCH VERIO) strip 1 each by In Vitro route 3 times daily As needed.  7/7/20  Yes ANA Peacock CNP   ONE TOUCH ULTRASOFT LANCETS MISC 1 each by Does not apply route 3 times daily 7/7/20  Yes ANA Peacock CNP   blood glucose test strips (FREESTYLE LITE) strip USE TO TEST 3 TIMES DAILY AS NEEDED E11.9 GIVE WHAT IS COVERED. 7/6/20  Yes ANA Peacock CNP   Blood Glucose Monitoring Suppl (ACCU-CHEK KATHIA PLUS) w/Device KIT 1 Device by Does not apply route once for 1 dose 12/30/19 11/30/22 Yes ANA Peacock CNP   SOFT TOUCH LANCETS MISC 1 Device by Does not apply route 3 times daily 7/19/19  Yes ANA Peacock CNP   [employer] LANCETS MICRO THIN 33G MISC USE TO TEST BLOOD SUGAR 1-2 TIMES A DAY MAY TEST MORE IF BLOOD SUGAR SEEMS LOW 5/13/19  Yes ANA Peacock CNP   Insulin Pen Needle (PEN NEEDLES) 31G X 6 MM MISC TEST BLOOD SUGARS ONCE DAILY FOR E11.42 3/14/19  Yes ANA Peacock - CNP oxyCODONE-acetaminophen (PERCOCET) 7.5-325 MG per tablet Take 1 tablet by mouth every 8 hours as needed for Pain (three times daily). Yes Tangela Nguyen MD   Alcohol Swabs (ALCOHOL PREP) 70 % PADS USE ONCE DAILY FOR [facility] INJECTION 3/28/18  Yes Reid Gonzalez APRN - CNP   ondansetron (ZOFRAN-ODT) 4 MG disintegrating tablet Take 4 mg by mouth daily  Patient not taking: No sig reported    Historical Provider, MD   lidocaine (LIDODERM) 5 % PLACE 3 PATCHES ONTO THE SKIN EVERY 24 HOURS, 12 HOURS ON AND 12 HOURS OFF  Patient not taking: No sig reported 5/24/20   Reid Hauseri, APRN - CNP   APPLE CIDER VINEGAR PO Take by mouth   Patient not taking: No sig reported    Historical Provider, MD       In-patient schedule medications:        Infusion Medications: Allergies:  Sulfa antibiotics and Victoza [liraglutide]     Review of Systems:   All 14 point review of symptoms completed. Pertinent positives identified in the HPI, all other review of symptoms findings as below.      Review of Systems - History obtained from the patient  General ROS: negative for - chills, fever or night sweats  Psychological ROS: negative for - disorientation or hallucinations  Ophthalmic ROS: negative for - dry eyes, eye pain or loss of vision  ENT ROS: negative for - nasal discharge or sore throat  Allergy and Immunology ROS: negative for - hives or itchy/watery eyes  Hematological and Lymphatic ROS: negative for - jaundice or night sweats  Endocrine ROS: negative for - mood swings or temperature intolerance  Breast ROS: deferred  Respiratory ROS: negative for - hemoptysis or stridor  Gastrointestinal ROS: no abdominal pain, change in bowel habits, or black or bloody stools  Genito-Urinary ROS: no dysuria, trouble voiding, or hematuria  Musculoskeletal ROS: negative for - gait disturbance, joint pain or joint stiffness  Neurological ROS: negative for - seizures or speech problems  Dermatological ROS: negative for - rash or skin lesion changes      Physical Examination:    /76   Pulse 91   Ht 5' 10\" (1.778 m)   Wt 216 lb 12.8 oz (98.3 kg)   SpO2 97%   BMI 31.11 kg/m²   /76   Pulse 91   Ht 5' 10\" (1.778 m)   Wt 216 lb 12.8 oz (98.3 kg)   SpO2 97%   BMI 31.11 kg/m²    Weight: 216 lb 12.8 oz (98.3 kg)     Wt Readings from Last 3 Encounters:   11/30/22 216 lb 12.8 oz (98.3 kg)   11/01/22 212 lb (96.2 kg)   06/02/22 212 lb (96.2 kg)     No intake or output data in the 24 hours ending 11/30/22 1211    General Appearance:  Alert, cooperative, no distress, appears stated age   Head:  Normocephalic, without obvious abnormality, atraumatic   Eyes:  PERRL, conjunctiva/corneas clear       Nose: Nares normal, no drainage or sinus tenderness   Throat: Lips, mucosa, and tongue normal   Neck: Supple, symmetrical, trachea midline, no adenopathy, thyroid: not enlarged, symmetric, no tenderness/mass/nodules, no carotid bruit or JVD       Lungs:   Clear to auscultation bilaterally, respirations unlabored   Chest Wall:  No tenderness or deformity   Heart:  Regular rhythm and normal rate; S1, S2 are normal; no murmur noted; no rub or gallop   Abdomen:   Soft, non-tender, bowel sounds active all four quadrants,  no masses, no organomegaly           Extremities: Extremities normal, atraumatic, no cyanosis or edema   Pulses: 2+ and symmetric   Skin: Skin color, texture, turgor normal, no rashes or lesions   Pysch: Normal mood and affect   Neurologic: Normal gross motor and sensory exam.         Labs  No results for input(s): WBC, HGB, HCT, MCV, PLT in the last 72 hours. No results for input(s): CREATININE, BUN, NA, K, CL, CO2 in the last 72 hours. No results for input(s): INR, PROTIME in the last 72 hours. No results for input(s): TROPONINI in the last 72 hours. Invalid input(s): PRO-BNP  No results for input(s): CHOL, LDL, HDL in the last 72 hours.     Invalid input(s): TG      Imaging:  I have reviewed the below testing personally and my interpretation is below. EKG:  CXR:      Assessment:  60 y.o. patient with:  Active Problems:    * No active hospital problems. *  Resolved Problems:    * No resolved hospital problems. *      Problem List Items Addressed This Visit       Coronary artery disease involving native coronary artery of native heart without angina pectoris - Primary    Relevant Orders    EKG 12 lead (Completed)    Encounter for pre-operative cardiovascular clearance    Primary hypertension    Hyperlipidemia         Plan:  1. Antwan Skaggs 1960 may proceed at moderate cardiac risk for non-cardiac surgery. Instructed to keep taking Plavix 75 mg once daily and aspirin 81 mg once daily. 2. If physician can not perform given medication instructions, will have to await for 03/2023 given history of MI in 03/2022.  3. Follow up in March 2023. Scribe's attestation: This note was scribed in the presence of Arielle Hill by Ivis Francisco RN     I, Dr. Marie Cameron, personally performed the services described in this documentation, as scribed by the above signed scribe in my presence. It is both accurate and complete to my knowledge. I agree with the details independently gathered by the clinical support staff, while the remaining scribed note accurately describes my personal service to the patient. Medical Decision Making: The following items were considered in medical decision making:  Independent review of images  Review / order clinical lab tests  Review / order radiology tests  Decision to obtain old records  Review and summation of old records as accessed through [facility] (a summary of my findings in these old records)      Time Based Itemization  A total of 30 minutes was spent on today's patient encounter.   If applicable, non-patient-facing activities:  (X)Preparing to see the patient and reviewing records  (X) Individual interpretation of results  ( ) Discussion or coordination of care with other health care professionals  () Ordering of unique tests, medications, or procedures  (X) Documentation within the EHR              All questions and concerns were addressed to the patient/family. Alternatives to my treatment were discussed. The note was completed using EMR. Every effort was made to ensure accuracy; however, inadvertent computerized transcription errors may be present.     Cami Gonzáles MD, [address], [facility]  [phone] [facility]  [phone] [facility]  11/30/2022  12:11 PM

## 2022-11-30 NOTE — PATIENT INSTRUCTIONS
Plan:  1. Xavi Skaggs 1960 may proceed at moderate cardiac risk for non-cardiac surgery. Instructed to keep taking Plavix 75 mg once daily and aspirin 81 mg once daily. 2. If physician can not perform given medication instructions, will have to await for 03/2023 given history of MI in 03/2022.  3. Follow up in March 2023.

## 2022-12-30 PROBLEM — Z01.810 ENCOUNTER FOR PRE-OPERATIVE CARDIOVASCULAR CLEARANCE: Status: RESOLVED | Noted: 2022-11-30 | Resolved: 2022-12-30

## 2023-01-03 RX ORDER — ATORVASTATIN CALCIUM 80 MG/1
TABLET, FILM COATED ORAL
Qty: 90 TABLET | Refills: 3 | Status: SHIPPED | OUTPATIENT
Start: 2023-01-03

## 2023-03-10 NOTE — CARE COORDINATION
[facility] Transitions Follow Up Call    3/23/2022    Patient: Ladarius Calles  Patient : 1960   MRN: [id number]  Reason for Admission: Cardiac cath   Discharge Date: 3/8/22 RARS: Readmission Risk Score: 13.5 ( )         Spoke with: [address] with patient who reported he is doing alright and denied any cp at this time. Patient did report some sob and that his provider informed patient it could be d/t the medications and may take a couple more weeks to resolve. Patient reported his cardiac cath site is healed and denied any other questions or concerns. Care Transitions Subsequent and Final Call    Subsequent and Final Calls  Care Transitions Interventions  Other Interventions:            Follow Up  Future Appointments   Date Time Provider Jacki Sue   2022  2:30 PM MD Liyah Lilly, RN, [facility]  Care Transition Nurse  [phone] mobile
sent/unknown

## 2023-10-02 ENCOUNTER — OFFICE VISIT (OUTPATIENT)
Dept: CARDIOLOGY CLINIC | Age: 63
End: 2023-10-02
Payer: MEDICARE

## 2023-10-02 ENCOUNTER — HOSPITAL ENCOUNTER (OUTPATIENT)
Age: 63
Discharge: HOME | End: 2023-10-02
Payer: MEDICARE

## 2023-10-02 ENCOUNTER — TELEPHONE (OUTPATIENT)
Dept: CARDIOLOGY CLINIC | Age: 63
End: 2023-10-02

## 2023-10-02 VITALS
OXYGEN SATURATION: 96 % | BODY MASS INDEX: 30.26 KG/M2 | SYSTOLIC BLOOD PRESSURE: 107 MMHG | HEART RATE: 80 BPM | HEIGHT: 70 IN | DIASTOLIC BLOOD PRESSURE: 72 MMHG | WEIGHT: 211.4 LBS

## 2023-10-02 DIAGNOSIS — I25.10 CORONARY ARTERY DISEASE INVOLVING NATIVE CORONARY ARTERY OF NATIVE HEART WITHOUT ANGINA PECTORIS: Primary | ICD-10-CM

## 2023-10-02 DIAGNOSIS — I25.5 ISCHEMIC CARDIOMYOPATHY: ICD-10-CM

## 2023-10-02 DIAGNOSIS — R06.02 SOB (SHORTNESS OF BREATH): ICD-10-CM

## 2023-10-02 DIAGNOSIS — Z79.4 TYPE 2 DIABETES MELLITUS WITH DIABETIC NEUROPATHY, WITH LONG-TERM CURRENT USE OF INSULIN (HCC): ICD-10-CM

## 2023-10-02 DIAGNOSIS — E11.40 TYPE 2 DIABETES MELLITUS WITH DIABETIC NEUROPATHY, WITH LONG-TERM CURRENT USE OF INSULIN (HCC): ICD-10-CM

## 2023-10-02 DIAGNOSIS — I25.10 CORONARY ARTERY DISEASE INVOLVING NATIVE CORONARY ARTERY OF NATIVE HEART WITHOUT ANGINA PECTORIS: ICD-10-CM

## 2023-10-02 DIAGNOSIS — E78.2 MIXED HYPERLIPIDEMIA: ICD-10-CM

## 2023-10-02 LAB
ALBUMIN SERPL-MCNC: 4 G/DL (ref 3.4–5)
ALBUMIN/GLOB SERPL: 1.5 {RATIO} (ref 1.1–2.2)
ALP SERPL-CCNC: 86 U/L (ref 40–129)
ALT SERPL-CCNC: 10 U/L (ref 10–40)
ANION GAP SERPL CALCULATED.3IONS-SCNC: 9 MMOL/L (ref 3–16)
AST SERPL-CCNC: 12 U/L (ref 15–37)
BASOPHILS # BLD: 0 K/UL (ref 0–0.2)
BASOPHILS NFR BLD: 0.7 %
BILIRUB SERPL-MCNC: 0.9 MG/DL (ref 0–1)
BUN SERPL-MCNC: 16 MG/DL (ref 7–20)
CALCIUM SERPL-MCNC: 9.3 MG/DL (ref 8.3–10.6)
CHLORIDE SERPL-SCNC: 104 MMOL/L (ref 99–110)
CO2 SERPL-SCNC: 28 MMOL/L (ref 21–32)
CREAT SERPL-MCNC: 1.3 MG/DL (ref 0.8–1.3)
DEPRECATED RDW RBC AUTO: 14.1 % (ref 12.4–15.4)
EOSINOPHIL # BLD: 0.2 K/UL (ref 0–0.6)
EOSINOPHIL NFR BLD: 4 %
GFR SERPLBLD CREATININE-BSD FMLA CKD-EPI: >60 ML/MIN/{1.73_M2}
GLUCOSE SERPL-MCNC: 283 MG/DL (ref 70–99)
HCT VFR BLD AUTO: 49.8 % (ref 40.5–52.5)
HGB BLD-MCNC: 16.7 G/DL (ref 13.5–17.5)
LYMPHOCYTES # BLD: 1.6 K/UL (ref 1–5.1)
LYMPHOCYTES NFR BLD: 28.8 %
MCH RBC QN AUTO: 27.8 PG (ref 26–34)
MCHC RBC AUTO-ENTMCNC: 33.6 G/DL (ref 31–36)
MCV RBC AUTO: 82.7 FL (ref 80–100)
MONOCYTES # BLD: 0.5 K/UL (ref 0–1.3)
MONOCYTES NFR BLD: 9.3 %
NEUTROPHILS # BLD: 3.2 K/UL (ref 1.7–7.7)
NEUTROPHILS NFR BLD: 57.2 %
PLATELET # BLD AUTO: 130 K/UL (ref 135–450)
PMV BLD AUTO: 9.8 FL (ref 5–10.5)
POTASSIUM SERPL-SCNC: 3.9 MMOL/L (ref 3.5–5.1)
PROT SERPL-MCNC: 6.6 G/DL (ref 6.4–8.2)
RBC # BLD AUTO: 6.02 M/UL (ref 4.2–5.9)
SODIUM SERPL-SCNC: 141 MMOL/L (ref 136–145)
WBC # BLD AUTO: 5.6 K/UL (ref 4–11)

## 2023-10-02 PROCEDURE — 85025 COMPLETE CBC W/AUTO DIFF WBC: CPT

## 2023-10-02 PROCEDURE — 80061 LIPID PANEL: CPT

## 2023-10-02 PROCEDURE — 3074F SYST BP LT 130 MM HG: CPT | Performed by: INTERNAL MEDICINE

## 2023-10-02 PROCEDURE — 84443 ASSAY THYROID STIM HORMONE: CPT

## 2023-10-02 PROCEDURE — 80053 COMPREHEN METABOLIC PANEL: CPT

## 2023-10-02 PROCEDURE — 99214 OFFICE O/P EST MOD 30 MIN: CPT | Performed by: INTERNAL MEDICINE

## 2023-10-02 PROCEDURE — 93000 ELECTROCARDIOGRAM COMPLETE: CPT | Performed by: INTERNAL MEDICINE

## 2023-10-02 PROCEDURE — 83036 HEMOGLOBIN GLYCOSYLATED A1C: CPT

## 2023-10-02 PROCEDURE — 36415 COLL VENOUS BLD VENIPUNCTURE: CPT

## 2023-10-02 PROCEDURE — 3078F DIAST BP <80 MM HG: CPT | Performed by: INTERNAL MEDICINE

## 2023-10-02 NOTE — TELEPHONE ENCOUNTER
Pt returned call message given. Per pt he is a Type 1 diabetic. Stated 283 is actually a good # for him, Usually is a lot higher    Stated he did take 2 drinks of Tea prior.

## 2023-10-02 NOTE — PATIENT INSTRUCTIONS
Plan:  Limited Echocardiogram to view size and strength of the heart and EF   Labs - A1C, Lipids, CBC, CMP and TSH   We will call you with the results of the testing  EKG in office today  No change in medications today     Your provider has ordered testing for further evaluation. An order/prescription has been included in your paper work. To schedule outpatient testing, contact Central Scheduling by calling [facility] ([phone]).

## 2023-10-02 NOTE — TELEPHONE ENCOUNTER
----- Message from Okey Cranker, MD sent at 10/2/2023 12:45 PM EDT -----  CBC is OK Blood sugar is high at 283  it is fasting. Rest of the blood test is pending.

## 2023-10-02 NOTE — PROGRESS NOTES
[facility] Office Note  10/2/2023     Subjective:  Oralee Yuan Skaggs 58 y.o. male with a past medical history notable for CAD, ischemic cardiomyopathy, HLD, HTN, STEMI stent RCA  DM 2,      [place]:  Patient previously seen by Dr. Francisco Gilmore. LOV 11/30/22. Transferring due to location and father as seen me as well. Today he reports feeling ok. He states it has been 5 weeks since his father passed. He states he does get SOB with exertion. He states his BSugar is running high. States his monitor will read \"HIGH\" a lot. PMH:  CAD, ischemic cardiomyopathy, HLD, HTN, ST, STEMI,  DM 2,  spinal stenosis, vitamin D def, and anxiety. He presented to ED on 03/06/22 with chest pain and was found to have inferior STEMI. He underwent emergent left heart catheterization 03/06/22 showing 100% right coronary artery occlusion treated with drug eluting stent. Echocardiogram showed ejection fraction 40-45%. He has previous smoking history of 3 ppd for 30 years quit in 01/2001. Echocardiogram 06/15/22 EF 45-50%, mild LVH, and mild MR. Review of Systems:         12 point ROS negative in all areas as listed below except as in [place]  Constitutional, EENT, GI, , Musculoskeletal, skin, neurological, hematological, endocrine, Psychiatric    Reviewed past medical history, social, and family history. Stopped smoking 15 years ago.    Smoked 31 years 3 PPD    Past Medical History:   Diagnosis Date    Adenomatous polyps     scoped 2019;acc to pt ,repeat in one yr    Anxiety     CTS (carpal tunnel syndrome)     CTS (carpal tunnel syndrome)     Diabetes mellitus (HCC)     GERD (gastroesophageal reflux disease)     has had stricture dilated mult yrs ago    Hyperlipidemia     Hypertension     Obesity with serious comorbidity 9/9/2020    Peripheral neuropathy     Type II or unspecified type diabetes mellitus without mention of complication, not stated as uncontrolled     Urinary frequency      Past Surgical History:   Procedure

## 2023-10-03 ENCOUNTER — TELEPHONE (OUTPATIENT)
Dept: CARDIOLOGY CLINIC | Age: 63
End: 2023-10-03

## 2023-10-03 LAB
CHOLEST SERPL-MCNC: 134 MG/DL (ref 0–199)
EST. AVERAGE GLUCOSE BLD GHB EST-MCNC: 286.2 MG/DL
HBA1C MFR BLD: 11.6 %
HDLC SERPL-MCNC: 28 MG/DL (ref 40–60)
LDL CHOLESTEROL CALCULATED: 73 MG/DL
TRIGL SERPL-MCNC: 164 MG/DL (ref 0–150)
TSH SERPL DL<=0.005 MIU/L-ACNC: 2.16 UIU/ML (ref 0.27–4.2)
VLDLC SERPL CALC-MCNC: 33 MG/DL

## 2023-10-03 NOTE — TELEPHONE ENCOUNTER
----- Message from Jose Alexander MD sent at 10/3/2023 10:41 AM EDT -----  A! C is very high 11.6 needs to follow up with his PCP re better blood sugar control  ----- Message -----  From: Tani Williamson Incoming Lab Results From [facility] (Estevan Monzon)  Sent: 10/2/2023  11:33 AM EDT  To: Jose Alexander MD

## 2023-10-06 RX ORDER — CLOPIDOGREL BISULFATE 75 MG/1
75 TABLET ORAL DAILY
Qty: 90 TABLET | Refills: 3 | Status: SHIPPED | OUTPATIENT
Start: 2023-10-06

## 2023-10-13 ENCOUNTER — HOSPITAL ENCOUNTER (OUTPATIENT)
Dept: NON INVASIVE DIAGNOSTICS | Age: 63
Discharge: HOME | End: 2023-10-13
Attending: INTERNAL MEDICINE
Payer: MEDICARE

## 2023-10-13 DIAGNOSIS — R06.02 SOB (SHORTNESS OF BREATH): ICD-10-CM

## 2023-10-13 DIAGNOSIS — I25.5 ISCHEMIC CARDIOMYOPATHY: ICD-10-CM

## 2023-10-13 PROCEDURE — 93325 DOPPLER ECHO COLOR FLOW MAPG: CPT

## 2023-10-13 PROCEDURE — 93308 TTE F-UP OR LMTD: CPT

## 2023-10-16 ENCOUNTER — TELEPHONE (OUTPATIENT)
Dept: CARDIOLOGY CLINIC | Age: 63
End: 2023-10-16

## 2023-10-16 NOTE — TELEPHONE ENCOUNTER
----- Message from Lizet Parikh MD sent at 10/15/2023  4:19 PM EDT -----  Heart pump function is slightly improved compared to 2022 continue current medications.

## 2023-11-08 RX ORDER — NITROGLYCERIN 0.4 MG/1
TABLET SUBLINGUAL
Qty: 25 TABLET | Refills: 0 | Status: SHIPPED | OUTPATIENT
Start: 2023-11-08

## 2023-12-11 RX ORDER — ATORVASTATIN CALCIUM 80 MG/1
TABLET, FILM COATED ORAL
Qty: 90 TABLET | Refills: 3 | Status: SHIPPED | OUTPATIENT
Start: 2023-12-11

## 2024-04-09 NOTE — PROGRESS NOTES
[facility] Office Note  4/10/2024   Patient previously seen by Dr. Duarte.  LOV 11/30/22.  Changing to this office due proximity to his residence.       Subjective:  Matthieu Skaggs 63 y.o. male with a past medical history notable for CAD, ischemic cardiomyopathy, HLD, HTN, STEMI stent RCA 3/2022,  DM 2,    C/o weight loss    [place]:    Limited echo 10/13/23 showed EF=50-55%.  Trivial MR and TR.     Today, 4/10/24 he reports he was sick for a few days and dropped about 10 pounds.  He is feeling good now.  He denies any problems.  He is not having any difficulty sleeping or breathing.  He worries about his sister being alone who is living in his dad's house. Patient denies current edema, chest pain, sob, palpitations, dizziness or syncope.  Patient is taking all cardiac medications as prescribed and tolerates them well.      PMH:  CAD, ischemic cardiomyopathy, HLD, HTN, ST, STEMI,  DM 2,  spinal stenosis, vitamin D def, and anxiety.  He presented to ED on 03/06/22 with chest pain and was found to have inferior STEMI. He underwent emergent left heart catheterization 03/06/22 showing 100% right coronary artery occlusion treated with drug eluting stent. Echocardiogram showed ejection fraction 40-45%. He has previous smoking history of 3 ppd for 30 years quit in 01/2001.  Echocardiogram 06/15/22 EF 45-50%, mild LVH, and mild MR.    Review of Systems:         12 point ROS negative in all areas as listed below except as in [place]  Constitutional, EENT, GI, , Musculoskeletal, skin, neurological, hematological, endocrine, Psychiatric    Reviewed past medical history, social, and family history.   Stopped smoking 15 years ago.   Smoked 31 years 3 PPD    Past Medical History:   Diagnosis Date    Adenomatous polyps     scoped 2019;acc to pt ,repeat in one yr    Anxiety     CTS (carpal tunnel syndrome)     CTS (carpal tunnel syndrome)     Diabetes mellitus (HCC)     GERD (gastroesophageal reflux disease)     has had

## 2024-04-10 ENCOUNTER — OFFICE VISIT (OUTPATIENT)
Dept: CARDIOLOGY CLINIC | Age: 64
End: 2024-04-10
Payer: MEDICARE

## 2024-04-10 VITALS
HEIGHT: 72 IN | HEART RATE: 97 BPM | SYSTOLIC BLOOD PRESSURE: 110 MMHG | BODY MASS INDEX: 28.42 KG/M2 | DIASTOLIC BLOOD PRESSURE: 64 MMHG | WEIGHT: 209.8 LBS | OXYGEN SATURATION: 96 %

## 2024-04-10 DIAGNOSIS — E11.40 TYPE 2 DIABETES MELLITUS WITH DIABETIC NEUROPATHY, WITH LONG-TERM CURRENT USE OF INSULIN (HCC): ICD-10-CM

## 2024-04-10 DIAGNOSIS — I25.5 ISCHEMIC CARDIOMYOPATHY: ICD-10-CM

## 2024-04-10 DIAGNOSIS — E78.2 MIXED HYPERLIPIDEMIA: ICD-10-CM

## 2024-04-10 DIAGNOSIS — I10 PRIMARY HYPERTENSION: ICD-10-CM

## 2024-04-10 DIAGNOSIS — I25.10 CORONARY ARTERY DISEASE INVOLVING NATIVE CORONARY ARTERY OF NATIVE HEART WITHOUT ANGINA PECTORIS: Primary | ICD-10-CM

## 2024-04-10 DIAGNOSIS — Z79.4 TYPE 2 DIABETES MELLITUS WITH DIABETIC NEUROPATHY, WITH LONG-TERM CURRENT USE OF INSULIN (HCC): ICD-10-CM

## 2024-04-10 PROCEDURE — 3074F SYST BP LT 130 MM HG: CPT | Performed by: INTERNAL MEDICINE

## 2024-04-10 PROCEDURE — 3078F DIAST BP <80 MM HG: CPT | Performed by: INTERNAL MEDICINE

## 2024-04-10 PROCEDURE — 99214 OFFICE O/P EST MOD 30 MIN: CPT | Performed by: INTERNAL MEDICINE

## 2024-04-10 RX ORDER — DAPAGLIFLOZIN 10 MG/1
10 TABLET, FILM COATED ORAL EVERY MORNING
Qty: 90 TABLET | Refills: 3 | Status: SHIPPED | OUTPATIENT
Start: 2024-04-10

## 2024-04-10 RX ORDER — DULAGLUTIDE 0.75 MG/.5ML
INJECTION, SOLUTION SUBCUTANEOUS
COMMUNITY
Start: 2024-02-05

## 2024-04-10 RX ORDER — CLOPIDOGREL BISULFATE 75 MG/1
75 TABLET ORAL DAILY
Qty: 90 TABLET | Refills: 3 | Status: SHIPPED | OUTPATIENT
Start: 2024-04-10

## 2024-04-10 RX ORDER — DAPAGLIFLOZIN 10 MG/1
TABLET, FILM COATED ORAL
COMMUNITY
Start: 2024-04-08 | End: 2024-04-10 | Stop reason: SDUPTHER

## 2024-04-10 RX ORDER — CARVEDILOL 3.12 MG/1
3.12 TABLET ORAL 2 TIMES DAILY WITH MEALS
Qty: 180 TABLET | Refills: 3 | Status: SHIPPED | OUTPATIENT
Start: 2024-04-10

## 2024-04-10 RX ORDER — FENOFIBRATE 145 MG/1
145 TABLET, COATED ORAL DAILY
Qty: 90 TABLET | Refills: 3 | Status: SHIPPED | OUTPATIENT
Start: 2024-04-10

## 2024-04-10 RX ORDER — NITROGLYCERIN 0.4 MG/1
TABLET SUBLINGUAL
Qty: 25 TABLET | Refills: 2 | Status: SHIPPED | OUTPATIENT
Start: 2024-04-10

## 2024-04-10 RX ORDER — ATORVASTATIN CALCIUM 80 MG/1
80 TABLET, FILM COATED ORAL NIGHTLY
Qty: 90 TABLET | Refills: 3 | Status: SHIPPED | OUTPATIENT
Start: 2024-04-10

## 2024-04-10 NOTE — PATIENT INSTRUCTIONS
Plan:  Labs reviewed in epic and discussed with patient.  Current medications reviewed.  Refills given as warranted.  Recommend you continue to follow with pcp to get better control of your diabetes.  No cardiac testing at this time.    Follow up with me in 6 months

## 2024-04-19 ENCOUNTER — OFFICE VISIT (OUTPATIENT)
Dept: FAMILY MEDICINE CLINIC | Age: 64
End: 2024-04-19

## 2024-04-19 VITALS
BODY MASS INDEX: 29.35 KG/M2 | HEIGHT: 70 IN | DIASTOLIC BLOOD PRESSURE: 70 MMHG | SYSTOLIC BLOOD PRESSURE: 104 MMHG | WEIGHT: 205 LBS

## 2024-04-19 DIAGNOSIS — D69.6 THROMBOCYTOPENIA, UNSPECIFIED (HCC): ICD-10-CM

## 2024-04-19 DIAGNOSIS — I50.22 CHRONIC SYSTOLIC (CONGESTIVE) HEART FAILURE (HCC): ICD-10-CM

## 2024-04-19 DIAGNOSIS — I25.10 CORONARY ARTERY DISEASE INVOLVING NATIVE CORONARY ARTERY OF NATIVE HEART WITHOUT ANGINA PECTORIS: ICD-10-CM

## 2024-04-19 DIAGNOSIS — Z76.89 ENCOUNTER TO ESTABLISH CARE: Primary | ICD-10-CM

## 2024-04-19 DIAGNOSIS — I21.11 ST ELEVATION MYOCARDIAL INFARCTION INVOLVING RIGHT CORONARY ARTERY (HCC): ICD-10-CM

## 2024-04-19 DIAGNOSIS — Z79.4 TYPE 2 DIABETES MELLITUS WITH DIABETIC NEUROPATHY, WITH LONG-TERM CURRENT USE OF INSULIN (HCC): ICD-10-CM

## 2024-04-19 DIAGNOSIS — N18.31 STAGE 3A CHRONIC KIDNEY DISEASE (HCC): ICD-10-CM

## 2024-04-19 DIAGNOSIS — E11.40 TYPE 2 DIABETES MELLITUS WITH DIABETIC NEUROPATHY, WITH LONG-TERM CURRENT USE OF INSULIN (HCC): ICD-10-CM

## 2024-04-19 LAB — HBA1C MFR BLD: 8.9 %

## 2024-04-19 SDOH — ECONOMIC STABILITY: FOOD INSECURITY: WITHIN THE PAST 12 MONTHS, THE FOOD YOU BOUGHT JUST DIDN'T LAST AND YOU DIDN'T HAVE MONEY TO GET MORE.: NEVER TRUE

## 2024-04-19 SDOH — ECONOMIC STABILITY: HOUSING INSECURITY
IN THE LAST 12 MONTHS, WAS THERE A TIME WHEN YOU DID NOT HAVE A STEADY PLACE TO SLEEP OR SLEPT IN A SHELTER (INCLUDING NOW)?: NO

## 2024-04-19 SDOH — ECONOMIC STABILITY: FOOD INSECURITY: WITHIN THE PAST 12 MONTHS, YOU WORRIED THAT YOUR FOOD WOULD RUN OUT BEFORE YOU GOT MONEY TO BUY MORE.: NEVER TRUE

## 2024-04-19 SDOH — ECONOMIC STABILITY: INCOME INSECURITY: HOW HARD IS IT FOR YOU TO PAY FOR THE VERY BASICS LIKE FOOD, HOUSING, MEDICAL CARE, AND HEATING?: NOT HARD AT ALL

## 2024-04-19 ASSESSMENT — PATIENT HEALTH QUESTIONNAIRE - PHQ9
SUM OF ALL RESPONSES TO PHQ9 QUESTIONS 1 & 2: 0
5. POOR APPETITE OR OVEREATING: NOT AT ALL
SUM OF ALL RESPONSES TO PHQ QUESTIONS 1-9: 0
1. LITTLE INTEREST OR PLEASURE IN DOING THINGS: NOT AT ALL
7. TROUBLE CONCENTRATING ON THINGS, SUCH AS READING THE NEWSPAPER OR WATCHING TELEVISION: NOT AT ALL
4. FEELING TIRED OR HAVING LITTLE ENERGY: NOT AT ALL
6. FEELING BAD ABOUT YOURSELF - OR THAT YOU ARE A FAILURE OR HAVE LET YOURSELF OR YOUR FAMILY DOWN: NOT AT ALL
8. MOVING OR SPEAKING SO SLOWLY THAT OTHER PEOPLE COULD HAVE NOTICED. OR THE OPPOSITE, BEING SO FIGETY OR RESTLESS THAT YOU HAVE BEEN MOVING AROUND A LOT MORE THAN USUAL: NOT AT ALL
10. IF YOU CHECKED OFF ANY PROBLEMS, HOW DIFFICULT HAVE THESE PROBLEMS MADE IT FOR YOU TO DO YOUR WORK, TAKE CARE OF THINGS AT HOME, OR GET ALONG WITH OTHER PEOPLE: NOT DIFFICULT AT ALL
9. THOUGHTS THAT YOU WOULD BE BETTER OFF DEAD, OR OF HURTING YOURSELF: NOT AT ALL
SUM OF ALL RESPONSES TO PHQ QUESTIONS 1-9: 0
3. TROUBLE FALLING OR STAYING ASLEEP: NOT AT ALL
2. FEELING DOWN, DEPRESSED OR HOPELESS: NOT AT ALL

## 2024-04-19 ASSESSMENT — ENCOUNTER SYMPTOMS
EYE PAIN: 0
COUGH: 0
COLOR CHANGE: 0
SHORTNESS OF BREATH: 0
SORE THROAT: 0
ABDOMINAL PAIN: 0
EYE DISCHARGE: 0
DIARRHEA: 0
CONSTIPATION: 0
CHEST TIGHTNESS: 0
ABDOMINAL DISTENTION: 0

## 2024-04-19 NOTE — PROGRESS NOTES
Age of Onset    Anemia Mother     Hypertension Mother     Heart Disease Father     Diabetes Father     No Known Problems Sister     No Known Problems Brother     No Known Problems Daughter     Other Son         ? etiol;    No Known Problems Son        PE  Vitals:    04/19/24 1330   BP: 104/70   Site: Left Upper Arm   Position: Sitting   Cuff Size: Medium Adult   Weight: 93 kg (205 lb)   Height: 1.778 m (5' 10\")     Estimated body mass index is 29.41 kg/m² as calculated from the following:    Height as of this encounter: 1.778 m (5' 10\").    Weight as of this encounter: 93 kg (205 lb).    Physical Exam  Constitutional:       General: He is not in acute distress.     Appearance: Normal appearance. He is not ill-appearing.   HENT:      Head: Normocephalic.      Right Ear: Tympanic membrane and external ear normal.      Left Ear: Tympanic membrane and external ear normal.      Nose: No congestion or rhinorrhea.      Mouth/Throat:      Mouth: Mucous membranes are moist.      Pharynx: Oropharynx is clear. No posterior oropharyngeal erythema.   Eyes:      Extraocular Movements: Extraocular movements intact.      Conjunctiva/sclera: Conjunctivae normal.      Pupils: Pupils are equal, round, and reactive to light.   Cardiovascular:      Rate and Rhythm: Normal rate and regular rhythm.      Pulses: Normal pulses.      Heart sounds: Normal heart sounds. No murmur heard.  Pulmonary:      Effort: Pulmonary effort is normal. No respiratory distress.      Breath sounds: Normal breath sounds. No wheezing.   Abdominal:      General: Abdomen is flat. Bowel sounds are normal.      Palpations: Abdomen is soft.      Tenderness: There is no abdominal tenderness.      Hernia: No hernia is present.   Musculoskeletal:         General: No swelling. Normal range of motion.      Cervical back: Normal range of motion and neck supple. No tenderness.      Right lower leg: No edema.      Left lower leg: No edema.   Lymphadenopathy:      Cervical:

## 2024-04-20 LAB
CREAT UR-MCNC: 40.8 MG/DL (ref 39–259)
MICROALBUMIN UR DL<=1MG/L-MCNC: <1.2 MG/DL
MICROALBUMIN/CREAT UR: NORMAL MG/G (ref 0–30)

## 2024-04-22 RX ORDER — INSULIN ASPART 100 [IU]/ML
10 INJECTION, SOLUTION INTRAVENOUS; SUBCUTANEOUS 2 TIMES DAILY
Qty: 5 ADJUSTABLE DOSE PRE-FILLED PEN SYRINGE | Refills: 3 | Status: SHIPPED | OUTPATIENT
Start: 2024-04-22

## 2024-04-22 NOTE — TELEPHONE ENCOUNTER
Novolog Flexpen 100 U/ML    [facility] [facility]    Last OV- 4/19/24 1m    Next OV- 5/20/24

## 2024-04-30 ENCOUNTER — TELEPHONE (OUTPATIENT)
Dept: CARDIOLOGY CLINIC | Age: 64
End: 2024-04-30

## 2024-04-30 NOTE — TELEPHONE ENCOUNTER
CARDIAC CLEARANCE REQUEST    What type of procedure are you having: Eye surgery.. pt has eye melanoma, placing radioactive material in eye    Are you taking any blood thinners: needs to hold clopidogrel (PLAVIX) 75 MG tablet 5 days prior. Can continue ASA    When is your procedure scheduled for: 5/17/24    What physician is performing your procedure: Dr Carlin    Phone Number: [phone]    Fax number to send the letter:

## 2024-05-20 ENCOUNTER — OFFICE VISIT (OUTPATIENT)
Dept: FAMILY MEDICINE CLINIC | Age: 64
End: 2024-05-20
Payer: MEDICARE

## 2024-05-20 VITALS — BODY MASS INDEX: 29.13 KG/M2 | SYSTOLIC BLOOD PRESSURE: 110 MMHG | WEIGHT: 203 LBS | DIASTOLIC BLOOD PRESSURE: 70 MMHG

## 2024-05-20 DIAGNOSIS — I50.22 CHRONIC SYSTOLIC (CONGESTIVE) HEART FAILURE (HCC): ICD-10-CM

## 2024-05-20 DIAGNOSIS — Z79.4 TYPE 2 DIABETES MELLITUS WITH DIABETIC NEUROPATHY, WITH LONG-TERM CURRENT USE OF INSULIN (HCC): Primary | ICD-10-CM

## 2024-05-20 DIAGNOSIS — E11.40 TYPE 2 DIABETES MELLITUS WITH DIABETIC NEUROPATHY, WITH LONG-TERM CURRENT USE OF INSULIN (HCC): Primary | ICD-10-CM

## 2024-05-20 DIAGNOSIS — N18.31 STAGE 3A CHRONIC KIDNEY DISEASE (HCC): ICD-10-CM

## 2024-05-20 LAB
ALBUMIN SERPL-MCNC: 4 G/DL (ref 3.4–5)
ALBUMIN/GLOB SERPL: 1.8 {RATIO} (ref 1.1–2.2)
ALP SERPL-CCNC: 54 U/L (ref 40–129)
ALT SERPL-CCNC: 8 U/L (ref 10–40)
ANION GAP SERPL CALCULATED.3IONS-SCNC: 13 MMOL/L (ref 3–16)
AST SERPL-CCNC: 11 U/L (ref 15–37)
BASOPHILS # BLD: 0.1 K/UL (ref 0–0.2)
BASOPHILS NFR BLD: 1.1 %
BILIRUB SERPL-MCNC: 0.7 MG/DL (ref 0–1)
BUN SERPL-MCNC: 24 MG/DL (ref 7–20)
CALCIUM SERPL-MCNC: 9.1 MG/DL (ref 8.3–10.6)
CHLORIDE SERPL-SCNC: 106 MMOL/L (ref 99–110)
CHOLEST SERPL-MCNC: 116 MG/DL (ref 0–199)
CO2 SERPL-SCNC: 25 MMOL/L (ref 21–32)
CREAT SERPL-MCNC: 1.5 MG/DL (ref 0.8–1.3)
DEPRECATED RDW RBC AUTO: 15.4 % (ref 12.4–15.4)
EOSINOPHIL # BLD: 0.3 K/UL (ref 0–0.6)
EOSINOPHIL NFR BLD: 6.1 %
GFR SERPLBLD CREATININE-BSD FMLA CKD-EPI: 51 ML/MIN/{1.73_M2}
GLUCOSE SERPL-MCNC: 175 MG/DL (ref 70–99)
HCT VFR BLD AUTO: 47.7 % (ref 40.5–52.5)
HDLC SERPL-MCNC: 35 MG/DL (ref 40–60)
HGB BLD-MCNC: 16 G/DL (ref 13.5–17.5)
LDL CHOLESTEROL: 58 MG/DL
LYMPHOCYTES # BLD: 1.6 K/UL (ref 1–5.1)
LYMPHOCYTES NFR BLD: 33.6 %
MCH RBC QN AUTO: 27.6 PG (ref 26–34)
MCHC RBC AUTO-ENTMCNC: 33.5 G/DL (ref 31–36)
MCV RBC AUTO: 82.4 FL (ref 80–100)
MONOCYTES # BLD: 0.4 K/UL (ref 0–1.3)
MONOCYTES NFR BLD: 8.6 %
NEUTROPHILS # BLD: 2.4 K/UL (ref 1.7–7.7)
NEUTROPHILS NFR BLD: 50.6 %
PLATELET # BLD AUTO: 123 K/UL (ref 135–450)
PMV BLD AUTO: 10.3 FL (ref 5–10.5)
POTASSIUM SERPL-SCNC: 4.4 MMOL/L (ref 3.5–5.1)
PROT SERPL-MCNC: 6.2 G/DL (ref 6.4–8.2)
RBC # BLD AUTO: 5.79 M/UL (ref 4.2–5.9)
SODIUM SERPL-SCNC: 144 MMOL/L (ref 136–145)
TRIGL SERPL-MCNC: 114 MG/DL (ref 0–150)
VLDLC SERPL CALC-MCNC: 23 MG/DL
WBC # BLD AUTO: 4.7 K/UL (ref 4–11)

## 2024-05-20 PROCEDURE — 3052F HG A1C>EQUAL 8.0%<EQUAL 9.0%: CPT

## 2024-05-20 PROCEDURE — 36415 COLL VENOUS BLD VENIPUNCTURE: CPT

## 2024-05-20 PROCEDURE — 3074F SYST BP LT 130 MM HG: CPT

## 2024-05-20 PROCEDURE — 3078F DIAST BP <80 MM HG: CPT

## 2024-05-20 PROCEDURE — G2211 COMPLEX E/M VISIT ADD ON: HCPCS

## 2024-05-20 PROCEDURE — 99214 OFFICE O/P EST MOD 30 MIN: CPT

## 2024-05-20 RX ORDER — INSULIN GLARGINE 100 [IU]/ML
25 INJECTION, SOLUTION SUBCUTANEOUS 2 TIMES DAILY
Qty: 5 ADJUSTABLE DOSE PRE-FILLED PEN SYRINGE | Refills: 3 | Status: SHIPPED | OUTPATIENT
Start: 2024-05-20

## 2024-05-20 RX ORDER — CARVEDILOL 3.12 MG/1
3.12 TABLET ORAL 2 TIMES DAILY WITH MEALS
Qty: 180 TABLET | Refills: 3 | Status: SHIPPED | OUTPATIENT
Start: 2024-05-20

## 2024-05-20 ASSESSMENT — ENCOUNTER SYMPTOMS
SORE THROAT: 0
EYE DISCHARGE: 0
ABDOMINAL PAIN: 0
DIARRHEA: 0
CHEST TIGHTNESS: 0
COLOR CHANGE: 0
COUGH: 0
EYE PAIN: 0
CONSTIPATION: 0
SHORTNESS OF BREATH: 0
ABDOMINAL DISTENTION: 0

## 2024-05-20 NOTE — PROGRESS NOTES
Allergies   Allergen Reactions    Sulfa Antibiotics Hives    Victoza [Liraglutide] Diarrhea       Past Medical History:   Diagnosis Date    Adenomatous polyps     scoped 2019;acc to pt ,repeat in one yr    Anxiety     CTS (carpal tunnel syndrome)     CTS (carpal tunnel syndrome)     Diabetes mellitus (HCC)     GERD (gastroesophageal reflux disease)     has had stricture dilated mult yrs ago    Hyperlipidemia     Hypertension     Obesity with serious comorbidity 9/9/2020    Peripheral neuropathy     Type II or unspecified type diabetes mellitus without mention of complication, not stated as uncontrolled     Urinary frequency        Patient Active Problem List   Diagnosis    Primary hypertension    Hyperlipidemia    Type 2 diabetes mellitus with diabetic neuropathy, with long-term current use of insulin (HCC)    Chronic foot pain    CTS (carpal tunnel syndrome)    Peripheral neuropathy    Urinary frequency    Anxiety    Elbow pain    Pain of finger of left hand    Lateral epicondylitis    Primary osteoarthritis of first carpometacarpal joint of left hand    Acute sciatica    Incarcerated umbilical hernia    Lumbar radiculitis    Arthritis, midfoot    Cervical radiculopathy at C7    Degeneration of lumbar or lumbosacral intervertebral disc    Displacement of lumbar intervertebral disc without myelopathy    Lumbosacral spondylosis without myelopathy    Spinal stenosis, lumbar region, without neurogenic claudication    Disc displacement, lumbar    Lumbar facet arthropathy    Lumbar stenosis    Neck pain, chronic    Impingement syndrome of right shoulder    Incomplete tear of right rotator cuff    Shoulder impingement    Incomplete tear of left rotator cuff    Occult blood in stools    Arthritis of carpometacarpal (CMC) joint of right thumb    Polyp of transverse colon    Polyp of descending colon    Adenomatous polyps    Obesity with serious comorbidity    Vitamin D deficiency    Acute upper GI hemorrhage

## 2024-05-21 ENCOUNTER — TELEPHONE (OUTPATIENT)
Dept: FAMILY MEDICINE CLINIC | Age: 64
End: 2024-05-21

## 2024-05-21 NOTE — RESULT ENCOUNTER NOTE
Cholesterol panell shows improvement in triglycerides. Likely secondary to less sugar in diet. HDL has also improved. Continue dietary changes.     Kidney function panel is similar compared to prior. Stable. Continue dietary change.     CBC grossly normal.

## 2024-05-21 NOTE — TELEPHONE ENCOUNTER
Patient says he has cut down on pastas and potatoes. He is asking if he could eat french fries, tater tots or other foods if he uses an air fryer

## 2024-05-22 NOTE — TELEPHONE ENCOUNTER
Those foods are still high in carbs. You can use them in moderation. Monitor how your blood sugar reacts when you use them with you continuous glucose monitor.

## 2024-07-03 RX ORDER — PANTOPRAZOLE SODIUM 40 MG/1
40 TABLET, DELAYED RELEASE ORAL DAILY
Qty: 90 TABLET | Refills: 3 | Status: SHIPPED | OUTPATIENT
Start: 2024-07-03

## 2024-07-22 ENCOUNTER — OFFICE VISIT (OUTPATIENT)
Dept: FAMILY MEDICINE CLINIC | Age: 64
End: 2024-07-22
Payer: MEDICARE

## 2024-07-22 VITALS — SYSTOLIC BLOOD PRESSURE: 122 MMHG | DIASTOLIC BLOOD PRESSURE: 86 MMHG | WEIGHT: 203 LBS | BODY MASS INDEX: 29.13 KG/M2

## 2024-07-22 DIAGNOSIS — E11.40 TYPE 2 DIABETES MELLITUS WITH DIABETIC NEUROPATHY, WITH LONG-TERM CURRENT USE OF INSULIN (HCC): Primary | ICD-10-CM

## 2024-07-22 DIAGNOSIS — I25.10 CORONARY ARTERY DISEASE INVOLVING NATIVE CORONARY ARTERY OF NATIVE HEART WITHOUT ANGINA PECTORIS: ICD-10-CM

## 2024-07-22 DIAGNOSIS — Z79.4 TYPE 2 DIABETES MELLITUS WITH DIABETIC NEUROPATHY, WITH LONG-TERM CURRENT USE OF INSULIN (HCC): Primary | ICD-10-CM

## 2024-07-22 DIAGNOSIS — I50.22 CHRONIC SYSTOLIC (CONGESTIVE) HEART FAILURE (HCC): ICD-10-CM

## 2024-07-22 DIAGNOSIS — H53.8 BLURRY VISION, LEFT EYE: ICD-10-CM

## 2024-07-22 DIAGNOSIS — K86.2 PANCREATIC CYST: ICD-10-CM

## 2024-07-22 LAB — HBA1C MFR BLD: 7.5 %

## 2024-07-22 PROCEDURE — 99214 OFFICE O/P EST MOD 30 MIN: CPT

## 2024-07-22 PROCEDURE — G2211 COMPLEX E/M VISIT ADD ON: HCPCS

## 2024-07-22 PROCEDURE — 3079F DIAST BP 80-89 MM HG: CPT

## 2024-07-22 PROCEDURE — 83036 HEMOGLOBIN GLYCOSYLATED A1C: CPT

## 2024-07-22 PROCEDURE — 3051F HG A1C>EQUAL 7.0%<8.0%: CPT

## 2024-07-22 PROCEDURE — 3074F SYST BP LT 130 MM HG: CPT

## 2024-07-22 RX ORDER — INSULIN GLARGINE 100 [IU]/ML
30 INJECTION, SOLUTION SUBCUTANEOUS 2 TIMES DAILY
Qty: 5 ADJUSTABLE DOSE PRE-FILLED PEN SYRINGE | Refills: 3 | Status: SHIPPED | OUTPATIENT
Start: 2024-07-22

## 2024-07-22 ASSESSMENT — ENCOUNTER SYMPTOMS
EYE PAIN: 0
COUGH: 0
ABDOMINAL DISTENTION: 0
CHEST TIGHTNESS: 0
CONSTIPATION: 0
ABDOMINAL PAIN: 0
SHORTNESS OF BREATH: 0
SORE THROAT: 0
EYE ITCHING: 1
DIARRHEA: 0
COLOR CHANGE: 0
EYE DISCHARGE: 0

## 2024-07-22 NOTE — PROGRESS NOTES
currently on NovoLog, Basaglar and Trulicity.  He does report some side effects of Trulicity with chronic belching and some abdominal discomfort and nausea.  Last A1c was 11.6.  Today's A1c is 8.9.  He reports that he has trouble with sugar and is chronically drinking Pepsi.  Today he came into the office with a 32 ounce Pepsi.  He evidently states that that is his problem is willing to quit.    History of STEMI right coronary artery.    Medical, carvedilol, Plavix    On Basaglar, NovoLog, Farxiga and Trulicity    On Zoloft and hydroxyzine for anxiety      CARDIO HPI:    Limited echo 10/13/23 showed EF=50-55%.  Trivial MR and TR.      Today, 4/10/24 he reports he was sick for a few days and dropped about 10 pounds.  He is feeling good now.  He denies any problems.  He is not having any difficulty sleeping or breathing.  He worries about his sister being alone who is living in his dad's house. Patient denies current edema, chest pain, sob, palpitations, dizziness or syncope.  Patient is taking all cardiac medications as prescribed and tolerates them well.       PMH:  CAD, ischemic cardiomyopathy, HLD, HTN, ST, STEMI,  DM 2,  spinal stenosis, vitamin D def, and anxiety.  He presented to ED on 03/06/22 with chest pain and was found to have inferior STEMI. He underwent emergent left heart catheterization 03/06/22 showing 100% right coronary artery occlusion treated with drug eluting stent. Echocardiogram showed ejection fraction 40-45%. He has previous smoking history of 3 ppd for 30 years quit in 01/2001.  Echocardiogram 06/15/22 EF 45-50%, mild LVH, and mild MR.      Interval Hx 5/20/24:    Patient seen in office today for follow-up regarding diabetes.    Since last time he has cut out Pepsi, bread, pasta and working on cutting out potatoes.  He is down 6 pounds since last visit.  Average blood sugar over the past month has been 171    Interval hx 7/22/24:  Patient seen in office today for routine follow-up.  Reports

## 2024-07-23 ENCOUNTER — TELEMEDICINE (OUTPATIENT)
Dept: FAMILY MEDICINE CLINIC | Age: 64
End: 2024-07-23
Payer: MEDICARE

## 2024-07-23 DIAGNOSIS — I50.22 CHRONIC SYSTOLIC (CONGESTIVE) HEART FAILURE (HCC): ICD-10-CM

## 2024-07-23 DIAGNOSIS — Z00.00 MEDICARE ANNUAL WELLNESS VISIT, SUBSEQUENT: Primary | ICD-10-CM

## 2024-07-23 DIAGNOSIS — I25.10 CORONARY ARTERY DISEASE INVOLVING NATIVE CORONARY ARTERY OF NATIVE HEART WITHOUT ANGINA PECTORIS: ICD-10-CM

## 2024-07-23 DIAGNOSIS — Z79.4 TYPE 2 DIABETES MELLITUS WITH DIABETIC NEUROPATHY, WITH LONG-TERM CURRENT USE OF INSULIN (HCC): ICD-10-CM

## 2024-07-23 DIAGNOSIS — E11.40 TYPE 2 DIABETES MELLITUS WITH DIABETIC NEUROPATHY, WITH LONG-TERM CURRENT USE OF INSULIN (HCC): ICD-10-CM

## 2024-07-23 DIAGNOSIS — K86.2 PANCREATIC CYST: ICD-10-CM

## 2024-07-23 PROCEDURE — G0439 PPPS, SUBSEQ VISIT: HCPCS

## 2024-07-23 PROCEDURE — 3051F HG A1C>EQUAL 7.0%<8.0%: CPT

## 2024-07-23 NOTE — PATIENT INSTRUCTIONS
Instructions.\"  Current as of: June 24, 2023  Content Version: 14.1  © 8545-6112 [employer].   Care instructions adapted under license by [employer]. If you have questions about a medical condition or this instruction, always ask your healthcare professional. [employer] disclaims any warranty or liability for your use of this information.      Personalized Preventive Plan for Matthieu Skaggs - 7/23/2024  Medicare offers a range of preventive health benefits. Some of the tests and screenings are paid in full while other may be subject to a deductible, co-insurance, and/or copay.    Some of these benefits include a comprehensive review of your medical history including lifestyle, illnesses that may run in your family, and various assessments and screenings as appropriate.    After reviewing your medical record and screening and assessments performed today your provider may have ordered immunizations, labs, imaging, and/or referrals for you.  A list of these orders (if applicable) as well as your Preventive Care list are included within your After Visit Summary for your review.    Other Preventive Recommendations:    A preventive eye exam performed by an eye specialist is recommended every 1-2 years to screen for glaucoma; cataracts, macular degeneration, and other eye disorders.  A preventive dental visit is recommended every 6 months.  Try to get at least 150 minutes of exercise per week or 10,000 steps per day on a pedometer .  Order or download the FREE \"Exercise & Physical Activity: Your Everyday Guide\" from [employer]. Call 1-738.791.9525 or search [employer] online.  You need 4874-6707 mg of calcium and 4491-8790 IU of vitamin D per day. It is possible to meet your calcium requirement with diet alone, but a vitamin D supplement is usually necessary to meet this goal.  When exposed to the sun, use a sunscreen that protects against both UVA and

## 2024-07-23 NOTE — PROGRESS NOTES
UNIT/ML injection pen Inject 10 Units into the skin 2 times daily  Phu Pedro APRN - CNP   atorvastatin (LIPITOR) 80 MG tablet Take 1 tablet by mouth nightly  Rudy Yo MD   clopidogrel (PLAVIX) 75 MG tablet Take 1 tablet by mouth daily  Rudy Yo MD   fenofibrate (TRICOR) 145 MG tablet Take 1 tablet by mouth daily  Rudy Yo MD   FARXIGA 10 MG tablet Take 1 tablet by mouth every morning  Rudy Yo MD   nitroGLYCERIN (NITROSTAT) 0.4 MG SL tablet DISSOLVE 1 TAB UNDER TONGUE FOR CHEST PAIN - IF PAIN REMAINS AFTER 5 MIN, CALL 911 AND REPEAT DOSE. MAX 3 TABS IN 15 MINUTES  Rudy Yo MD   hydrOXYzine HCl (ATARAX) 25 MG tablet   Nick Kaplan MD   aspirin 81 MG chewable tablet Take 1 tablet by mouth daily  Harvey Levi MD   divalproex (DEPAKOTE ER) 250 MG extended release tablet Take 1 tablet by mouth every morning  Nick Kaplan MD   divalproex (DEPAKOTE) 500 MG DR tablet Take 1 tablet by mouth every evening  Nick Kaplan MD   tamsulosin (FLOMAX) 0.4 MG capsule Take 1 capsule by mouth daily  Nick Kaplan MD   sertraline (ZOLOFT) 50 MG tablet Take 2 tablets by mouth daily  ProviderNick MD   Blood Glucose Monitoring Suppl (ONETOUCH VERIO) w/Device KIT 1 Device by Does not apply route once for 1 dose  Tiffany Parker APRN - CNP   blood glucose test strips (ONETOUCH VERIO) strip 1 each by In Vitro route 3 times daily As needed.  Tiffany Parker APRN - CNP   ONE TOUCH ULTRASOFT LANCETS MISC 1 each by Does not apply route 3 times daily  Tiffany Parker APRN - CNP   blood glucose test strips (FREESTYLE LITE) strip USE TO TEST 3 TIMES DAILY AS NEEDED E11.9 GIVE WHAT IS COVERED.  Tiffany Parker APRN - CNP   SOFT TOUCH LANCETS MISC 1 Device by Does not apply route 3 times daily  Tiffany Parker APRN - CNP   [employer] LANCETS MICRO THIN 33G MISC USE TO TEST BLOOD SUGAR 1-2 TIMES A DAY MAY TEST MORE IF BLOOD

## 2024-09-11 RX ORDER — HYDROXYZINE HYDROCHLORIDE 25 MG/1
25 TABLET, FILM COATED ORAL 2 TIMES DAILY
Qty: 180 TABLET | Refills: 1 | Status: SHIPPED | OUTPATIENT
Start: 2024-09-11

## 2024-09-12 ENCOUNTER — TELEPHONE (OUTPATIENT)
Dept: ADMINISTRATIVE | Age: 64
End: 2024-09-12

## 2024-09-12 NOTE — TELEPHONE ENCOUNTER
Submitted PA for hydrOXYzine HCl 25MG tablets   Via CM (Key: [id number]) STATUS: PENDING.    Follow up done daily; if no decision with in three days we will refax.  If another three days goes by with no decision will call the insurance for status.     None

## 2024-09-20 ENCOUNTER — TELEPHONE (OUTPATIENT)
Dept: FAMILY MEDICINE CLINIC | Age: 64
End: 2024-09-20

## 2024-09-20 DIAGNOSIS — Z79.4 TYPE 2 DIABETES MELLITUS WITH DIABETIC NEUROPATHY, WITH LONG-TERM CURRENT USE OF INSULIN (HCC): ICD-10-CM

## 2024-09-20 DIAGNOSIS — E11.40 TYPE 2 DIABETES MELLITUS WITH DIABETIC NEUROPATHY, WITH LONG-TERM CURRENT USE OF INSULIN (HCC): ICD-10-CM

## 2024-09-20 RX ORDER — INSULIN GLARGINE 100 [IU]/ML
30 INJECTION, SOLUTION SUBCUTANEOUS 2 TIMES DAILY
Qty: 5 ADJUSTABLE DOSE PRE-FILLED PEN SYRINGE | Refills: 3 | Status: SHIPPED | OUTPATIENT
Start: 2024-09-20

## 2024-09-24 DIAGNOSIS — Z79.4 TYPE 2 DIABETES MELLITUS WITH DIABETIC NEUROPATHY, WITH LONG-TERM CURRENT USE OF INSULIN (HCC): ICD-10-CM

## 2024-09-24 DIAGNOSIS — I50.22 CHRONIC SYSTOLIC (CONGESTIVE) HEART FAILURE (HCC): ICD-10-CM

## 2024-09-24 DIAGNOSIS — E11.40 TYPE 2 DIABETES MELLITUS WITH DIABETIC NEUROPATHY, WITH LONG-TERM CURRENT USE OF INSULIN (HCC): ICD-10-CM

## 2024-09-24 RX ORDER — TAMSULOSIN HYDROCHLORIDE 0.4 MG/1
0.4 CAPSULE ORAL DAILY
Qty: 90 CAPSULE | Refills: 1 | Status: SHIPPED | OUTPATIENT
Start: 2024-09-24

## 2024-09-24 RX ORDER — PANTOPRAZOLE SODIUM 40 MG/1
40 TABLET, DELAYED RELEASE ORAL DAILY
Qty: 90 TABLET | Refills: 1 | Status: SHIPPED | OUTPATIENT
Start: 2024-09-24

## 2024-09-24 RX ORDER — DIVALPROEX SODIUM 250 MG/1
250 TABLET, FILM COATED, EXTENDED RELEASE ORAL EVERY MORNING
Qty: 90 TABLET | Refills: 1 | Status: SHIPPED | OUTPATIENT
Start: 2024-09-24

## 2024-09-24 RX ORDER — INSULIN ASPART 100 [IU]/ML
10 INJECTION, SOLUTION INTRAVENOUS; SUBCUTANEOUS 2 TIMES DAILY
Qty: 10 ADJUSTABLE DOSE PRE-FILLED PEN SYRINGE | Refills: 1 | Status: SHIPPED | OUTPATIENT
Start: 2024-09-24

## 2024-09-24 RX ORDER — DIVALPROEX SODIUM 500 MG/1
500 TABLET, DELAYED RELEASE ORAL EVERY EVENING
Qty: 90 TABLET | Refills: 1 | Status: SHIPPED | OUTPATIENT
Start: 2024-09-24

## 2024-09-24 RX ORDER — CARVEDILOL 3.12 MG/1
3.12 TABLET ORAL 2 TIMES DAILY WITH MEALS
Qty: 180 TABLET | Refills: 1 | Status: SHIPPED | OUTPATIENT
Start: 2024-09-24

## 2024-09-24 RX ORDER — INSULIN GLARGINE 100 [IU]/ML
30 INJECTION, SOLUTION SUBCUTANEOUS 2 TIMES DAILY
Qty: 20 ADJUSTABLE DOSE PRE-FILLED PEN SYRINGE | Refills: 1 | Status: SHIPPED | OUTPATIENT
Start: 2024-09-24

## 2024-10-02 DIAGNOSIS — E11.40 TYPE 2 DIABETES MELLITUS WITH DIABETIC NEUROPATHY, WITH LONG-TERM CURRENT USE OF INSULIN (HCC): ICD-10-CM

## 2024-10-02 DIAGNOSIS — Z79.4 TYPE 2 DIABETES MELLITUS WITH DIABETIC POLYNEUROPATHY, WITH LONG-TERM CURRENT USE OF INSULIN (HCC): ICD-10-CM

## 2024-10-02 DIAGNOSIS — I50.22 CHRONIC SYSTOLIC (CONGESTIVE) HEART FAILURE (HCC): ICD-10-CM

## 2024-10-02 DIAGNOSIS — E11.42 TYPE 2 DIABETES MELLITUS WITH DIABETIC POLYNEUROPATHY, WITH LONG-TERM CURRENT USE OF INSULIN (HCC): ICD-10-CM

## 2024-10-02 DIAGNOSIS — Z79.4 TYPE 2 DIABETES MELLITUS WITH DIABETIC NEUROPATHY, WITH LONG-TERM CURRENT USE OF INSULIN (HCC): ICD-10-CM

## 2024-10-02 NOTE — TELEPHONE ENCOUNTER
Carvedilol 3.125 MG    Basaglar Kwikpen 100u/ML    Novolog Flexpen 100u/ML    Pantoprazole 40 MG    Divalproex 250 MG    Divalproex 500 MG    Tamsulosin 0.4 MG      [facility]    Requesting 90 day supplies.    Patient just got into an argument with pharmacist at [facility] and will no longer be getting prescriptions there.       Last OV- 7/22/24    Next OV- 10/23/24

## 2024-10-03 RX ORDER — BLOOD SUGAR DIAGNOSTIC
STRIP MISCELLANEOUS
Qty: 300 EACH | Refills: 11 | Status: SHIPPED | OUTPATIENT
Start: 2024-10-03

## 2024-10-03 RX ORDER — TAMSULOSIN HYDROCHLORIDE 0.4 MG/1
0.4 CAPSULE ORAL DAILY
Qty: 90 CAPSULE | Refills: 1 | Status: SHIPPED | OUTPATIENT
Start: 2024-10-03

## 2024-10-03 RX ORDER — INSULIN ASPART 100 [IU]/ML
10 INJECTION, SOLUTION INTRAVENOUS; SUBCUTANEOUS 2 TIMES DAILY
Qty: 10 ADJUSTABLE DOSE PRE-FILLED PEN SYRINGE | Refills: 1 | Status: SHIPPED | OUTPATIENT
Start: 2024-10-03

## 2024-10-03 RX ORDER — DIVALPROEX SODIUM 500 MG/1
500 TABLET, DELAYED RELEASE ORAL EVERY EVENING
Qty: 90 TABLET | Refills: 1 | Status: SHIPPED | OUTPATIENT
Start: 2024-10-03

## 2024-10-03 RX ORDER — INSULIN GLARGINE 100 [IU]/ML
30 INJECTION, SOLUTION SUBCUTANEOUS 2 TIMES DAILY
Qty: 20 ADJUSTABLE DOSE PRE-FILLED PEN SYRINGE | Refills: 1 | Status: SHIPPED | OUTPATIENT
Start: 2024-10-03

## 2024-10-03 RX ORDER — CARVEDILOL 3.12 MG/1
3.12 TABLET ORAL 2 TIMES DAILY WITH MEALS
Qty: 180 TABLET | Refills: 1 | Status: SHIPPED | OUTPATIENT
Start: 2024-10-03

## 2024-10-03 RX ORDER — PANTOPRAZOLE SODIUM 40 MG/1
40 TABLET, DELAYED RELEASE ORAL DAILY
Qty: 90 TABLET | Refills: 1 | Status: SHIPPED | OUTPATIENT
Start: 2024-10-03

## 2024-10-09 NOTE — PROGRESS NOTES
[facility] Office Note  10/16/2024     Subjective:  Matthieu Skaggs 64 y.o. male with a past medical history notable for CAD, ischemic cardiomyopathy, HLD, HTN, STEMI stent RCA 3/2022,  DM 2,    C/o left choroidal malignant melanoma    [place]:    Underwent insertion of eye radioactive plaque for left choroidal malignant melanoma 5/30/24.  Radioactive plaque removed 6/3/24    Today, 10/16/24 he reports his eye felt like there was something in his eye all the time so he went and had it checked.  His eye still feels like there is always something in it.  His weight is stable and he has gained a little weight.  His sugar is better but all over the place.  He does not eat consistently and might only eat only one meal a day and does not always make good choices.  Patient denies current edema, chest pain, shortness of breath, palpitations, dizziness or syncope.  Patient is taking all cardiac medications as prescribed and tolerates them well.      PMH:  CAD, ischemic cardiomyopathy, HLD, HTN, ST, STEMI,  DM 2,  spinal stenosis, vitamin D def, and anxiety.  He presented to ED on 03/06/22 with chest pain and was found to have inferior STEMI. He underwent emergent left heart catheterization 03/06/22 showing 100% right coronary artery occlusion treated with drug eluting stent. Echocardiogram showed ejection fraction 40-45%. He has previous smoking history of 3 ppd for 30 years quit in 01/2001.  Echocardiogram 06/15/22 EF 45-50%, mild LVH, and mild MR.    Review of Systems:         12 point ROS negative in all areas as listed below except as in [place]  Constitutional, EENT, GI, , Musculoskeletal, skin, neurological, hematological, endocrine, Psychiatric    Reviewed past medical history, social, and family history.   Stopped smoking 15 years ago.   Smoked 31 years 3 PPD    Past Medical History:   Diagnosis Date    Adenomatous polyps     scoped 2019;acc to pt ,repeat in one yr    Anxiety     CTS (carpal tunnel syndrome)

## 2024-10-16 ENCOUNTER — OFFICE VISIT (OUTPATIENT)
Dept: CARDIOLOGY CLINIC | Age: 64
End: 2024-10-16
Payer: MEDICARE

## 2024-10-16 VITALS
WEIGHT: 210 LBS | DIASTOLIC BLOOD PRESSURE: 80 MMHG | OXYGEN SATURATION: 97 % | SYSTOLIC BLOOD PRESSURE: 110 MMHG | BODY MASS INDEX: 30.06 KG/M2 | HEART RATE: 96 BPM | HEIGHT: 70 IN

## 2024-10-16 DIAGNOSIS — I50.22 CHRONIC SYSTOLIC (CONGESTIVE) HEART FAILURE (HCC): ICD-10-CM

## 2024-10-16 DIAGNOSIS — Z79.4 TYPE 2 DIABETES MELLITUS WITH DIABETIC NEUROPATHY, WITH LONG-TERM CURRENT USE OF INSULIN (HCC): ICD-10-CM

## 2024-10-16 DIAGNOSIS — I25.10 CORONARY ARTERY DISEASE INVOLVING NATIVE CORONARY ARTERY OF NATIVE HEART WITHOUT ANGINA PECTORIS: Primary | ICD-10-CM

## 2024-10-16 DIAGNOSIS — N18.31 STAGE 3A CHRONIC KIDNEY DISEASE (HCC): ICD-10-CM

## 2024-10-16 DIAGNOSIS — I25.5 ISCHEMIC CARDIOMYOPATHY: ICD-10-CM

## 2024-10-16 DIAGNOSIS — E11.40 TYPE 2 DIABETES MELLITUS WITH DIABETIC NEUROPATHY, WITH LONG-TERM CURRENT USE OF INSULIN (HCC): ICD-10-CM

## 2024-10-16 DIAGNOSIS — I10 PRIMARY HYPERTENSION: ICD-10-CM

## 2024-10-16 DIAGNOSIS — E78.2 MIXED HYPERLIPIDEMIA: ICD-10-CM

## 2024-10-16 PROCEDURE — 1123F ACP DISCUSS/DSCN MKR DOCD: CPT | Performed by: INTERNAL MEDICINE

## 2024-10-16 PROCEDURE — 99214 OFFICE O/P EST MOD 30 MIN: CPT | Performed by: INTERNAL MEDICINE

## 2024-10-16 PROCEDURE — 3051F HG A1C>EQUAL 7.0%<8.0%: CPT | Performed by: INTERNAL MEDICINE

## 2024-10-16 PROCEDURE — 3074F SYST BP LT 130 MM HG: CPT | Performed by: INTERNAL MEDICINE

## 2024-10-16 PROCEDURE — 3079F DIAST BP 80-89 MM HG: CPT | Performed by: INTERNAL MEDICINE

## 2024-10-16 PROCEDURE — 93000 ELECTROCARDIOGRAM COMPLETE: CPT | Performed by: INTERNAL MEDICINE

## 2024-10-16 NOTE — PATIENT INSTRUCTIONS
Plan:  Labs reviewed in epic and discussed with patient.  Current medications reviewed.  Refills given as warranted.  No cardiac testing at this time.    Follow up with me in 6 months.

## 2024-10-23 ENCOUNTER — OFFICE VISIT (OUTPATIENT)
Dept: FAMILY MEDICINE CLINIC | Age: 64
End: 2024-10-23

## 2024-10-23 VITALS — DIASTOLIC BLOOD PRESSURE: 70 MMHG | WEIGHT: 207 LBS | BODY MASS INDEX: 29.7 KG/M2 | SYSTOLIC BLOOD PRESSURE: 128 MMHG

## 2024-10-23 DIAGNOSIS — N18.31 STAGE 3A CHRONIC KIDNEY DISEASE (HCC): ICD-10-CM

## 2024-10-23 DIAGNOSIS — E11.40 TYPE 2 DIABETES MELLITUS WITH DIABETIC NEUROPATHY, WITH LONG-TERM CURRENT USE OF INSULIN (HCC): Primary | ICD-10-CM

## 2024-10-23 DIAGNOSIS — Z79.4 TYPE 2 DIABETES MELLITUS WITH DIABETIC NEUROPATHY, WITH LONG-TERM CURRENT USE OF INSULIN (HCC): Primary | ICD-10-CM

## 2024-10-23 DIAGNOSIS — I50.22 CHRONIC SYSTOLIC (CONGESTIVE) HEART FAILURE (HCC): ICD-10-CM

## 2024-10-23 LAB — HBA1C MFR BLD: 6.7 %

## 2024-10-23 RX ORDER — DIVALPROEX SODIUM 250 MG/1
250 TABLET, FILM COATED, EXTENDED RELEASE ORAL EVERY MORNING
Qty: 90 TABLET | Refills: 1 | Status: SHIPPED | OUTPATIENT
Start: 2024-10-23

## 2024-10-23 ASSESSMENT — ENCOUNTER SYMPTOMS
EYE DISCHARGE: 0
COUGH: 0
CONSTIPATION: 0
SORE THROAT: 0
ABDOMINAL DISTENTION: 0
CHEST TIGHTNESS: 0
COLOR CHANGE: 0
SHORTNESS OF BREATH: 0
ABDOMINAL PAIN: 0
EYE ITCHING: 0
EYE PAIN: 0
DIARRHEA: 0

## 2024-10-23 NOTE — PROGRESS NOTES
[facility]    10/23/2024    Matthieu Skaggs (:  1960) is a 64 y.o. male, here to follow up on DM.     Chief Complaint   Patient presents with    Diabetes        ASSESSMENT/ PLAN  1. Type 2 diabetes mellitus with diabetic neuropathy, with long-term current use of insulin (HCC)  -A1c 6.7 today.  Continue same regimen.  -Dexcom 7 sensor given  -Foot exam negative  - POCT glycosylated hemoglobin (Hb A1C)  - Diabetic Foot Exam    2. Stage 3a chronic kidney disease (HCC)  -Overall stable.  Advocated for adequate hydration of 64 ounces of water daily    3. Chronic systolic (congestive) heart failure (HCC)  -No chest pain or shortness of breath.  Following with cardiology.  Most recent note was from 10/16.            Imaging:    MRI abdomen 6/10/24:    FINDINGS:   The lower chest is evaluated on the chest CT performed on the same date.   The background hepatic parenchyma is normal in signal intensity. There is a 3 mm T2 hyperintense focus in the anterior aspect of segment 2 of the liver (series 6, image 15) which is hyperenhancing on the prior CT, consistent with a small hemangioma. There are no suspicious hepatic lesions.   Excreted contrast is seen within the biliary tree which is normal in caliber. The gallbladder is normal.   The spleen is enlarged measuring 16.5 cm in craniocaudal dimension, slightly increased in comparison to the prior CT.   There is a 0.9 x 0.6 cm cyst in the uncinate process of the pancreas, most likely a sidebranch IPMN. There is no evidence of main duct dilation.   The adrenal glands are normal.   The kidneys are symmetric in signal intensity and enhancement. Renal calculi are better seen on the comparison CT. There is no evidence of collecting system dilation.   There is no evidence of retroperitoneal adenopathy.   The visualized aorta is normal in caliber.   The visualized GI tract is normal in caliber. There is a small hiatal hernia. There is no free fluid seen within the

## 2024-12-30 RX ORDER — ATORVASTATIN CALCIUM 80 MG/1
80 TABLET, FILM COATED ORAL NIGHTLY
Qty: 90 TABLET | Refills: 1 | Status: SHIPPED | OUTPATIENT
Start: 2024-12-30

## 2024-12-30 NOTE — TELEPHONE ENCOUNTER
Last Office Visit: 10/16/24 Provider: JENNIFER  **Is provider OOT? No    Next Office Visit: 5/14/25 Provider: JENNIFER  **If no OV, when does pt need to be seen? N/a  **Has patient already had 30 day supply? No    Lab orders needed? no   Encounter provider correct? Yes If not, change provider  Script changes since last refill? no    LAST LABS:   Lipid:   Lab Results   Component Value Date    CHOL 125 12/28/2023    TRIG 212 12/28/2023    HDL 35 (L) 05/20/2024    LDL 58 05/20/2024    VLDL 23 05/20/2024     Liver:   Lab Results   Component Value Date    ALT 8 (L) 05/20/2024    AST 11 (L) 05/20/2024    ALKPHOS 54 05/20/2024    BILITOT 0.7 05/20/2024      **Care Everywhere? N/A

## 2024-12-30 NOTE — TELEPHONE ENCOUNTER
Refill  atorvastatin (LIPITOR) 80 MG tablet   [id number] - [place], [place] - [address] - P [phone] - F [phone]   Pt is completely out.  Lov 10/16/24  Next 5/14/25

## 2025-01-09 DIAGNOSIS — E11.42 TYPE 2 DIABETES MELLITUS WITH DIABETIC POLYNEUROPATHY, WITH LONG-TERM CURRENT USE OF INSULIN (HCC): ICD-10-CM

## 2025-01-09 DIAGNOSIS — Z79.4 TYPE 2 DIABETES MELLITUS WITH DIABETIC POLYNEUROPATHY, WITH LONG-TERM CURRENT USE OF INSULIN (HCC): ICD-10-CM

## 2025-01-09 RX ORDER — BLOOD SUGAR DIAGNOSTIC
STRIP MISCELLANEOUS
Qty: 300 EACH | Refills: 11 | Status: SHIPPED | OUTPATIENT
Start: 2025-01-09

## 2025-01-09 NOTE — TELEPHONE ENCOUNTER
Requesting a refill of insulin pen needles. Send to [facility]. Asking if the office has samples of dexcom g7.

## 2025-01-24 RX ORDER — INSULIN LISPRO 100 [IU]/ML
35 INJECTION, SOLUTION INTRAVENOUS; SUBCUTANEOUS
Qty: 3 ADJUSTABLE DOSE PRE-FILLED PEN SYRINGE | Refills: 3 | Status: SHIPPED | OUTPATIENT
Start: 2025-01-24

## 2025-01-24 NOTE — TELEPHONE ENCOUNTER
Pt calling and states he had to switch insurances and states that his insurance is no longer going to cover the Novolog insulin. Pt asking if there is something else that he can switch to.

## 2025-02-13 NOTE — PROGRESS NOTES
Patient desires right shoulder injection because of pain was limits ADLs. Indication: R CMC joint pain    Procedure details: After explaining risk and benefits of the procedure and informed consent, skin was prepped with Chloroprep and anaesthetized with ethylene chloride spray , Under sterile fashion, R CMCjoint was entered via  dorsal approach  with 25G needle and Kenalog 20 mg and 1/2 cc of 1% Lidocaine  was injected and the needle withdrawn. Procedure was well tolerated. Post injection care was discussed with patient. Call or return to clinic prn if such symptoms occur or there is failure to improve as anticipated. Follow-up in 3 months. Patient indicates understanding and agrees with the management plan. I reviewed patient's history, referral documents and electronic medical records. #######################################################################  Subjective: Follow up for generalized osteoarthritis. States that he ran out of pain medications and feeling miserable. Right thumb is very painful, is barely able to use, desires cortisone injection. Neck, back, knees, feet joints are sore, achy, stiff. He continues to take meloxicam, Cymbalta, tizanidine, and gabapentin. He is trying to get off of the medications, states that he is taking 20 tablets a day including supplements. He denies any swollen joints in upper or lower extremities. He denies any GI bleed, GI upset, wheezing, cough, skin rashes, weight loss, sedation, suicidal thoughts. All other review of systems are negative. PMH/PSH/FH/PS/ MEDS reviewed.    No  family history of autoimmune diseases    Current Outpatient Prescriptions   Medication Sig Dispense Refill    ondansetron (ZOFRAN) 4 MG tablet Take 1 tablet by mouth every 8 hours as needed for Nausea or Vomiting 20 tablet 0    insulin lispro (HUMALOG KWIKPEN) 100 UNIT/ML pen Inject 20 units into skin three times daily 5 Pen 6    Alcohol Swabs (SURE-PREP ALCOHOL Render In Strict Bullet Format?: No daily 60 tablet 6    tiZANidine (ZANAFLEX) 4 MG tablet Take 1 tablet by mouth 3 times daily 90 tablet 5    Saw Palmetto, Serenoa repens, (SAW PALMETTO PO) Take by mouth 2 times daily      VALERIAN ROOT PO Take by mouth nightly       No current facility-administered medications for this visit. No Known Allergies    PHYSICAL EXAM:    Vitals:    /82   Temp 98.3 °F (36.8 °C) (Oral)   Ht 5' 7.99\" (1.727 m)   Wt 222 lb (100.7 kg)   BMI 33.76 kg/m²   General appearance: alert, appears stated age and cooperative. MKS: Right CMC joint is tender, has bony crepitus. Heberden's and Ang's nodules in his finger joints. Nontender. Bilateral shoulder discomfort with bony crepitus, range of motion limited by 30%. Paraspinal muscle spasm throughout the spine. Bony crepitus of knees with bony swelling without any effusion, warmth or tenderness. Full range of motion. He has normal gait and muscle strength in upper and lower extremities. Skin: No rashes, induration, skin thickening . No evidence ischemia or deformities noted in digits or nails. HEENT: Normal lids, lacrimal glands and pupils. No oral or nasal ulcers. Salivary glands reveal no evidence of abnormality. External inspection of the ears and nose within normal limits. Neck: No masses or asymmetry. No thyroid enlargement. Neurologic: normal deep tendon reflexes. No foot or wrist drop.           DATA:   Lab Results   Component Value Date    WBC 6.9 10/18/2017    HGB 14.9 10/18/2017    HCT 43.5 10/18/2017    MCV 84.7 10/18/2017     (L) 10/18/2017         Chemistry        Component Value Date/Time     10/18/2017 1050    K 3.8 10/18/2017 1050     10/18/2017 1050    CO2 24 10/18/2017 1050    BUN 18 10/18/2017 1050    CREATININE 0.8 (L) 10/18/2017 1050        Component Value Date/Time    CALCIUM 8.8 10/18/2017 1050    ALKPHOS 55 10/18/2017 1050    AST 17 10/18/2017 1050    ALT 22 10/18/2017 1050    BILITOT 1.0 10/18/2017 1050 A/P- See above. Detail Level: Zone Modify Regimen: Triamcinolone bid x 2 weeks, prn flares

## 2025-02-24 ENCOUNTER — TELEPHONE (OUTPATIENT)
Dept: FAMILY MEDICINE CLINIC | Age: 65
End: 2025-02-24

## 2025-02-24 ENCOUNTER — OFFICE VISIT (OUTPATIENT)
Dept: FAMILY MEDICINE CLINIC | Age: 65
End: 2025-02-24
Payer: COMMERCIAL

## 2025-02-24 VITALS
BODY MASS INDEX: 29.99 KG/M2 | WEIGHT: 209 LBS | HEART RATE: 76 BPM | DIASTOLIC BLOOD PRESSURE: 78 MMHG | SYSTOLIC BLOOD PRESSURE: 119 MMHG

## 2025-02-24 DIAGNOSIS — Z12.11 COLON CANCER SCREENING: ICD-10-CM

## 2025-02-24 DIAGNOSIS — Z86.0100 HX OF COLONIC POLYPS: ICD-10-CM

## 2025-02-24 DIAGNOSIS — Z79.4 TYPE 2 DIABETES MELLITUS WITH DIABETIC NEUROPATHY, WITH LONG-TERM CURRENT USE OF INSULIN (HCC): ICD-10-CM

## 2025-02-24 DIAGNOSIS — I50.22 CHRONIC SYSTOLIC (CONGESTIVE) HEART FAILURE (HCC): ICD-10-CM

## 2025-02-24 DIAGNOSIS — Z79.4 TYPE 2 DIABETES MELLITUS WITH DIABETIC POLYNEUROPATHY, WITH LONG-TERM CURRENT USE OF INSULIN (HCC): Primary | ICD-10-CM

## 2025-02-24 DIAGNOSIS — Z13.6 ENCOUNTER FOR ABDOMINAL AORTIC ANEURYSM (AAA) SCREENING: ICD-10-CM

## 2025-02-24 DIAGNOSIS — Z79.899 POLYPHARMACY: ICD-10-CM

## 2025-02-24 DIAGNOSIS — E11.40 TYPE 2 DIABETES MELLITUS WITH DIABETIC NEUROPATHY, WITH LONG-TERM CURRENT USE OF INSULIN (HCC): ICD-10-CM

## 2025-02-24 DIAGNOSIS — N18.31 STAGE 3A CHRONIC KIDNEY DISEASE (HCC): ICD-10-CM

## 2025-02-24 DIAGNOSIS — E11.42 TYPE 2 DIABETES MELLITUS WITH DIABETIC POLYNEUROPATHY, WITH LONG-TERM CURRENT USE OF INSULIN (HCC): Primary | ICD-10-CM

## 2025-02-24 PROCEDURE — 3074F SYST BP LT 130 MM HG: CPT

## 2025-02-24 PROCEDURE — 36415 COLL VENOUS BLD VENIPUNCTURE: CPT

## 2025-02-24 PROCEDURE — 1123F ACP DISCUSS/DSCN MKR DOCD: CPT

## 2025-02-24 PROCEDURE — 99214 OFFICE O/P EST MOD 30 MIN: CPT

## 2025-02-24 PROCEDURE — G2211 COMPLEX E/M VISIT ADD ON: HCPCS

## 2025-02-24 PROCEDURE — 3078F DIAST BP <80 MM HG: CPT

## 2025-02-24 RX ORDER — ACYCLOVIR 800 MG/1
1 TABLET ORAL
Qty: 4 EACH | Refills: 3 | Status: SHIPPED | OUTPATIENT
Start: 2025-02-24

## 2025-02-24 RX ORDER — INSULIN LISPRO 100 [IU]/ML
35 INJECTION, SOLUTION INTRAVENOUS; SUBCUTANEOUS
Qty: 5 ADJUSTABLE DOSE PRE-FILLED PEN SYRINGE | Refills: 3 | Status: SHIPPED | OUTPATIENT
Start: 2025-02-24 | End: 2025-02-26

## 2025-02-24 RX ORDER — BLOOD SUGAR DIAGNOSTIC
STRIP MISCELLANEOUS
Qty: 300 EACH | Refills: 11 | Status: SHIPPED | OUTPATIENT
Start: 2025-02-24

## 2025-02-24 RX ORDER — HYDROXYZINE HYDROCHLORIDE 25 MG/1
25 TABLET, FILM COATED ORAL 2 TIMES DAILY
Qty: 180 TABLET | Refills: 1 | Status: SHIPPED | OUTPATIENT
Start: 2025-02-24

## 2025-02-24 ASSESSMENT — ENCOUNTER SYMPTOMS
COUGH: 0
ABDOMINAL DISTENTION: 0
EYE PAIN: 0
ABDOMINAL PAIN: 0
EYE ITCHING: 0
EYE DISCHARGE: 0
DIARRHEA: 0
SORE THROAT: 0
SHORTNESS OF BREATH: 0
CHEST TIGHTNESS: 0
COLOR CHANGE: 0
CONSTIPATION: 0

## 2025-02-24 NOTE — TELEPHONE ENCOUNTER
Pt calling and states that the freestyle patricia is going to cost $54 and pt stated he can't do that. He just wanted to let you know.

## 2025-02-24 NOTE — PROGRESS NOTES
[facility]    2025    Matthieu Skaggs (:  1960) is a 64 y.o. male, here to follow up on DM.     Chief Complaint   Patient presents with    Diabetes        ASSESSMENT/ PLAN  1. Type 2 diabetes mellitus with diabetic polyneuropathy, with long-term current use of insulin (HCC)  -Reassess A1c today.  Continue insulin regimen.  Continue Farxiga.  Potentially could try berberine.  - Lipid Panel  - Hemoglobin A1C  - Comprehensive Metabolic Panel  - CBC with Auto Differential  - insulin lispro, 1 Unit Dial, (HUMALOG KWIKPEN) 100 UNIT/ML SOPN; Inject 35 Units into the skin 3 times daily (before meals)  Dispense: 5 Adjustable Dose Pre-filled Pen Syringe; Refill: 3  - Insulin Pen Needle (PEN NEEDLES) 31G X 6 MM MISC; TEST BLOOD SUGARS ONCE DAILY FOR E11.42  Dispense: 300 each; Refill: 11  - Continuous Glucose Sensor (FREESTYLE LUBA 3 SENSOR) MISC; 1 each by Does not apply route every 14 days  Dispense: 4 each; Refill: 3    2. Type 2 diabetes mellitus with diabetic neuropathy, with long-term current use of insulin (HCC)  -Following with pain management.  Recently started on pregabalin  - Lipid Panel  - Hemoglobin A1C  - Comprehensive Metabolic Panel  - CBC with Auto Differential  - insulin lispro, 1 Unit Dial, (HUMALOG KWIKPEN) 100 UNIT/ML SOPN; Inject 35 Units into the skin 3 times daily (before meals)  Dispense: 5 Adjustable Dose Pre-filled Pen Syringe; Refill: 3  - Insulin Pen Needle (PEN NEEDLES) 31G X 6 MM MISC; TEST BLOOD SUGARS ONCE DAILY FOR E11.42  Dispense: 300 each; Refill: 11  - Continuous Glucose Sensor (FREESTYLE LUBA 3 SENSOR) MISC; 1 each by Does not apply route every 14 days  Dispense: 4 each; Refill: 3    3. Stage 3a chronic kidney disease (HCC)  -Reassess kidney function today.  Continue Farxiga encourage water    4. Chronic systolic (congestive) heart failure (HCC)  -Following with cardiology.  Most recent echocardiogram 50 to 55%.  Continue Farxiga.    5. Hx of colonic

## 2025-02-25 LAB
ALBUMIN SERPL-MCNC: 4 G/DL (ref 3.4–5)
ALBUMIN/GLOB SERPL: 1.8 {RATIO} (ref 1.1–2.2)
ALP SERPL-CCNC: 66 U/L (ref 40–129)
ALT SERPL-CCNC: 20 U/L (ref 10–40)
ANION GAP SERPL CALCULATED.3IONS-SCNC: 9 MMOL/L (ref 3–16)
AST SERPL-CCNC: 21 U/L (ref 15–37)
BASOPHILS # BLD: 0 K/UL (ref 0–0.2)
BASOPHILS NFR BLD: 0.4 %
BILIRUB SERPL-MCNC: 0.6 MG/DL (ref 0–1)
BUN SERPL-MCNC: 28 MG/DL (ref 7–20)
CALCIUM SERPL-MCNC: 9.1 MG/DL (ref 8.3–10.6)
CHLORIDE SERPL-SCNC: 110 MMOL/L (ref 99–110)
CHOLEST SERPL-MCNC: 97 MG/DL (ref 0–199)
CO2 SERPL-SCNC: 26 MMOL/L (ref 21–32)
CREAT SERPL-MCNC: 1.4 MG/DL (ref 0.8–1.3)
DEPRECATED RDW RBC AUTO: 15.8 % (ref 12.4–15.4)
EOSINOPHIL # BLD: 0.4 K/UL (ref 0–0.6)
EOSINOPHIL NFR BLD: 7.1 %
EST. AVERAGE GLUCOSE BLD GHB EST-MCNC: 165.7 MG/DL
GFR SERPLBLD CREATININE-BSD FMLA CKD-EPI: 56 ML/MIN/{1.73_M2}
GLUCOSE SERPL-MCNC: 143 MG/DL (ref 70–99)
HBA1C MFR BLD: 7.4 %
HCT VFR BLD AUTO: 48.8 % (ref 40.5–52.5)
HDLC SERPL-MCNC: 29 MG/DL (ref 40–60)
HGB BLD-MCNC: 15.8 G/DL (ref 13.5–17.5)
LDLC SERPL CALC-MCNC: 49 MG/DL
LYMPHOCYTES # BLD: 1.6 K/UL (ref 1–5.1)
LYMPHOCYTES NFR BLD: 30.4 %
MCH RBC QN AUTO: 27.2 PG (ref 26–34)
MCHC RBC AUTO-ENTMCNC: 32.4 G/DL (ref 31–36)
MCV RBC AUTO: 83.8 FL (ref 80–100)
MONOCYTES # BLD: 0.5 K/UL (ref 0–1.3)
MONOCYTES NFR BLD: 8.9 %
NEUTROPHILS # BLD: 2.8 K/UL (ref 1.7–7.7)
NEUTROPHILS NFR BLD: 53.2 %
PLATELET # BLD AUTO: 105 K/UL (ref 135–450)
PMV BLD AUTO: 10.8 FL (ref 5–10.5)
POTASSIUM SERPL-SCNC: 4.6 MMOL/L (ref 3.5–5.1)
PROT SERPL-MCNC: 6.2 G/DL (ref 6.4–8.2)
RBC # BLD AUTO: 5.82 M/UL (ref 4.2–5.9)
SODIUM SERPL-SCNC: 145 MMOL/L (ref 136–145)
TRIGL SERPL-MCNC: 93 MG/DL (ref 0–150)
VLDLC SERPL CALC-MCNC: 19 MG/DL
WBC # BLD AUTO: 5.3 K/UL (ref 4–11)

## 2025-02-25 RX ORDER — ATORVASTATIN CALCIUM 40 MG/1
40 TABLET, FILM COATED ORAL NIGHTLY
Qty: 90 TABLET | Refills: 0
Start: 2025-02-25

## 2025-02-25 NOTE — RESULT ENCOUNTER NOTE
Kidney function overall stable.  Cholesterol panel similar compared to prior.  I would advise taking half of his atorvastatin this equates to 40 mg daily.  Lipid panel shows some 100 total cholesterol.

## 2025-02-26 ENCOUNTER — TELEPHONE (OUTPATIENT)
Dept: FAMILY MEDICINE CLINIC | Age: 65
End: 2025-02-26

## 2025-02-26 RX ORDER — INSULIN ASPART 100 [IU]/ML
35 INJECTION, SOLUTION INTRAVENOUS; SUBCUTANEOUS
Qty: 5 ADJUSTABLE DOSE PRE-FILLED PEN SYRINGE | Refills: 3 | Status: SHIPPED | OUTPATIENT
Start: 2025-02-26

## 2025-02-27 ENCOUNTER — TELEPHONE (OUTPATIENT)
Dept: CARDIOLOGY CLINIC | Age: 65
End: 2025-02-27

## 2025-02-27 NOTE — TELEPHONE ENCOUNTER
CARDIAC CLEARANCE REQUEST    What type of procedure are you having:  colonoscopy  Are you taking any blood thinners:  Plavix hold 5 days prior  Type on anesthesia:  Mac  When is your procedure scheduled for:  04/10/25  What physician is performing your procedure:  Dr. Finley  Phone Number:  [phone]  Fax number to send the letter:  [phone]    LOV 10/16/24 JENNIFER

## 2025-03-03 ENCOUNTER — TELEPHONE (OUTPATIENT)
Dept: FAMILY MEDICINE CLINIC | Age: 65
End: 2025-03-03

## 2025-03-03 DIAGNOSIS — G89.29 NECK PAIN, CHRONIC: ICD-10-CM

## 2025-03-03 DIAGNOSIS — M54.2 NECK PAIN, CHRONIC: ICD-10-CM

## 2025-03-03 DIAGNOSIS — M54.16 LUMBAR RADICULITIS: Primary | ICD-10-CM

## 2025-03-03 NOTE — TELEPHONE ENCOUNTER
His insurance changed to devoted. Dr Scott Sosa, pain management provider, that he has been going to the past 5 years, is out of network. Insurance told patient to  request a referral. Need a referral to see Dr Sosa and also needs a referral for Dr Mumtaz Jay, pain management provider, who is in network until he can see Dr Sosa. Dr Jay is a provider of his insurance but he is 17 miles away and patient needs to see someone to refill his meds. Asking if we have samples of  Dex com 7

## 2025-03-04 ENCOUNTER — HOSPITAL ENCOUNTER (OUTPATIENT)
Dept: ULTRASOUND IMAGING | Age: 65
Discharge: HOME | End: 2025-03-04
Payer: COMMERCIAL

## 2025-03-04 DIAGNOSIS — Z13.6 ENCOUNTER FOR ABDOMINAL AORTIC ANEURYSM (AAA) SCREENING: ICD-10-CM

## 2025-03-04 PROCEDURE — 76706 US ABDL AORTA SCREEN AAA: CPT

## 2025-03-04 NOTE — RESULT ENCOUNTER NOTE
----- Message from Reanna Duarte PA-C sent at 2/28/2023  4:40 PM CST -----  Path for the biopsy on the right helix came back as an actinic keratosis. Recommend cryo for complete resolution.      AAA screening negative.  No follow-up

## 2025-03-24 RX ORDER — PANTOPRAZOLE SODIUM 40 MG/1
40 TABLET, DELAYED RELEASE ORAL DAILY
Qty: 90 TABLET | Refills: 1 | Status: SHIPPED | OUTPATIENT
Start: 2025-03-24

## 2025-03-27 RX ORDER — TAMSULOSIN HYDROCHLORIDE 0.4 MG/1
0.4 CAPSULE ORAL DAILY
Qty: 90 CAPSULE | Refills: 1 | Status: SHIPPED | OUTPATIENT
Start: 2025-03-27

## 2025-03-28 RX ORDER — DIVALPROEX SODIUM 500 MG/1
500 TABLET, DELAYED RELEASE ORAL NIGHTLY
Qty: 90 TABLET | Refills: 1 | Status: SHIPPED | OUTPATIENT
Start: 2025-03-28

## 2025-03-31 ENCOUNTER — TELEPHONE (OUTPATIENT)
Dept: FAMILY MEDICINE CLINIC | Age: 65
End: 2025-03-31

## 2025-04-17 RX ORDER — DIVALPROEX SODIUM 250 MG/1
250 TABLET, FILM COATED, EXTENDED RELEASE ORAL EVERY MORNING
Qty: 90 TABLET | Refills: 1 | Status: SHIPPED | OUTPATIENT
Start: 2025-04-17

## 2025-04-17 NOTE — TELEPHONE ENCOUNTER
Last seen 5/24/25 next appt 6/11/25, spoke to patient because it looks like the 500 was sent in March, he said he takes the 250 in the morning and the 500 at night

## 2025-05-01 RX ORDER — ATORVASTATIN CALCIUM 40 MG/1
40 TABLET, FILM COATED ORAL NIGHTLY
Qty: 90 TABLET | Refills: 0 | Status: SHIPPED | OUTPATIENT
Start: 2025-05-01

## 2025-05-05 ENCOUNTER — TELEPHONE (OUTPATIENT)
Dept: FAMILY MEDICINE CLINIC | Age: 65
End: 2025-05-05

## 2025-05-05 NOTE — TELEPHONE ENCOUNTER
Notified patient that they did not order it.  He said he put in back in mail box and put return to sender

## 2025-05-05 NOTE — TELEPHONE ENCOUNTER
Patient says he received 6 bottles of glucocyn 900 mg and 2 bottles of glucocyn 140 mg in the mail. Patient is asking if Edy Pedro CNP or Annalise Hodges DNP ordered these?

## 2025-05-14 ENCOUNTER — TELEPHONE (OUTPATIENT)
Dept: FAMILY MEDICINE CLINIC | Age: 65
End: 2025-05-14

## 2025-05-14 ENCOUNTER — OFFICE VISIT (OUTPATIENT)
Dept: CARDIOLOGY CLINIC | Age: 65
End: 2025-05-14
Payer: MEDICARE

## 2025-05-14 VITALS
BODY MASS INDEX: 30.67 KG/M2 | WEIGHT: 214.2 LBS | HEIGHT: 70 IN | DIASTOLIC BLOOD PRESSURE: 70 MMHG | HEART RATE: 104 BPM | SYSTOLIC BLOOD PRESSURE: 110 MMHG | OXYGEN SATURATION: 94 %

## 2025-05-14 DIAGNOSIS — I10 PRIMARY HYPERTENSION: ICD-10-CM

## 2025-05-14 DIAGNOSIS — N18.31 STAGE 3A CHRONIC KIDNEY DISEASE (HCC): ICD-10-CM

## 2025-05-14 DIAGNOSIS — I50.22 CHRONIC SYSTOLIC (CONGESTIVE) HEART FAILURE (HCC): ICD-10-CM

## 2025-05-14 DIAGNOSIS — I25.10 CORONARY ARTERY DISEASE INVOLVING NATIVE CORONARY ARTERY OF NATIVE HEART WITHOUT ANGINA PECTORIS: Primary | ICD-10-CM

## 2025-05-14 DIAGNOSIS — E78.2 MIXED HYPERLIPIDEMIA: ICD-10-CM

## 2025-05-14 DIAGNOSIS — I25.5 ISCHEMIC CARDIOMYOPATHY: ICD-10-CM

## 2025-05-14 PROCEDURE — 3078F DIAST BP <80 MM HG: CPT | Performed by: INTERNAL MEDICINE

## 2025-05-14 PROCEDURE — 1123F ACP DISCUSS/DSCN MKR DOCD: CPT | Performed by: INTERNAL MEDICINE

## 2025-05-14 PROCEDURE — 99214 OFFICE O/P EST MOD 30 MIN: CPT | Performed by: INTERNAL MEDICINE

## 2025-05-14 PROCEDURE — 3074F SYST BP LT 130 MM HG: CPT | Performed by: INTERNAL MEDICINE

## 2025-05-14 RX ORDER — CLOPIDOGREL BISULFATE 75 MG/1
75 TABLET ORAL DAILY
Qty: 90 TABLET | Refills: 3 | Status: SHIPPED | OUTPATIENT
Start: 2025-05-14

## 2025-05-14 RX ORDER — DAPAGLIFLOZIN 10 MG/1
10 TABLET, FILM COATED ORAL EVERY MORNING
Qty: 90 TABLET | Refills: 3 | Status: SHIPPED | OUTPATIENT
Start: 2025-05-14

## 2025-05-14 RX ORDER — PREGABALIN 25 MG/1
CAPSULE ORAL
COMMUNITY

## 2025-05-14 RX ORDER — FENOFIBRATE 145 MG/1
145 TABLET, FILM COATED ORAL DAILY
Qty: 90 TABLET | Refills: 3 | Status: SHIPPED | OUTPATIENT
Start: 2025-05-14

## 2025-05-14 NOTE — PATIENT INSTRUCTIONS
Plan:  Labs reviewed in epic and discussed with patient.  Current medications reviewed.  Refills given as warranted.  Repeat bp 110/70  Talk to pcp about getting a referral to go physical therapy for your neck.   No cardiac testing at this time.    Follow up with me in 6 months.  Call back in August to make your next appointment

## 2025-05-14 NOTE — PROGRESS NOTES
[facility] Office Note  5/14/2025     Subjective:  Matthieu Skaggs 64 y.o. male with a past medical history notable for CAD, ischemic cardiomyopathy, HLD, HTN, STEMI stent RCA 3/2022,  DM 2,    C/o neck pain, dizziness with low sugar    [place]:    US screening for AAA 3/4/25 showed no evidence of abdominal aortic aneurysm.    Today, 5/14/25 he reports his neck hurts and he can't get it to bend different directions.  He is going to see his pcp about his neck.  He gets dizzy when his sugar gets low.  He quit drinking pepsi about 6 months ago and is drinking diet Mt. Dew now.  Patient denies current edema, chest pain, shortness of breath, palpitations,  or syncope.  Patient is taking all cardiac medications as prescribed and tolerates them well.      PMH:    Underwent insertion of eye radioactive plaque for left choroidal malignant melanoma 5/30/24.  Radioactive plaque removed 6/3/24    CAD, ischemic cardiomyopathy, HLD, HTN, ST, STEMI,  DM 2,  spinal stenosis, vitamin D def, and anxiety.  He presented to ED on 03/06/22 with chest pain and was found to have inferior STEMI. He underwent emergent left heart catheterization 03/06/22 showing 100% right coronary artery occlusion treated with drug eluting stent. Echocardiogram showed ejection fraction 40-45%. He has previous smoking history of 3 ppd for 30 years quit in 01/2001.  Echocardiogram 06/15/22 EF 45-50%, mild LVH, and mild MR.    Review of Systems:         12 point ROS negative in all areas as listed below except as in [place]  Constitutional, EENT, GI, , Musculoskeletal, skin, neurological, hematological, endocrine, Psychiatric    Reviewed past medical history, social, and family history.   Stopped smoking 15 years ago.   Smoked 31 years 3 PPD    Past Medical History:   Diagnosis Date    Adenomatous polyps     scoped 2019;acc to pt ,repeat in one yr    Anxiety     CTS (carpal tunnel syndrome)     CTS (carpal tunnel syndrome)     Diabetes mellitus (HCC)

## 2025-05-25 DIAGNOSIS — I50.22 CHRONIC SYSTOLIC (CONGESTIVE) HEART FAILURE (HCC): ICD-10-CM

## 2025-05-27 RX ORDER — CARVEDILOL 3.12 MG/1
TABLET ORAL
Qty: 180 TABLET | Refills: 1 | Status: SHIPPED | OUTPATIENT
Start: 2025-05-27

## 2025-05-27 NOTE — TELEPHONE ENCOUNTER
Future appt scheduled 06/11/2025                    Last appt 02/24/2025    Refill Request     CONFIRM preferrred pharmacy with the patient.    If Mail Order Rx - Pend for 90 day refill.      Last Seen: Last Seen Department: 2/24/2025  Last Seen by PCP: 2/24/2025    Last Written: 10/03/2024    If no future appointment scheduled, route STAFF MESSAGE with patient name to the  Pool for scheduling.      Next Appointment:   Future Appointments   Date Time Provider Department Center   6/11/2025  1:40 PM Phu Pedro, APRN - CNP [place] DEP   11/21/2025  1:15 PM Rudy Yo MD [facility]       Message sent to  to schedule appt with patient?  NO      Requested Prescriptions     Pending Prescriptions Disp Refills    carvedilol (COREG) 3.125 MG tablet [Pharmacy Med Name: CARVEDILOL 3.125 MG TABLET] 180 tablet 1     Sig: TAKE 1 TABLET BY MOUTH TWICE A DAY WITH A MEAL

## 2025-05-29 DIAGNOSIS — Z79.4 TYPE 2 DIABETES MELLITUS WITH DIABETIC NEUROPATHY, WITH LONG-TERM CURRENT USE OF INSULIN (HCC): ICD-10-CM

## 2025-05-29 DIAGNOSIS — E11.40 TYPE 2 DIABETES MELLITUS WITH DIABETIC NEUROPATHY, WITH LONG-TERM CURRENT USE OF INSULIN (HCC): ICD-10-CM

## 2025-05-29 RX ORDER — INSULIN GLARGINE 100 [IU]/ML
30 INJECTION, SOLUTION SUBCUTANEOUS 2 TIMES DAILY
Qty: 60 ML | Refills: 2 | Status: SHIPPED | OUTPATIENT
Start: 2025-05-29

## 2025-05-29 NOTE — TELEPHONE ENCOUNTER
Refill Request     CONFIRM preferrred pharmacy with the patient.    If Mail Order Rx - Pend for 90 day refill.      Last Seen: Last Seen Department: 2/24/2025  Last Seen by PCP: 2/24/2025    Last Written:     If no future appointment scheduled, route STAFF MESSAGE with patient name to the  Pool for scheduling.      Next Appointment:   Future Appointments   Date Time Provider Department Center   6/11/2025  1:40 PM Phu Pedro, APRN - CNP [facility]   11/21/2025  1:15 PM Rudy Yo, MD [facility]       Message sent to  to schedule appt with patient?  NO      Requested Prescriptions     Pending Prescriptions Disp Refills    BASAGLAR KWIKPEN 100 UNIT/ML injection pen [Pharmacy Med Name: BASAGLAR 100 UNIT/ML KWIKPEN] 60 mL      Sig: INJECT 30 UNITS INTO THE SKIN 2 TIMES DAILY

## 2025-06-08 SDOH — ECONOMIC STABILITY: FOOD INSECURITY: WITHIN THE PAST 12 MONTHS, THE FOOD YOU BOUGHT JUST DIDN'T LAST AND YOU DIDN'T HAVE MONEY TO GET MORE.: PATIENT DECLINED

## 2025-06-08 SDOH — ECONOMIC STABILITY: TRANSPORTATION INSECURITY
IN THE PAST 12 MONTHS, HAS THE LACK OF TRANSPORTATION KEPT YOU FROM MEDICAL APPOINTMENTS OR FROM GETTING MEDICATIONS?: NO

## 2025-06-08 SDOH — ECONOMIC STABILITY: INCOME INSECURITY: IN THE LAST 12 MONTHS, WAS THERE A TIME WHEN YOU WERE NOT ABLE TO PAY THE MORTGAGE OR RENT ON TIME?: PATIENT DECLINED

## 2025-06-08 SDOH — ECONOMIC STABILITY: TRANSPORTATION INSECURITY
IN THE PAST 12 MONTHS, HAS LACK OF TRANSPORTATION KEPT YOU FROM MEETINGS, WORK, OR FROM GETTING THINGS NEEDED FOR DAILY LIVING?: NO

## 2025-06-08 SDOH — ECONOMIC STABILITY: FOOD INSECURITY: WITHIN THE PAST 12 MONTHS, YOU WORRIED THAT YOUR FOOD WOULD RUN OUT BEFORE YOU GOT MONEY TO BUY MORE.: PATIENT DECLINED

## 2025-06-08 ASSESSMENT — PATIENT HEALTH QUESTIONNAIRE - PHQ9
SUM OF ALL RESPONSES TO PHQ9 QUESTIONS 1 & 2: 0
2. FEELING DOWN, DEPRESSED OR HOPELESS: NOT AT ALL
SUM OF ALL RESPONSES TO PHQ QUESTIONS 1-9: 0
1. LITTLE INTEREST OR PLEASURE IN DOING THINGS: NOT AT ALL
SUM OF ALL RESPONSES TO PHQ QUESTIONS 1-9: 0
2. FEELING DOWN, DEPRESSED OR HOPELESS: NOT AT ALL
SUM OF ALL RESPONSES TO PHQ QUESTIONS 1-9: 0
1. LITTLE INTEREST OR PLEASURE IN DOING THINGS: NOT AT ALL
SUM OF ALL RESPONSES TO PHQ QUESTIONS 1-9: 0

## 2025-06-11 ENCOUNTER — OFFICE VISIT (OUTPATIENT)
Dept: FAMILY MEDICINE CLINIC | Age: 65
End: 2025-06-11
Payer: MEDICARE

## 2025-06-11 VITALS
BODY MASS INDEX: 31.07 KG/M2 | DIASTOLIC BLOOD PRESSURE: 64 MMHG | SYSTOLIC BLOOD PRESSURE: 112 MMHG | WEIGHT: 217 LBS | HEIGHT: 70 IN

## 2025-06-11 DIAGNOSIS — Z79.4 TYPE 2 DIABETES MELLITUS WITH DIABETIC NEUROPATHY, WITH LONG-TERM CURRENT USE OF INSULIN (HCC): ICD-10-CM

## 2025-06-11 DIAGNOSIS — E11.42 TYPE 2 DIABETES MELLITUS WITH DIABETIC POLYNEUROPATHY, WITH LONG-TERM CURRENT USE OF INSULIN (HCC): Primary | ICD-10-CM

## 2025-06-11 DIAGNOSIS — E11.40 TYPE 2 DIABETES MELLITUS WITH DIABETIC NEUROPATHY, WITH LONG-TERM CURRENT USE OF INSULIN (HCC): ICD-10-CM

## 2025-06-11 DIAGNOSIS — I50.22 CHRONIC SYSTOLIC (CONGESTIVE) HEART FAILURE (HCC): ICD-10-CM

## 2025-06-11 DIAGNOSIS — Z12.11 COLON CANCER SCREENING: ICD-10-CM

## 2025-06-11 DIAGNOSIS — Z79.4 TYPE 2 DIABETES MELLITUS WITH DIABETIC POLYNEUROPATHY, WITH LONG-TERM CURRENT USE OF INSULIN (HCC): Primary | ICD-10-CM

## 2025-06-11 DIAGNOSIS — G47.00 INSOMNIA, UNSPECIFIED TYPE: ICD-10-CM

## 2025-06-11 LAB — HBA1C MFR BLD: 7.8 %

## 2025-06-11 PROCEDURE — 99214 OFFICE O/P EST MOD 30 MIN: CPT

## 2025-06-11 PROCEDURE — 1123F ACP DISCUSS/DSCN MKR DOCD: CPT

## 2025-06-11 PROCEDURE — 3078F DIAST BP <80 MM HG: CPT

## 2025-06-11 PROCEDURE — 3051F HG A1C>EQUAL 7.0%<8.0%: CPT

## 2025-06-11 PROCEDURE — 83036 HEMOGLOBIN GLYCOSYLATED A1C: CPT

## 2025-06-11 PROCEDURE — G2211 COMPLEX E/M VISIT ADD ON: HCPCS

## 2025-06-11 PROCEDURE — 3074F SYST BP LT 130 MM HG: CPT

## 2025-06-11 RX ORDER — HYDROXYZINE HYDROCHLORIDE 25 MG/1
25 TABLET, FILM COATED ORAL 2 TIMES DAILY
Qty: 180 TABLET | Refills: 1 | Status: SHIPPED | OUTPATIENT
Start: 2025-06-11

## 2025-06-11 RX ORDER — ROPINIROLE 1 MG/1
1 TABLET, FILM COATED ORAL NIGHTLY
Qty: 30 TABLET | Refills: 0 | Status: SHIPPED | OUTPATIENT
Start: 2025-06-11

## 2025-06-11 ASSESSMENT — ENCOUNTER SYMPTOMS
SHORTNESS OF BREATH: 0
EYE PAIN: 0
CONSTIPATION: 0
SORE THROAT: 0
ABDOMINAL PAIN: 0
ABDOMINAL DISTENTION: 0
EYE ITCHING: 0
DIARRHEA: 0
CHEST TIGHTNESS: 0
EYE DISCHARGE: 0
COLOR CHANGE: 0
COUGH: 0

## 2025-06-11 NOTE — PROGRESS NOTES
[facility]    2025    Matthieu Skaggs (:  1960) is a 64 y.o. male, here to follow up on DM.     Chief Complaint   Patient presents with    Diabetes    Chronic Kidney Disease        ASSESSMENT/ PLAN  1. Type 2 diabetes mellitus with diabetic polyneuropathy, with long-term current use of insulin (HCC)  -A1c 7.8 today.  No change in medication.  Encouraged dietary change.  At 1 point was down to 6.4 with diet and lifestyle.  Plans to get back on lifestyle and diet regimen  - POCT glycosylated hemoglobin (Hb A1C)  - Albumin/Creatinine Ratio, Urine    2. Type 2 diabetes mellitus with diabetic neuropathy, with long-term current use of insulin (HCC)  -See #1  - Albumin/Creatinine Ratio, Urine    3. Chronic systolic (congestive) heart failure (HCC)  -No chest pain or shortness of breath.  Following with cardiology.  Has follow-up with cardiology on     4. Insomnia, unspecified type  -Hydroxyzine and had Requip    -- rOPINIRole (REQUIP) 1 MG tablet; Take 1 tablet by mouth nightly  Dispense: 30 tablet; Refill: 0  - hydrOXYzine HCl (ATARAX) 25 MG tablet; Take 1 tablet by mouth in the morning and at bedtime  Dispense: 180 tablet; Refill: 1    5. Colon cancer screening  -Cologuard ordered  - Fecal DNA Colorectal cancer screening (Cologuard)          1. Type 2 diabetes mellitus with diabetic polyneuropathy, with long-term current use of insulin (HCC)  -Reassess A1c today.  Continue insulin regimen.  Continue Farxiga.  Potentially could try berberine.  - Lipid Panel  - Hemoglobin A1C  - Comprehensive Metabolic Panel  - CBC with Auto Differential  - insulin lispro, 1 Unit Dial, (HUMALOG KWIKPEN) 100 UNIT/ML SOPN; Inject 35 Units into the skin 3 times daily (before meals)  Dispense: 5 Adjustable Dose Pre-filled Pen Syringe; Refill: 3  - Insulin Pen Needle (PEN NEEDLES) 31G X 6 MM MISC; TEST BLOOD SUGARS ONCE DAILY FOR E11.42  Dispense: 300 each; Refill: 11  - Continuous Glucose Sensor (FREESTYLE

## 2025-06-16 ENCOUNTER — TELEPHONE (OUTPATIENT)
Dept: ADMINISTRATIVE | Age: 65
End: 2025-06-16

## 2025-06-17 ENCOUNTER — TELEPHONE (OUTPATIENT)
Dept: FAMILY MEDICINE CLINIC | Age: 65
End: 2025-06-17

## 2025-06-17 DIAGNOSIS — L29.9 CHRONIC PRURITUS: Primary | ICD-10-CM

## 2025-06-17 RX ORDER — HYDROXYZINE HYDROCHLORIDE 25 MG/1
25 TABLET, FILM COATED ORAL EVERY 8 HOURS PRN
Qty: 90 TABLET | Refills: 3 | Status: SHIPPED | OUTPATIENT
Start: 2025-06-17 | End: 2025-10-15

## 2025-06-17 NOTE — TELEPHONE ENCOUNTER
Patient called about his hydroxyzine, he has been out of this for 5 days. He said it had been sent for a PA. Please call patient.,

## 2025-06-17 NOTE — TELEPHONE ENCOUNTER
RichardDiana      6/16/25  2:01 PM  Note     The medication was DENIED; DENIAL letter is uploaded to MEDIA.     Generic Denial:  Other; please see Denial Letter.               Note :  We denied coverage for this drug because: The information provided by your prescriber did not meet the requirements for covering this medication (prior authorization). Your plan does not allow coverage of this medication based on your prescriber answering No to the following question(s): Is the requested drug being prescribed for the treatment of anxiety?        If you want an APPEAL; please note in this encounter what new information you would like to APPEAL with.  Once complete route back to PA POOL.     If this requires a response please respond to the pool ( P MHCX PSC MEDICATION PRE-AUTH).       Thank you please advise patient.             It was denied do you want try something else   Pt refused to pay for the discount card [employer] offered him

## 2025-06-20 DIAGNOSIS — G47.00 INSOMNIA, UNSPECIFIED TYPE: ICD-10-CM

## 2025-06-20 RX ORDER — HYDROXYZINE HYDROCHLORIDE 25 MG/1
25 TABLET, FILM COATED ORAL 2 TIMES DAILY
Qty: 180 TABLET | Refills: 1 | Status: SHIPPED | OUTPATIENT
Start: 2025-06-20

## 2025-06-20 NOTE — TELEPHONE ENCOUNTER
Please provide dx code and ICD-10 code for pa request      If this requires a response please respond to the pool ( P MHCX PSC MEDICATION PRE-AUTH).

## 2025-06-20 NOTE — TELEPHONE ENCOUNTER
Patient called and would like the medication sent for the quantity of 180. He said it was only 90 and that is incorrect. He was upset that the quantity was wrong rc

## 2025-06-26 RX ORDER — DAPAGLIFLOZIN 10 MG/1
10 TABLET, FILM COATED ORAL EVERY MORNING
Qty: 90 TABLET | Refills: 3 | OUTPATIENT
Start: 2025-06-26

## 2025-06-26 RX ORDER — CLOPIDOGREL BISULFATE 75 MG/1
75 TABLET ORAL DAILY
Qty: 90 TABLET | Refills: 3 | OUTPATIENT
Start: 2025-06-26

## 2025-06-26 NOTE — TELEPHONE ENCOUNTER
FARXIGA 10 MG tablet [[id number]]    Order Details  Dose: 10 mg Route: Oral Frequency: EVERY MORNING   Dispense Quantity: 90 tablet Refills: 3          Sig: Take 1 tablet by mouth every morning         Start Date: 05/14/25 End Date: --   Written Date: 05/14/25 Expiration Date: 05/14/26   Original Order: FARXIGA 10 MG tablet [[id number]]   Providers    Authorizing Provider: Rudy Yo MD NPI: [id number]   Ordering User: Berna Villegas RN    Cosigner: Rudy Yo MD Signed: 5/14/2025 15:03     [facility] [id number] - [facility], [place] - [address] - P [phone] - F [phone]  [address] [facility] [place] [address]  Phone: [phone]  Fax: [phone]         clopidogrel (PLAVIX) 75 MG tablet [[id number]]    Order Details  Dose: 75 mg Route: Oral Frequency: DAILY   Dispense Quantity: 90 tablet Refills: 3          Sig: Take 1 tablet by mouth daily         Start Date: 05/14/25 End Date: --   Written Date: 05/14/25 Expiration Date: 05/14/26   Original Order: clopidogrel (PLAVIX) 75 MG tablet [[id number]]   Providers    Authorizing Provider: Rudy Yo MD NPI: [id number]   Ordering User: Berna Villegas RN    Cosigner: Rudy Yo MD Signed: 5/14/2025 15:03     Pharmacy    [facility] [id number] - [facility], [place] - [address] - P [phone] - F [phone]  [address] [facility] [place] [address]  Phone: [phone]  Fax: [phone]   Pt just got year supplies of these medications- duplicate rx request. Rx denied

## 2025-07-08 ENCOUNTER — TELEPHONE (OUTPATIENT)
Dept: FAMILY MEDICINE CLINIC | Age: 65
End: 2025-07-08

## 2025-07-08 DIAGNOSIS — M43.6 STIFF NECK: Primary | ICD-10-CM

## 2025-07-10 DIAGNOSIS — G47.00 INSOMNIA, UNSPECIFIED TYPE: ICD-10-CM

## 2025-07-10 RX ORDER — ROPINIROLE 1 MG/1
TABLET, FILM COATED ORAL
Qty: 30 TABLET | Refills: 0 | Status: SHIPPED | OUTPATIENT
Start: 2025-07-10

## 2025-07-10 NOTE — TELEPHONE ENCOUNTER
Refill Request     CONFIRM preferrred pharmacy with the patient.    If Mail Order Rx - Pend for 90 day refill.      Last Seen: Last Seen Department: 6/11/2025  Last Seen by PCP: 6/11/2025    Last Written:     If no future appointment scheduled, route STAFF MESSAGE with patient name to the  Pool for scheduling.      Next Appointment:   Future Appointments   Date Time Provider Department Center   7/16/2025  1:00 PM Porsha Marlow, PT [facility]   7/16/2025  2:20 PM Pedro, Phu P, APRN - CNP [facility] DEP   10/13/2025  1:20 PM Pedro, Phu P, APRN - CNP [facility] DEP   11/21/2025  1:15 PM Rudy Yo MD [facility]       Message sent to  to schedule appt with patient?  NO      Requested Prescriptions     Pending Prescriptions Disp Refills    rOPINIRole (REQUIP) 1 MG tablet [Pharmacy Med Name: rOPINIRole HCL 1 MG TABLET] 30 tablet 0     Sig: TAKE 1 TABLET BY MOUTH ONCE NIGHTLY

## 2025-07-14 DIAGNOSIS — G47.00 INSOMNIA, UNSPECIFIED TYPE: ICD-10-CM

## 2025-07-14 RX ORDER — ROPINIROLE 1 MG/1
1 TABLET, FILM COATED ORAL NIGHTLY
Qty: 90 TABLET | Refills: 3 | Status: SHIPPED | OUTPATIENT
Start: 2025-07-14

## 2025-07-14 NOTE — TELEPHONE ENCOUNTER
Pt called and states that he was placed on this medication and advised to let pcp know if was helping or not. Pt states he feels it is helping and would like a refill for 90 day supply sent to his [facility]     Future appt scheduled 10/13/2025                    Last appt 06/11/2025      Refill Request     CONFIRM preferrred pharmacy with the patient.    If Mail Order Rx - Pend for 90 day refill.      Last Seen: Last Seen Department: 6/11/2025  Last Seen by PCP: 6/11/2025    Last Written: 07/10/2025    If no future appointment scheduled, route STAFF MESSAGE with patient name to the  Pool for scheduling.      Next Appointment:   Future Appointments   Date Time Provider Department Center   7/16/2025  1:00 PM Porsha Marlow, PT [facility]   7/16/2025  2:20 PM Pedro, Phu P, APRN - CNP [facility] DEP   10/13/2025  1:20 PM Pedro, Phu P, APRN - CNP [facility] DEP   11/21/2025  1:15 PM Rudy Yo MD [facility]       Message sent to  to schedule appt with patient?  NO      Requested Prescriptions     Pending Prescriptions Disp Refills    rOPINIRole (REQUIP) 1 MG tablet 90 tablet 3     Sig: Take 1 tablet by mouth nightly

## 2025-07-16 ENCOUNTER — TELEMEDICINE (OUTPATIENT)
Dept: FAMILY MEDICINE CLINIC | Age: 65
End: 2025-07-16

## 2025-07-16 DIAGNOSIS — Z00.00 MEDICARE ANNUAL WELLNESS VISIT, SUBSEQUENT: Primary | ICD-10-CM

## 2025-07-16 DIAGNOSIS — Z79.4 TYPE 2 DIABETES MELLITUS WITH DIABETIC NEUROPATHY, WITH LONG-TERM CURRENT USE OF INSULIN (HCC): ICD-10-CM

## 2025-07-16 DIAGNOSIS — E11.40 TYPE 2 DIABETES MELLITUS WITH DIABETIC NEUROPATHY, WITH LONG-TERM CURRENT USE OF INSULIN (HCC): ICD-10-CM

## 2025-07-16 DIAGNOSIS — Z79.4 TYPE 2 DIABETES MELLITUS WITH DIABETIC POLYNEUROPATHY, WITH LONG-TERM CURRENT USE OF INSULIN (HCC): ICD-10-CM

## 2025-07-16 DIAGNOSIS — I21.11 ST ELEVATION MYOCARDIAL INFARCTION INVOLVING RIGHT CORONARY ARTERY (HCC): ICD-10-CM

## 2025-07-16 DIAGNOSIS — E11.42 TYPE 2 DIABETES MELLITUS WITH DIABETIC POLYNEUROPATHY, WITH LONG-TERM CURRENT USE OF INSULIN (HCC): ICD-10-CM

## 2025-07-16 DIAGNOSIS — I50.22 CHRONIC SYSTOLIC (CONGESTIVE) HEART FAILURE (HCC): ICD-10-CM

## 2025-07-16 ASSESSMENT — LIFESTYLE VARIABLES
HOW OFTEN DO YOU HAVE A DRINK CONTAINING ALCOHOL: NEVER
HOW MANY STANDARD DRINKS CONTAINING ALCOHOL DO YOU HAVE ON A TYPICAL DAY: PATIENT DOES NOT DRINK

## 2025-07-16 ASSESSMENT — PATIENT HEALTH QUESTIONNAIRE - PHQ9
SUM OF ALL RESPONSES TO PHQ QUESTIONS 1-9: 0
SUM OF ALL RESPONSES TO PHQ QUESTIONS 1-9: 0
2. FEELING DOWN, DEPRESSED OR HOPELESS: NOT AT ALL
SUM OF ALL RESPONSES TO PHQ QUESTIONS 1-9: 0
SUM OF ALL RESPONSES TO PHQ QUESTIONS 1-9: 0
1. LITTLE INTEREST OR PLEASURE IN DOING THINGS: NOT AT ALL

## 2025-07-16 NOTE — PROGRESS NOTES
Medicare Annual Wellness Visit    Matthieu Skaggs is here for Medicare AWV    Assessment & Plan   1. Medicare annual wellness visit, subsequent  - Annual wellness visit today    2. Type 2 diabetes mellitus with diabetic polyneuropathy, with long-term current use of insulin ([facility])  -Overall stable on insulin and Farxiga.  Potentially could start berberine.    3. Type 2 diabetes mellitus with diabetic neuropathy, with long-term current use of insulin (HCC)  - See 2    4. Chronic systolic (congestive) heart failure (HCC)  -Last action fraction 50%.  Continue Farxiga and carvedilol  - Follow-up with cardiology    5. ST elevation myocardial infarction involving right coronary artery ([facility])  -Following with cardiology.  - No chest pain or shortness of breath  - Blood pressure controlled         No follow-ups on file.     Subjective       Patient's complete Health Risk Assessment and screening values have been reviewed and are found in Flowsheets. The following problems were reviewed today and where indicated follow up appointments were made and/or referrals ordered.    Positive Risk Factor Screenings with Interventions:          Controlled Medication Review:    Today's Pain Level: No data recorded   Opioid Risk: (Low risk score <55) Opioid risk score: 21    Patient is low risk for opioid use disorder or overdose.    Last PDMP Adam as Reviewed:  Review User Review Instant Review Result   TOMY MARTÍNEZ 3/11/2020 11:51 AM     Reviewed PDMP [1]     Last Controlled Substance Monitoring Documentation      Flowsheet [facility] ED from 12/22/2017 in [facility] Emergency Department   Attestation The Prescription Monitoring Report for this patient was reviewed today. filed at 12/22/2017 0930               Inactivity:  On average, how many days per week do you engage in moderate to strenuous exercise (like a brisk walk)?: 0 days (!) Abnormal  On average, how many minutes do you engage in exercise at this level?: 0

## 2025-07-16 NOTE — PATIENT INSTRUCTIONS
Learning About Being Active as an Older Adult  Why is being active important as you get older?     Being active is one of the best things you can do for your health. And it's never too late to start. Being active--or getting active, if you aren't already--has definite benefits. It can:  Give you more energy,  Keep your mind sharp.  Improve balance to reduce your risk of falls.  Help you manage chronic illness with fewer medicines.  No matter how old you are, how fit you are, or what health problems you have, there is a form of activity that will work for you. And the more physical activity you can do, the better your overall health will be.  What kinds of activity can help you stay healthy?  Being more active will make your daily activities easier. Physical activity includes planned exercise and things you do in daily life. There are four types of activity:  Aerobic.  Doing aerobic activity makes your heart and lungs strong.  Includes walking, dancing, and gardening.  Aim for at least 2½ hours spread throughout the week.  It improves your energy and can help you sleep better.  Muscle-strengthening.  This type of activity can help maintain muscle and strengthen bones.  Includes climbing stairs, using resistance bands, and lifting or carrying heavy loads.  Aim for at least twice a week.  It can help protect the knees and other joints.  Stretching.  Stretching gives you better range of motion in joints and muscles.  Includes upper arm stretches, calf stretches, and gentle yoga.  Aim for at least twice a week, preferably after your muscles are warmed up from other activities.  It can help you function better in daily life.  Balancing.  This helps you stay coordinated and have good posture.  Includes heel-to-toe walking, kelsey chi, and certain types of yoga.  Aim for at least 3 days a week.  It can reduce your risk of falling.  Even if you have a hard time meeting the recommendations, it's better to be more active

## 2025-07-22 ENCOUNTER — TELEPHONE (OUTPATIENT)
Dept: FAMILY MEDICINE CLINIC | Age: 65
End: 2025-07-22

## 2025-07-22 NOTE — TELEPHONE ENCOUNTER
Pt informed cologuard will reach out if they are unable to run test   Spoke with patient he states that he is taking magnesium oxide 250mg bid he buys it otc. He wants to know if that enough or he needs to take more?

## 2025-07-22 NOTE — TELEPHONE ENCOUNTER
Patient sent cologuard specimen out on 7-18-25. It was shipped back to him yesterday because the label to send on the box was to him. he shipped it out again today.  Asking if it is going to be ok or will he need to do another one

## 2025-07-25 ENCOUNTER — TELEPHONE (OUTPATIENT)
Dept: FAMILY MEDICINE CLINIC | Age: 65
End: 2025-07-25

## 2025-07-25 RX ORDER — GLUCOSAMINE HCL/CHONDROITIN SU 500-400 MG
1 CAPSULE ORAL 4 TIMES DAILY
Qty: 100 STRIP | Refills: 11 | Status: SHIPPED | OUTPATIENT
Start: 2025-07-25

## 2025-07-25 RX ORDER — AVOBENZONE, HOMOSALATE, OCTISALATE, OCTOCRYLENE 30; 40; 45; 26 MG/ML; MG/ML; MG/ML; MG/ML
1 CREAM TOPICAL 4 TIMES DAILY
Qty: 100 EACH | Refills: 11 | Status: SHIPPED | OUTPATIENT
Start: 2025-07-25

## 2025-07-25 RX ORDER — BLOOD-GLUCOSE METER
1 KIT MISCELLANEOUS DAILY
Qty: 1 KIT | Refills: 0 | Status: SHIPPED | OUTPATIENT
Start: 2025-07-25

## 2025-07-25 NOTE — TELEPHONE ENCOUNTER
I have a freestyle patricia for him.  He did say the last 3 sensors have fallen off in the past week, I asked him where does he get his sensors and he stated we supply them to him because he can not afford to pay $54 for the sensors.  I did tell him that we can not be suppling him with his sensors all the time because sometimes we do not have any samples and the ones we get need to be used for patients who are first starting out on a CGM.  And since the past week he went through 3 sensors that should last longer then a day at a time.  He stated since we can not give him samples all the time then he will not  this one.  I did tell him that it would be at the  if he changed his mind

## 2025-07-28 RX ORDER — GLUCOSAMINE HCL/CHONDROITIN SU 500-400 MG
CAPSULE ORAL
Qty: 300 STRIP | Refills: 1 | Status: SHIPPED | OUTPATIENT
Start: 2025-07-28

## 2025-07-28 RX ORDER — GLUCOSAMINE HCL/CHONDROITIN SU 500-400 MG
1 CAPSULE ORAL 4 TIMES DAILY
Qty: 300 STRIP | Refills: 11 | Status: SHIPPED | OUTPATIENT
Start: 2025-07-28 | End: 2025-07-28

## 2025-07-28 RX ORDER — ATORVASTATIN CALCIUM 40 MG/1
TABLET, FILM COATED ORAL
Qty: 90 TABLET | Refills: 0 | Status: SHIPPED | OUTPATIENT
Start: 2025-07-28

## 2025-07-28 RX ORDER — AVOBENZONE, HOMOSALATE, OCTISALATE, OCTOCRYLENE 30; 40; 45; 26 MG/ML; MG/ML; MG/ML; MG/ML
CREAM TOPICAL
Qty: 300 EACH | Refills: 5 | Status: SHIPPED | OUTPATIENT
Start: 2025-07-28

## 2025-07-28 NOTE — TELEPHONE ENCOUNTER
Refill Request     CONFIRM preferrred pharmacy with the patient.    If Mail Order Rx - Pend for 90 day refill.      Last Seen: Last Seen Department: 7/16/2025  Last Seen by PCP: Visit date not found    Last Written:     If no future appointment scheduled, route STAFF MESSAGE with patient name to the  Pool for scheduling.      Next Appointment:   Future Appointments   Date Time Provider Department Center   7/30/2025  2:00 PM Porsha Marlow, PT [facility]   10/13/2025  1:20 PM Phu Pedro, APRN - CNP [facility] DE[facility]   11/21/2025  1:15 PM Rudy Yo, MD [facility]       Message sent to  to schedule appt with patient?  NO      Requested Prescriptions     Pending Prescriptions Disp Refills    atorvastatin (LIPITOR) 40 MG tablet [Pharmacy Med Name: ATORVASTATIN 40 MG TABLET] 90 tablet 0     Sig: TAKE 1 TABLET BY MOUTH ONCE NIGHTLY

## 2025-07-28 NOTE — TELEPHONE ENCOUNTER
Matthieu says he received a message from [facility] that his insurance is needing more information to fill. He does not know which prescription they are referring to but it starts with acc?

## 2025-07-30 ENCOUNTER — HOSPITAL ENCOUNTER (OUTPATIENT)
Dept: PHYSICAL THERAPY | Age: 65
Setting detail: THERAPIES SERIES
Discharge: HOME | End: 2025-07-30
Payer: MEDICARE

## 2025-07-30 DIAGNOSIS — M54.2 NECK PAIN: Primary | ICD-10-CM

## 2025-07-30 PROCEDURE — 97161 PT EVAL LOW COMPLEX 20 MIN: CPT

## 2025-07-30 PROCEDURE — 97140 MANUAL THERAPY 1/> REGIONS: CPT

## 2025-07-30 PROCEDURE — 97110 THERAPEUTIC EXERCISES: CPT

## 2025-07-30 NOTE — PLAN OF CARE
applicable for orthotic eval)     Long Term Goals: To be achieved in: 6 weeks  Disability index score of 10% or less for the Neck Disability Index to assist with reaching prior level of function with activities such as lifting.  [] Progressing: [] Met: [] Not Met: [] Adjusted  Patient will demonstrate increased AROM of cervical rotation to 60 deg bilat without pain to allow for proper joint functioning to enable patient to turn fully while driving.   [] Progressing: [] Met: [] Not Met: [] Adjusted  Patient will demonstrate increased Strength of deep cervical flexors to at least 4+/5 throughout without pain to allow for proper functional mobility to enable patient to return to sitting x 60 min without pain.   [] Progressing: [] Met: [] Not Met: [] Adjusted  Patient will return to weed-eating without increased symptoms or restriction.   [] Progressing: [] Met: [] Not Met: [] Adjusted  Patient wants to be able to tolerate all daily activities, including driving, without pain or restriction.(patient specific functional goal)    [] Progressing: [] Met: [] Not Met: [] Adjusted       Overall Progression Towards Functional goals/ Treatment Progress Update:  [] Patient is progressing as expected towards functional goals listed.    [] Progression is slowed due to complexities/Impairments listed.  [] Progression has been slowed due to co-morbidities.  [x] Plan just implemented, too soon (<30days) to assess goals progression   [] Goals require adjustment due to lack of progress  [] Patient is not progressing as expected and requires additional follow up with physician  [] Other:     TREATMENT PLAN     Frequency/Duration: 1-2x/week for 6 weeks for the following treatment interventions:    Interventions:  Therapeutic Exercise (12948) including: strength training, ROM, and functional mobility  Therapeutic Activities (31250) including: functional mobility training and education.  Neuromuscular Re-education (98277) activation and

## 2025-08-02 LAB — NONINV COLON CA DNA+OCC BLD SCRN STL QL: NORMAL

## 2025-08-19 ENCOUNTER — TELEPHONE (OUTPATIENT)
Dept: FAMILY MEDICINE CLINIC | Age: 65
End: 2025-08-19

## 2025-08-19 RX ORDER — ACYCLOVIR 400 MG/1
1 TABLET ORAL
Qty: 2 EACH | Refills: 3 | Status: SHIPPED | OUTPATIENT
Start: 2025-08-19 | End: 2025-08-19 | Stop reason: SDUPTHER

## 2025-08-19 RX ORDER — ACYCLOVIR 400 MG/1
1 TABLET ORAL
Qty: 1 EACH | Refills: 0 | Status: SHIPPED | OUTPATIENT
Start: 2025-08-19 | End: 2025-08-21

## 2025-08-20 RX ORDER — ACYCLOVIR 400 MG/1
1 TABLET ORAL
Qty: 3 EACH | Refills: 3 | Status: SHIPPED | OUTPATIENT
Start: 2025-08-20

## 2025-08-21 LAB — NONINV COLON CA DNA+OCC BLD SCRN STL QL: NEGATIVE

## 2025-08-25 ENCOUNTER — TELEPHONE (OUTPATIENT)
Dept: FAMILY MEDICINE CLINIC | Age: 65
End: 2025-08-25

## (undated) DEVICE — COVER BOOT THK2MIL UNIV POLYETH IMPERMEABLE FLD RESIST DISP

## (undated) DEVICE — YANKAUER,BULB TIP,W/O VENT,RIGID,STERILE: Brand: MEDLINE

## (undated) DEVICE — ENDO CARRY-ON PROCEDURE KIT INCLUDES SUCTION TUBING, LUBRICANT, GAUZE, BIOHAZARD STICKER, TRANSPORT PAD AND INTERCEPT BEDSIDE KIT.: Brand: ENDO CARRY-ON PROCEDURE KIT

## (undated) DEVICE — ENDOSCOPIC KIT 2 12 FT OP4 DE2 GWN SYR

## (undated) DEVICE — SUTURE MCRYL SZ 2-0 L27IN ABSRB VLT L26MM UR-6 5/8 CIR Y605H

## (undated) DEVICE — TRAP POLYP ETRAP

## (undated) DEVICE — MEDI-VAC NON-CONDUCTIVE SUCTION TUBING: Brand: CARDINAL HEALTH

## (undated) DEVICE — 3M™ MICROFOAM™ SURGICAL TAPE 4 ROLLS/CARTON 6 CARTONS/CASE 1528-3: Brand: 3M™ MICROFOAM™

## (undated) DEVICE — SUTURE FIBERWIRE SZ 2 L38IN NONABSORBABLE BLU WHT BLK AR7201

## (undated) DEVICE — 3M™ DURAPORE™ SURGICAL TAPE 1538-3, 3 INCH X 10 YARD (7,5CM X 9,1M), 4 ROLLS/BOX: Brand: 3M™ DURAPORE™

## (undated) DEVICE — CONMED SCOPE SAVER BITE BLOCK, 20X27 MM: Brand: SCOPE SAVER

## (undated) DEVICE — Device: Brand: DISPOSABLE ELECTROSURGICAL SNARE

## (undated) DEVICE — SUTURE ETHLN SZ 3-0 L18IN NONABSORBABLE BLK PS-2 L19MM 3/8 1669H

## (undated) DEVICE — TUBING PMP L16FT MAIN DISP FOR AR-6400 AR-6475

## (undated) DEVICE — 5.5 CM ACROMIOBLASTER STRAIGHT                                    BURRS, POWER/EP-1, BRICK RED, 8000                                    MAXIMUM RPM, PACKAGED 6 PER BOX, STERILE

## (undated) DEVICE — MAYO STAND COVER: Brand: CONVERTORS

## (undated) DEVICE — TUBING PMP L6FT CONT WAVE EXTN

## (undated) DEVICE — NEEDLE SUT PASS FOR ROT CUF LABRAL REP MULTFI SCORPION

## (undated) DEVICE — ELECTRODE PT RET AD L9FT HI MOIST COND ADH HYDRGEL CORDED

## (undated) DEVICE — FORCEP BX STD CAP 240CM RAD JAW 4

## (undated) DEVICE — OCCLUSIVE GAUZE STRIP,3% BISMUTH TRIBROMOPHENATE IN PETROLATUM BLEND: Brand: XEROFORM

## (undated) DEVICE — SUTURE PROL SZ 0 L30IN NONABSORBABLE BLU L36MM CT-1 1/2 CIR 8424H

## (undated) DEVICE — CANNULA ARTHSCP L7CM ID8.25MM TRNSLUC THRD FLX W/ NO SQUIRT

## (undated) DEVICE — APPLICATOR PREP 26ML 0.7% IOD POVACRYLEX 74% ISO ALC ST

## (undated) DEVICE — Z INACTIVE NO SUPPLIER SOLUTIONIRRIG 3000ML 0.9% SOD CHL FLX CONT [79720808] [HOSPIRA WORLDWIDE INC]

## (undated) DEVICE — PAD FELT FOR ARTHROVAC FLR SUCT SYS

## (undated) DEVICE — MANIFOLD SURG NEPTUNE WST MGMT

## (undated) DEVICE — SHOULDER STABILIZATION KIT,                                    DISPOSABLE 12 PER BOX

## (undated) DEVICE — CANNULA,OXY,ADULT,SUPERSOFT,W/7'TUB,SC: Brand: MEDLINE INDUSTRIES, INC.